# Patient Record
Sex: FEMALE | Race: WHITE | NOT HISPANIC OR LATINO | Employment: OTHER | URBAN - METROPOLITAN AREA
[De-identification: names, ages, dates, MRNs, and addresses within clinical notes are randomized per-mention and may not be internally consistent; named-entity substitution may affect disease eponyms.]

---

## 2017-01-09 RX ORDER — ATORVASTATIN CALCIUM 10 MG/1
10 TABLET, FILM COATED ORAL DAILY
Qty: 30 TABLET | Refills: 2 | Status: SHIPPED | OUTPATIENT
Start: 2017-01-09 | End: 2017-01-10 | Stop reason: SDUPTHER

## 2017-01-10 RX ORDER — ATORVASTATIN CALCIUM 10 MG/1
10 TABLET, FILM COATED ORAL DAILY
Qty: 90 TABLET | Refills: 1 | Status: SHIPPED | OUTPATIENT
Start: 2017-01-10 | End: 2017-01-11 | Stop reason: SDUPTHER

## 2017-01-11 RX ORDER — ATORVASTATIN CALCIUM 10 MG/1
10 TABLET, FILM COATED ORAL DAILY
Qty: 90 TABLET | Refills: 1 | Status: SHIPPED | OUTPATIENT
Start: 2017-01-11 | End: 2017-05-11

## 2017-02-22 ENCOUNTER — OFFICE VISIT (OUTPATIENT)
Dept: CARDIOLOGY | Facility: CLINIC | Age: 72
End: 2017-02-22

## 2017-02-22 VITALS — DIASTOLIC BLOOD PRESSURE: 79 MMHG | HEART RATE: 90 BPM | SYSTOLIC BLOOD PRESSURE: 127 MMHG

## 2017-02-22 DIAGNOSIS — R06.02 SOB (SHORTNESS OF BREATH): ICD-10-CM

## 2017-02-22 DIAGNOSIS — I25.10 CHRONIC CORONARY ARTERY DISEASE: Primary | ICD-10-CM

## 2017-02-22 DIAGNOSIS — I21.4 ACUTE NON-ST ELEVATION MYOCARDIAL INFARCTION (NSTEMI) (HCC): ICD-10-CM

## 2017-02-22 DIAGNOSIS — R09.89 BRUIT: Primary | ICD-10-CM

## 2017-02-22 DIAGNOSIS — E78.5 HYPERLIPIDEMIA, UNSPECIFIED HYPERLIPIDEMIA TYPE: ICD-10-CM

## 2017-02-22 DIAGNOSIS — R09.89 CAROTID BRUIT, UNSPECIFIED LATERALITY: ICD-10-CM

## 2017-02-22 DIAGNOSIS — I25.10 CHRONIC CORONARY ARTERY DISEASE: ICD-10-CM

## 2017-02-22 DIAGNOSIS — Z72.0 TOBACCO ABUSE: ICD-10-CM

## 2017-02-22 PROCEDURE — 93000 ELECTROCARDIOGRAM COMPLETE: CPT | Performed by: INTERNAL MEDICINE

## 2017-02-22 PROCEDURE — 99213 OFFICE O/P EST LOW 20 MIN: CPT | Performed by: INTERNAL MEDICINE

## 2017-02-22 NOTE — PROGRESS NOTES
Subjective:       Mary Jack is a 71 y.o. female who here for follow up    CC yearly follow-up for coronary artery disease     HPI  71-year-old white female with known history of coronary artery disease  Hyperlipidemia as well as a tobacco abuse with a history of myocardial infarction here for the follow-up denies any chest pains or tightness in chest    Complains of shortness of breath on exertion     Problem List Items Addressed This Visit        Cardiovascular and Mediastinum    Acute myocardial infarction    Relevant Orders    Adult Transthoracic Echo Complete    Treadmill Stress Test    Duplex Carotid Ultrasound CAR    Lipid Panel    Comprehensive Metabolic Panel    Chronic coronary artery disease - Primary    Relevant Orders    ECG 12 Lead    Adult Transthoracic Echo Complete    Treadmill Stress Test    Duplex Carotid Ultrasound CAR    Lipid Panel    Comprehensive Metabolic Panel    Hyperlipidemia    Relevant Orders    Adult Transthoracic Echo Complete    Treadmill Stress Test    Duplex Carotid Ultrasound CAR    Lipid Panel    Comprehensive Metabolic Panel       Other    Tobacco abuse    Relevant Orders    Adult Transthoracic Echo Complete    Treadmill Stress Test    Duplex Carotid Ultrasound CAR    Lipid Panel    Comprehensive Metabolic Panel      Other Visit Diagnoses     Carotid bruit, unspecified laterality        Relevant Orders    Adult Transthoracic Echo Complete    Treadmill Stress Test    Duplex Carotid Ultrasound CAR    Lipid Panel    Comprehensive Metabolic Panel        Previous treatments/evaluations include: ASA and beta blocker. Cardiac risk factors: advanced age (older than 55 for men, 65 for women) and hypertension.    The following portions of the patient's history were reviewed and updated as appropriate: allergies, current medications, past family history, past medical history, past social history, past surgical history and problem list.    Past Medical History   Diagnosis Date   •  Anemia    • Arthritis    • CAD (coronary artery disease)    • COPD (chronic obstructive pulmonary disease)    • Emphysema lung    • Empyema    • Fatigue    • GERD (gastroesophageal reflux disease)    • Headache    • High risk medication use    • History of cardiac catheterization      CATH STENT PLACEMENT: CIRCUMFLEX BRANCH   • Hypertension    • Jaundice    • Myocardial infarction    • Myocardial infarction    • Occult blood in stools    • Reflux gastritis     reports that she quit smoking about 8 months ago. She has a 75.00 pack-year smoking history. She does not have any smokeless tobacco history on file. She reports that she does not drink alcohol or use illicit drugs.  Family History   Problem Relation Age of Onset   • Colon cancer Mother    • Breast cancer Mother    • Other Father      cardiac disorder   • Hypertension Father    • Other Sister      cardiac disorder   • Hypertension Sister    • Lung disease Sister    • Thyroid disease Sister    • Other Brother      cardiac disorder   • Thyroid disease Daughter        Review of Systems  Constitutional: No wt loss, fever, fatigue  Gastrointestinal: No nausea, abdominal pain  Behavioral/Psych: No insomnia or anxiety   Cardiovascular shortness of breath no chest pain   Objective:       Physical Exam             Physical Exam  Visit Vitals   • /79   • Pulse 90       General appearance: NAD, conversant   Eyes: anicteric sclerae, moist conjunctivae; no lid-lag; PERRLA   HENT: Atraumatic; oropharynx clear with moist mucous membranes and no mucosal ulcerations;  normal hard and soft palate   Neck: Trachea midline; FROM, supple, no thyromegaly or lymphadenopathy   Lungs: CTA, with normal respiratory effort and no intercostal retractions   CV: S1-S2 regular, no murmurs, no rub, no gallop   Abdomen: Soft, non-tender; no masses or HSM   Extremities: No peripheral edema or extremity lymphadenopathy  Skin: Normal temperature, turgor and texture; no rash, ulcers or  subcutaneous nodules   Psych: Appropriate affect, alert and oriented to person, place and time           Cardiographics  @  ECG 12 Lead  Date/Time: 2/22/2017 3:50 PM  Performed by: ASHU BEVERLY  Authorized by: ASHU BEVERLY   Comparison: not compared with previous ECG   Previous ECG: no previous ECG available  Rhythm: sinus rhythm  Conduction: conduction normal  ST Segments: ST segments normal  Clinical impression: normal ECG            Echocardiogram:        Current Outpatient Prescriptions:   •  aspirin 81 MG tablet, Take 1 tablet by mouth daily., Disp: , Rfl:   •  atorvastatin (LIPITOR) 10 MG tablet, Take 1 tablet by mouth Daily., Disp: 90 tablet, Rfl: 1  •  Diphenhydramine-PE-APAP (ALLERGY & SINUS INTENSE ST PO), Take  by mouth., Disp: , Rfl:   •  Docusate Calcium (STOOL SOFTENER PO), Take  by mouth., Disp: , Rfl:   •  metoprolol tartrate (LOPRESSOR) 25 MG tablet, take 1 tablet by mouth twice a day, Disp: 60 tablet, Rfl: 5  •  nitroglycerin (NITROSTAT) 0.4 MG SL tablet, Place under the tongue. DISSOLVE 1 TABLET UNDER THE TONGUE AS NEEDED FOR CHEST PAIN., Disp: , Rfl:   •  omeprazole (PriLOSEC) 40 MG capsule, Take 40 mg by mouth daily., Disp: , Rfl:   •  Potassium (POTASSIMIN PO), Take  by mouth., Disp: , Rfl:   •  vitamin B-12 (CYANOCOBALAMIN) 2500 MCG sublingual tablet tablet, Place under the tongue daily., Disp: , Rfl:    Assessment:        Patient Active Problem List   Diagnosis   • Acute myocardial infarction   • Chronic obstructive pulmonary disease   • Chronic coronary artery disease   • Fatigue   • Anemia   • Heartburn   • Hyperlipidemia   • Tobacco abuse         CONCLUSION:  1. Previous stent widely patent.   2. Successful angioplasty and stent to the right coronary artery reduced from  80% down to 0% with a 2.5 x 18 Xience drug-eluting stent dilated to 2.7.          Plan:       Clinically no signs and symptoms of angina or chf, no side effects with current meds,.    Continue current  meds and treatment.        ICD-10-CM ICD-9-CM   1. Chronic coronary artery disease I25.10 414.00   2. Hyperlipidemia, unspecified hyperlipidemia type E78.5 272.4   3. Tobacco abuse Z72.0 305.1     FLP/CMP    US OF CAROTIDS    ETT/ ECHO  SEE US 1 MONTH  COUNSELING:    Mary Sands was given to patient for the following topics: diagnostic results, risk factor reductions, impressions, risks and benefits of treatment options and importance of treatment compliance .       SMOKING COUNSELING:    Counseling given: Not Answered      EMR Dragon/Transcription disclaimer:   Much of this encounter note is an electronic transcription/translation of spoken language to printed text. The electronic translation of spoken language may permit erroneous, or at times, nonsensical words or phrases to be inadvertently transcribed; Although I have reviewed the note for such errors, some may still exist.

## 2017-03-06 ENCOUNTER — HOSPITAL ENCOUNTER (OUTPATIENT)
Dept: CARDIOLOGY | Facility: HOSPITAL | Age: 72
Discharge: HOME | End: 2017-03-06
Attending: INTERNAL MEDICINE | Admitting: INTERNAL MEDICINE

## 2017-03-06 ENCOUNTER — APPOINTMENT (OUTPATIENT)
Dept: LAB | Facility: HOSPITAL | Age: 72
End: 2017-03-06

## 2017-03-06 DIAGNOSIS — E78.5 HYPERLIPIDEMIA, UNSPECIFIED HYPERLIPIDEMIA TYPE: ICD-10-CM

## 2017-03-06 DIAGNOSIS — R09.89 BRUIT: ICD-10-CM

## 2017-03-06 DIAGNOSIS — I25.10 CHRONIC CORONARY ARTERY DISEASE: ICD-10-CM

## 2017-03-06 DIAGNOSIS — R06.02 SOB (SHORTNESS OF BREATH): ICD-10-CM

## 2017-03-06 LAB
ALBUMIN SERPL-MCNC: 3.8 G/DL (ref 3.5–5.2)
ALBUMIN/GLOB SERPL: 1.3 G/DL
ALP SERPL-CCNC: 77 U/L (ref 39–117)
ALT SERPL W P-5'-P-CCNC: 13 U/L (ref 1–33)
ANION GAP SERPL CALCULATED.3IONS-SCNC: 12.1 MMOL/L
AST SERPL-CCNC: 14 U/L (ref 1–32)
BH CV XLRA MEAS LEFT CCA RATIO VEL: 80.9 CM/SEC
BH CV XLRA MEAS LEFT DIST CCA EDV: 25.8 CM/SEC
BH CV XLRA MEAS LEFT DIST CCA PSV: 80.9 CM/SEC
BH CV XLRA MEAS LEFT DIST ICA EDV: -38.7 CM/SEC
BH CV XLRA MEAS LEFT DIST ICA PSV: -99.7 CM/SEC
BH CV XLRA MEAS LEFT ICA RATIO VEL: -111 CM/SEC
BH CV XLRA MEAS LEFT ICA/CCA RATIO: -1.4
BH CV XLRA MEAS LEFT MID ICA EDV: -41.6 CM/SEC
BH CV XLRA MEAS LEFT MID ICA PSV: -111 CM/SEC
BH CV XLRA MEAS LEFT PROX CCA EDV: 26.7 CM/SEC
BH CV XLRA MEAS LEFT PROX CCA PSV: 110 CM/SEC
BH CV XLRA MEAS LEFT PROX ECA EDV: -19.3 CM/SEC
BH CV XLRA MEAS LEFT PROX ECA PSV: -77.4 CM/SEC
BH CV XLRA MEAS LEFT PROX ICA EDV: 27.6 CM/SEC
BH CV XLRA MEAS LEFT PROX ICA PSV: 89.1 CM/SEC
BH CV XLRA MEAS LEFT PROX SCLA EDV: 8.4 CM/SEC
BH CV XLRA MEAS LEFT PROX SCLA PSV: 123 CM/SEC
BH CV XLRA MEAS LEFT VERTEBRAL A EDV: 11 CM/SEC
BH CV XLRA MEAS LEFT VERTEBRAL A PSV: 55.8 CM/SEC
BH CV XLRA MEAS RIGHT CCA RATIO VEL: -54.2 CM/SEC
BH CV XLRA MEAS RIGHT DIST CCA EDV: -20.8 CM/SEC
BH CV XLRA MEAS RIGHT DIST CCA PSV: -54.2 CM/SEC
BH CV XLRA MEAS RIGHT DIST ICA EDV: -20.7 CM/SEC
BH CV XLRA MEAS RIGHT DIST ICA PSV: -62.8 CM/SEC
BH CV XLRA MEAS RIGHT ICA RATIO VEL: -269 CM/SEC
BH CV XLRA MEAS RIGHT ICA/CCA RATIO: 5
BH CV XLRA MEAS RIGHT MID ICA EDV: 22.6 CM/SEC
BH CV XLRA MEAS RIGHT MID ICA PSV: 80.5 CM/SEC
BH CV XLRA MEAS RIGHT PROX CCA EDV: 16.4 CM/SEC
BH CV XLRA MEAS RIGHT PROX CCA PSV: 72.7 CM/SEC
BH CV XLRA MEAS RIGHT PROX ECA EDV: 89.8 CM/SEC
BH CV XLRA MEAS RIGHT PROX ECA PSV: 366 CM/SEC
BH CV XLRA MEAS RIGHT PROX ICA EDV: -91.7 CM/SEC
BH CV XLRA MEAS RIGHT PROX ICA PSV: -269 CM/SEC
BH CV XLRA MEAS RIGHT PROX SCLA PSV: 144 CM/SEC
BH CV XLRA MEAS RIGHT VERTEBRAL A EDV: -22 CM/SEC
BH CV XLRA MEAS RIGHT VERTEBRAL A PSV: -63.3 CM/SEC
BILIRUB SERPL-MCNC: 0.2 MG/DL (ref 0.1–1.2)
BUN BLD-MCNC: 12 MG/DL (ref 8–23)
BUN/CREAT SERPL: 13.5 (ref 7–25)
CALCIUM SPEC-SCNC: 9.6 MG/DL (ref 8.6–10.5)
CHLORIDE SERPL-SCNC: 100 MMOL/L (ref 98–107)
CHOLEST SERPL-MCNC: 162 MG/DL (ref 0–200)
CO2 SERPL-SCNC: 27.9 MMOL/L (ref 22–29)
CREAT BLD-MCNC: 0.89 MG/DL (ref 0.57–1)
GFR SERPL CREATININE-BSD FRML MDRD: 63 ML/MIN/1.73
GLOBULIN UR ELPH-MCNC: 2.9 GM/DL
GLUCOSE BLD-MCNC: 81 MG/DL (ref 65–99)
HDLC SERPL-MCNC: 72 MG/DL (ref 40–60)
LDLC SERPL CALC-MCNC: 66 MG/DL (ref 0–100)
LDLC/HDLC SERPL: 0.92 {RATIO}
LEFT ARM BP: NORMAL MMHG
POTASSIUM BLD-SCNC: 3.7 MMOL/L (ref 3.5–5.2)
PROT SERPL-MCNC: 6.7 G/DL (ref 6–8.5)
RIGHT ARM BP: NORMAL MMHG
SODIUM BLD-SCNC: 140 MMOL/L (ref 136–145)
TRIGL SERPL-MCNC: 119 MG/DL (ref 0–150)
VLDLC SERPL-MCNC: 23.8 MG/DL (ref 5–40)

## 2017-03-06 PROCEDURE — 80061 LIPID PANEL: CPT | Performed by: INTERNAL MEDICINE

## 2017-03-06 PROCEDURE — 36415 COLL VENOUS BLD VENIPUNCTURE: CPT | Performed by: INTERNAL MEDICINE

## 2017-03-06 PROCEDURE — 93880 EXTRACRANIAL BILAT STUDY: CPT

## 2017-03-06 PROCEDURE — 80053 COMPREHEN METABOLIC PANEL: CPT | Performed by: INTERNAL MEDICINE

## 2017-03-20 ENCOUNTER — HOSPITAL ENCOUNTER (OUTPATIENT)
Dept: CARDIOLOGY | Facility: HOSPITAL | Age: 72
Discharge: HOME | End: 2017-03-20
Attending: INTERNAL MEDICINE | Admitting: INTERNAL MEDICINE

## 2017-03-20 ENCOUNTER — HOSPITAL ENCOUNTER (OUTPATIENT)
Dept: CARDIOLOGY | Facility: HOSPITAL | Age: 72
Discharge: HOME | End: 2017-03-20
Attending: INTERNAL MEDICINE

## 2017-03-20 ENCOUNTER — OFFICE VISIT (OUTPATIENT)
Dept: CARDIOLOGY | Facility: CLINIC | Age: 72
End: 2017-03-20

## 2017-03-20 VITALS
RESPIRATION RATE: 20 BRPM | SYSTOLIC BLOOD PRESSURE: 126 MMHG | HEART RATE: 87 BPM | OXYGEN SATURATION: 95 % | HEIGHT: 65 IN | BODY MASS INDEX: 26.49 KG/M2 | DIASTOLIC BLOOD PRESSURE: 80 MMHG | WEIGHT: 159 LBS

## 2017-03-20 VITALS
HEIGHT: 65 IN | HEART RATE: 87 BPM | WEIGHT: 159 LBS | BODY MASS INDEX: 26.49 KG/M2 | SYSTOLIC BLOOD PRESSURE: 126 MMHG | DIASTOLIC BLOOD PRESSURE: 80 MMHG

## 2017-03-20 VITALS
WEIGHT: 159 LBS | HEART RATE: 87 BPM | BODY MASS INDEX: 26.49 KG/M2 | SYSTOLIC BLOOD PRESSURE: 126 MMHG | HEIGHT: 65 IN | DIASTOLIC BLOOD PRESSURE: 80 MMHG

## 2017-03-20 DIAGNOSIS — I25.10 CHRONIC CORONARY ARTERY DISEASE: ICD-10-CM

## 2017-03-20 DIAGNOSIS — R09.89 BRUIT: ICD-10-CM

## 2017-03-20 DIAGNOSIS — E78.5 HYPERLIPIDEMIA, UNSPECIFIED HYPERLIPIDEMIA TYPE: ICD-10-CM

## 2017-03-20 DIAGNOSIS — I25.118 CORONARY ARTERY DISEASE OF NATIVE ARTERY OF NATIVE HEART WITH STABLE ANGINA PECTORIS (HCC): ICD-10-CM

## 2017-03-20 DIAGNOSIS — I21.29 ACUTE MYOCARDIAL INFARCTION INVOLVING OTHER CORONARY ARTERY: ICD-10-CM

## 2017-03-20 DIAGNOSIS — R06.02 SOB (SHORTNESS OF BREATH): ICD-10-CM

## 2017-03-20 DIAGNOSIS — R06.02 SOB (SHORTNESS OF BREATH): Primary | ICD-10-CM

## 2017-03-20 DIAGNOSIS — Z72.0 TOBACCO ABUSE: ICD-10-CM

## 2017-03-20 LAB
BH CV ECHO MEAS - ACS: 1.7 CM
BH CV ECHO MEAS - AO MAX PG (FULL): 2.5 MMHG
BH CV ECHO MEAS - AO MAX PG: 6.8 MMHG
BH CV ECHO MEAS - AO MEAN PG (FULL): 1 MMHG
BH CV ECHO MEAS - AO MEAN PG: 4 MMHG
BH CV ECHO MEAS - AO ROOT AREA (BSA CORRECTED): 1.8
BH CV ECHO MEAS - AO ROOT AREA: 8 CM^2
BH CV ECHO MEAS - AO ROOT DIAM: 3.2 CM
BH CV ECHO MEAS - AO V2 MAX: 130 CM/SEC
BH CV ECHO MEAS - AO V2 MEAN: 93.9 CM/SEC
BH CV ECHO MEAS - AO V2 VTI: 25.5 CM
BH CV ECHO MEAS - AVA(I,A): 2.2 CM^2
BH CV ECHO MEAS - AVA(I,D): 2.2 CM^2
BH CV ECHO MEAS - AVA(V,A): 2.2 CM^2
BH CV ECHO MEAS - AVA(V,D): 2.2 CM^2
BH CV ECHO MEAS - BSA(HAYCOCK): 1.8 M^2
BH CV ECHO MEAS - BSA: 1.8 M^2
BH CV ECHO MEAS - BZI_BMI: 26.5 KILOGRAMS/M^2
BH CV ECHO MEAS - BZI_DIASTOLIC_PRESSURE: 80 MMHG
BH CV ECHO MEAS - BZI_HEART_RATE: 87 BPM
BH CV ECHO MEAS - BZI_METRIC_HEIGHT: 165.1 CM
BH CV ECHO MEAS - BZI_METRIC_WEIGHT: 72.1 KG
BH CV ECHO MEAS - BZI_SYSTOLIC_PRESSURE: 126 MMHG
BH CV ECHO MEAS - CONTRAST EF 4CH: 56.4 ML/M^2
BH CV ECHO MEAS - EDV(CUBED): 42.9 ML
BH CV ECHO MEAS - EDV(MOD-SP4): 39 ML
BH CV ECHO MEAS - EDV(TEICH): 50.9 ML
BH CV ECHO MEAS - EF(CUBED): 66.5 %
BH CV ECHO MEAS - EF(MOD-SP4): 56.4 %
BH CV ECHO MEAS - EF(TEICH): 59.1 %
BH CV ECHO MEAS - ESV(CUBED): 14.3 ML
BH CV ECHO MEAS - ESV(MOD-SP4): 17 ML
BH CV ECHO MEAS - ESV(TEICH): 20.8 ML
BH CV ECHO MEAS - FS: 30.6 %
BH CV ECHO MEAS - IVS/LVPW: 0.94
BH CV ECHO MEAS - IVSD: 1.5 CM
BH CV ECHO MEAS - LA DIMENSION: 2 CM
BH CV ECHO MEAS - LA/AO: 0.63
BH CV ECHO MEAS - LAT PEAK E' VEL: 7 CM/SEC
BH CV ECHO MEAS - LV DIASTOLIC VOL/BSA (35-75): 21.7 ML/M^2
BH CV ECHO MEAS - LV MASS(C)D: 204.1 GRAMS
BH CV ECHO MEAS - LV MASS(C)DI: 113.8 GRAMS/M^2
BH CV ECHO MEAS - LV MAX PG: 4.2 MMHG
BH CV ECHO MEAS - LV MEAN PG: 3 MMHG
BH CV ECHO MEAS - LV SYSTOLIC VOL/BSA (12-30): 9.5 ML/M^2
BH CV ECHO MEAS - LV V1 MAX: 103 CM/SEC
BH CV ECHO MEAS - LV V1 MEAN: 77.8 CM/SEC
BH CV ECHO MEAS - LV V1 VTI: 20.2 CM
BH CV ECHO MEAS - LVIDD: 3.5 CM
BH CV ECHO MEAS - LVIDS: 2.4 CM
BH CV ECHO MEAS - LVLD AP4: 6.2 CM
BH CV ECHO MEAS - LVLS AP4: 5.1 CM
BH CV ECHO MEAS - LVOT AREA (M): 2.8 CM^2
BH CV ECHO MEAS - LVOT AREA: 2.8 CM^2
BH CV ECHO MEAS - LVOT DIAM: 1.9 CM
BH CV ECHO MEAS - LVPWD: 1.6 CM
BH CV ECHO MEAS - MED PEAK E' VEL: 5 CM/SEC
BH CV ECHO MEAS - MR MAX PG: 11.6 MMHG
BH CV ECHO MEAS - MR MAX VEL: 170 CM/SEC
BH CV ECHO MEAS - MV A DUR: 0.12 SEC
BH CV ECHO MEAS - MV A MAX VEL: 87.4 CM/SEC
BH CV ECHO MEAS - MV DEC SLOPE: 238 CM/SEC^2
BH CV ECHO MEAS - MV DEC TIME: 0.22 SEC
BH CV ECHO MEAS - MV E MAX VEL: 49.4 CM/SEC
BH CV ECHO MEAS - MV E/A: 0.57
BH CV ECHO MEAS - MV MAX PG: 3 MMHG
BH CV ECHO MEAS - MV MEAN PG: 1 MMHG
BH CV ECHO MEAS - MV P1/2T MAX VEL: 56.8 CM/SEC
BH CV ECHO MEAS - MV P1/2T: 69.9 MSEC
BH CV ECHO MEAS - MV V2 MAX: 86.5 CM/SEC
BH CV ECHO MEAS - MV V2 MEAN: 46 CM/SEC
BH CV ECHO MEAS - MV V2 VTI: 15.3 CM
BH CV ECHO MEAS - MVA P1/2T LCG: 3.9 CM^2
BH CV ECHO MEAS - MVA(P1/2T): 3.1 CM^2
BH CV ECHO MEAS - MVA(VTI): 3.7 CM^2
BH CV ECHO MEAS - PA MAX PG: 3.4 MMHG
BH CV ECHO MEAS - PA MEAN PG: 1 MMHG
BH CV ECHO MEAS - PA V2 MAX: 88.5 CM/SEC
BH CV ECHO MEAS - PA V2 MEAN: 45.4 CM/SEC
BH CV ECHO MEAS - PA V2 VTI: 10.4 CM
BH CV ECHO MEAS - PULM A REVS DUR: 0.11 SEC
BH CV ECHO MEAS - PULM A REVS VEL: 107 CM/SEC
BH CV ECHO MEAS - PULM DIAS VEL: 38.5 CM/SEC
BH CV ECHO MEAS - PULM S/D: 1.8
BH CV ECHO MEAS - PULM SYS VEL: 69.1 CM/SEC
BH CV ECHO MEAS - RAP SYSTOLE: 10 MMHG
BH CV ECHO MEAS - RVSP: 35 MMHG
BH CV ECHO MEAS - SI(AO): 114.3 ML/M^2
BH CV ECHO MEAS - SI(CUBED): 15.9 ML/M^2
BH CV ECHO MEAS - SI(LVOT): 31.9 ML/M^2
BH CV ECHO MEAS - SI(MOD-SP4): 12.3 ML/M^2
BH CV ECHO MEAS - SI(TEICH): 16.8 ML/M^2
BH CV ECHO MEAS - SV(AO): 205.1 ML
BH CV ECHO MEAS - SV(CUBED): 28.5 ML
BH CV ECHO MEAS - SV(LVOT): 57.3 ML
BH CV ECHO MEAS - SV(MOD-SP4): 22 ML
BH CV ECHO MEAS - SV(TEICH): 30.1 ML
BH CV ECHO MEAS - TAPSE (>1.6): 2 CM2
BH CV ECHO MEAS - TR MAX VEL: 250 CM/SEC
BH CV XLRA - RV BASE: 2.5 CM
BH CV XLRA - RV LENGTH: 5.3 CM
BH CV XLRA - RV MID: 2.3 CM
E/E' RATIO: 9
LEFT ATRIUM VOLUME INDEX: 17 ML/M2

## 2017-03-20 PROCEDURE — 93306 TTE W/DOPPLER COMPLETE: CPT

## 2017-03-20 PROCEDURE — 93306 TTE W/DOPPLER COMPLETE: CPT | Performed by: INTERNAL MEDICINE

## 2017-03-20 PROCEDURE — 93016 CV STRESS TEST SUPVJ ONLY: CPT | Performed by: INTERNAL MEDICINE

## 2017-03-20 PROCEDURE — 99213 OFFICE O/P EST LOW 20 MIN: CPT | Performed by: INTERNAL MEDICINE

## 2017-03-20 PROCEDURE — 93018 CV STRESS TEST I&R ONLY: CPT | Performed by: INTERNAL MEDICINE

## 2017-03-20 PROCEDURE — 93017 CV STRESS TEST TRACING ONLY: CPT

## 2017-03-20 PROCEDURE — 93000 ELECTROCARDIOGRAM COMPLETE: CPT | Performed by: INTERNAL MEDICINE

## 2017-03-20 NOTE — PROGRESS NOTES
Subjective:       Mary Jack is a 71 y.o. female who here for follow up    CC  Follow-up for the coronary artery disease as well as a stress test and echocardiogram  HPI  71-year-old white female with known history of coronary artery disease, hyperlipidemia has been complaining of increasing shortness of breath recently had a stress stress test was inconclusive as patient exercised 1 minute    Patient denies any tightness and heaviness or pressure sensations in the chest     Problem List Items Addressed This Visit        Cardiovascular and Mediastinum    Chronic coronary artery disease    Hyperlipidemia       Respiratory    SOB (shortness of breath) - Primary    Relevant Orders    ECG 12 Lead       Other    Tobacco abuse        Previous treatments/evaluations include: ASA. Cardiac risk factors: advanced age (older than 55 for men, 65 for women).    The following portions of the patient's history were reviewed and updated as appropriate: allergies, current medications, past family history, past medical history, past social history, past surgical history and problem list.    Past Medical History   Diagnosis Date   • Anemia    • Arthritis    • CAD (coronary artery disease)    • COPD (chronic obstructive pulmonary disease)    • Emphysema lung    • Empyema    • Fatigue    • GERD (gastroesophageal reflux disease)    • Headache    • High risk medication use    • History of cardiac catheterization      CATH STENT PLACEMENT: CIRCUMFLEX BRANCH   • Hyperlipidemia    • Hypertension    • Jaundice    • Myocardial infarction    • Myocardial infarction    • Occult blood in stools    • Reflux gastritis     reports that she has quit smoking. She has a 59.00 pack-year smoking history. She has never used smokeless tobacco. She reports that she does not drink alcohol or use illicit drugs.  Family History   Problem Relation Age of Onset   • Colon cancer Mother    • Breast cancer Mother    • Other Father      cardiac disorder   •  "Hypertension Father    • Other Sister      cardiac disorder   • Hypertension Sister    • Lung disease Sister    • Thyroid disease Sister    • Other Brother      cardiac disorder   • Thyroid disease Daughter        Review of Systems  Constitutional: No wt loss, fever, fatigue  Gastrointestinal: No nausea, abdominal pain  Behavioral/Psych: No insomnia or anxiety   Cardiovascular chest pains and shortness of breath  Objective:       Physical Exam           Physical Exam  Visit Vitals   • /80   • Pulse 87   • Ht 65\" (165.1 cm)   • Wt 159 lb (72.1 kg)   • BMI 26.46 kg/m2       General appearance: NAD, conversant   Eyes: anicteric sclerae, moist conjunctivae; no lid-lag; PERRLA   HENT: Atraumatic; oropharynx clear with moist mucous membranes and no mucosal ulcerations;  normal hard and soft palate   Neck: Trachea midline; FROM, supple, no thyromegaly or lymphadenopathy   Lungs: CTA, with normal respiratory effort and no intercostal retractions   CV: S1-S2 regular, no murmurs, no rub, no gallop   Abdomen: Soft, non-tender; no masses or HSM   Extremities: No peripheral edema or extremity lymphadenopathy  Skin: Normal temperature, turgor and texture; no rash, ulcers or subcutaneous nodules   Psych: Appropriate affect, alert and oriented to person, place and time           Cardiographics  @  ECG 12 Lead  Date/Time: 3/20/2017 3:56 PM  Performed by: ASHU BEVERLY  Authorized by: ASHU BEVERLY   Comparison: compared with previous ECG   Similar to previous ECG  Rhythm: sinus rhythm  Clinical impression: normal ECG            Echocardiogram:        Current Outpatient Prescriptions:   •  aspirin 81 MG tablet, Take 1 tablet by mouth daily., Disp: , Rfl:   •  atorvastatin (LIPITOR) 10 MG tablet, Take 1 tablet by mouth Daily., Disp: 90 tablet, Rfl: 1  •  Diphenhydramine-PE-APAP (ALLERGY & SINUS INTENSE ST PO), Take  by mouth., Disp: , Rfl:   •  Docusate Calcium (STOOL SOFTENER PO), Take  by mouth., Disp: , Rfl: "   •  metoprolol tartrate (LOPRESSOR) 25 MG tablet, take 1 tablet by mouth twice a day (Patient taking differently: take 1 tablet by mouth once daily at night), Disp: 60 tablet, Rfl: 5  •  nitroglycerin (NITROSTAT) 0.4 MG SL tablet, Place under the tongue. DISSOLVE 1 TABLET UNDER THE TONGUE AS NEEDED FOR CHEST PAIN., Disp: , Rfl:   •  omeprazole (PriLOSEC) 40 MG capsule, Take 40 mg by mouth daily., Disp: , Rfl:   •  Potassium (POTASSIMIN PO), Take  by mouth., Disp: , Rfl:    Assessment:        Patient Active Problem List   Diagnosis   • Acute myocardial infarction   • Chronic obstructive pulmonary disease   • Chronic coronary artery disease   • Fatigue   • Anemia   • Heartburn   • Hyperlipidemia   • Tobacco abuse     Interpretation Summary   · Proximal right internal carotid artery moderate stenosis.  · Mid left internal carotid artery mild stenosis.     Total Cholesterol 0 - 200 mg/dL 162   Triglycerides 0 - 150 mg/dL 119   HDL Cholesterol 40 - 60 mg/dL 72 (H)   LDL Cholesterol  0 - 100 mg/dL 66   VLDL Cholesterol 5 - 40 mg/dL 23.8   LDL/HDL Ratio  0.92   Resulting Agency  [facility]     Glucose 65 - 99 mg/dL 81   BUN 8 - 23 mg/dL 12   Creatinine 0.57 - 1.00 mg/dL 0.89   Sodium 136 - 145 mmol/L 140   Potassium 3.5 - 5.2 mmol/L 3.7   Chloride 98 - 107 mmol/L 100   CO2 22.0 - 29.0 mmol/L 27.9   Calcium 8.6 - 10.5 mg/dL 9.6   Total Protein 6.0 - 8.5 g/dL 6.7   Albumin 3.50 - 5.20 g/dL 3.80   ALT (SGPT) 1 - 33 U/L 13   AST (SGOT) 1 - 32 U/L 14   Alkaline Phosphatase 39 - 117 U/L 77   Total Bilirubin 0.1 - 1.2 mg/dL 0.2   eGFR Non African Amer >60 mL/min/1.73 63   Globulin gm/dL 2.9   A/G Ratio g/dL 1.3               Plan:         shortness of breath most likely due to the COPD but possibility of the coronary artery disease, because of inconclusive stress test patient will have the Lexiscan Cardiolite done    Mary Jack has been explained that tobacco abuse is extremely harmful to the body including to the  cardiovascular, it significantly increases the risk of atherosclerosis, myocardial infarction, strokes and peripheral vascular disease  Strongly advised to stop tobacco abuse  Secondhand smoking also has been explained      ICD-10-CM ICD-9-CM   1. SOB (shortness of breath) R06.02 786.05   2. Chronic coronary artery disease I25.10 414.00   3. Hyperlipidemia, unspecified hyperlipidemia type E78.5 272.4   4. Tobacco abuse Z72.0 305.1     Pt could not do lexiscan cardiolyte and see us 2 wks  COUNSELING:    Mary Sands was given to patient for the following topics: diagnostic results, risk factor reductions, impressions, risks and benefits of treatment options and importance of treatment compliance .       SMOKING COUNSELING:    Counseling given: Not Answered      EMR Dragon/Transcription disclaimer:   Much of this encounter note is an electronic transcription/translation of spoken language to printed text. The electronic translation of spoken language may permit erroneous, or at times, nonsensical words or phrases to be inadvertently transcribed; Although I have reviewed the note for such errors, some may still exist.

## 2017-03-21 PROBLEM — R06.02 SOB (SHORTNESS OF BREATH): Status: ACTIVE | Noted: 2017-03-21

## 2017-03-21 LAB
BH CV STRESS BP STAGE 1: NORMAL
BH CV STRESS DURATION MIN STAGE 1: 1
BH CV STRESS DURATION SEC STAGE 1: 41
BH CV STRESS GRADE STAGE 1: 10
BH CV STRESS HR STAGE 1: 129
BH CV STRESS METS STAGE 1: 4
BH CV STRESS O2 STAGE 1: 93
BH CV STRESS PROTOCOL 1: NORMAL
BH CV STRESS RECOVERY BP: NORMAL MMHG
BH CV STRESS RECOVERY HR: 86 BPM
BH CV STRESS RECOVERY O2: 98 %
BH CV STRESS SPEED STAGE 1: 1.7
BH CV STRESS STAGE 1: 1
MAXIMAL PREDICTED HEART RATE: 151 BPM
PERCENT MAX PREDICTED HR: 85.43 %
STRESS BASELINE BP: NORMAL MMHG
STRESS BASELINE HR: 96 BPM
STRESS O2 SAT REST: 95 %
STRESS PERCENT HR: 101 %
STRESS POST ESTIMATED WORKLOAD: 4 METS
STRESS POST EXERCISE DUR MIN: 1 MIN
STRESS POST EXERCISE DUR SEC: 41 SEC
STRESS POST O2 SAT PEAK: 93 %
STRESS POST PEAK BP: NORMAL MMHG
STRESS POST PEAK HR: 129 BPM
STRESS TARGET HR: 128 BPM

## 2017-03-22 ENCOUNTER — HOSPITAL ENCOUNTER (OUTPATIENT)
Facility: HOSPITAL | Age: 72
Setting detail: OBSERVATION
LOS: 1 days | Discharge: HOME | End: 2017-03-24
Attending: EMERGENCY MEDICINE | Admitting: HOSPITALIST

## 2017-03-22 ENCOUNTER — APPOINTMENT (OUTPATIENT)
Dept: CT IMAGING | Facility: HOSPITAL | Age: 72
End: 2017-03-22

## 2017-03-22 DIAGNOSIS — R10.31 RIGHT LOWER QUADRANT ABDOMINAL PAIN: Primary | ICD-10-CM

## 2017-03-22 DIAGNOSIS — J44.1 COPD EXACERBATION (HCC): ICD-10-CM

## 2017-03-22 DIAGNOSIS — I71.40 ABDOMINAL AORTIC ANEURYSM (AAA) WITHOUT RUPTURE (HCC): ICD-10-CM

## 2017-03-22 DIAGNOSIS — R06.02 SOB (SHORTNESS OF BREATH): ICD-10-CM

## 2017-03-22 DIAGNOSIS — N39.0 ACUTE UTI: ICD-10-CM

## 2017-03-22 DIAGNOSIS — R09.02 HYPOXIA: ICD-10-CM

## 2017-03-22 LAB
ALBUMIN SERPL-MCNC: 4.4 G/DL (ref 3.5–5.2)
ALBUMIN/GLOB SERPL: 1.3 G/DL
ALP SERPL-CCNC: 100 U/L (ref 40–129)
ALT SERPL W P-5'-P-CCNC: 13 U/L (ref 5–33)
ANION GAP SERPL CALCULATED.3IONS-SCNC: 13.2 MMOL/L
AST SERPL-CCNC: 18 U/L (ref 5–32)
BACTERIA UR QL AUTO: ABNORMAL /HPF
BASOPHILS # BLD AUTO: 0.05 10*3/MM3 (ref 0–0.2)
BASOPHILS NFR BLD AUTO: 0.9 % (ref 0–2)
BILIRUB SERPL-MCNC: 0.4 MG/DL (ref 0.2–1.2)
BILIRUB UR QL STRIP: NEGATIVE
BUN BLD-MCNC: 12 MG/DL (ref 8–23)
BUN/CREAT SERPL: 10.5 (ref 7–25)
CALCIUM SPEC-SCNC: 9.6 MG/DL (ref 8.8–10.5)
CHLORIDE SERPL-SCNC: 94 MMOL/L (ref 98–107)
CLARITY UR: CLEAR
CO2 SERPL-SCNC: 24.8 MMOL/L (ref 22–29)
COLOR UR: YELLOW
CREAT BLD-MCNC: 1.14 MG/DL (ref 0.57–1)
DEPRECATED RDW RBC AUTO: 46.8 FL (ref 37–54)
EOSINOPHIL # BLD AUTO: 0.07 10*3/MM3 (ref 0.1–0.3)
EOSINOPHIL NFR BLD AUTO: 1.3 % (ref 0–4)
ERYTHROCYTE [DISTWIDTH] IN BLOOD BY AUTOMATED COUNT: 13.3 % (ref 11.5–14.5)
GFR SERPL CREATININE-BSD FRML MDRD: 47 ML/MIN/1.73
GLOBULIN UR ELPH-MCNC: 3.5 GM/DL
GLUCOSE BLD-MCNC: 111 MG/DL (ref 65–99)
GLUCOSE UR STRIP-MCNC: NEGATIVE MG/DL
HCT VFR BLD AUTO: 39.8 % (ref 37–47)
HGB BLD-MCNC: 13 G/DL (ref 12–16)
HGB UR QL STRIP.AUTO: NEGATIVE
HYALINE CASTS UR QL AUTO: ABNORMAL /LPF
IMM GRANULOCYTES # BLD: 0.01 10*3/MM3 (ref 0–0.03)
IMM GRANULOCYTES NFR BLD: 0.2 % (ref 0–0.5)
KETONES UR QL STRIP: NEGATIVE
LEUKOCYTE ESTERASE UR QL STRIP.AUTO: ABNORMAL
LIPASE SERPL-CCNC: 56 U/L (ref 13–60)
LYMPHOCYTES # BLD AUTO: 1.79 10*3/MM3 (ref 0.6–4.8)
LYMPHOCYTES NFR BLD AUTO: 33.1 % (ref 20–45)
MCH RBC QN AUTO: 31.3 PG (ref 27–31)
MCHC RBC AUTO-ENTMCNC: 32.7 G/DL (ref 31–37)
MCV RBC AUTO: 95.9 FL (ref 81–99)
MONOCYTES # BLD AUTO: 0.64 10*3/MM3 (ref 0–1)
MONOCYTES NFR BLD AUTO: 11.9 % (ref 3–8)
NEUTROPHILS # BLD AUTO: 2.84 10*3/MM3 (ref 1.5–8.3)
NEUTROPHILS NFR BLD AUTO: 52.6 % (ref 45–70)
NITRITE UR QL STRIP: NEGATIVE
NRBC BLD MANUAL-RTO: 0 /100 WBC (ref 0–0)
PH UR STRIP.AUTO: 5.5 [PH] (ref 4.5–8)
PLATELET # BLD AUTO: 202 10*3/MM3 (ref 140–500)
PMV BLD AUTO: 10.7 FL (ref 7.4–10.4)
POTASSIUM BLD-SCNC: 4 MMOL/L (ref 3.5–5.2)
PROT SERPL-MCNC: 7.9 G/DL (ref 6–8.5)
PROT UR QL STRIP: NEGATIVE
RBC # BLD AUTO: 4.15 10*6/MM3 (ref 4.2–5.4)
RBC # UR: ABNORMAL /HPF
REF LAB TEST METHOD: ABNORMAL
SODIUM BLD-SCNC: 132 MMOL/L (ref 136–145)
SP GR UR STRIP: 1.01 (ref 1–1.03)
SQUAMOUS #/AREA URNS HPF: ABNORMAL /HPF
UROBILINOGEN UR QL STRIP: ABNORMAL
WBC NRBC COR # BLD: 5.4 10*3/MM3 (ref 4.8–10.8)
WBC UR QL AUTO: ABNORMAL /HPF

## 2017-03-22 PROCEDURE — 25010000002 ONDANSETRON PER 1 MG: Performed by: EMERGENCY MEDICINE

## 2017-03-22 PROCEDURE — 25010000002 HYDROMORPHONE PER 4 MG: Performed by: EMERGENCY MEDICINE

## 2017-03-22 PROCEDURE — 85025 COMPLETE CBC W/AUTO DIFF WBC: CPT | Performed by: EMERGENCY MEDICINE

## 2017-03-22 PROCEDURE — 99284 EMERGENCY DEPT VISIT MOD MDM: CPT

## 2017-03-22 PROCEDURE — 99284 EMERGENCY DEPT VISIT MOD MDM: CPT | Performed by: EMERGENCY MEDICINE

## 2017-03-22 PROCEDURE — 74176 CT ABD & PELVIS W/O CONTRAST: CPT

## 2017-03-22 PROCEDURE — 80053 COMPREHEN METABOLIC PANEL: CPT | Performed by: EMERGENCY MEDICINE

## 2017-03-22 PROCEDURE — 83690 ASSAY OF LIPASE: CPT | Performed by: EMERGENCY MEDICINE

## 2017-03-22 PROCEDURE — 87086 URINE CULTURE/COLONY COUNT: CPT | Performed by: EMERGENCY MEDICINE

## 2017-03-22 PROCEDURE — 96374 THER/PROPH/DIAG INJ IV PUSH: CPT

## 2017-03-22 PROCEDURE — 81001 URINALYSIS AUTO W/SCOPE: CPT | Performed by: EMERGENCY MEDICINE

## 2017-03-22 PROCEDURE — 96375 TX/PRO/DX INJ NEW DRUG ADDON: CPT

## 2017-03-22 RX ORDER — ONDANSETRON 2 MG/ML
4 INJECTION INTRAMUSCULAR; INTRAVENOUS ONCE
Status: COMPLETED | OUTPATIENT
Start: 2017-03-22 | End: 2017-03-22

## 2017-03-22 RX ADMIN — ONDANSETRON 4 MG: 2 INJECTION, SOLUTION INTRAMUSCULAR; INTRAVENOUS at 21:40

## 2017-03-22 RX ADMIN — HYDROMORPHONE HYDROCHLORIDE 1 MG: 1 INJECTION, SOLUTION INTRAMUSCULAR; INTRAVENOUS; SUBCUTANEOUS at 21:40

## 2017-03-23 ENCOUNTER — APPOINTMENT (OUTPATIENT)
Dept: CT IMAGING | Facility: HOSPITAL | Age: 72
End: 2017-03-23

## 2017-03-23 ENCOUNTER — APPOINTMENT (OUTPATIENT)
Dept: GENERAL RADIOLOGY | Facility: HOSPITAL | Age: 72
End: 2017-03-23

## 2017-03-23 PROBLEM — R10.31 RIGHT LOWER QUADRANT ABDOMINAL PAIN: Status: ACTIVE | Noted: 2017-03-23

## 2017-03-23 PROBLEM — R09.02 HYPOXIA: Status: ACTIVE | Noted: 2017-03-23

## 2017-03-23 LAB
AMYLASE SERPL-CCNC: 90 U/L (ref 28–100)
ARTERIAL PATENCY WRIST A: POSITIVE
ARTERIAL PATENCY WRIST A: POSITIVE
ATMOSPHERIC PRESS: 759 MMHG
ATMOSPHERIC PRESS: 759 MMHG
B PERT DNA SPEC QL NAA+PROBE: NOT DETECTED
BASE EXCESS BLDA CALC-SCNC: -1.1 MMOL/L (ref -2.3–2.3)
BASE EXCESS BLDA CALC-SCNC: 0.8 MMOL/L (ref -2.3–2.3)
BDY SITE: ABNORMAL
BDY SITE: ABNORMAL
C PNEUM DNA NPH QL NAA+NON-PROBE: NOT DETECTED
D DIMER PPP FEU-MCNC: 0.44 MCGFEU/ML (ref 0–0.46)
FLUAV H1 2009 PAND RNA NPH QL NAA+PROBE: NOT DETECTED
FLUAV H1 HA GENE NPH QL NAA+PROBE: NOT DETECTED
FLUAV H3 RNA NPH QL NAA+PROBE: NOT DETECTED
FLUAV SUBTYP SPEC NAA+PROBE: NOT DETECTED
FLUBV RNA ISLT QL NAA+PROBE: NOT DETECTED
GAS FLOW AIRWAY: 3 LPM
HADV DNA SPEC NAA+PROBE: NOT DETECTED
HCO3 BLDA-SCNC: 23.9 MMOL/L (ref 22–26)
HCO3 BLDA-SCNC: 27.1 MMOL/L (ref 22–26)
HCOV 229E RNA SPEC QL NAA+PROBE: NOT DETECTED
HCOV HKU1 RNA SPEC QL NAA+PROBE: NOT DETECTED
HCOV NL63 RNA SPEC QL NAA+PROBE: NOT DETECTED
HCOV OC43 RNA SPEC QL NAA+PROBE: NOT DETECTED
HGB BLDA-MCNC: 12.5 G/DL (ref 12–18)
HGB BLDA-MCNC: 12.9 G/DL (ref 12–18)
HMPV RNA NPH QL NAA+NON-PROBE: NOT DETECTED
HPIV1 RNA SPEC QL NAA+PROBE: NOT DETECTED
HPIV2 RNA SPEC QL NAA+PROBE: NOT DETECTED
HPIV3 RNA NPH QL NAA+PROBE: NOT DETECTED
HPIV4 P GENE NPH QL NAA+PROBE: NOT DETECTED
LIPASE SERPL-CCNC: 34 U/L (ref 13–60)
M PNEUMO IGG SER IA-ACNC: NOT DETECTED
MODALITY: ABNORMAL
MODALITY: ABNORMAL
NT-PROBNP SERPL-MCNC: 139.8 PG/ML (ref 0–900)
PCO2 BLDA: 40.6 MM HG (ref 35–45)
PCO2 BLDA: 50.4 MM HG (ref 35–45)
PH BLDA: 7.35 PH UNITS (ref 7.35–7.45)
PH BLDA: 7.39 PH UNITS (ref 7.35–7.45)
PO2 BLDA: 46.4 MM HG (ref 80–100)
PO2 BLDA: 63.6 MM HG (ref 80–100)
PROCALCITONIN SERPL-MCNC: 0.07 NG/ML (ref 0.1–0.25)
RHINOVIRUS RNA SPEC NAA+PROBE: NOT DETECTED
RSV RNA NPH QL NAA+NON-PROBE: NOT DETECTED
SAO2 % BLDCOA: 81.9 % (ref 91–100)
SAO2 % BLDCOA: 92.2 % (ref 91–100)
TROPONIN T SERPL-MCNC: <0.01 NG/ML (ref 0–0.03)
TROPONIN T SERPL-MCNC: <0.01 NG/ML (ref 0–0.03)

## 2017-03-23 PROCEDURE — 71010 HC CHEST PA OR AP: CPT

## 2017-03-23 PROCEDURE — 94799 UNLISTED PULMONARY SVC/PX: CPT

## 2017-03-23 PROCEDURE — 84145 PROCALCITONIN (PCT): CPT | Performed by: NURSE PRACTITIONER

## 2017-03-23 PROCEDURE — 87798 DETECT AGENT NOS DNA AMP: CPT | Performed by: HOSPITALIST

## 2017-03-23 PROCEDURE — 25010000002 DIPHENHYDRAMINE PER 50 MG: Performed by: NURSE PRACTITIONER

## 2017-03-23 PROCEDURE — 94640 AIRWAY INHALATION TREATMENT: CPT

## 2017-03-23 PROCEDURE — 82803 BLOOD GASES ANY COMBINATION: CPT | Performed by: EMERGENCY MEDICINE

## 2017-03-23 PROCEDURE — 85379 FIBRIN DEGRADATION QUANT: CPT | Performed by: EMERGENCY MEDICINE

## 2017-03-23 PROCEDURE — 25010000002 METHYLPREDNISOLONE PER 125 MG: Performed by: HOSPITALIST

## 2017-03-23 PROCEDURE — 36600 WITHDRAWAL OF ARTERIAL BLOOD: CPT | Performed by: EMERGENCY MEDICINE

## 2017-03-23 PROCEDURE — 99219 PR INITIAL OBSERVATION CARE/DAY 50 MINUTES: CPT | Performed by: NURSE PRACTITIONER

## 2017-03-23 PROCEDURE — 93010 ELECTROCARDIOGRAM REPORT: CPT | Performed by: INTERNAL MEDICINE

## 2017-03-23 PROCEDURE — 96375 TX/PRO/DX INJ NEW DRUG ADDON: CPT

## 2017-03-23 PROCEDURE — 83880 ASSAY OF NATRIURETIC PEPTIDE: CPT | Performed by: EMERGENCY MEDICINE

## 2017-03-23 PROCEDURE — G0378 HOSPITAL OBSERVATION PER HR: HCPCS

## 2017-03-23 PROCEDURE — 82150 ASSAY OF AMYLASE: CPT | Performed by: NURSE PRACTITIONER

## 2017-03-23 PROCEDURE — 87633 RESP VIRUS 12-25 TARGETS: CPT | Performed by: HOSPITALIST

## 2017-03-23 PROCEDURE — 71275 CT ANGIOGRAPHY CHEST: CPT

## 2017-03-23 PROCEDURE — 83690 ASSAY OF LIPASE: CPT | Performed by: NURSE PRACTITIONER

## 2017-03-23 PROCEDURE — 0 IOPAMIDOL PER 1 ML: Performed by: HOSPITALIST

## 2017-03-23 PROCEDURE — 82803 BLOOD GASES ANY COMBINATION: CPT | Performed by: NURSE PRACTITIONER

## 2017-03-23 PROCEDURE — 25010000002 METHYLPREDNISOLONE PER 125 MG: Performed by: EMERGENCY MEDICINE

## 2017-03-23 PROCEDURE — 93005 ELECTROCARDIOGRAM TRACING: CPT | Performed by: EMERGENCY MEDICINE

## 2017-03-23 PROCEDURE — 96361 HYDRATE IV INFUSION ADD-ON: CPT

## 2017-03-23 PROCEDURE — 84484 ASSAY OF TROPONIN QUANT: CPT | Performed by: EMERGENCY MEDICINE

## 2017-03-23 PROCEDURE — 96376 TX/PRO/DX INJ SAME DRUG ADON: CPT

## 2017-03-23 PROCEDURE — 25010000002 METHYLPREDNISOLONE PER 40 MG

## 2017-03-23 PROCEDURE — 36600 WITHDRAWAL OF ARTERIAL BLOOD: CPT | Performed by: NURSE PRACTITIONER

## 2017-03-23 PROCEDURE — 99213 OFFICE O/P EST LOW 20 MIN: CPT | Performed by: INTERNAL MEDICINE

## 2017-03-23 PROCEDURE — 87581 M.PNEUMON DNA AMP PROBE: CPT | Performed by: HOSPITALIST

## 2017-03-23 PROCEDURE — 84484 ASSAY OF TROPONIN QUANT: CPT | Performed by: HOSPITALIST

## 2017-03-23 PROCEDURE — 87486 CHLMYD PNEUM DNA AMP PROBE: CPT | Performed by: HOSPITALIST

## 2017-03-23 RX ORDER — ATORVASTATIN CALCIUM 10 MG/1
10 TABLET, FILM COATED ORAL NIGHTLY
Status: DISCONTINUED | OUTPATIENT
Start: 2017-03-23 | End: 2017-03-24 | Stop reason: HOSPADM

## 2017-03-23 RX ORDER — GUAIFENESIN 600 MG/1
1200 TABLET, EXTENDED RELEASE ORAL 2 TIMES DAILY
Status: DISCONTINUED | OUTPATIENT
Start: 2017-03-23 | End: 2017-03-24 | Stop reason: HOSPADM

## 2017-03-23 RX ORDER — SODIUM CHLORIDE 9 MG/ML
75 INJECTION, SOLUTION INTRAVENOUS CONTINUOUS
Status: DISCONTINUED | OUTPATIENT
Start: 2017-03-23 | End: 2017-03-24 | Stop reason: HOSPADM

## 2017-03-23 RX ORDER — MELATONIN
2000 DAILY
Status: DISCONTINUED | OUTPATIENT
Start: 2017-03-23 | End: 2017-03-24 | Stop reason: HOSPADM

## 2017-03-23 RX ORDER — IPRATROPIUM BROMIDE AND ALBUTEROL SULFATE 2.5; .5 MG/3ML; MG/3ML
3 SOLUTION RESPIRATORY (INHALATION)
Status: DISCONTINUED | OUTPATIENT
Start: 2017-03-23 | End: 2017-03-23

## 2017-03-23 RX ORDER — METHYLPREDNISOLONE SODIUM SUCCINATE 40 MG/ML
INJECTION, POWDER, LYOPHILIZED, FOR SOLUTION INTRAMUSCULAR; INTRAVENOUS
Status: COMPLETED
Start: 2017-03-23 | End: 2017-03-23

## 2017-03-23 RX ORDER — CEFUROXIME AXETIL 250 MG/1
500 TABLET ORAL 2 TIMES DAILY
Qty: 10 TABLET | Refills: 0 | Status: SHIPPED | OUTPATIENT
Start: 2017-03-23 | End: 2017-03-24 | Stop reason: HOSPADM

## 2017-03-23 RX ORDER — BUDESONIDE 0.5 MG/2ML
0.5 INHALANT ORAL
Status: DISCONTINUED | OUTPATIENT
Start: 2017-03-23 | End: 2017-03-23

## 2017-03-23 RX ORDER — ASPIRIN 81 MG/1
81 TABLET, CHEWABLE ORAL DAILY
Status: DISCONTINUED | OUTPATIENT
Start: 2017-03-23 | End: 2017-03-24 | Stop reason: HOSPADM

## 2017-03-23 RX ORDER — ACETAMINOPHEN 325 MG/1
650 TABLET ORAL EVERY 6 HOURS PRN
Status: DISCONTINUED | OUTPATIENT
Start: 2017-03-23 | End: 2017-03-24 | Stop reason: HOSPADM

## 2017-03-23 RX ORDER — TRAMADOL HYDROCHLORIDE 50 MG/1
50 TABLET ORAL EVERY 6 HOURS PRN
Qty: 30 TABLET | Refills: 0 | Status: SHIPPED | OUTPATIENT
Start: 2017-03-23 | End: 2017-03-24 | Stop reason: HOSPADM

## 2017-03-23 RX ORDER — ALBUTEROL SULFATE 2.5 MG/3ML
2.5 SOLUTION RESPIRATORY (INHALATION) ONCE
Status: COMPLETED | OUTPATIENT
Start: 2017-03-23 | End: 2017-03-23

## 2017-03-23 RX ORDER — METHYLPREDNISOLONE SODIUM SUCCINATE 125 MG/2ML
60 INJECTION, POWDER, LYOPHILIZED, FOR SOLUTION INTRAMUSCULAR; INTRAVENOUS EVERY 6 HOURS
Status: DISCONTINUED | OUTPATIENT
Start: 2017-03-23 | End: 2017-03-24 | Stop reason: HOSPADM

## 2017-03-23 RX ORDER — POLYETHYLENE GLYCOL 3350 17 G/17G
17 POWDER, FOR SOLUTION ORAL DAILY
Status: DISCONTINUED | OUTPATIENT
Start: 2017-03-23 | End: 2017-03-24 | Stop reason: HOSPADM

## 2017-03-23 RX ORDER — PANTOPRAZOLE SODIUM 40 MG/1
40 TABLET, DELAYED RELEASE ORAL
Status: DISCONTINUED | OUTPATIENT
Start: 2017-03-23 | End: 2017-03-24 | Stop reason: HOSPADM

## 2017-03-23 RX ORDER — ONDANSETRON 8 MG/1
8 TABLET, ORALLY DISINTEGRATING ORAL EVERY 8 HOURS PRN
Qty: 10 TABLET | Refills: 0 | Status: SHIPPED | OUTPATIENT
Start: 2017-03-23 | End: 2017-03-24 | Stop reason: HOSPADM

## 2017-03-23 RX ORDER — METOPROLOL SUCCINATE 25 MG/1
25 TABLET, EXTENDED RELEASE ORAL ONCE
Status: COMPLETED | OUTPATIENT
Start: 2017-03-23 | End: 2017-03-23

## 2017-03-23 RX ORDER — METHYLPREDNISOLONE SODIUM SUCCINATE 40 MG/ML
INJECTION, POWDER, LYOPHILIZED, FOR SOLUTION INTRAMUSCULAR; INTRAVENOUS
Status: DISCONTINUED
Start: 2017-03-23 | End: 2017-03-23 | Stop reason: WASHOUT

## 2017-03-23 RX ORDER — ALBUTEROL SULFATE 90 UG/1
AEROSOL, METERED RESPIRATORY (INHALATION) EVERY 4 HOURS PRN
COMMUNITY
End: 2017-03-24 | Stop reason: HOSPADM

## 2017-03-23 RX ORDER — METHYLPREDNISOLONE SODIUM SUCCINATE 125 MG/2ML
60 INJECTION, POWDER, LYOPHILIZED, FOR SOLUTION INTRAMUSCULAR; INTRAVENOUS EVERY 6 HOURS
Status: DISCONTINUED | OUTPATIENT
Start: 2017-03-23 | End: 2017-03-23

## 2017-03-23 RX ORDER — DIPHENHYDRAMINE HYDROCHLORIDE 50 MG/ML
25 INJECTION INTRAMUSCULAR; INTRAVENOUS EVERY 6 HOURS PRN
Status: DISCONTINUED | OUTPATIENT
Start: 2017-03-23 | End: 2017-03-24 | Stop reason: HOSPADM

## 2017-03-23 RX ORDER — IPRATROPIUM BROMIDE AND ALBUTEROL SULFATE 2.5; .5 MG/3ML; MG/3ML
3 SOLUTION RESPIRATORY (INHALATION) ONCE
Status: COMPLETED | OUTPATIENT
Start: 2017-03-23 | End: 2017-03-23

## 2017-03-23 RX ORDER — SODIUM CHLORIDE 9 MG/ML
INJECTION, SOLUTION INTRAVENOUS
Status: DISPENSED
Start: 2017-03-23 | End: 2017-03-24

## 2017-03-23 RX ORDER — IPRATROPIUM BROMIDE AND ALBUTEROL SULFATE 2.5; .5 MG/3ML; MG/3ML
3 SOLUTION RESPIRATORY (INHALATION)
Status: DISCONTINUED | OUTPATIENT
Start: 2017-03-23 | End: 2017-03-24 | Stop reason: HOSPADM

## 2017-03-23 RX ORDER — METOPROLOL SUCCINATE 25 MG/1
25 TABLET, EXTENDED RELEASE ORAL NIGHTLY
Status: DISCONTINUED | OUTPATIENT
Start: 2017-03-23 | End: 2017-03-24 | Stop reason: HOSPADM

## 2017-03-23 RX ORDER — DIPHENHYDRAMINE HYDROCHLORIDE 50 MG/ML
25 INJECTION INTRAMUSCULAR; INTRAVENOUS ONCE
Status: COMPLETED | OUTPATIENT
Start: 2017-03-23 | End: 2017-03-23

## 2017-03-23 RX ORDER — METHYLPREDNISOLONE SODIUM SUCCINATE 125 MG/2ML
125 INJECTION, POWDER, LYOPHILIZED, FOR SOLUTION INTRAMUSCULAR; INTRAVENOUS ONCE
Status: COMPLETED | OUTPATIENT
Start: 2017-03-23 | End: 2017-03-23

## 2017-03-23 RX ORDER — METOPROLOL SUCCINATE 25 MG/1
25 TABLET, EXTENDED RELEASE ORAL NIGHTLY
COMMUNITY
End: 2018-07-18

## 2017-03-23 RX ORDER — MELATONIN
2000 EVERY MORNING
COMMUNITY
End: 2020-06-24 | Stop reason: HOSPADM

## 2017-03-23 RX ORDER — PANTOPRAZOLE SODIUM 40 MG/1
40 TABLET, DELAYED RELEASE ORAL
Status: DISCONTINUED | OUTPATIENT
Start: 2017-03-23 | End: 2017-03-23

## 2017-03-23 RX ADMIN — IPRATROPIUM BROMIDE AND ALBUTEROL SULFATE 3 ML: .5; 3 SOLUTION RESPIRATORY (INHALATION) at 12:01

## 2017-03-23 RX ADMIN — ACETAMINOPHEN 650 MG: 325 TABLET, FILM COATED ORAL at 12:36

## 2017-03-23 RX ADMIN — METOPROLOL SUCCINATE 25 MG: 25 TABLET, EXTENDED RELEASE ORAL at 20:19

## 2017-03-23 RX ADMIN — IPRATROPIUM BROMIDE AND ALBUTEROL SULFATE 3 ML: .5; 3 SOLUTION RESPIRATORY (INHALATION) at 23:22

## 2017-03-23 RX ADMIN — SODIUM CHLORIDE 250 ML: 9 INJECTION, SOLUTION INTRAVENOUS at 12:36

## 2017-03-23 RX ADMIN — PANTOPRAZOLE SODIUM 40 MG: 40 TABLET, DELAYED RELEASE ORAL at 07:11

## 2017-03-23 RX ADMIN — IOPAMIDOL 100 ML: 755 INJECTION, SOLUTION INTRAVENOUS at 14:30

## 2017-03-23 RX ADMIN — ASPIRIN 81 MG 81 MG: 81 TABLET ORAL at 08:29

## 2017-03-23 RX ADMIN — IPRATROPIUM BROMIDE AND ALBUTEROL SULFATE 3 ML: .5; 3 SOLUTION RESPIRATORY (INHALATION) at 07:21

## 2017-03-23 RX ADMIN — ALBUTEROL SULFATE 2.5 MG: 2.5 SOLUTION RESPIRATORY (INHALATION) at 01:28

## 2017-03-23 RX ADMIN — METOPROLOL SUCCINATE 25 MG: 25 TABLET, EXTENDED RELEASE ORAL at 14:56

## 2017-03-23 RX ADMIN — PANTOPRAZOLE SODIUM 40 MG: 40 TABLET, DELAYED RELEASE ORAL at 17:35

## 2017-03-23 RX ADMIN — POLYETHYLENE GLYCOL (3350) 17 G: 17 POWDER, FOR SOLUTION ORAL at 14:57

## 2017-03-23 RX ADMIN — GUAIFENESIN 1200 MG: 600 TABLET, EXTENDED RELEASE ORAL at 20:19

## 2017-03-23 RX ADMIN — METHYLPREDNISOLONE SODIUM SUCCINATE 125 MG: 125 INJECTION, POWDER, FOR SOLUTION INTRAMUSCULAR; INTRAVENOUS at 01:20

## 2017-03-23 RX ADMIN — METHYLPREDNISOLONE SODIUM SUCCINATE 60 MG: 125 INJECTION, POWDER, FOR SOLUTION INTRAMUSCULAR; INTRAVENOUS at 07:08

## 2017-03-23 RX ADMIN — DIPHENHYDRAMINE HYDROCHLORIDE 25 MG: 50 INJECTION, SOLUTION INTRAMUSCULAR; INTRAVENOUS at 13:42

## 2017-03-23 RX ADMIN — METHYLPREDNISOLONE SODIUM SUCCINATE 60 MG: 125 INJECTION, POWDER, FOR SOLUTION INTRAMUSCULAR; INTRAVENOUS at 14:55

## 2017-03-23 RX ADMIN — METHYLPREDNISOLONE SODIUM SUCCINATE 60 MG: 125 INJECTION, POWDER, FOR SOLUTION INTRAMUSCULAR; INTRAVENOUS at 20:20

## 2017-03-23 RX ADMIN — METHYLPREDNISOLONE SODIUM SUCCINATE 60 MG: 40 INJECTION, POWDER, FOR SOLUTION INTRAMUSCULAR; INTRAVENOUS at 20:20

## 2017-03-23 RX ADMIN — VITAMIN D 2000 UNITS: 25 TAB ORAL at 08:29

## 2017-03-23 RX ADMIN — ALBUTEROL SULFATE 2.5 MG: 2.5 SOLUTION RESPIRATORY (INHALATION) at 01:54

## 2017-03-23 RX ADMIN — IPRATROPIUM BROMIDE AND ALBUTEROL SULFATE 3 ML: .5; 3 SOLUTION RESPIRATORY (INHALATION) at 01:55

## 2017-03-23 RX ADMIN — IPRATROPIUM BROMIDE AND ALBUTEROL SULFATE 3 ML: .5; 3 SOLUTION RESPIRATORY (INHALATION) at 19:46

## 2017-03-23 RX ADMIN — ATORVASTATIN CALCIUM 10 MG: 10 TABLET, FILM COATED ORAL at 20:19

## 2017-03-23 RX ADMIN — GUAIFENESIN 1200 MG: 600 TABLET, EXTENDED RELEASE ORAL at 14:55

## 2017-03-23 RX ADMIN — IPRATROPIUM BROMIDE AND ALBUTEROL SULFATE 3 ML: .5; 3 SOLUTION RESPIRATORY (INHALATION) at 16:05

## 2017-03-23 RX ADMIN — SODIUM CHLORIDE 75 ML/HR: 9 INJECTION, SOLUTION INTRAVENOUS at 15:00

## 2017-03-23 NOTE — ED NOTES
Patient c/o nausea.  Has completed 1 and 3/4 bottles of contrast.  Patient instructed to stop drinking.  Radiology notified.     Jaz Greco RN  03/22/17 [id number]

## 2017-03-23 NOTE — NURSING NOTE
spoke with patient at bedside. verified face sheet-address and phone# correct. she lives at home in a lower level apartment. no HH, DME or home O2. independent with ADL's. able to drive. uses [facility] [place] and denies having trouble paying for medications. does not have a living will and is not interested in any information. plan is home when medically stable. will continue to follow.

## 2017-03-23 NOTE — ED NOTES
Oxygen discontinued by Dr. De La Cruz at this time to evaluate oxygen saturation on room air in preparation for discharge home.     Jaz Greco RN  03/23/17 0029

## 2017-03-23 NOTE — CONSULTS
[facility]              Initial Inpatient Consult Note    Referring Provider: Dr. Hartman  Reason for Consultation: abdominal pain    Subjective     History of present illness:  A 71-year-old female with underlying COPD and GERD who was doing well up until yesterday around 1 PM when she developed crampy right upper quadrant abdominal pain that was described as moderate to severe in intensity with some radiation to the right lower .  She is not aware of any aggravating or alleviating factors.  This pain started around 1 PM.  She had and this did not affect her pain.  She denies any nausea or vomiting.  She did have one small bowel movement yesterday.  She went to the urgent care facility at 8 PM and was ultimately sent to [facility] emergency room.  Here she had a CT of her abdomen pelvis performed which showed retained contrast in the stomach with some question of thickening of the distal stomach.  Today she states her pain is better but still mildly present mostly in the right upper quadrant and a little in the right lower quadrant.  2 weeks prior to presentation she was receiving treatment for bronchitis.  She was coughing a lot around the time when her abdominal pain started.  I did do her upper endoscopy and colonoscopy about 6 months ago.  She had some gastritis and a diagnosis of Zepeda's esophagus and hiatal hernia.  During the colonoscopy the terminal ileum was entered and this was normal.  She denies any hematemesis or melena.  She is passing flatus but has not had a bowel movement today.  Her hospital course was complicated by hypoxemia which all of a sudden came on last night.  She's straight but she thought it was related to getting Dilaudid.  Nonetheless she is on 2 L of oxygen today satting around 90%.  She is scheduled for CT angiogram later this afternoon.    Past Medical History:  Past Medical History:   Diagnosis Date   • Anemia    • Arthritis    • CAD  (coronary artery disease)    • COPD (chronic obstructive pulmonary disease)    • Emphysema lung    • Empyema    • Fatigue    • GERD (gastroesophageal reflux disease)    • Headache    • High risk medication use    • History of cardiac catheterization     CATH STENT PLACEMENT: CIRCUMFLEX BRANCH   • Hyperlipidemia    • Hypertension    • Jaundice    • Myocardial infarction    • Myocardial infarction    • Occult blood in stools    • Reflux gastritis        Past Surgical History:  Past Surgical History:   Procedure Laterality Date   • CARDIAC VALVE REPLACEMENT      x 2   • CHOLECYSTECTOMY     • COLONOSCOPY     • COLONOSCOPY N/A 6/10/2016    Procedure: COLONOSCOPY with polypectomy;  Surgeon: Virgie Castelan MD;  Location: [facility] OR;  Service:    • CORONARY STENT PLACEMENT     • ENDOSCOPY N/A 6/10/2016    Procedure: ESOPHAGOGASTRODUODENOSCOPY WITH BIOPSY;  Surgeon: Virgie Castelan MD;  Location: [facility] OR;  Service:    • GALLBLADDER SURGERY          Social History:   Social History   Substance Use Topics   • Smoking status: Current Every Day Smoker     Packs/day: 0.50     Years: 59.00   • Smokeless tobacco: Never Used   • Alcohol use No        Family History:  Family History   Problem Relation Age of Onset   • Colon cancer Mother    • Breast cancer Mother    • Other Father      cardiac disorder   • Hypertension Father    • Other Sister      cardiac disorder   • Hypertension Sister    • Lung disease Sister    • Thyroid disease Sister    • Other Brother      cardiac disorder   • Thyroid disease Daughter        Home Meds:  Prescriptions Prior to Admission   Medication Sig Dispense Refill Last Dose   • albuterol (PROVENTIL HFA;VENTOLIN HFA) 108 (90 BASE) MCG/ACT inhaler Inhale Every 4 (Four) Hours As Needed for Wheezing.      • aspirin 81 MG tablet Take 1 tablet by mouth daily.   3/22/2017 at 0800   • atorvastatin (LIPITOR) 10 MG tablet Take 1 tablet by mouth Daily. (Patient taking differently: Take 10 mg by mouth Every Night.)  90 tablet 1 3/21/2017 at 2100   • cholecalciferol (VITAMIN D3) 1000 UNITS tablet Take 2,000 Units by mouth Daily.   3/22/2017 at 0800   • Diphenhydramine-PE-APAP (ALLERGY & SINUS INTENSE ST PO) Take 1 tablet by mouth Daily As Needed.   Past Week at Unknown time   • Docusate Calcium (STOOL SOFTENER PO) Take 1 tablet by mouth 3 (Three) Times a Day As Needed.   Past Week at Unknown time   • metoprolol succinate XL (TOPROL-XL) 25 MG 24 hr tablet Take 25 mg by mouth Every Night.   3/21/2017 at 2100   • nitroglycerin (NITROSTAT) 0.4 MG SL tablet Place under the tongue. DISSOLVE 1 TABLET UNDER THE TONGUE AS NEEDED FOR CHEST PAIN.   Unknown   • omeprazole (PriLOSEC) 40 MG capsule Take 40 mg by mouth daily.   3/22/2017 at 0800   • metoprolol tartrate (LOPRESSOR) 25 MG tablet take 1 tablet by mouth twice a day (Patient taking differently: take 1 tablet by mouth once daily at night) 60 tablet 5 3/20/2017   • Potassium (POTASSIMIN PO) Take  by mouth.   Taking       Current Meds:     aspirin 81 mg Oral Daily   atorvastatin 10 mg Oral Nightly   cholecalciferol 2,000 Units Oral Daily   diphenhydrAMINE 25 mg Intravenous Once   enoxaparin 40 mg Subcutaneous Daily   guaiFENesin 1,200 mg Oral BID   ipratropium-albuterol 3 mL Nebulization Q4H - RT   methylPREDNISolone sodium succinate 60 mg Intravenous Q6H   metoprolol succinate XL 25 mg Oral Nightly   metoprolol succinate XL 25 mg Oral Once   pantoprazole 40 mg Oral Q AM   sodium chloride          Allergies:  Allergies   Allergen Reactions   • Iodinated Diagnostic Agents Itching   • Codeine Palpitations   • Morphine And Related Palpitations       Review of Systems  Pertinent items are noted in HPI     Objective     Vital Signs  Temp:  [96.9 °F (36.1 °C)-97.9 °F (36.6 °C)] 97.4 °F (36.3 °C)  Heart Rate:  [] 104  Resp:  [16-20] 20  BP: (112-171)/(63-88) 124/69    Physical Exam:  General Appearance:    Alert, cooperative, in no acute distress   Head:    Normocephalic, without  obvious abnormality, atraumatic   Eyes:            Lids and lashes normal, conjunctivae and sclerae normal, no   icterus   Throat:   No oral lesions, no thrush, oral mucosa moist   Neck:   No adenopathy, supple, trachea midline, no thyromegaly, no   carotid bruit, no JVD   Lungs:     Clear to auscultation,respirations regular, even and                   unlabored    Heart:    Regular rhythm and normal rate, normal S1 and S2, no            murmur, no gallop, no rub, no click   Chest Wall:    No abnormalities observed   Abdomen:     Normal bowel sounds, no masses, no organomegaly, soft       Mild ruq pain,  Mild central distension, no guarding, no rebound                 tenderness   Rectal:     Deferred   Extremities:   no edema, no cyanosis, no redness   Skin:   No bleeding, bruising or rash   Lymph nodes:   No palpable adenopathy   Psychiatric:  Judgement and insight: normal   Orientation to person place and time: normal   Mood and affect: normal     Results Review:   I reviewed the patient's new clinical results.    WBC No results found for: WBCS   HGB Hemoglobin   Date Value Ref Range Status   03/22/2017 13.0 12.0 - 16.0 g/dL Final      HCT Hematocrit   Date Value Ref Range Status   03/22/2017 39.8 37.0 - 47.0 % Final      Platlets No results found for: LABPLAT   MCV MCV   Date Value Ref Range Status   03/22/2017 95.9 81.0 - 99.0 fL Final          Sodium Sodium   Date Value Ref Range Status   03/22/2017 132 (L) 136 - 145 mmol/L Final      Potassium Potassium   Date Value Ref Range Status   03/22/2017 4.0 3.5 - 5.2 mmol/L Final      Chloride Chloride   Date Value Ref Range Status   03/22/2017 94 (L) 98 - 107 mmol/L Final      CO2 CO2   Date Value Ref Range Status   03/22/2017 24.8 22.0 - 29.0 mmol/L Final      BUN BUN   Date Value Ref Range Status   03/22/2017 12 8 - 23 mg/dL Final      Creatinine Creatinine   Date Value Ref Range Status   03/22/2017 1.14 (H) 0.57 - 1.00 mg/dL Final      Calcium Calcium   Date  Value Ref Range Status   03/22/2017 9.6 8.8 - 10.5 mg/dL Final      Albumin Albumin   Date Value Ref Range Status   03/22/2017 4.40 3.50 - 5.20 g/dL Final      AST  ALT  PT/INR:   AST (SGOT)   Date Value Ref Range Status   03/22/2017 18 5 - 32 U/L Final     ALT (SGPT)   Date Value Ref Range Status   03/22/2017 13 5 - 33 U/L Final     No results found for: PROTIME/No results found for: INR         Imaging Results (last 72 hours)     Procedure Component Value Units Date/Time    CT Abdomen Pelvis Without Contrast [[id number]] Collected:  03/23/17 0659     Updated:  03/23/17 0706    Narrative:       CT abdomen and pelvis without contrast     INDICATION: Right lower quadrant pain last night     PROCEDURE: Noncontrast CT of the abdomen and pelvis.Radiation dose  reduction techniques were utilized, including automated exposure control  and exposure modulation based on body size.     COMPARISON: None     FINDINGS:     ABDOMEN WITHOUT CONTRAST:Included lung bases predominantly clear.     Liver, spleen, kidneys, adrenal glands, pancreas have an unremarkable  unenhanced appearance. Previous cholecystectomy. Bowel loops nondilated.  Appendix is normal.     Dilation of the infrarenal abdominal aorta up to 3.4 cm.  Sigmoid diverticula. No convincing evidence for acute complication.     A majority of the oral contrast material is in the stomach. Others some  questionable thickening in the distal stomach.     PELVIS WITHOUT CONTRAST:No pelvic mass or fluid. No aggressive appearing  bone lesion.       Impression:       Majority of the oral contrast material in the stomach could  reflect delayed gastric emptying. There is questionable thickening of  the distal stomach that could represent the peptic ulcer disease. No  definite active inflammation. This be best evaluated with endoscopy if  clinically appropriate.     Uncomplicated sigmoid diverticula.     Appendix normal.     Aneurysmal dilation infrarenal abdominal aorta.      This  report was finalized on 3/23/2017 7:04 AM by Dr. Gerson Oleary MD.       XR Chest 1 View [[id number]] Collected:  03/23/17 0720     Updated:  03/23/17 0723    Narrative:       INDICATION: COPD chest congestion today     FINDINGS:  Single portable AP view of the chest compared to none.     Heart and mediastinal contours are normal. No dense consolidation. Mild  diffuse interstitial prominence.. No pneumothorax or pleural effusion.       Impression:       Mild diffuse interstitial prominence probably representing underlying  chronic change cannot completely exclude mild interstitial edema though  there is no significant central pulmonary vascular congestion.     This report was finalized on 3/23/2017 7:21 AM by Dr. Gerson Oleary MD.             Assessment/Plan     Active Problems:    Right lower quadrant abdominal pain    Hypoxia      Abdominal pain-CT results are reviewed and there is evidence of retained contrast in the stomach.  Clinically her pain is better.  At this point, do not feel endoscopy is warranted acutely and she is hypoxic and is under evaluation for the etiology of that.  There is no evidence of bleeding at this time.  We'll continue on her PPI therapy and obtain a flat and upright of her abdomen in the morning to assure that her contrast is passing through.  We'll start her on MiraLAX daily.    I discussed the patients findings and my recommendations with patient    Virgie Castelan MD  [facility]  03/23/17  @NOW      Time:

## 2017-03-23 NOTE — ED PROVIDER NOTES
Subjective   History of Present Illness  History of Present Illness    Chief complaint: Abdominal pain    Location: Right sided    Quality/Severity:  Moderate, sharp    Timing/Onset/Duration: Gradual onset since 1 PM    Modifying Factors: Nothing seems to make it better or worse    Associated Symptoms: The patient denies any headache.  No fever chills or cough.  No sore throat earache or nasal congestion.  No chest pain or shortness of breath.  No diarrhea or burning when she urinates.  No nausea or vomiting.    Narrative: This 71-year-old white female presents with right-sided abdominal pain that started 1 PM.  The patient denies any fever chills or cough.  No sore throat earache or nasal congestion.  No chest pain or shortness of breath.  The patient denies any diarrhea or burning when she urinates.  She has had an open cholecystectomy in the past.  She has had no other abdominal surgeries.  The patient still has her appendix and uterus and ovaries.    PCP:  Shaka      Review of Systems   Constitutional: Negative for chills and fever.   HENT: Negative for congestion, ear pain and sore throat.    Eyes: Negative for pain and discharge.   Respiratory: Negative for cough, chest tightness, shortness of breath and wheezing.    Cardiovascular: Negative for chest pain, palpitations and leg swelling.   Gastrointestinal: Positive for abdominal pain. Negative for blood in stool, constipation, diarrhea, nausea and vomiting.   Genitourinary: Negative for decreased urine volume, dysuria, flank pain, frequency, hematuria, menstrual problem, pelvic pain, urgency, vaginal bleeding, vaginal discharge and vaginal pain.   Musculoskeletal: Negative for back pain.   Skin: Negative for color change, pallor, rash and wound.   Neurological: Negative for weakness and headaches.   Hematological: Negative for adenopathy.   Psychiatric/Behavioral: Negative for confusion.        Medication List      START taking these medications           cefuroxime 250 MG tablet   Commonly known as:  CEFTIN   Take 2 tablets by mouth 2 (Two) Times a Day.       ondansetron ODT 8 MG disintegrating tablet   Commonly known as:  ZOFRAN ODT   Take 1 tablet by mouth Every 8 (Eight) Hours As Needed for Nausea or   Vomiting.       traMADol 50 MG tablet   Commonly known as:  ULTRAM   Take 1 tablet by mouth Every 6 (Six) Hours As Needed for Moderate Pain   (4-6).         CHANGE how you take these medications          metoprolol tartrate 25 MG tablet   Commonly known as:  LOPRESSOR   take 1 tablet by mouth twice a day   What changed:  See the new instructions.         CONTINUE taking these medications          ALLERGY & SINUS INTENSE ST PO       aspirin 81 MG tablet       atorvastatin 10 MG tablet   Commonly known as:  LIPITOR   Take 1 tablet by mouth Daily.       nitroglycerin 0.4 MG SL tablet   Commonly known as:  NITROSTAT       omeprazole 40 MG capsule   Commonly known as:  priLOSEC       POTASSIMIN PO       STOOL SOFTENER PO           Past Medical History:   Diagnosis Date   • Anemia    • Arthritis    • CAD (coronary artery disease)    • COPD (chronic obstructive pulmonary disease)    • Emphysema lung    • Empyema    • Fatigue    • GERD (gastroesophageal reflux disease)    • Headache    • High risk medication use    • History of cardiac catheterization     CATH STENT PLACEMENT: CIRCUMFLEX BRANCH   • Hyperlipidemia    • Hypertension    • Jaundice    • Myocardial infarction    • Myocardial infarction    • Occult blood in stools    • Reflux gastritis        Allergies   Allergen Reactions   • Iodinated Diagnostic Agents Itching   • Codeine Palpitations   • Morphine And Related Palpitations       Past Surgical History:   Procedure Laterality Date   • CARDIAC VALVE REPLACEMENT      x 2   • COLONOSCOPY     • COLONOSCOPY N/A 6/10/2016    Procedure: COLONOSCOPY with polypectomy;  Surgeon: Virgie Castelan MD;  Location: [facility];  Service:    • CORONARY STENT PLACEMENT     •  ENDOSCOPY N/A 6/10/2016    Procedure: ESOPHAGOGASTRODUODENOSCOPY WITH BIOPSY;  Surgeon: Virgie Castelan MD;  Location: [facility];  Service:    • GALLBLADDER SURGERY         Family History   Problem Relation Age of Onset   • Colon cancer Mother    • Breast cancer Mother    • Other Father      cardiac disorder   • Hypertension Father    • Other Sister      cardiac disorder   • Hypertension Sister    • Lung disease Sister    • Thyroid disease Sister    • Other Brother      cardiac disorder   • Thyroid disease Daughter        Social History     Social History   • Marital status: Single     Spouse name: N/A   • Number of children: N/A   • Years of education: N/A     Social History Main Topics   • Smoking status: Current Every Day Smoker     Packs/day: 1.00     Years: 59.00   • Smokeless tobacco: Never Used   • Alcohol use No   • Drug use: No   • Sexual activity: Defer     Other Topics Concern   • None     Social History Narrative   • None           Objective   Physical Exam   Constitutional: She is oriented to person, place, and time. She appears well-developed and well-nourished. No distress.   ED Triage Vitals:  Temp: 97.7 °F (36.5 °C) (03/22/17 2046)  Heart Rate: 102 (03/22/17 2046)  Resp: 20 (03/22/17 2046)  BP: 171/69 (03/22/17 2046)  SpO2: 95 % (03/22/17 2046)  Temp src: n/a  Heart Rate Source: n/a  Patient Position: n/a  BP Location: n/a  FiO2 (%): n/a    The patient's vitals were reviewed by me.  Unless otherwise noted they are within normal limits.  The patient is mildly tachycardic with heart rate of 102.  The patient is hypertensive with a blood pressure 171/69.  The patient is in a moderate amount of pain.     HENT:   Head: Normocephalic and atraumatic.   Right Ear: External ear normal.   Left Ear: External ear normal.   Nose: Nose normal.   Mouth/Throat: Oropharynx is clear and moist.   Eyes: Conjunctivae and EOM are normal. Pupils are equal, round, and reactive to light. Right eye exhibits no discharge. Left  eye exhibits no discharge. No scleral icterus.   Neck: Normal range of motion. Neck supple. No JVD present. No tracheal deviation present. No thyromegaly present.   Cardiovascular: Normal rate, regular rhythm, normal heart sounds and intact distal pulses.  Exam reveals no gallop and no friction rub.    No murmur heard.  Pulmonary/Chest: Effort normal and breath sounds normal. No stridor. No respiratory distress. She has no wheezes. She has no rales. She exhibits no tenderness.   Abdominal: Soft. Bowel sounds are normal. She exhibits no distension and no mass. There is tenderness (moderate right lower quadrant tenderness). There is no rebound and no guarding. No hernia.   Musculoskeletal: Normal range of motion. She exhibits no edema or deformity.   Lymphadenopathy:     She has no cervical adenopathy.   Neurological: She is alert and oriented to person, place, and time.   Skin: Skin is dry. No rash noted. She is not diaphoretic. No erythema. No pallor.   Psychiatric: Her behavior is normal.   Nursing note and vitals reviewed.      Procedures         ED Course  ED Course   Comment By Time   The laboratory values were reviewed by me.  The glucose is 111.  The creatinine is 1.14.  The sodium is 132.  The chloride is 94.  The urine microscopic shows 0-2 RBCs, 3-5 day WBCs, trace bacteria.  The urinalysis shows small leukocyte esterase.  The laboratory values are otherwise unremarkable. Ishmael De La Cruz MD 03/23 0010   The ABG shows a pH is 7.34.  A PCO2 of 50.  PO2 of 46.  Bicarbonate 27.  Base excess 0.8.  O2 saturations 81%.  The troponin is normal.  The d-dimer is normal.  The BNP is normal. Ishmael De La Cruz MD 03/23 0119                  [facility]  XR Chest 1 View   ED Interpretation   The chest x-ray is read by Dr. Indra Lemon shows scarring or   atelectasis in the left base, otherwise negative.      CT Abdomen Pelvis Without Contrast   ED Interpretation   The CT of the abdomen and pelvis with oral contrast shows  that   the stomach is distended which he contrast and there is very   little contrast in the proximal small bowel, despite over 90   minutes prior to imaging.  This suggests delayed gastric emptying   or gastric outlet obstruction.  Possible wall thickening of   pylorus, consider peptic ulcer disease.  Diverticulosis.    Appendix is normal.  3.5 cm abdominal aortic aneurysm.    Cholecystectomy.  This was read by Dr. Indra Lemon.        Labs Reviewed   COMPREHENSIVE METABOLIC PANEL - Abnormal; Notable for the following:        Result Value    Glucose 111 (*)     Creatinine 1.14 (*)     Sodium 132 (*)     Chloride 94 (*)     eGFR Non  Amer 47 (*)     All other components within normal limits   URINALYSIS W/ CULTURE IF INDICATED - Abnormal; Notable for the following:     Leuk Esterase, UA Small (1+) (*)     All other components within normal limits   CBC WITH AUTO DIFFERENTIAL - Abnormal; Notable for the following:     RBC 4.15 (*)     MCH 31.3 (*)     MPV 10.7 (*)     Monocyte % 11.9 (*)     Eosinophils, Absolute 0.07 (*)     All other components within normal limits   URINALYSIS, MICROSCOPIC ONLY - Abnormal; Notable for the following:     RBC, UA 0-2 (*)     WBC, UA 3-5 (*)     Bacteria, UA Trace (*)     All other components within normal limits   BLOOD GAS, ARTERIAL - Abnormal; Notable for the following:     pH, Arterial 7.349 (*)     pCO2, Arterial 50.4 (*)     pO2, Arterial 46.4 (*)     HCO3, Arterial 27.1 (*)     O2 Saturation Calculated 81.9 (*)     All other components within normal limits   LIPASE - Normal   TROPONIN (IN-HOUSE) - Normal    Narrative:     Troponin T Reference Ranges:  Less than 0.03 ng/mL:    Negative for AMI  0.03 to 0.09 ng/mL:      Indeterminant for AMI  Greater than 0.09 ng/mL: Positive for AMI   BNP (IN-HOUSE) - Normal    Narrative:     Among patients with dyspnea, NT-proBNP is highly sensitive for the detection of acute congestive heart failure. In addition NT-proBNP of <300  pg/ml effectively rules out acute congestive heart failure with 99% negative predictive value.   D-DIMER, QUANTITATIVE - Normal    Narrative:     Can be elevated in, but is not diagnostic for deep vein thrombosis (DVT) or pulmonary embolis (PE).  It is also elevated in other medical conditions.  Clinical correlation is required.  The negative cut-off value for the D-Dimer is 0.50 mcg FEU/mL for DVT and PE.   URINE CULTURE   CBC AND DIFFERENTIAL    Narrative:     The following orders were created for panel order CBC & Differential.  Procedure                               Abnormality         Status                     ---------                               -----------         ------                     CBC Auto Differential[[id number]]         Abnormal            Final result                 Please view results for these tests on the individual orders.     1:25 AM, 03/23/17:  The patient was reassessed.  She feels better.  Her vital signs were reviewed and are stable.  Abdominal exam: Soft mild right sided abdominal tenderness, no rebound, no guarding, no masses, positive bowel sounds.  She is running sats intermittently of 86%.  He has received pain medication.  She has a history of COPD.  The patient was temporarily placed on supplemental oxygen.  She has no complaints of shortness of breath.  Have turned her oxygen off to her room air sats are going to be.  The patient's diagnosis of abdominal pain was discussed with her.  The plan will be to have the patient follow-up with Dr. Matt 2 days for reassessment.  I will write patient a prescription for something for pain.  She has been instructed to return to emergency department if she has increasing pain vomiting, worsening anyway at all.  She was also informed that she had a small 3.5 mm abdominal aortic aneurysm that will need to be followed by Dr. Matt.  All the patient's questions were answered she will be discharged in good condition.    Lung exam: Rales  noted on the right side, the left side is clear.    1:25 AM, 03/23/17:  The EKG was obtained at 00 47.  EKG was read by me at 00 50.  The EKG shows a normal sinus rhythm with rate of 89.  There is a normal axis with no hypertrophy.  The OR, QRS, and QT intervals are unremarkable.  There is border line T-wave abnormalities in anterior leads.  There is no ectopy.  There is no acute ST elevation.    1:25 AM, 03/23/17:  Chest x-ray was contemporaneously reviewed by me.  There is hyperinflation consistent with COPD.  There is mild pulmonary vascular congestion appears to be slightly worse on the right.  There is bibasilar opacities.    1:25 AM, 03/23/17:  After a DuoNeb the patient was reassessed.  She now has wheezes bilaterally.  Her chest x-ray shows findings consistent with COPD.  We will continue DuoNeb's.  I have ordered Solu-Medrol for the patient.  The plan will be to admit the patient for COPD exacerbation with hypoxia.    1:38 AM, 03/23/17:  I spoke with Dr. Sorenson, on call for the hospitalist, he will bring the patient in for observation.  The patient currently has saturations of 90% on 2 L/m.  Final diagnoses:   Right lower quadrant abdominal pain   Acute UTI   Abdominal aortic aneurysm (AAA) without rupture   COPD exacerbation   Hypoxia         ED Medications:  Medications   ipratropium-albuterol (DUO-NEB) nebulizer solution 3 mL (not administered)   albuterol (PROVENTIL) nebulizer solution 0.083% 2.5 mg/3mL (not administered)   albuterol (PROVENTIL) nebulizer solution 0.083% 2.5 mg/3mL (not administered)   HYDROmorphone (DILAUDID) injection 1 mg (1 mg Intravenous Given 3/22/17 2140)   ondansetron (ZOFRAN) injection 4 mg (4 mg Intravenous Given 3/22/17 2140)   methylPREDNISolone sodium succinate (SOLU-Medrol) injection 125 mg (125 mg Intravenous Given 3/23/17 0120)       New Medications:     Medication List      START taking these medications          cefuroxime 250 MG tablet   Commonly known as:  CEFTIN    Take 2 tablets by mouth 2 (Two) Times a Day.       ondansetron ODT 8 MG disintegrating tablet   Commonly known as:  ZOFRAN ODT   Take 1 tablet by mouth Every 8 (Eight) Hours As Needed for Nausea or   Vomiting.       traMADol 50 MG tablet   Commonly known as:  ULTRAM   Take 1 tablet by mouth Every 6 (Six) Hours As Needed for Moderate Pain   (4-6).         CHANGE how you take these medications          metoprolol tartrate 25 MG tablet   Commonly known as:  LOPRESSOR   take 1 tablet by mouth twice a day   What changed:  See the new instructions.         CONTINUE taking these medications          ALLERGY & SINUS INTENSE ST PO       aspirin 81 MG tablet       atorvastatin 10 MG tablet   Commonly known as:  LIPITOR   Take 1 tablet by mouth Daily.       nitroglycerin 0.4 MG SL tablet   Commonly known as:  NITROSTAT       omeprazole 40 MG capsule   Commonly known as:  priLOSEC       POTASSIMIN PO       STOOL SOFTENER PO           Stopped Medications:     Medication List      START taking these medications          cefuroxime 250 MG tablet   Commonly known as:  CEFTIN   Take 2 tablets by mouth 2 (Two) Times a Day.       ondansetron ODT 8 MG disintegrating tablet   Commonly known as:  ZOFRAN ODT   Take 1 tablet by mouth Every 8 (Eight) Hours As Needed for Nausea or   Vomiting.       traMADol 50 MG tablet   Commonly known as:  ULTRAM   Take 1 tablet by mouth Every 6 (Six) Hours As Needed for Moderate Pain   (4-6).         CHANGE how you take these medications          metoprolol tartrate 25 MG tablet   Commonly known as:  LOPRESSOR   take 1 tablet by mouth twice a day   What changed:  See the new instructions.         CONTINUE taking these medications          ALLERGY & SINUS INTENSE ST PO       aspirin 81 MG tablet       atorvastatin 10 MG tablet   Commonly known as:  LIPITOR   Take 1 tablet by mouth Daily.       nitroglycerin 0.4 MG SL tablet   Commonly known as:  NITROSTAT       omeprazole 40 MG capsule   Commonly known  as:  priLOSEC       POTASSIMIN PO       STOOL SOFTENER PO             Final diagnoses:   Right lower quadrant abdominal pain   Acute UTI   Abdominal aortic aneurysm (AAA) without rupture   COPD exacerbation   Hypoxia            Ishmael De La Cruz MD  03/23/17 0034       Ishmael De La Cruz MD  03/23/17 0052       Ishmael De La Cruz MD  03/23/17 0101       Ishmael De La Cruz MD  03/23/17 0138       Ishmael De La Cruz MD  03/23/17 0139

## 2017-03-23 NOTE — H&P
"[facility] HOSPITALIST     Delfino Matt MD    CHIEF COMPLAINT: abdominal pain    HISTORY OF PRESENT ILLNESS:    The patient is a 71 YOF who presented through the ER secondary to RLQ/RUQ sudden onset, aching in nature, occurring at rest yesterday. She reports no change in pain with eating/movement/breathing, and reports only residual \"soreness\" today. She reports chronic constipation with a very small BM yesterday and stopped taking colace/ran out of colace a couple of weeks ago.  She notes a h/o hiatal hernia and GERD that have been at baseline symptoms    On 3/20/17 she was seen by Dr. Hayes in follow up after a stress test she was was unable to complete due to her extreme SOA. She has an [facility] this [facility] to complete her cardiac evaluation. She has 2 coronary stents, h/o MI, CAD, HLD and known moderate right carotid stenosis. She had a recent echo that preliminary showed a normal EF. She denies any chest pain at this time.    She reports a 3 week history of bronchitis and completion of Z-pack/steroid pack/mucinex full box for this with persistent coughing, nonproductive. She reports that she has COPD but has never been seen by a pulmonologist and only recently was prescribed an albuterol inhaler. She continues to smoke at home and reports no oxygen requirement recurring. She was admitted secondary to pO2 of 46.4% noted on ABG.    She otherwise denies f/c/n/v/d/chest pain/sick exposures/change in bowel or bladder habits (chronic constipation)/no weight change/bloody emesis or bloody stools/change in medications or any other new concerns other than as mentioned above.    Past Medical History:   Diagnosis Date   • Anemia    • Arthritis    • CAD (coronary artery disease)    • COPD (chronic obstructive pulmonary disease)    • Emphysema lung    • Empyema    • Fatigue    • GERD (gastroesophageal reflux disease)    • Headache    • High risk medication use    • History " of cardiac catheterization     CATH STENT PLACEMENT: CIRCUMFLEX BRANCH   • Hyperlipidemia    • Hypertension    • Jaundice    • Myocardial infarction    • Myocardial infarction    • Occult blood in stools    • Reflux gastritis      Past Surgical History:   Procedure Laterality Date   • CARDIAC VALVE REPLACEMENT      x 2   • CHOLECYSTECTOMY     • COLONOSCOPY     • COLONOSCOPY N/A 6/10/2016    Procedure: COLONOSCOPY with polypectomy;  Surgeon: Virgie Castelan MD;  Location: [facility] OR;  Service:    • CORONARY STENT PLACEMENT     • ENDOSCOPY N/A 6/10/2016    Procedure: ESOPHAGOGASTRODUODENOSCOPY WITH BIOPSY;  Surgeon: Virgie Castelan MD;  Location: [facility] OR;  Service:    • GALLBLADDER SURGERY       Family History   Problem Relation Age of Onset   • Colon cancer Mother    • Breast cancer Mother    • Other Father      cardiac disorder   • Hypertension Father    • Other Sister      cardiac disorder   • Hypertension Sister    • Lung disease Sister    • Thyroid disease Sister    • Other Brother      cardiac disorder   • Thyroid disease Daughter      Social History   Substance Use Topics   • Smoking status: Current Every Day Smoker     Packs/day: 0.50     Years: 59.00   • Smokeless tobacco: Never Used   • Alcohol use No     Prescriptions Prior to Admission   Medication Sig Dispense Refill Last Dose   • albuterol (PROVENTIL HFA;VENTOLIN HFA) 108 (90 BASE) MCG/ACT inhaler Inhale Every 4 (Four) Hours As Needed for Wheezing.      • aspirin 81 MG tablet Take 1 tablet by mouth daily.   3/22/2017 at 0800   • atorvastatin (LIPITOR) 10 MG tablet Take 1 tablet by mouth Daily. (Patient taking differently: Take 10 mg by mouth Every Night.) 90 tablet 1 3/21/2017 at 2100   • cholecalciferol (VITAMIN D3) 1000 UNITS tablet Take 2,000 Units by mouth Daily.   3/22/2017 at 0800   • Diphenhydramine-PE-APAP (ALLERGY & SINUS INTENSE ST PO) Take 1 tablet by mouth Daily As Needed.   Past Week at Unknown time   • Docusate Calcium (STOOL SOFTENER PO)  "Take 1 tablet by mouth 3 (Three) Times a Day As Needed.   Past Week at Unknown time   • metoprolol succinate XL (TOPROL-XL) 25 MG 24 hr tablet Take 25 mg by mouth Every Night.   3/21/2017 at 2100   • nitroglycerin (NITROSTAT) 0.4 MG SL tablet Place under the tongue. DISSOLVE 1 TABLET UNDER THE TONGUE AS NEEDED FOR CHEST PAIN.   Unknown   • omeprazole (PriLOSEC) 40 MG capsule Take 40 mg by mouth daily.   3/22/2017 at 0800   • metoprolol tartrate (LOPRESSOR) 25 MG tablet take 1 tablet by mouth twice a day (Patient taking differently: take 1 tablet by mouth once daily at night) 60 tablet 5 3/20/2017   • Potassium (POTASSIMIN PO) Take  by mouth.   Taking     Allergies:  Iodinated diagnostic agents; Codeine; and Morphine and related    REVIEW OF SYSTEMS:  Please see the above history of present illness for pertinent positives and negatives.  The remainder of the patient's systems have been reviewed and are negative.     Vital Signs  Temp:  [96.9 °F (36.1 °C)-97.9 °F (36.6 °C)] 97.9 °F (36.6 °C)  Heart Rate:  [] 112  Resp:  [16-20] 20  BP: (112-171)/(63-88) 112/63  Oxygen Therapy  SpO2: 92 %  Pulse Oximetry Type: Continuous  O2 Device: nasal cannula  Flow (L/min): 2  Oxygen Concentration (%): 2}  Body mass index is 27.04 kg/(m^2).  Flowsheet Rows         First Filed Value    Admission Height  65\" (165.1 cm) Documented at 03/22/2017 2046    Admission Weight  169 lb (76.7 kg) Documented at 03/22/2017 2046             Physical Exam:  Physical Exam   Constitutional: Patient appears well-developed and well-nourished and in no acute distress   HEENT:   Head: Normocephalic and atraumatic.   Eyes:  Pupils are equal, round, and reactive to light. EOM are intact. Sclera are anicteric and non-injected.  Mouth and Throat: Patient has moist mucous membranes. Oropharynx is clear of any erythema or exudate.     Neck: Neck supple. No JVD present. No thyromegaly present. No lymphadenopathy present.  Cardiovascular: Tachycardic rate, " regular rhythm, S1 normal and S2 normal.  Exam reveals no gallop and no friction rub.  No murmur heard.  Pulmonary/Chest: Lungs diminished all fields, occasional exp wheeze LLL   Abdominal: Obese, Soft. Bowel sounds are normal. No distension and no mass. There is no hepatosplenomegaly. There is no tenderness.   Musculoskeletal: Normal Muscle tone  Extremities: No edema. Pulses are palpable in all 4 extremities.  Neurological: Patient is alert and oriented to person, place, and time. Cranial nerves II-XII are grossly intact with no focal deficits.  Skin: Skin is warm. No rash noted. Nails show no clubbing.  No cyanosis or erythema.     Results Review:    I reviewed the patient's new clinical results.  Lab Results (most recent)     Procedure Component Value Units Date/Time    CBC & Differential [[id number]] Collected:  03/22/17 2104    Specimen:  Blood Updated:  03/22/17 2119    Narrative:       The following orders were created for panel order CBC & Differential.  Procedure                               Abnormality         Status                     ---------                               -----------         ------                     CBC Auto Differential[[id number]]         Abnormal            Final result                 Please view results for these tests on the individual orders.    CBC Auto Differential [[id number]]  (Abnormal) Collected:  03/22/17 2104    Specimen:  Blood Updated:  03/22/17 2119     WBC 5.40 10*3/mm3      RBC 4.15 (L) 10*6/mm3      Hemoglobin 13.0 g/dL      Hematocrit 39.8 %      MCV 95.9 fL      MCH 31.3 (H) pg      MCHC 32.7 g/dL      RDW 13.3 %      RDW-SD 46.8 fl      MPV 10.7 (H) fL      Platelets 202 10*3/mm3      Neutrophil % 52.6 %      Lymphocyte % 33.1 %      Monocyte % 11.9 (H) %      Eosinophil % 1.3 %      Basophil % 0.9 %      Immature Grans % 0.2 %      Neutrophils, Absolute 2.84 10*3/mm3      Lymphocytes, Absolute 1.79 10*3/mm3      Monocytes, Absolute 0.64 10*3/mm3      Eosinophils,  Absolute 0.07 (L) 10*3/mm3      Basophils, Absolute 0.05 10*3/mm3      Immature Grans, Absolute 0.01 10*3/mm3      nRBC 0.0 /100 WBC     Comprehensive Metabolic Panel [[id number]]  (Abnormal) Collected:  03/22/17 2104    Specimen:  Blood Updated:  03/22/17 2133     Glucose 111 (H) mg/dL      BUN 12 mg/dL      Creatinine 1.14 (H) mg/dL      Sodium 132 (L) mmol/L      Potassium 4.0 mmol/L      Chloride 94 (L) mmol/L      CO2 24.8 mmol/L      Calcium 9.6 mg/dL      Total Protein 7.9 g/dL      Albumin 4.40 g/dL      ALT (SGPT) 13 U/L      AST (SGOT) 18 U/L      Alkaline Phosphatase 100 U/L      Total Bilirubin 0.4 mg/dL      eGFR Non African Amer 47 (L) mL/min/1.73      Globulin 3.5 gm/dL      A/G Ratio 1.3 g/dL      BUN/Creatinine Ratio 10.5     Anion Gap 13.2 mmol/L     Urinalysis With / Culture If Indicated [[id number]]  (Abnormal) Collected:  03/22/17 2104    Specimen:  Urine from Urine, Clean Catch Updated:  03/22/17 2138     Color, UA Yellow     Appearance, UA Clear     pH, UA 5.5     Specific Gravity, UA 1.015     Glucose, UA Negative     Ketones, UA Negative     Bilirubin, UA Negative     Blood, UA Negative     Protein, UA Negative     Leuk Esterase, UA Small (1+) (A)     Nitrite, UA Negative     Urobilinogen, UA 0.2 E.U./dL    Urinalysis, Microscopic Only [[id number]]  (Abnormal) Collected:  03/22/17 2104    Specimen:  Urine from Urine, Clean Catch Updated:  03/22/17 2138     RBC, UA 0-2 (A) /HPF      WBC, UA 3-5 (A) /HPF      Bacteria, UA Trace (A) /HPF      Squamous Epithelial Cells, UA 0-2 /HPF      Hyaline Casts, UA None Seen /LPF      Methodology Manual Light Microscopy    Lipase [[id number]]  (Normal) Collected:  03/22/17 2133    Specimen:  Blood Updated:  03/22/17 2140     Lipase 56 U/L     D-dimer, Quantitative [[id number]]  (Normal) Collected:  03/23/17 0045    Specimen:  Blood Updated:  03/23/17 0056     D-Dimer, Quantitative 0.44 MCGFEU/mL     Narrative:       Can be elevated in, but is not diagnostic for deep  vein thrombosis (DVT) or pulmonary embolis (PE).  It is also elevated in other medical conditions.  Clinical correlation is required.  The negative cut-off value for the D-Dimer is 0.50 mcg FEU/mL for DVT and PE.    BNP [[id number]]  (Normal) Collected:  03/23/17 0045    Specimen:  Blood Updated:  03/23/17 0111     proBNP 139.8 pg/mL     Narrative:       Among patients with dyspnea, NT-proBNP is highly sensitive for the detection of acute congestive heart failure. In addition NT-proBNP of <300 pg/ml effectively rules out acute congestive heart failure with 99% negative predictive value.    Blood Gas, Arterial [[id number]]  (Abnormal) Collected:  03/23/17 0102    Specimen:  Arterial Blood Updated:  03/23/17 0111     Site Arterial: left radial     Elia's Test Positive     pH, Arterial 7.349 (L) pH units      pCO2, Arterial 50.4 (H) mm Hg      pO2, Arterial 46.4 (C) mm Hg      HCO3, Arterial 27.1 (H) mmol/L      Base Excess, Arterial 0.8 mmol/L      O2 Saturation Calculated 81.9 (L) %      Hemoglobin, Blood Gas 12.9 g/dL      Barometric Pressure for Blood Gas 759 mmHg      Modality RA    Troponin [[id number]]  (Normal) Collected:  03/23/17 0045    Specimen:  Blood Updated:  03/23/17 0114     Troponin T <0.010 ng/mL     Narrative:       Troponin T Reference Ranges:  Less than 0.03 ng/mL:    Negative for AMI  0.03 to 0.09 ng/mL:      Indeterminant for AMI  Greater than 0.09 ng/mL: Positive for AMI    Troponin [[id number]]  (Normal) Collected:  03/23/17 0404    Specimen:  Blood Updated:  03/23/17 0440     Troponin T <0.010 ng/mL     Narrative:       Troponin T Reference Ranges:  Less than 0.03 ng/mL:    Negative for AMI  0.03 to 0.09 ng/mL:      Indeterminant for AMI  Greater than 0.09 ng/mL: Positive for AMI    Respiratory Panel, PCR [[id number]]  (Normal) Collected:  03/23/17 0301    Specimen:  Swab from Nasopharynx Updated:  03/23/17 0891     ADENOVIRUS, PCR Not Detected     Coronavirus 229E Not Detected     Coronavirus HKU1  Not Detected     Coronavirus NL63 Not Detected     Coronavirus OC43 Not Detected     Human Metapneumovirus Not Detected     Human Rhinovirus/Enterovirus Not Detected     Influenza B PCR Not Detected     Parainfluenza Virus 1 Not Detected     Parainfluenza Virus 2 Not Detected     Parainfluenza Virus 3 Not Detected     Parainfluenza Virus 4 Not Detected     Bordetella pertussis pcr Not Detected     Influenza 2009 H1N1 by PCR Not Detected     Chlamydophila pneumoniae PCR Not Detected     Mycoplasma pneumo by PCR Not Detected     Influenza A PCR Not Detected     Influenza A H3 Not Detected     Influenza A H1 Not Detected     RSV, PCR Not Detected    Urine Culture [[id number]]  (Normal) Collected:  03/22/17 2104    Specimen:  Urine from Urine, Clean Catch Updated:  03/23/17 0918     Urine Culture Culture in progress          Imaging Results (most recent)     Procedure Component Value Units Date/Time    CT Abdomen Pelvis Without Contrast [[id number]] Collected:  03/23/17 0659     Updated:  03/23/17 0706    Narrative:       CT abdomen and pelvis without contrast     INDICATION: Right lower quadrant pain last night     PROCEDURE: Noncontrast CT of the abdomen and pelvis.Radiation dose  reduction techniques were utilized, including automated exposure control  and exposure modulation based on body size.     COMPARISON: None     FINDINGS:     ABDOMEN WITHOUT CONTRAST:Included lung bases predominantly clear.     Liver, spleen, kidneys, adrenal glands, pancreas have an unremarkable  unenhanced appearance. Previous cholecystectomy. Bowel loops nondilated.  Appendix is normal.     Dilation of the infrarenal abdominal aorta up to 3.4 cm.  Sigmoid diverticula. No convincing evidence for acute complication.     A majority of the oral contrast material is in the stomach. Others some  questionable thickening in the distal stomach.     PELVIS WITHOUT CONTRAST:No pelvic mass or fluid. No aggressive appearing  bone lesion.       Impression:        Majority of the oral contrast material in the stomach could  reflect delayed gastric emptying. There is questionable thickening of  the distal stomach that could represent the peptic ulcer disease. No  definite active inflammation. This be best evaluated with endoscopy if  clinically appropriate.     Uncomplicated sigmoid diverticula.     Appendix normal.     Aneurysmal dilation infrarenal abdominal aorta.      This report was finalized on 3/23/2017 7:04 AM by Dr. Gerson Oleary MD.       XR Chest 1 View [[id number]] Collected:  03/23/17 0720     Updated:  03/23/17 0723    Narrative:       INDICATION: COPD chest congestion today     FINDINGS:  Single portable AP view of the chest compared to none.     Heart and mediastinal contours are normal. No dense consolidation. Mild  diffuse interstitial prominence.. No pneumothorax or pleural effusion.       Impression:       Mild diffuse interstitial prominence probably representing underlying  chronic change cannot completely exclude mild interstitial edema though  there is no significant central pulmonary vascular congestion.     This report was finalized on 3/23/2017 7:21 AM by Dr. Gerson Oleary MD.           Reviewed report    ECG/EMG Results (most recent)     Procedure Component Value Units Date/Time    ECG 12 Lead [[id number]] Collected:  03/23/17 0047     Updated:  03/23/17 0504        Assessment/Plan      1. Abdominal pain: Consult Dr. Castelan  CT abdomen shows retained contrast in stomach with delayed gastric emptying noted.   Followed outpatient by Dr. Castelan with EGD done 6/2016 showing colon polyps/diverticulosis/internal hemorrhoids/Barretts esophagus/TA/hyperplastic polypsGERD/HH/irregular Z-line  Continue daily PPI  Amylase/lipase/LFTs normal/WBCC normal  Await further input from GI    2. Acute hypoxic hypercarbic respiratory failure with   3. AECOPD: Consult pulmonary  PO2 on ABG 46.4%, patient currently on 4 liters nasal cannula with sats approx 91%  Very poor  air movement  Viral panel negative  Sputum culture pending  Has never seen a pulmonologist  Repeat ABG to verify  CT chest pending  Procal pending  Change duonebs to q 4 hours, continue IV steroids, add mucinex/acapella/IS  Await diagnostic results    3. Hypertension: BP at goal on metoprolol succ XL 25 mg hs    4. Hyperlipidemia: lipid panel normal, continue atorvastatin 10 mg hs/asa 81 mg daily    5. Tachycardia with H/O cardiac stents x 2:  Missed metoprolol dosing yesterday, give home dose metoprolol now  trops negative  Recent echo with normal EF  Followed outpatient by Dr. Hayes  Monitor on telemetry    6. 3.4cm AAA, infrarenal: f/u Delfino Matt MD for further referral    I discussed the patients findings and my recommendations with patient.     Nhung Clifford, RIO  03/23/17  11:05 AM

## 2017-03-23 NOTE — ED NOTES
RT at bedside to draw room air ABG then give DuoNeb for low oxygen saturation in the 80's.     Jaz Greco RN  03/23/17 0102

## 2017-03-23 NOTE — PLAN OF CARE
Problem: Patient Care Overview (Adult)  Goal: Discharge Needs Assessment  Outcome: Ongoing (interventions implemented as appropriate)    03/23/17 0584   Discharge Needs Assessment   Concerns To Be Addressed denies needs/concerns at this time   Readmission Within The Last 30 Days no previous admission in last 30 days   Equipment Needed After Discharge (will continue to assess, currently wearing O2. )   Discharge Planning Comments spoke with patient at bedside. verified face sheet-address and phone# correct. she lives at home in a lower level apartment. no HH, DME or home O2. independent with ADL's. able to drive. uses [facility] [place] and denies having trouble paying for medications. does not have a living will and is not interested in any information. plan is home when medically stable. will continue to follow.    Current Health   Anticipated Changes Related to Illness none   Living Environment   Transportation Available family or friend will provide   Self-Care   Equipment Currently Used at Home none

## 2017-03-23 NOTE — ED NOTES
"Oxygen Saturation dropped to 86% on Room Air after DuoNeb Tx.  Oxygen 2L/NC placed on patient.  Patient states \"when you put the oxygen on me earlier, my dizziness went away.\"  When I asked patient if she was short of breath when she came to the hospital, patient states, \"only when I do something. I thought the pain was making me short of breath.\"  No distress noted at this time.     Jaz Greco RN  03/23/17 0131    "

## 2017-03-23 NOTE — NURSING NOTE
"Patient refused Lovenox this AM, stated she wont take it and wants \"out of here\" today! Patient is not in very god humor and wanting to be D/C from hospital.   "

## 2017-03-23 NOTE — DISCHARGE INSTRUCTIONS
Follow-up with Dr. Matt in 2 days.  Return if he had increasing pain, fever, vomiting blood, vomiting, diarrhea, bloody diarrhea, worse in any way at all.

## 2017-03-23 NOTE — PLAN OF CARE
Problem: COPD, Chronic Bronchitis/Emphysema (Adult)  Intervention: Optimize Oxygenation/Ventilation/Perfusion    03/23/17 1747   Promote Aggressive Pulmonary Hygiene/Secretion Management   Cough And Deep Breathing done with encouragement         03/23/17 1747   Promote Aggressive Pulmonary Hygiene/Secretion Management   Cough And Deep Breathing done with encouragement       Intervention: Reduce/Relieve Breathlessness    03/23/17 1747   Positioning   Head Of Bed (HOB) Position HOB at 30 degrees   Respiratory Interventions   Breathing Techniques/Airway Clearance pursed-lip breathing encouraged

## 2017-03-24 ENCOUNTER — APPOINTMENT (OUTPATIENT)
Dept: GENERAL RADIOLOGY | Facility: HOSPITAL | Age: 72
End: 2017-03-24

## 2017-03-24 VITALS
HEART RATE: 76 BPM | SYSTOLIC BLOOD PRESSURE: 138 MMHG | BODY MASS INDEX: 27.07 KG/M2 | OXYGEN SATURATION: 96 % | DIASTOLIC BLOOD PRESSURE: 68 MMHG | TEMPERATURE: 97.1 F | RESPIRATION RATE: 20 BRPM | HEIGHT: 65 IN | WEIGHT: 162.5 LBS

## 2017-03-24 LAB — BACTERIA SPEC AEROBE CULT: NO GROWTH

## 2017-03-24 PROCEDURE — 96376 TX/PRO/DX INJ SAME DRUG ADON: CPT

## 2017-03-24 PROCEDURE — 96361 HYDRATE IV INFUSION ADD-ON: CPT

## 2017-03-24 PROCEDURE — G0378 HOSPITAL OBSERVATION PER HR: HCPCS

## 2017-03-24 PROCEDURE — 96372 THER/PROPH/DIAG INJ SC/IM: CPT

## 2017-03-24 PROCEDURE — 94620 HC PULMONARY STRESS TEST SIMPLE: CPT

## 2017-03-24 PROCEDURE — 99217 PR OBSERVATION CARE DISCHARGE MANAGEMENT: CPT | Performed by: NURSE PRACTITIONER

## 2017-03-24 PROCEDURE — 94799 UNLISTED PULMONARY SVC/PX: CPT

## 2017-03-24 PROCEDURE — 74020 HC XR ABDOMEN FLAT & UPRIGHT: CPT

## 2017-03-24 PROCEDURE — 99406 BEHAV CHNG SMOKING 3-10 MIN: CPT | Performed by: NURSE PRACTITIONER

## 2017-03-24 PROCEDURE — 71020 HC CHEST PA AND LATERAL: CPT

## 2017-03-24 PROCEDURE — 25010000002 METHYLPREDNISOLONE PER 125 MG: Performed by: HOSPITALIST

## 2017-03-24 PROCEDURE — 25010000002 METHYLPREDNISOLONE PER 40 MG

## 2017-03-24 PROCEDURE — 25010000002 ENOXAPARIN PER 10 MG: Performed by: HOSPITALIST

## 2017-03-24 RX ORDER — BUDESONIDE AND FORMOTEROL FUMARATE DIHYDRATE 160; 4.5 UG/1; UG/1
2 AEROSOL RESPIRATORY (INHALATION)
Qty: 1 INHALER | Refills: 1 | Status: SHIPPED | OUTPATIENT
Start: 2017-03-24 | End: 2017-04-23

## 2017-03-24 RX ORDER — IPRATROPIUM BROMIDE AND ALBUTEROL SULFATE 2.5; .5 MG/3ML; MG/3ML
3 SOLUTION RESPIRATORY (INHALATION)
Qty: 360 ML | Refills: 2 | Status: SHIPPED | OUTPATIENT
Start: 2017-03-24 | End: 2017-03-31 | Stop reason: SDUPTHER

## 2017-03-24 RX ORDER — POLYETHYLENE GLYCOL 3350 17 G/17G
17 POWDER, FOR SOLUTION ORAL DAILY
Qty: 850 G | Refills: 1 | Status: SHIPPED | OUTPATIENT
Start: 2017-03-24 | End: 2017-05-11

## 2017-03-24 RX ORDER — METHYLPREDNISOLONE SODIUM SUCCINATE 40 MG/ML
INJECTION, POWDER, LYOPHILIZED, FOR SOLUTION INTRAMUSCULAR; INTRAVENOUS
Status: COMPLETED
Start: 2017-03-24 | End: 2017-03-24

## 2017-03-24 RX ORDER — PREDNISONE 10 MG/1
TABLET ORAL
Qty: 20 TABLET | Refills: 0 | Status: SHIPPED | OUTPATIENT
Start: 2017-03-24 | End: 2017-05-11

## 2017-03-24 RX ORDER — GUAIFENESIN 600 MG/1
1200 TABLET, EXTENDED RELEASE ORAL 2 TIMES DAILY
Qty: 40 TABLET | Refills: 0 | Status: SHIPPED | OUTPATIENT
Start: 2017-03-24 | End: 2017-05-11

## 2017-03-24 RX ADMIN — METHYLPREDNISOLONE SODIUM SUCCINATE 60 MG: 125 INJECTION, POWDER, FOR SOLUTION INTRAMUSCULAR; INTRAVENOUS at 03:37

## 2017-03-24 RX ADMIN — IPRATROPIUM BROMIDE AND ALBUTEROL SULFATE 3 ML: .5; 3 SOLUTION RESPIRATORY (INHALATION) at 08:22

## 2017-03-24 RX ADMIN — METHYLPREDNISOLONE SODIUM SUCCINATE 60 MG: 40 INJECTION, POWDER, FOR SOLUTION INTRAMUSCULAR; INTRAVENOUS at 03:37

## 2017-03-24 RX ADMIN — VITAMIN D 2000 UNITS: 25 TAB ORAL at 09:14

## 2017-03-24 RX ADMIN — ASPIRIN 81 MG 81 MG: 81 TABLET ORAL at 09:14

## 2017-03-24 RX ADMIN — ACETAMINOPHEN 650 MG: 325 TABLET, FILM COATED ORAL at 07:58

## 2017-03-24 RX ADMIN — PANTOPRAZOLE SODIUM 40 MG: 40 TABLET, DELAYED RELEASE ORAL at 06:33

## 2017-03-24 RX ADMIN — POLYETHYLENE GLYCOL (3350) 17 G: 17 POWDER, FOR SOLUTION ORAL at 10:16

## 2017-03-24 RX ADMIN — ENOXAPARIN SODIUM 40 MG: 40 INJECTION SUBCUTANEOUS at 09:15

## 2017-03-24 RX ADMIN — SODIUM CHLORIDE 75 ML/HR: 9 INJECTION, SOLUTION INTRAVENOUS at 06:33

## 2017-03-24 RX ADMIN — IPRATROPIUM BROMIDE AND ALBUTEROL SULFATE 3 ML: .5; 3 SOLUTION RESPIRATORY (INHALATION) at 03:26

## 2017-03-24 RX ADMIN — METHYLPREDNISOLONE SODIUM SUCCINATE 60 MG: 125 INJECTION, POWDER, FOR SOLUTION INTRAMUSCULAR; INTRAVENOUS at 09:19

## 2017-03-24 RX ADMIN — IPRATROPIUM BROMIDE AND ALBUTEROL SULFATE 3 ML: .5; 3 SOLUTION RESPIRATORY (INHALATION) at 12:05

## 2017-03-24 RX ADMIN — GUAIFENESIN 1200 MG: 600 TABLET, EXTENDED RELEASE ORAL at 09:15

## 2017-03-24 NOTE — DISCHARGE SUMMARY
Mary Jack  1945  [id number]    Hospitalists Discharge Summary    Date of Admission: 3/22/2017  Date of Discharge:  3/24/2017    Primary Discharge Diagnoses:   1. Acute hypoxic, hypercarbic respiratory failure  3. Abdominal pain  Secondary Discharge Diagnoses:   4. Tachycardia with h/o cardiac stents x 2, h/o MI  5. Hypertension  6. Hyperlipidemia  7. 3.4 cm abdominal aortic infrarenal aneurysm  8. Tobacco abuse  PCP  Patient Care Team:  Delfino Matt MD as PCP - General    Consults:   Consults     Date and Time Order Name Status Description    3/23/2017 1046 Inpatient Consult to Gastroenterology          Operations and Procedures Performed:     Ct Abdomen Pelvis Without Contrast    Result Date: 3/23/2017  Narrative: CT abdomen and pelvis without contrast  INDICATION: Right lower quadrant pain last night  PROCEDURE: Noncontrast CT of the abdomen and pelvis.Radiation dose reduction techniques were utilized, including automated exposure control and exposure modulation based on body size.  COMPARISON: None  FINDINGS:  ABDOMEN WITHOUT CONTRAST:Included lung bases predominantly clear.  Liver, spleen, kidneys, adrenal glands, pancreas have an unremarkable unenhanced appearance. Previous cholecystectomy. Bowel loops nondilated. Appendix is normal.  Dilation of the infrarenal abdominal aorta up to 3.4 cm. Sigmoid diverticula. No convincing evidence for acute complication.  A majority of the oral contrast material is in the stomach. Others some questionable thickening in the distal stomach.  PELVIS WITHOUT CONTRAST:No pelvic mass or fluid. No aggressive appearing bone lesion.      Impression: Majority of the oral contrast material in the stomach could reflect delayed gastric emptying. There is questionable thickening of the distal stomach that could represent the peptic ulcer disease. No definite active inflammation. This be best evaluated with endoscopy if clinically appropriate.  Uncomplicated sigmoid  diverticula.  Appendix normal.  Aneurysmal dilation infrarenal abdominal aorta.  This report was finalized on 3/23/2017 7:04 AM by Dr. Gerson Oleary MD.      Xr Abdomen Flat & Upright    Result Date: 3/24/2017  Narrative: FLAT AND UPRIGHT VIEWS OF THE ABDOMEN 03/24/2017  HISTORY: Right lower quadrant abdominal pain, improving.  COMPARISON: CT abdomen and pelvis 03/22/2017.  FINDINGS: Enteric contrast is demonstrated within the ascending, transverse and descending colon with a mild-to-moderate stool burden in the rectosigmoid region. No abnormal small bowel dilation is identified. No free air or pneumatosis is evident. Cholecystectomy clips in the right upper quadrant. Imaged lung bases appear clear. Lumbar levoscoliosis with degenerative endplate changes.      Impression: No acute findings in the abdomen. Oral contrast from the 03/22/2017 CT abdomen has advanced into the colon.  This report was finalized on 3/24/2017 9:06 AM by Dr. Paige Khan MD.      Ct Angiogram Chest With & Without Contrast    Result Date: 3/23/2017  Narrative: CTA CHEST PE PROTOCOL  INDICATION: Shortness of air since last night  PROCEDURE: Contrast enhanced CTA of the chest with attention on opacification of the pulmonary arteries. Coronal 3-D mid and sagittal reformatted images are reconstructed and submitted.  COMPARISON: None  FINDINGS:  No evidence for pulmonary embolus or acute aortic injury. Main pulmonary artery measures up to 3.3 cm. No adenopathy. No acute findings in the included upper abdomen.  Severe emphysema. Lungs are otherwise clear. No aggressive appearing bone lesion.      Impression: No acute findings. No evidence for pulmonary embolus.  Severe emphysema.  Prominence of the pulmonary arteries suggesting of underlying pulmonary arterial hypertension.  This report was finalized on 3/23/2017 2:27 PM by Dr. Gerson Oleary MD.      Xr Chest 1 View    Result Date: 3/23/2017  Narrative: INDICATION: COPD chest congestion today   FINDINGS: Single portable AP view of the chest compared to none.  Heart and mediastinal contours are normal. No dense consolidation. Mild diffuse interstitial prominence.. No pneumothorax or pleural effusion.      Impression: Mild diffuse interstitial prominence probably representing underlying chronic change cannot completely exclude mild interstitial edema though there is no significant central pulmonary vascular congestion.  This report was finalized on 3/23/2017 7:21 AM by Dr. Gerson Oleary MD.      Xr Chest Pa & Lateral    Result Date: 3/24/2017  Narrative: EXAM: PA and lateral chest.  DATE: 03/24/2017  HISTORY:: Cough for 3 weeks. Smoking history. Bronchitis. Emphysema.  COMPARISON: CT angiography is chest 08/23/2017. AP portable chest 03/23/2017.  FINDINGS: Severe emphysematous changes redemonstrated. Benign calcified granuloma in the right lower lung. No acute airspace disease. Heart size within normal limits. No pleural effusion. No pneumothorax. Lower thoracic curvature toward the right. Osteopenia.      Impression: 1. Emphysema.. No acute chest findings.  This report was finalized on 3/24/2017 9:18 AM by Dr. Paige Khan MD.      Allergies:  is allergic to iodinated diagnostic agents; codeine; and morphine and related.    Perez  No medications noted per report of 3/23/17    Discharge Medications:   Mary Jack   Home Medication Instructions MARY ANN:[id number]    Printed on:03/24/17 1014   Medication Information                      aspirin 81 MG tablet  Take 1 tablet by mouth daily.             atorvastatin (LIPITOR) 10 MG tablet  Take 1 tablet by mouth Daily.             budesonide-formoterol (SYMBICORT) 160-4.5 MCG/ACT inhaler  Inhale 2 puffs 2 (Two) Times a Day for 30 days.             cholecalciferol (VITAMIN D3) 1000 UNITS tablet  Take 2,000 Units by mouth Daily.             Diphenhydramine-PE-APAP (ALLERGY & SINUS INTENSE ST PO)  Take 1 tablet by mouth Daily As Needed.             Docusate  "Calcium (STOOL SOFTENER PO)  Take 1 tablet by mouth 3 (Three) Times a Day As Needed.             guaiFENesin (MUCINEX) 600 MG 12 hr tablet  Take 2 tablets by mouth 2 (Two) Times a Day.             ipratropium-albuterol (DUO-NEB) 0.5-2.5 mg/mL nebulizer  Take 3 mL by nebulization Every 4 (Four) Hours.             metoprolol succinate XL (TOPROL-XL) 25 MG 24 hr tablet  Take 25 mg by mouth Every Night.             nitroglycerin (NITROSTAT) 0.4 MG SL tablet  Place under the tongue. DISSOLVE 1 TABLET UNDER THE TONGUE AS NEEDED FOR CHEST PAIN.             omeprazole (PriLOSEC) 40 MG capsule  Take 40 mg by mouth daily.             ondansetron ODT (ZOFRAN ODT) 8 MG disintegrating tablet  Take 1 tablet by mouth Every 8 (Eight) Hours As Needed for Nausea or Vomiting.             polyethylene glycol (MIRALAX) packet  Take 17 g by mouth Daily.             Potassium (POTASSIMIN PO)  Take  by mouth.             predniSONE (DELTASONE) 10 MG tablet  40 mg x 2 days, 30 mg x 2 days, 20 mg x 2 days, 10 mg x 2 days               History of Present Illness: Taken from original HPI on admit:   \"The patient is a 71 YOF who presented through the ER secondary to RLQ/RUQ sudden onset, aching in nature, occurring at rest yesterday. She reports no change in pain with eating/movement/breathing, and reports only residual \"soreness\" today. She reports chronic constipation with a very small BM yesterday and stopped taking colace/ran out of colace a couple of weeks ago.  She notes a h/o hiatal hernia and GERD that have been at baseline symptoms     On 3/20/17 she was seen by Dr. Hayes in follow up after a stress test she was was unable to complete due to her extreme SOA. She has an upcoming Lexiscan Cardiolite this week to complete her cardiac evaluation. She has 2 coronary stents, h/o MI, CAD, HLD and known moderate right carotid stenosis. She had a recent echo that preliminary showed a normal EF. She denies any chest pain at this " "time.     She reports a 3 week history of bronchitis and completion of Z-pack/steroid pack/mucinex full box for this with persistent coughing, nonproductive. She reports that she has COPD but has never been seen by a pulmonologist and only recently was prescribed an albuterol inhaler. She continues to smoke at home and reports no oxygen requirement recurring. She was admitted secondary to pO2 of 46.4% noted on ABG.     She otherwise denies f/c/n/v/d/chest pain/sick exposures/change in bowel or bladder habits (chronic constipation)/no weight change/bloody emesis or bloody stools/change in medications or any other new concerns other than as mentioned above.\"    Hospital Course  1. Acute hypoxic, hypercarbic respiratory failure:  2. Acute exacerbation COPD (AECOPD): Pulmonary followed  ABG much improved yesterday on 3 liters oxygen/nasal cannula (46.4 to 63.6),   CT chest negative for pulmonary embolism  Procalcitonin low/respiratory viral panel negative  Sputum culture pending  Echo with normal EF, RVSP 35-40, grade I diastolic dysfunction  CXR today shows emphysema, no acute findings  Home on duonebs/symbicort/tapering prednisone pending eval by pulmonary outpatient    3. Abdominal pain: Dr. Castelan following  Resolved  Labs normal  CT abdomen showed retained contrast in stomach  Amylase/lipase normal  Flat and upright today shows nothing acute, contrast progressed  F/U Dr. Castelan 4 weeks    4. Tachycardia with h/o cardiac stents x 2, h/o MI:   Tachycardia resolved when home metoprolol home dose given  Has full workup in progress outpatient by Dr. Hayes, f/u as scheduled    5. Hypertension: at goal on home metoprolol succinate XL 25 mg hs    6. Hyperlipidemia: no acute issues on atorvastatin 10 mg daily/Aspirin 81 mg daily, lipid panel normal    7. 3.4 cm abdominal aortic infrarenal aneurysm: stable, f/u Delfino Matt MD  For further referral needs.     8. Tobacco abuse: Advised the patient of the risks " in continuing to use tobacco and provided this patient with smoking cessation educational materials and discussed how to quit smoking, and patient has expressed the willingness to quit.  Counseled x 4 minutes, patient has tried nicotine patch-allergy, wellbutrin without success, gum works so prescribed this for her. F/U Delfino Matt MD    Last Lab Results:   Lab Results (most recent)     Procedure Component Value Units Date/Time    CBC & Differential [[id number]] Collected:  03/22/17 2104    Specimen:  Blood Updated:  03/22/17 2119    Narrative:       The following orders were created for panel order CBC & Differential.  Procedure                               Abnormality         Status                     ---------                               -----------         ------                     CBC Auto Differential[[id number]]         Abnormal            Final result                 Please view results for these tests on the individual orders.    CBC Auto Differential [[id number]]  (Abnormal) Collected:  03/22/17 2104    Specimen:  Blood Updated:  03/22/17 2119     WBC 5.40 10*3/mm3      RBC 4.15 (L) 10*6/mm3      Hemoglobin 13.0 g/dL      Hematocrit 39.8 %      MCV 95.9 fL      MCH 31.3 (H) pg      MCHC 32.7 g/dL      RDW 13.3 %      RDW-SD 46.8 fl      MPV 10.7 (H) fL      Platelets 202 10*3/mm3      Neutrophil % 52.6 %      Lymphocyte % 33.1 %      Monocyte % 11.9 (H) %      Eosinophil % 1.3 %      Basophil % 0.9 %      Immature Grans % 0.2 %      Neutrophils, Absolute 2.84 10*3/mm3      Lymphocytes, Absolute 1.79 10*3/mm3      Monocytes, Absolute 0.64 10*3/mm3      Eosinophils, Absolute 0.07 (L) 10*3/mm3      Basophils, Absolute 0.05 10*3/mm3      Immature Grans, Absolute 0.01 10*3/mm3      nRBC 0.0 /100 WBC     Comprehensive Metabolic Panel [[id number]]  (Abnormal) Collected:  03/22/17 2104    Specimen:  Blood Updated:  03/22/17 2133     Glucose 111 (H) mg/dL      BUN 12 mg/dL      Creatinine 1.14 (H) mg/dL       Sodium 132 (L) mmol/L      Potassium 4.0 mmol/L      Chloride 94 (L) mmol/L      CO2 24.8 mmol/L      Calcium 9.6 mg/dL      Total Protein 7.9 g/dL      Albumin 4.40 g/dL      ALT (SGPT) 13 U/L      AST (SGOT) 18 U/L      Alkaline Phosphatase 100 U/L      Total Bilirubin 0.4 mg/dL      eGFR Non African Amer 47 (L) mL/min/1.73      Globulin 3.5 gm/dL      A/G Ratio 1.3 g/dL      BUN/Creatinine Ratio 10.5     Anion Gap 13.2 mmol/L     Urinalysis With / Culture If Indicated [[id number]]  (Abnormal) Collected:  03/22/17 2104    Specimen:  Urine from Urine, Clean Catch Updated:  03/22/17 2138     Color, UA Yellow     Appearance, UA Clear     pH, UA 5.5     Specific Gravity, UA 1.015     Glucose, UA Negative     Ketones, UA Negative     Bilirubin, UA Negative     Blood, UA Negative     Protein, UA Negative     Leuk Esterase, UA Small (1+) (A)     Nitrite, UA Negative     Urobilinogen, UA 0.2 E.U./dL    Urinalysis, Microscopic Only [[id number]]  (Abnormal) Collected:  03/22/17 2104    Specimen:  Urine from Urine, Clean Catch Updated:  03/22/17 2138     RBC, UA 0-2 (A) /HPF      WBC, UA 3-5 (A) /HPF      Bacteria, UA Trace (A) /HPF      Squamous Epithelial Cells, UA 0-2 /HPF      Hyaline Casts, UA None Seen /LPF      Methodology Manual Light Microscopy    Lipase [[id number]]  (Normal) Collected:  03/22/17 2133    Specimen:  Blood Updated:  03/22/17 2140     Lipase 56 U/L     D-dimer, Quantitative [[id number]]  (Normal) Collected:  03/23/17 0045    Specimen:  Blood Updated:  03/23/17 0056     D-Dimer, Quantitative 0.44 MCGFEU/mL     Narrative:       Can be elevated in, but is not diagnostic for deep vein thrombosis (DVT) or pulmonary embolis (PE).  It is also elevated in other medical conditions.  Clinical correlation is required.  The negative cut-off value for the D-Dimer is 0.50 mcg FEU/mL for DVT and PE.    BNP [[id number]]  (Normal) Collected:  03/23/17 0045    Specimen:  Blood Updated:  03/23/17 0111     proBNP 139.8 pg/mL      Narrative:       Among patients with dyspnea, NT-proBNP is highly sensitive for the detection of acute congestive heart failure. In addition NT-proBNP of <300 pg/ml effectively rules out acute congestive heart failure with 99% negative predictive value.    Blood Gas, Arterial [[id number]]  (Abnormal) Collected:  03/23/17 0102    Specimen:  Arterial Blood Updated:  03/23/17 0111     Site Arterial: left radial     Elia's Test Positive     pH, Arterial 7.349 (L) pH units      pCO2, Arterial 50.4 (H) mm Hg      pO2, Arterial 46.4 (C) mm Hg      HCO3, Arterial 27.1 (H) mmol/L      Base Excess, Arterial 0.8 mmol/L      O2 Saturation Calculated 81.9 (L) %      Hemoglobin, Blood Gas 12.9 g/dL      Barometric Pressure for Blood Gas 759 mmHg      Modality RA    Troponin [[id number]]  (Normal) Collected:  03/23/17 0045    Specimen:  Blood Updated:  03/23/17 0114     Troponin T <0.010 ng/mL     Narrative:       Troponin T Reference Ranges:  Less than 0.03 ng/mL:    Negative for AMI  0.03 to 0.09 ng/mL:      Indeterminant for AMI  Greater than 0.09 ng/mL: Positive for AMI    Troponin [[id number]]  (Normal) Collected:  03/23/17 0404    Specimen:  Blood Updated:  03/23/17 0440     Troponin T <0.010 ng/mL     Narrative:       Troponin T Reference Ranges:  Less than 0.03 ng/mL:    Negative for AMI  0.03 to 0.09 ng/mL:      Indeterminant for AMI  Greater than 0.09 ng/mL: Positive for AMI    Respiratory Panel, PCR [[id number]]  (Normal) Collected:  03/23/17 0301    Specimen:  Swab from Nasopharynx Updated:  03/23/17 0835     ADENOVIRUS, PCR Not Detected     Coronavirus 229E Not Detected     Coronavirus HKU1 Not Detected     Coronavirus NL63 Not Detected     Coronavirus OC43 Not Detected     Human Metapneumovirus Not Detected     Human Rhinovirus/Enterovirus Not Detected     Influenza B PCR Not Detected     Parainfluenza Virus 1 Not Detected     Parainfluenza Virus 2 Not Detected     Parainfluenza Virus 3 Not Detected     Parainfluenza Virus  "4 Not Detected     Bordetella pertussis pcr Not Detected     Influenza 2009 H1N1 by PCR Not Detected     Chlamydophila pneumoniae PCR Not Detected     Mycoplasma pneumo by PCR Not Detected     Influenza A PCR Not Detected     Influenza A H3 Not Detected     Influenza A H1 Not Detected     RSV, PCR Not Detected    Amylase [[id number]]  (Normal) Collected:  03/23/17 0404    Specimen:  Blood Updated:  03/23/17 1031     Amylase 90 U/L     Lipase [[id number]]  (Normal) Collected:  03/23/17 0404    Specimen:  Blood Updated:  03/23/17 1032     Lipase 34 U/L     Blood Gas, Arterial [[id number]]  (Abnormal) Collected:  03/23/17 1158    Specimen:  Arterial Blood Updated:  03/23/17 1204     Site Arterial: left brachial     Elia's Test Positive     pH, Arterial 7.387 pH units      pCO2, Arterial 40.6 mm Hg      pO2, Arterial 63.6 (L) mm Hg      HCO3, Arterial 23.9 mmol/L      Base Excess, Arterial -1.1 mmol/L      O2 Saturation Calculated 92.2 %      Hemoglobin, Blood Gas 12.5 g/dL      Barometric Pressure for Blood Gas 759 mmHg      Modality Cannula - Nasal     Flow Rate 3 lpm     Procalcitonin [[id number]]  (Abnormal) Collected:  03/23/17 0404    Specimen:  Blood Updated:  03/23/17 1426     Procalcitonin 0.07 (L) ng/mL     Narrative:       As a Marker for Sepsis (Non-Neonates):   1. <0.5 ng/mL represents a low risk of severe sepsis and/or septic shock.  1. >2 ng/mL represents a high risk of severe sepsis and/or septic shock.    As a Marker for Lower Respiratory Tract Infections that require antibiotic therapy:  PCT on Admission     Antibiotic Therapy             6-12 Hrs later  > 0.5                Strongly Recommended            >0.25 - <0.5         Recommended  0.1 - 0.25           Discouraged                   Remeasure/reassess PCT  <0.1                 Strongly Discouraged          Remeasure/reassess PCT      As 28 day mortality risk marker: \"Change in Procalcitonin Result\" (> 80 % or <=80 %) if Day 0 (or Day 1) and Day 4 " values are available. Refer to http://www.[facility]-pct-calculator.com/   Change in PCT <=80 %   A decrease of PCT levels below or equal to 80 % defines a positive change in PCT test result representing a higher risk for 28-day all-cause mortality of patients diagnosed with severe sepsis or septic shock.  Change in PCT > 80 %   A decrease of PCT levels of more than 80 % defines a negative change in PCT result representing a lower risk for 28-day all-cause mortality of patients diagnosed with severe sepsis or septic shock.                Urine Culture [[id number]]  (Normal) Collected:  03/22/17 2104    Specimen:  Urine from Urine, Clean Catch Updated:  03/24/17 0659     Urine Culture No growth        Imaging Results (most recent)     Procedure Component Value Units Date/Time    CT Abdomen Pelvis Without Contrast [[id number]] Collected:  03/23/17 0659     Updated:  03/23/17 0706    Narrative:       CT abdomen and pelvis without contrast     INDICATION: Right lower quadrant pain last night     PROCEDURE: Noncontrast CT of the abdomen and pelvis.Radiation dose  reduction techniques were utilized, including automated exposure control  and exposure modulation based on body size.     COMPARISON: None     FINDINGS:     ABDOMEN WITHOUT CONTRAST:Included lung bases predominantly clear.     Liver, spleen, kidneys, adrenal glands, pancreas have an unremarkable  unenhanced appearance. Previous cholecystectomy. Bowel loops nondilated.  Appendix is normal.     Dilation of the infrarenal abdominal aorta up to 3.4 cm.  Sigmoid diverticula. No convincing evidence for acute complication.     A majority of the oral contrast material is in the stomach. Others some  questionable thickening in the distal stomach.     PELVIS WITHOUT CONTRAST:No pelvic mass or fluid. No aggressive appearing  bone lesion.       Impression:       Majority of the oral contrast material in the stomach could  reflect delayed gastric emptying. There is questionable  thickening of  the distal stomach that could represent the peptic ulcer disease. No  definite active inflammation. This be best evaluated with endoscopy if  clinically appropriate.     Uncomplicated sigmoid diverticula.     Appendix normal.     Aneurysmal dilation infrarenal abdominal aorta.      This report was finalized on 3/23/2017 7:04 AM by Dr. Gerson Oleary MD.       XR Chest 1 View [[id number]] Collected:  03/23/17 0720     Updated:  03/23/17 0723    Narrative:       INDICATION: COPD chest congestion today     FINDINGS:  Single portable AP view of the chest compared to none.     Heart and mediastinal contours are normal. No dense consolidation. Mild  diffuse interstitial prominence.. No pneumothorax or pleural effusion.       Impression:       Mild diffuse interstitial prominence probably representing underlying  chronic change cannot completely exclude mild interstitial edema though  there is no significant central pulmonary vascular congestion.     This report was finalized on 3/23/2017 7:21 AM by Dr. Gerson Oleary MD.       CT Angiogram Chest With & Without Contrast [[id number]] Collected:  03/23/17 1424     Updated:  03/23/17 1429    Narrative:       CTA CHEST PE PROTOCOL     INDICATION: Shortness of air since last night      PROCEDURE: Contrast enhanced CTA of the chest with attention on  opacification of the pulmonary arteries. Coronal 3-D mid and sagittal  reformatted images are reconstructed and submitted.      COMPARISON: None      FINDINGS:  No evidence for pulmonary embolus or acute aortic injury.  Main pulmonary artery measures up to 3.3 cm. No adenopathy. No acute  findings in the included upper abdomen.     Severe emphysema. Lungs are otherwise clear. No aggressive appearing  bone lesion.        Impression:       No acute findings. No evidence for pulmonary embolus.     Severe emphysema.     Prominence of the pulmonary arteries suggesting of underlying pulmonary  arterial hypertension.     This report  was finalized on 3/23/2017 2:27 PM by Dr. Gerson Oleary MD.           PROCEDURES: NONE    Condition on Discharge:  Stable    Physical Exam at Discharge  Vital Signs  Temp:  [97.1 °F (36.2 °C)-98.1 °F (36.7 °C)] 97.1 °F (36.2 °C)  Heart Rate:  [] 96  Resp:  [18-22] 18  BP: ()/(57-75) 138/68    Physical Exam:  Physical Exam   Constitutional: Patient appears well-developed and well-nourished and in no acute distress   HEENT:   Head: Normocephalic and atraumatic.   Eyes:  Pupils are equal, round, and reactive to light. EOM are intact. Sclera are anicteric and non-injected.  Mouth and Throat: Patient has moist mucous membranes. Oropharynx is clear of any erythema or exudate.     Neck: Neck supple. No JVD present. No thyromegaly present. No lymphadenopathy present.  Cardiovascular: Regular rate, regular rhythm, S1 normal and S2 normal.  Exam reveals no gallop and no friction rub.  No murmur heard.  Pulmonary/Chest: Lungs diminished throughout.   Abdominal: Soft. Bowel sounds are normal. No distension and no mass. There is no hepatosplenomegaly. There is no tenderness.   Musculoskeletal: Normal Muscle tone  Extremities: No edema. Pulses are palpable in all 4 extremities.  Neurological: Patient is alert and oriented to person, place, and time. Cranial nerves II-XII are grossly intact with no focal deficits.  Skin: Skin is warm. No rash noted. Nails show no clubbing.  No cyanosis or erythema.    Discharge Disposition  Home    Visiting Nurse:    Yes     Home PT/OT:  Yes     Home Safety Evaluation:  Yes     DME  arranged    Discharge Diet:         Dietary Orders            Start     Ordered    03/23/17 0248  Diet Regular; Cardiac  Diet Effective Now     Question Answer Comment   Diet Texture / Consistency Regular    Common Modifiers Cardiac        03/23/17 0248        Activity at Discharge:  As tolerated    Pre-discharge education  Medications, follow up    Follow-up Appointments  Future Appointments  Date Time  Provider Department [place]   3/31/2017 10:00 AM Delfino Matt MD [facility] None   4/14/2017 7:45 AM [facility]  [facility]   4/14/2017 9:30 AM [facility]  [facility]   4/14/2017 10:30 AM [facility]  [facility]   4/14/2017 11:15 AM [facility]  [facility]   4/27/2017 10:45 AM Virgie Castelan MD [facility] None   5/4/2017 2:45 PM Herber Hayes MD [facility] None     Additional Instructions for the Follow-ups that You Need to Schedule     Discharge Follow-Up With Specified Provider    As directed    To:  Dr. CHHAYA Llanes Pulmonary   Follow Up:  2 Weeks       Discharge Follow-up with PCP    As directed    Follow Up Details:  1 week       Discharge Follow-up with Specialty    As directed    Specialty:  Dr. Castelan   Follow Up:  1 Month             Test Results Pending at Discharge: NONE     Nhung Clifford, RIO  03/24/17  11:51 AM    Time: Discharge over 30 min (if over 30 minutes give explanation as to why it took greater than 30 minutes)  Secondary to:   Walking oximetry  Review of labs/diagnostics   Medication reconciliation

## 2017-03-24 NOTE — PROGRESS NOTES
Exercise Oximetry    Patient Name:Mary Jack   MRN: [id number]   Date: 03/24/17             ROOM AIR BASELINE   SpO2%    Heart Rate    Blood Pressure      EXERCISE ON ROOM AIR SpO2% EXERCISE ON O2 @  2 LPM SpO2%   1 MINUTE  1 MINUTE   96   2 MINUTES  2 MINUTES   93   3 MINUTES  3 MINUTES   92   4 MINUTES  4 MINUTES   92   5 MINUTES  5 MINUTES   93   6 MINUTES  6 MINUTES   91              Distance Walked   Distance Walked    520 ft   Dyspnea (Flynn Scale)   Dyspnea (Flynn Scale)   Pre 0 post 0   Fatigue (Flynn Scale)   Fatigue (Flynn Scale)     Pre 0 post 3   SpO2% Post Exercise   SpO2% Post Exercise     93   HR Post Exercise   HR Post Exercise             100   Time to Recovery   Time to Recovery   2 min,     Comments: Pt walked using 2 lpm Cannula.

## 2017-03-24 NOTE — PLAN OF CARE
Problem: Patient Care Overview (Adult)  Goal: Plan of Care Review  Outcome: Ongoing (interventions implemented as appropriate)    03/24/17 0327   Coping/Psychosocial Response Interventions   Plan Of Care Reviewed With patient   Patient Care Overview   Progress improving       Goal: Adult Individualization and Mutuality  Outcome: Ongoing (interventions implemented as appropriate)    Problem: COPD, Chronic Bronchitis/Emphysema (Adult)  Intervention: Optimize Oxygenation/Ventilation/Perfusion    03/24/17 0327   Promote Aggressive Pulmonary Hygiene/Secretion Management   Cough And Deep Breathing done independently per patient   Respiratory Interventions   Airway/Ventilation Management airway patency maintained

## 2017-03-24 NOTE — NURSING NOTE
spoke with patient at bedside regarding discharge planning. patient states her sister is coming to visit today and could provide transportation if discharged today. her daughter lives in the [place] area. patient agreeable to look at list provided of home health agecies. she would like to use [facility]. spoke with Kacy @ [facility] r/t referral. also discussed home O2 companies and she stated she had used oxygen approx 13 years ago. she would like to use [employer]. awaiting pulmonology consult. will continue to follow.       spoke with Erin @ [employer] r/t need for home O2 and nebulizer machine. she will be out to deliver approx before 12 noon. faxed information to [phone]. will continue to follow.     Spoke with Fior @ [employer] and duoneb=$29.80 and symbicort is $30.00, will communicate pricing to patient. patient states she has talked to the home health and she is not home bound so therefore she cancelled services. will contiue to follow.

## 2017-03-24 NOTE — PLAN OF CARE
Problem: Patient Care Overview (Adult)  Goal: Plan of Care Review  Outcome: Outcome(s) achieved Date Met:  03/24/17 03/24/17 1134   Coping/Psychosocial Response Interventions   Plan Of Care Reviewed With patient   Outcome Evaluation   Outcome Summary/Follow up Plan home today with oxygen       Goal: Adult Individualization and Mutuality  Outcome: Outcome(s) achieved Date Met:  03/24/17  Goal: Discharge Needs Assessment  Outcome: Outcome(s) achieved Date Met:  03/24/17    Problem: Pain, Acute (Adult)  Goal: Identify Related Risk Factors and Signs and Symptoms  Outcome: Outcome(s) achieved Date Met:  03/24/17  Goal: Acceptable Pain Control/Comfort Level  Outcome: Outcome(s) achieved Date Met:  03/24/17    Problem: COPD, Chronic Bronchitis/Emphysema (Adult)  Goal: Signs and Symptoms of Listed Potential Problems Will be Absent or Manageable (COPD, Chronic Bronchitis/Emphysema)  Outcome: Outcome(s) achieved Date Met:  03/24/17

## 2017-03-24 NOTE — PLAN OF CARE
Problem: Patient Care Overview (Adult)  Goal: Plan of Care Review  Outcome: Ongoing (interventions implemented as appropriate)    03/24/17 0141   Coping/Psychosocial Response Interventions   Plan Of Care Reviewed With patient   Patient Care Overview   Progress no change         Problem: Pain, Acute (Adult)  Goal: Acceptable Pain Control/Comfort Level  Outcome: Ongoing (interventions implemented as appropriate)    Problem: COPD, Chronic Bronchitis/Emphysema (Adult)  Goal: Signs and Symptoms of Listed Potential Problems Will be Absent or Manageable (COPD, Chronic Bronchitis/Emphysema)  Outcome: Ongoing (interventions implemented as appropriate)

## 2017-03-24 NOTE — PLAN OF CARE
Problem: Patient Care Overview (Adult)  Goal: Discharge Needs Assessment  Outcome: Ongoing (interventions implemented as appropriate)    03/24/17 0970   Discharge Needs Assessment   Discharge Planning Comments referral to [facility].

## 2017-03-31 ENCOUNTER — OFFICE VISIT (OUTPATIENT)
Dept: FAMILY MEDICINE CLINIC | Facility: CLINIC | Age: 72
End: 2017-03-31

## 2017-03-31 VITALS
DIASTOLIC BLOOD PRESSURE: 60 MMHG | SYSTOLIC BLOOD PRESSURE: 110 MMHG | HEIGHT: 64 IN | BODY MASS INDEX: 27.76 KG/M2 | TEMPERATURE: 97.7 F | HEART RATE: 91 BPM | WEIGHT: 162.6 LBS | OXYGEN SATURATION: 98 %

## 2017-03-31 DIAGNOSIS — J43.1 PANLOBULAR EMPHYSEMA (HCC): Primary | ICD-10-CM

## 2017-03-31 DIAGNOSIS — I25.10 CHRONIC CORONARY ARTERY DISEASE: ICD-10-CM

## 2017-03-31 DIAGNOSIS — Z72.0 TOBACCO ABUSE: ICD-10-CM

## 2017-03-31 DIAGNOSIS — Z09 HOSPITAL DISCHARGE FOLLOW-UP: ICD-10-CM

## 2017-03-31 DIAGNOSIS — I71.40 ABDOMINAL AORTIC ANEURYSM (AAA) WITHOUT RUPTURE (HCC): ICD-10-CM

## 2017-03-31 DIAGNOSIS — E78.2 MIXED HYPERLIPIDEMIA: ICD-10-CM

## 2017-03-31 DIAGNOSIS — Z99.81 SUPPLEMENTAL OXYGEN DEPENDENT: ICD-10-CM

## 2017-03-31 DIAGNOSIS — I27.21 PULMONARY ARTERIAL HYPERTENSION (HCC): ICD-10-CM

## 2017-03-31 PROBLEM — R06.02 SOB (SHORTNESS OF BREATH): Status: RESOLVED | Noted: 2017-03-21 | Resolved: 2017-03-31

## 2017-03-31 PROCEDURE — 99407 BEHAV CHNG SMOKING > 10 MIN: CPT | Performed by: FAMILY MEDICINE

## 2017-03-31 PROCEDURE — 99214 OFFICE O/P EST MOD 30 MIN: CPT | Performed by: FAMILY MEDICINE

## 2017-03-31 RX ORDER — POLYETHYLENE GLYCOL 3350 17 G
4 POWDER IN PACKET (EA) ORAL AS NEEDED
Qty: 27 LOZENGE | Refills: 5 | Status: SHIPPED | OUTPATIENT
Start: 2017-03-31 | End: 2017-05-11

## 2017-03-31 RX ORDER — POLYETHYLENE GLYCOL 3350 17 G
4 POWDER IN PACKET (EA) ORAL AS NEEDED
Qty: 27 LOZENGE | Refills: 5 | Status: SHIPPED | OUTPATIENT
Start: 2017-03-31 | End: 2017-03-31 | Stop reason: SDUPTHER

## 2017-03-31 RX ORDER — IPRATROPIUM BROMIDE AND ALBUTEROL SULFATE 2.5; .5 MG/3ML; MG/3ML
3 SOLUTION RESPIRATORY (INHALATION)
Qty: 360 ML | Refills: 2
Start: 2017-03-31 | End: 2017-05-11

## 2017-03-31 NOTE — PROGRESS NOTES
Subjective   Mary Jack is a 71 y.o. female who is here for   Chief Complaint   Patient presents with   • COPD   • Follow-up   • Emphysema   .     History of Present Illness   COPD: Patient complains of dyspnea, cough, wheezing, fatigue, increased sputum and colored sputum. Symptoms began a few weeks ago. Symptoms chronic dyspnea, dyspnea after 1 blocks, dyspnea after 1 flights of stairs and inability to climb stairs does worsen with exertion. Sputum is green in moderate amounts. Fever has been hot and cold spells. Patient uses 2 pillows at night. Patient can walk 40 feet before resting. Patient currently is on oxygen at 2 L/min per nasal cannula.. Respiratory history: frequent episodes of bronchitis, COPD, emphysema and pneumonia     Was hospitalized last week with AECOPD  Now on oxygen 2 L  Will see Jaycee Alba as a new pt next week.    Still smoking.  We reviewed all her hospital records.  Has a new 3.8 cm AAA, i will refer to Vascular.    The following portions of the patient's history were reviewed and updated as appropriate: allergies, current medications, past family history, past medical history, past social history, past surgical history and problem list.    Review of Systems    Objective   Physical Exam   Constitutional: She appears well-developed and well-nourished. No distress.   HENT:   Mouth/Throat: Oropharynx is clear and moist.   Eyes: Conjunctivae are normal.   Cardiovascular: Normal rate.    Pulmonary/Chest: She has wheezes. She has rales.   Abdominal: There is no tenderness.   Neurological: She is alert.   Psychiatric: She has a normal mood and affect.   Nursing note and vitals reviewed.      Assessment/Plan   Mary was seen today for copd, follow-up and emphysema.    Diagnoses and all orders for this visit:    Panlobular emphysema    Chronic coronary artery disease    Mixed hyperlipidemia    Tobacco abuse  -     Discontinue: nicotine polacrilex (NICORETTE) 4 MG lozenge; Dissolve 1  "lozenge in the mouth As Needed for Smoking Cessation.  -     nicotine polacrilex (NICORETTE) 4 MG lozenge; Dissolve 1 lozenge in the mouth As Needed for Smoking Cessation.    Hospital discharge follow-up    Abdominal aortic aneurysm (AAA) without rupture  -     Ambulatory Referral to Vascular Surgery    Pulmonary arterial hypertension    Supplemental oxygen dependent    Other orders  -     ipratropium-albuterol (DUO-NEB) 0.5-2.5 mg/mL nebulizer; Take 3 mL by nebulization Every 4 (Four) Hours.      Patient Instructions   \"I advised the patient of the risks in continuing to use tobacco,\" and \"I provided this patient with smoking cessation educational materials and discussed how to quit smoking,\" and \"patient has expressed the willingness to quit\".      Medications Discontinued During This Encounter   Medication Reason   • nitroglycerin (NITROSTAT) 0.4 MG SL tablet Duplicate order   • nicotine polacrilex (NICORETTE) 4 MG gum    • Potassium (POTASSIMIN PO)    • ipratropium-albuterol (DUO-NEB) 0.5-2.5 mg/mL nebulizer Duplicate order   • Docusate Calcium (STOOL SOFTENER PO) Duplicate order   • Diphenhydramine-PE-APAP (ALLERGY & SINUS INTENSE ST PO) Duplicate order   • nicotine polacrilex (NICORETTE) 4 MG lozenge Reorder   • nicotine polacrilex (NICORETTE) 4 MG gum         Return in about 3 months (around 6/30/2017).    Dr. Delfino Matt  [place], [place]  "

## 2017-03-31 NOTE — PATIENT INSTRUCTIONS
"\"I advised the patient of the risks in continuing to use tobacco,\" and \"I provided this patient with smoking cessation educational materials and discussed how to quit smoking,\" and \"patient has expressed the willingness to quit\".  "

## 2017-04-14 ENCOUNTER — HOSPITAL ENCOUNTER (OUTPATIENT)
Dept: CARDIOLOGY | Facility: HOSPITAL | Age: 72
Discharge: HOME | End: 2017-04-14
Attending: INTERNAL MEDICINE

## 2017-04-14 VITALS
DIASTOLIC BLOOD PRESSURE: 64 MMHG | SYSTOLIC BLOOD PRESSURE: 98 MMHG | OXYGEN SATURATION: 95 % | BODY MASS INDEX: 28.53 KG/M2 | RESPIRATION RATE: 22 BRPM | HEART RATE: 68 BPM | HEIGHT: 63 IN | WEIGHT: 161 LBS

## 2017-04-14 DIAGNOSIS — R06.02 SOB (SHORTNESS OF BREATH): ICD-10-CM

## 2017-04-14 DIAGNOSIS — I21.29 ACUTE MYOCARDIAL INFARCTION INVOLVING OTHER CORONARY ARTERY: ICD-10-CM

## 2017-04-14 DIAGNOSIS — I25.118 CORONARY ARTERY DISEASE OF NATIVE ARTERY OF NATIVE HEART WITH STABLE ANGINA PECTORIS (HCC): ICD-10-CM

## 2017-04-14 PROCEDURE — 0 TECHNETIUM SESTAMIBI: Performed by: INTERNAL MEDICINE

## 2017-04-14 PROCEDURE — 93016 CV STRESS TEST SUPVJ ONLY: CPT | Performed by: INTERNAL MEDICINE

## 2017-04-14 PROCEDURE — 25010000002 REGADENOSON 0.4 MG/5ML SOLUTION: Performed by: INTERNAL MEDICINE

## 2017-04-14 PROCEDURE — A9500 TC99M SESTAMIBI: HCPCS | Performed by: INTERNAL MEDICINE

## 2017-04-14 PROCEDURE — 78452 HT MUSCLE IMAGE SPECT MULT: CPT | Performed by: INTERNAL MEDICINE

## 2017-04-14 PROCEDURE — 93017 CV STRESS TEST TRACING ONLY: CPT

## 2017-04-14 PROCEDURE — 78452 HT MUSCLE IMAGE SPECT MULT: CPT

## 2017-04-14 PROCEDURE — 93018 CV STRESS TEST I&R ONLY: CPT | Performed by: INTERNAL MEDICINE

## 2017-04-14 RX ADMIN — Medication 1 DOSE: at 10:50

## 2017-04-14 RX ADMIN — REGADENOSON 0.4 MG: 0.08 INJECTION, SOLUTION INTRAVENOUS at 10:50

## 2017-04-14 RX ADMIN — Medication 1 DOSE: at 08:01

## 2017-04-19 LAB
BH CV STRESS BP STAGE 1: NORMAL
BH CV STRESS COMMENTS STAGE 1: NORMAL
BH CV STRESS DOSE REGADENOSON STAGE 1: 0.4
BH CV STRESS DURATION MIN STAGE 1: 0
BH CV STRESS DURATION SEC STAGE 1: 15
BH CV STRESS HR STAGE 1: 96
BH CV STRESS O2 STAGE 1: 98
BH CV STRESS PROTOCOL 1: NORMAL
BH CV STRESS RECOVERY BP: NORMAL MMHG
BH CV STRESS RECOVERY HR: 82 BPM
BH CV STRESS RECOVERY O2: 98 %
BH CV STRESS STAGE 1: 1
LV EF NUC BP: 72 %
MAXIMAL PREDICTED HEART RATE: 151 BPM
PERCENT MAX PREDICTED HR: 63.58 %
STRESS BASELINE BP: NORMAL MMHG
STRESS BASELINE HR: 68 BPM
STRESS O2 SAT REST: 98 %
STRESS PERCENT HR: 75 %
STRESS POST ESTIMATED WORKLOAD: 1 METS
STRESS POST EXERCISE DUR MIN: 0 MIN
STRESS POST EXERCISE DUR SEC: 0 SEC
STRESS POST O2 SAT PEAK: 98 %
STRESS POST PEAK BP: NORMAL MMHG
STRESS POST PEAK HR: 96 BPM
STRESS TARGET HR: 128 BPM

## 2017-04-27 ENCOUNTER — OFFICE VISIT (OUTPATIENT)
Dept: GASTROENTEROLOGY | Facility: CLINIC | Age: 72
End: 2017-04-27

## 2017-04-27 VITALS
TEMPERATURE: 97.8 F | SYSTOLIC BLOOD PRESSURE: 112 MMHG | OXYGEN SATURATION: 93 % | HEART RATE: 78 BPM | HEIGHT: 63 IN | WEIGHT: 164.8 LBS | DIASTOLIC BLOOD PRESSURE: 68 MMHG | BODY MASS INDEX: 29.2 KG/M2

## 2017-04-27 DIAGNOSIS — K22.70 BARRETT'S ESOPHAGUS WITHOUT DYSPLASIA: ICD-10-CM

## 2017-04-27 DIAGNOSIS — K59.00 CONSTIPATION, UNSPECIFIED CONSTIPATION TYPE: Primary | ICD-10-CM

## 2017-04-27 PROCEDURE — 99213 OFFICE O/P EST LOW 20 MIN: CPT | Performed by: INTERNAL MEDICINE

## 2017-04-27 NOTE — PROGRESS NOTES
PATIENT INFORMATION  Mary Jack       - 1945    CHIEF COMPLAINT  Chief Complaint   Patient presents with   • Constipation     1 MO FOLLOW UP    • Heartburn       HISTORY OF PRESENT ILLNESS  HPI  She is here today in follow up from recent hospitalization.  She had some abdominal pain pain and a CT was done which showed some question of thickening of the distal stomach. I did her EGD about 8 months prior and looked normal. The esophageal biopsy showed evidence of Barretts esophagus.   She has not had any further pain. She is on prilosec daily and miralax. Weight has been stable. No nausea or vomiting. She is on chronic o2 2 liters.  She has follow up with pulmonary.   Her reflux is controlled on Prilosec daily.  She denies any nausea vomiting dysphagia or odynophagia.    REVIEW OF SYSTEMS  Review of Systems   HENT: Positive for ear pain and hearing loss.    Eyes: Positive for photophobia.   Respiratory: Positive for shortness of breath.    Gastrointestinal: Positive for constipation.   Musculoskeletal: Positive for back pain, neck pain and neck stiffness.   Neurological: Positive for light-headedness and numbness.         ACTIVE PROBLEMS  Patient Active Problem List    Diagnosis   • Hospital discharge follow-up [Z09]   • Abdominal aortic aneurysm (AAA) without rupture [I71.4]   • Pulmonary arterial hypertension [I27.2]     Overview Note:     Dilated pulm arteries on CTA of chest, 2017     • Supplemental oxygen dependent [Z99.81]   • Right lower quadrant abdominal pain [R10.31]   • Hypoxia [R09.02]   • Tobacco abuse [Z72.0]   • Acute myocardial infarction [I21.3]   • Panlobular emphysema [J43.1]   • Chronic coronary artery disease [I25.10]     Overview Note:     Description: RCA 80% (nonSTLMI), LAD 40%, Circum branch     • Anemia [D64.9]   • Heartburn [R12]   • Mixed hyperlipidemia [E78.2]         PAST MEDICAL HISTORY  Past Medical History:   Diagnosis Date   • Anemia    • Aneurysm of abdominal aorta     • Arthritis    • CAD (coronary artery disease)    • COPD (chronic obstructive pulmonary disease)    • Emphysema lung    • Empyema    • Fatigue    • GERD (gastroesophageal reflux disease)    • Headache    • High risk medication use    • History of cardiac catheterization     CATH STENT PLACEMENT: CIRCUMFLEX BRANCH   • Hyperlipidemia    • Hypertension    • Jaundice    • Myocardial infarction    • Myocardial infarction    • Occult blood in stools    • Peripheral artery disease    • Reflux gastritis          SURGICAL HISTORY  Past Surgical History:   Procedure Laterality Date   • CARDIAC VALVE REPLACEMENT      x 2   • CHOLECYSTECTOMY     • COLONOSCOPY     • COLONOSCOPY N/A 6/10/2016    Procedure: COLONOSCOPY with polypectomy;  Surgeon: Virgie Castelan MD;  Location: [facility] OR;  Service:    • CORONARY STENT PLACEMENT     • ENDOSCOPY N/A 6/10/2016    Procedure: ESOPHAGOGASTRODUODENOSCOPY WITH BIOPSY;  Surgeon: Virgie Castelan MD;  Location: [facility] OR;  Service:    • GALLBLADDER SURGERY           FAMILY HISTORY  Family History   Problem Relation Age of Onset   • Colon cancer Mother    • Breast cancer Mother    • Other Father      cardiac disorder   • Hypertension Father    • Other Sister      cardiac disorder   • Hypertension Sister    • Lung disease Sister    • Thyroid disease Sister    • Other Brother      cardiac disorder   • Thyroid disease Daughter          SOCIAL HISTORY  Social History     Occupational History   • Not on file.     Social History Main Topics   • Smoking status: Current Every Day Smoker     Packs/day: 0.50     Years: 59.00   • Smokeless tobacco: Never Used      Comment: trying to quit   • Alcohol use No   • Drug use: No   • Sexual activity: Defer         CURRENT MEDICATIONS    Current Outpatient Prescriptions:   •  ANORO ELLIPTA 62.5-25 MCG/INH aerosol powder , USE 1 PUFF BY MOUTH DAILY., Disp: , Rfl: 0  •  aspirin 81 MG tablet, Take 1 tablet by mouth daily., Disp: , Rfl:   •  atorvastatin (LIPITOR)  "10 MG tablet, Take 1 tablet by mouth Daily. (Patient taking differently: Take 10 mg by mouth Every Night.), Disp: 90 tablet, Rfl: 1  •  cholecalciferol (VITAMIN D3) 1000 UNITS tablet, Take 2,000 Units by mouth Daily., Disp: , Rfl:   •  guaiFENesin (MUCINEX) 600 MG 12 hr tablet, Take 2 tablets by mouth 2 (Two) Times a Day., Disp: 40 tablet, Rfl: 0  •  ipratropium-albuterol (DUO-NEB) 0.5-2.5 mg/mL nebulizer, Take 3 mL by nebulization Every 4 (Four) Hours., Disp: 360 mL, Rfl: 2  •  metoprolol succinate XL (TOPROL-XL) 25 MG 24 hr tablet, Take 25 mg by mouth Every Night., Disp: , Rfl:   •  nicotine polacrilex (NICORETTE) 4 MG lozenge, Dissolve 1 lozenge in the mouth As Needed for Smoking Cessation., Disp: 27 lozenge, Rfl: 5  •  omeprazole (PriLOSEC) 40 MG capsule, Take 40 mg by mouth daily., Disp: , Rfl:   •  polyethylene glycol (MIRALAX) packet, Take 17 g by mouth Daily., Disp: 850 g, Rfl: 1  •  predniSONE (DELTASONE) 10 MG tablet, 40 mg x 2 days, 30 mg x 2 days, 20 mg x 2 days, 10 mg x 2 days, Disp: 20 tablet, Rfl: 0    ALLERGIES  Iodinated diagnostic agents; Codeine; and Morphine and related    VITALS  Vitals:    04/27/17 1056   BP: 112/68   Pulse: 78   Temp: 97.8 °F (36.6 °C)   TempSrc: Oral   SpO2: 93%   Weight: 164 lb 12.8 oz (74.8 kg)   Height: 63.25\" (160.7 cm)       LAST RESULTS   Hospital Outpatient Visit on 04/14/2017   Component Date Value Ref Range Status   •  CV STRESS PROTOCOL 1 04/14/2017 Pharmacologic   Final   • Stage 1 04/14/2017 1   Final   • HR Stage 1 04/14/2017 96   Final   • BP Stage 1 04/14/2017 98/64   Final   • O2 Stage 1 04/14/2017 98   Final   • Duration Min Stage 1 04/14/2017 0   Final   • Duration Sec Stage 1 04/14/2017 15   Final   • Stress Dose Regadenoson Stage 1 04/14/2017 0.4   Final   • Stress Comments Stage 1 04/14/2017 15 sec bolus injection   Final   • Baseline HR 04/14/2017 68  bpm Final   • Baseline BP 04/14/2017 98/64  mmHg Final   • O2 sat rest 04/14/2017 98  % Final   • Peak " HR 04/14/2017 96  bpm Final   • Percent Max Pred HR 04/14/2017 63.58  % Final   • Percent Target HR 04/14/2017 75  % Final   • Peak BP 04/14/2017 98/64  mmHg Final   • O2 sat peak 04/14/2017 98  % Final   • Recovery HR 04/14/2017 82  bpm Final   • Recovery BP 04/14/2017 102/64  mmHg Final   • Recovery O2 04/14/2017 98  % Final   • Target HR (85%) 04/14/2017 128  bpm Final   • Max. Pred. HR (100%) 04/14/2017 151  bpm Final   • Exercise duration (min) 04/14/2017 0  min Final   • Exercise duration (sec) 04/14/2017 0  sec Final   • Estimated workload 04/14/2017 1.0  METS Final   • Nuc Stress EF 04/14/2017 72  % Final     No results found.    PHYSICAL EXAM  Physical Exam   Constitutional: She is oriented to person, place, and time. She appears well-developed and well-nourished. No distress.   HENT:   Head: Normocephalic and atraumatic.   Mouth/Throat: Oropharynx is clear and moist.   Eyes: EOM are normal. Pupils are equal, round, and reactive to light.   Neck: Normal range of motion. No tracheal deviation present.   Cardiovascular: Normal rate, regular rhythm, normal heart sounds and intact distal pulses.  Exam reveals no gallop and no friction rub.    No murmur heard.  Pulmonary/Chest: Effort normal and breath sounds normal. No stridor. No respiratory distress. She has no wheezes. She has no rales. She exhibits no tenderness.   Abdominal: Soft. Bowel sounds are normal. She exhibits no distension. There is no tenderness. There is no rebound and no guarding.   Musculoskeletal: She exhibits no edema.   Lymphadenopathy:     She has no cervical adenopathy.   Neurological: She is alert and oriented to person, place, and time.   Skin: Skin is warm. She is not diaphoretic.   Psychiatric: She has a normal mood and affect. Her behavior is normal. Judgment and thought content normal.   Nursing note and vitals reviewed.      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    I had a lengthy discussion with her in regards to Repeating an upper endoscopy.  I would defer this at this time due to her current decompensation of her long.  She has follow-up with pulmonary in August.  She is maintained on Prilosec once daily.  She is not having any further issues with abdominal pain nausea or vomiting.  Her constipation is controlled on MiraLAX daily and she will continue on this.  Her pictures from her previous endoscopy is reviewed in the antral area at that time looked normal.  She is agreeable with this plan.  We will reassess in 3 months and consider endoscopy at that time for lung status has stabilized.

## 2017-05-04 ENCOUNTER — OFFICE VISIT (OUTPATIENT)
Dept: CARDIOLOGY | Facility: CLINIC | Age: 72
End: 2017-05-04

## 2017-05-04 VITALS
DIASTOLIC BLOOD PRESSURE: 72 MMHG | HEIGHT: 63 IN | SYSTOLIC BLOOD PRESSURE: 123 MMHG | BODY MASS INDEX: 29.06 KG/M2 | HEART RATE: 85 BPM | WEIGHT: 164 LBS

## 2017-05-04 DIAGNOSIS — Z01.810 PRE-OPERATIVE CARDIOVASCULAR EXAMINATION: ICD-10-CM

## 2017-05-04 DIAGNOSIS — I20.0 UNSTABLE ANGINA (HCC): Primary | ICD-10-CM

## 2017-05-04 DIAGNOSIS — R07.89 OTHER CHEST PAIN: ICD-10-CM

## 2017-05-04 PROCEDURE — 99214 OFFICE O/P EST MOD 30 MIN: CPT | Performed by: INTERNAL MEDICINE

## 2017-05-04 RX ORDER — NITROGLYCERIN 0.4 MG/1
TABLET SUBLINGUAL
Qty: 100 TABLET | Refills: 11 | Status: SHIPPED | OUTPATIENT
Start: 2017-05-04 | End: 2017-05-11

## 2017-05-04 RX ORDER — B-COMPLEX WITH VITAMIN C
1 TABLET ORAL DAILY
COMMUNITY
End: 2018-07-18

## 2017-05-04 RX ORDER — LANOLIN ALCOHOL/MO/W.PET/CERES
1000 CREAM (GRAM) TOPICAL EVERY MORNING
COMMUNITY

## 2017-05-09 ENCOUNTER — LAB (OUTPATIENT)
Dept: LAB | Facility: HOSPITAL | Age: 72
End: 2017-05-09

## 2017-05-09 DIAGNOSIS — R07.89 OTHER CHEST PAIN: ICD-10-CM

## 2017-05-09 DIAGNOSIS — I20.0 UNSTABLE ANGINA (HCC): ICD-10-CM

## 2017-05-09 DIAGNOSIS — Z01.810 PRE-OPERATIVE CARDIOVASCULAR EXAMINATION: ICD-10-CM

## 2017-05-09 LAB
ANION GAP SERPL CALCULATED.3IONS-SCNC: 12.8 MMOL/L
APTT PPP: 29.1 SECONDS (ref 24.3–38.1)
BASOPHILS # BLD AUTO: 0.03 10*3/MM3 (ref 0–0.2)
BASOPHILS NFR BLD AUTO: 0.6 % (ref 0–2)
BUN BLD-MCNC: 10 MG/DL (ref 8–23)
BUN/CREAT SERPL: 11.8 (ref 7–25)
CALCIUM SPEC-SCNC: 9.6 MG/DL (ref 8.8–10.5)
CHLORIDE SERPL-SCNC: 95 MMOL/L (ref 98–107)
CO2 SERPL-SCNC: 26.2 MMOL/L (ref 22–29)
CREAT BLD-MCNC: 0.85 MG/DL (ref 0.57–1)
DEPRECATED RDW RBC AUTO: 46 FL (ref 37–54)
EOSINOPHIL # BLD AUTO: 0.07 10*3/MM3 (ref 0.1–0.3)
EOSINOPHIL NFR BLD AUTO: 1.4 % (ref 0–4)
ERYTHROCYTE [DISTWIDTH] IN BLOOD BY AUTOMATED COUNT: 13.2 % (ref 11.5–14.5)
GFR SERPL CREATININE-BSD FRML MDRD: 66 ML/MIN/1.73
GLUCOSE BLD-MCNC: 93 MG/DL (ref 65–99)
HCT VFR BLD AUTO: 34.4 % (ref 37–47)
HGB BLD-MCNC: 11.1 G/DL (ref 12–16)
IMM GRANULOCYTES # BLD: 0.02 10*3/MM3 (ref 0–0.03)
IMM GRANULOCYTES NFR BLD: 0.4 % (ref 0–0.5)
INR PPP: 1.02 (ref 0.9–1.1)
LYMPHOCYTES # BLD AUTO: 1.61 10*3/MM3 (ref 0.6–4.8)
LYMPHOCYTES NFR BLD AUTO: 32 % (ref 20–45)
MCH RBC QN AUTO: 30.7 PG (ref 27–31)
MCHC RBC AUTO-ENTMCNC: 32.3 G/DL (ref 31–37)
MCV RBC AUTO: 95 FL (ref 81–99)
MONOCYTES # BLD AUTO: 0.71 10*3/MM3 (ref 0–1)
MONOCYTES NFR BLD AUTO: 14.1 % (ref 3–8)
NEUTROPHILS # BLD AUTO: 2.59 10*3/MM3 (ref 1.5–8.3)
NEUTROPHILS NFR BLD AUTO: 51.5 % (ref 45–70)
NRBC BLD MANUAL-RTO: 0 /100 WBC (ref 0–0)
PLATELET # BLD AUTO: 181 10*3/MM3 (ref 140–500)
PMV BLD AUTO: 10.2 FL (ref 7.4–10.4)
POTASSIUM BLD-SCNC: 5 MMOL/L (ref 3.5–5.2)
PROTHROMBIN TIME: 13.4 SECONDS (ref 12.1–15)
RBC # BLD AUTO: 3.62 10*6/MM3 (ref 4.2–5.4)
SODIUM BLD-SCNC: 134 MMOL/L (ref 136–145)
WBC NRBC COR # BLD: 5.03 10*3/MM3 (ref 4.8–10.8)

## 2017-05-09 PROCEDURE — 85025 COMPLETE CBC W/AUTO DIFF WBC: CPT

## 2017-05-09 PROCEDURE — 36415 COLL VENOUS BLD VENIPUNCTURE: CPT | Performed by: INTERNAL MEDICINE

## 2017-05-09 PROCEDURE — 80048 BASIC METABOLIC PNL TOTAL CA: CPT | Performed by: INTERNAL MEDICINE

## 2017-05-09 PROCEDURE — 85730 THROMBOPLASTIN TIME PARTIAL: CPT

## 2017-05-09 PROCEDURE — 85610 PROTHROMBIN TIME: CPT

## 2017-05-11 RX ORDER — GUAIFENESIN 600 MG/1
1200 TABLET, EXTENDED RELEASE ORAL 2 TIMES DAILY
Status: ON HOLD | COMMUNITY
End: 2017-05-12

## 2017-05-11 RX ORDER — ATORVASTATIN CALCIUM 10 MG/1
10 TABLET, FILM COATED ORAL DAILY
COMMUNITY
End: 2018-04-09 | Stop reason: SDUPTHER

## 2017-05-11 RX ORDER — IPRATROPIUM BROMIDE AND ALBUTEROL SULFATE 2.5; .5 MG/3ML; MG/3ML
3 SOLUTION RESPIRATORY (INHALATION) EVERY 4 HOURS PRN
Status: ON HOLD | COMMUNITY
End: 2017-05-12

## 2017-05-11 RX ORDER — POLYETHYLENE GLYCOL 3350 17 G
2 POWDER IN PACKET (EA) ORAL AS NEEDED
COMMUNITY
End: 2017-12-15

## 2017-05-11 RX ORDER — POLYETHYLENE GLYCOL 3350 17 G/17G
17 POWDER, FOR SOLUTION ORAL DAILY
COMMUNITY
End: 2017-06-14 | Stop reason: SDUPTHER

## 2017-05-11 RX ORDER — NITROGLYCERIN 0.4 MG/1
0.4 TABLET SUBLINGUAL
COMMUNITY
End: 2022-02-08 | Stop reason: SDUPTHER

## 2017-05-12 ENCOUNTER — HOSPITAL ENCOUNTER (OUTPATIENT)
Facility: HOSPITAL | Age: 72
Setting detail: HOSPITAL OUTPATIENT SURGERY
Discharge: HOME | End: 2017-05-12
Attending: INTERNAL MEDICINE | Admitting: INTERNAL MEDICINE

## 2017-05-12 VITALS
WEIGHT: 162 LBS | BODY MASS INDEX: 28.7 KG/M2 | HEART RATE: 69 BPM | OXYGEN SATURATION: 95 % | SYSTOLIC BLOOD PRESSURE: 136 MMHG | RESPIRATION RATE: 16 BRPM | TEMPERATURE: 97.9 F | DIASTOLIC BLOOD PRESSURE: 77 MMHG | HEIGHT: 63 IN

## 2017-05-12 DIAGNOSIS — I20.0 UNSTABLE ANGINA (HCC): ICD-10-CM

## 2017-05-12 PROCEDURE — 93458 L HRT ARTERY/VENTRICLE ANGIO: CPT | Performed by: INTERNAL MEDICINE

## 2017-05-12 PROCEDURE — 25010000002 MIDAZOLAM PER 1 MG: Performed by: INTERNAL MEDICINE

## 2017-05-12 PROCEDURE — 0 IOPAMIDOL PER 1 ML: Performed by: INTERNAL MEDICINE

## 2017-05-12 PROCEDURE — C1769 GUIDE WIRE: HCPCS | Performed by: INTERNAL MEDICINE

## 2017-05-12 PROCEDURE — 25010000002 HEPARIN (PORCINE) PER 1000 UNITS: Performed by: INTERNAL MEDICINE

## 2017-05-12 PROCEDURE — C1894 INTRO/SHEATH, NON-LASER: HCPCS | Performed by: INTERNAL MEDICINE

## 2017-05-12 PROCEDURE — 25010000002 FENTANYL CITRATE (PF) 100 MCG/2ML SOLUTION: Performed by: INTERNAL MEDICINE

## 2017-05-12 RX ORDER — LIDOCAINE HYDROCHLORIDE 20 MG/ML
INJECTION, SOLUTION INFILTRATION; PERINEURAL AS NEEDED
Status: DISCONTINUED | OUTPATIENT
Start: 2017-05-12 | End: 2017-05-12 | Stop reason: HOSPADM

## 2017-05-12 RX ORDER — SODIUM CHLORIDE 0.9 % (FLUSH) 0.9 %
1-10 SYRINGE (ML) INJECTION AS NEEDED
Status: DISCONTINUED | OUTPATIENT
Start: 2017-05-12 | End: 2017-05-12 | Stop reason: HOSPADM

## 2017-05-12 RX ORDER — LIDOCAINE HYDROCHLORIDE 10 MG/ML
0.1 INJECTION, SOLUTION EPIDURAL; INFILTRATION; INTRACAUDAL; PERINEURAL ONCE AS NEEDED
Status: DISCONTINUED | OUTPATIENT
Start: 2017-05-12 | End: 2017-05-12 | Stop reason: HOSPADM

## 2017-05-12 RX ORDER — SODIUM CHLORIDE 9 MG/ML
75 INJECTION, SOLUTION INTRAVENOUS CONTINUOUS
Status: DISCONTINUED | OUTPATIENT
Start: 2017-05-12 | End: 2017-05-12 | Stop reason: HOSPADM

## 2017-05-12 RX ORDER — FENTANYL CITRATE 50 UG/ML
INJECTION, SOLUTION INTRAMUSCULAR; INTRAVENOUS AS NEEDED
Status: DISCONTINUED | OUTPATIENT
Start: 2017-05-12 | End: 2017-05-12 | Stop reason: HOSPADM

## 2017-05-12 RX ORDER — MIDAZOLAM HYDROCHLORIDE 1 MG/ML
INJECTION INTRAMUSCULAR; INTRAVENOUS AS NEEDED
Status: DISCONTINUED | OUTPATIENT
Start: 2017-05-12 | End: 2017-05-12 | Stop reason: HOSPADM

## 2017-05-12 RX ORDER — SODIUM CHLORIDE 9 MG/ML
100 INJECTION, SOLUTION INTRAVENOUS CONTINUOUS
Status: DISCONTINUED | OUTPATIENT
Start: 2017-05-12 | End: 2017-05-12 | Stop reason: HOSPADM

## 2017-05-12 RX ADMIN — SODIUM CHLORIDE 75 ML/HR: 9 INJECTION, SOLUTION INTRAVENOUS at 07:07

## 2017-05-31 ENCOUNTER — OFFICE VISIT (OUTPATIENT)
Dept: CARDIOLOGY | Facility: CLINIC | Age: 72
End: 2017-05-31

## 2017-05-31 VITALS
HEART RATE: 79 BPM | WEIGHT: 163 LBS | HEIGHT: 63 IN | DIASTOLIC BLOOD PRESSURE: 69 MMHG | BODY MASS INDEX: 28.88 KG/M2 | SYSTOLIC BLOOD PRESSURE: 108 MMHG

## 2017-05-31 DIAGNOSIS — I25.10 CHRONIC CORONARY ARTERY DISEASE: Primary | ICD-10-CM

## 2017-05-31 DIAGNOSIS — I27.21 PULMONARY ARTERIAL HYPERTENSION (HCC): ICD-10-CM

## 2017-05-31 DIAGNOSIS — E78.5 HYPERLIPIDEMIA, UNSPECIFIED HYPERLIPIDEMIA TYPE: ICD-10-CM

## 2017-05-31 DIAGNOSIS — Z72.0 TOBACCO ABUSE: ICD-10-CM

## 2017-05-31 PROCEDURE — 99213 OFFICE O/P EST LOW 20 MIN: CPT | Performed by: INTERNAL MEDICINE

## 2017-06-14 RX ORDER — POLYETHYLENE GLYCOL 3350 17 G/17G
17 POWDER, FOR SOLUTION ORAL DAILY
Qty: 850 G | Refills: 3 | Status: SHIPPED | OUTPATIENT
Start: 2017-06-14 | End: 2018-10-31 | Stop reason: SDUPTHER

## 2017-06-14 RX ORDER — OMEPRAZOLE 40 MG/1
40 CAPSULE, DELAYED RELEASE ORAL DAILY
Qty: 90 CAPSULE | Refills: 3 | Status: SHIPPED | OUTPATIENT
Start: 2017-06-14 | End: 2018-06-28 | Stop reason: SDUPTHER

## 2017-08-03 ENCOUNTER — OFFICE VISIT (OUTPATIENT)
Dept: GASTROENTEROLOGY | Facility: CLINIC | Age: 72
End: 2017-08-03

## 2017-08-03 ENCOUNTER — TELEPHONE (OUTPATIENT)
Dept: GASTROENTEROLOGY | Facility: CLINIC | Age: 72
End: 2017-08-03

## 2017-08-03 VITALS
BODY MASS INDEX: 29.2 KG/M2 | SYSTOLIC BLOOD PRESSURE: 124 MMHG | HEIGHT: 63 IN | DIASTOLIC BLOOD PRESSURE: 70 MMHG | WEIGHT: 164.8 LBS

## 2017-08-03 DIAGNOSIS — R10.84 GENERALIZED ABDOMINAL PAIN: Primary | ICD-10-CM

## 2017-08-03 DIAGNOSIS — K21.9 GASTROESOPHAGEAL REFLUX DISEASE, ESOPHAGITIS PRESENCE NOT SPECIFIED: ICD-10-CM

## 2017-08-03 DIAGNOSIS — K22.70 BARRETT'S ESOPHAGUS WITHOUT DYSPLASIA: ICD-10-CM

## 2017-08-03 PROCEDURE — 99214 OFFICE O/P EST MOD 30 MIN: CPT | Performed by: INTERNAL MEDICINE

## 2017-08-03 NOTE — TELEPHONE ENCOUNTER
Requesting pulmonary clearance for EGD scheduled on 8/25/17 with Dr. Castelan. Patient has an appointment with them on 8/15/17.  They will review her then and fax okay.

## 2017-08-03 NOTE — PROGRESS NOTES
PATIENT INFORMATION  Mary Jack       - 1945    CHIEF COMPLAINT  Chief Complaint   Patient presents with   • Abdominal Pain     3 MO FOLLOW UP       HISTORY OF PRESENT ILLNESS  Abdominal Pain       3 weeks ago she developed lower abdominal pain which radiated up to the right side. There was some nausea and one episode of vomiting. Niece who is a np called in Rx for levaquin for presumed diverticulitis. This did not help. Pain resolved. Weight has been stable. No melena or hematochezia.    EGD and CLS last year. Barretts esophagus noted. Due for EGD. CT done earlier this year with some questionable thickening of the stomach.      REVIEW OF SYSTEMS  Review of Systems   Constitutional: Positive for fatigue.   HENT: Positive for sinus pressure.    Gastrointestinal: Positive for abdominal pain.   Musculoskeletal: Positive for joint swelling.   Neurological: Positive for numbness.   All other systems reviewed and are negative.        ACTIVE PROBLEMS  Patient Active Problem List    Diagnosis   • Hospital discharge follow-up [Z09]   • Abdominal aortic aneurysm (AAA) without rupture [I71.4]   • Pulmonary arterial hypertension [I27.2]     Overview Note:     Dilated pulm arteries on CTA of chest, 2017     • Supplemental oxygen dependent [Z99.81]   • Right lower quadrant abdominal pain [R10.31]   • Hypoxia [R09.02]   • Tobacco abuse [Z72.0]   • Acute myocardial infarction [I21.3]   • Panlobular emphysema [J43.1]   • Chronic coronary artery disease [I25.10]     Overview Note:     Description: RCA 80% (nonSTLMI), LAD 40%, Circum branch     • Anemia [D64.9]   • Heartburn [R12]   • Mixed hyperlipidemia [E78.2]         PAST MEDICAL HISTORY  Past Medical History:   Diagnosis Date   • Anemia    • Aneurysm of abdominal aorta    • Arthritis    • CAD (coronary artery disease)    • COPD (chronic obstructive pulmonary disease)    • Emphysema lung    • Empyema    • Fatigue    • GERD (gastroesophageal reflux disease)    •  Headache    • High risk medication use    • History of cardiac catheterization     CATH STENT PLACEMENT: CIRCUMFLEX BRANCH   • Hyperlipidemia    • Hypertension    • Jaundice    • Myocardial infarction    • Myocardial infarction    • Occult blood in stools    • On home O2    • Peripheral artery disease    • Reflux gastritis          SURGICAL HISTORY  Past Surgical History:   Procedure Laterality Date   • CARDIAC CATHETERIZATION     • CARDIAC CATHETERIZATION N/A 5/12/2017    Procedure: Left Heart Cath;  Surgeon: Herber Hayes MD;  Location: [facility] CATH INVASIVE LOCATION;  Service:    • CHOLECYSTECTOMY     • COLONOSCOPY     • COLONOSCOPY N/A 6/10/2016    Procedure: COLONOSCOPY with polypectomy;  Surgeon: Virgie Castelan MD;  Location: [facility] OR;  Service:    • CORONARY ANGIOPLASTY     • CORONARY STENT PLACEMENT      X2   • ENDOSCOPY N/A 6/10/2016    Procedure: ESOPHAGOGASTRODUODENOSCOPY WITH BIOPSY;  Surgeon: Virgie Castelan MD;  Location: [facility] OR;  Service:          FAMILY HISTORY  Family History   Problem Relation Age of Onset   • Colon cancer Mother    • Breast cancer Mother    • Other Father      cardiac disorder   • Hypertension Father    • Other Sister      cardiac disorder   • Hypertension Sister    • Lung disease Sister    • Thyroid disease Sister    • Other Brother      cardiac disorder   • Thyroid disease Daughter          SOCIAL HISTORY  Social History     Occupational History   • Not on file.     Social History Main Topics   • Smoking status: Former Smoker     Packs/day: 0.50     Years: 59.00     Quit date: 4/27/2017   • Smokeless tobacco: Never Used      Comment: trying to quit   • Alcohol use No   • Drug use: No   • Sexual activity: Defer         CURRENT MEDICATIONS    Current Outpatient Prescriptions:   •  ANORO ELLIPTA 62.5-25 MCG/INH aerosol powder , USE 1 PUFF BY MOUTH DAILY., Disp: , Rfl: 0  •  aspirin 81 MG tablet, Take 1 tablet by mouth daily., Disp: , Rfl:   •  atorvastatin (LIPITOR) 10 MG  "tablet, Take 10 mg by mouth Daily., Disp: , Rfl:   •  B Complex Vitamins (VITAMIN B COMPLEX) tablet, Take 1 tablet by mouth Daily., Disp: , Rfl:   •  cholecalciferol (VITAMIN D3) 1000 UNITS tablet, Take 2,000 Units by mouth Daily., Disp: , Rfl:   •  metoprolol succinate XL (TOPROL-XL) 25 MG 24 hr tablet, Take 25 mg by mouth Every Night., Disp: , Rfl:   •  nicotine polacrilex (COMMIT) 2 MG lozenge, Dissolve 2 mg in the mouth As Needed for Smoking Cessation., Disp: , Rfl:   •  nitroglycerin (NITROSTAT) 0.4 MG SL tablet, Place 0.4 mg under the tongue Every 5 (Five) Minutes As Needed for Chest Pain. Take no more than 3 doses in 15 minutes., Disp: , Rfl:   •  omeprazole (priLOSEC) 40 MG capsule, Take 1 capsule by mouth Daily., Disp: 90 capsule, Rfl: 3  •  polyethylene glycol (MIRALAX) packet, Take 17 g by mouth Daily., Disp: 850 g, Rfl: 3  •  vitamin B-12 (CYANOCOBALAMIN) 1000 MCG tablet, Take 1,000 mcg by mouth Daily., Disp: , Rfl:     ALLERGIES  Iodinated diagnostic agents; Codeine; and Morphine and related    VITALS  Vitals:    08/03/17 1047   BP: 124/70   Weight: 164 lb 12.8 oz (74.8 kg)   Height: 63\" (160 cm)       LAST RESULTS   Lab on 05/09/2017   Component Date Value Ref Range Status   • PTT 05/09/2017 29.1  24.3 - 38.1 seconds Final   • Protime 05/09/2017 13.4  12.1 - 15.0 Seconds Final   • INR 05/09/2017 1.02  0.90 - 1.10 Final   • WBC 05/09/2017 5.03  4.80 - 10.80 10*3/mm3 Final   • RBC 05/09/2017 3.62* 4.20 - 5.40 10*6/mm3 Final   • Hemoglobin 05/09/2017 11.1* 12.0 - 16.0 g/dL Final   • Hematocrit 05/09/2017 34.4* 37.0 - 47.0 % Final   • MCV 05/09/2017 95.0  81.0 - 99.0 fL Final   • MCH 05/09/2017 30.7  27.0 - 31.0 pg Final   • MCHC 05/09/2017 32.3  31.0 - 37.0 g/dL Final   • RDW 05/09/2017 13.2  11.5 - 14.5 % Final   • RDW-SD 05/09/2017 46.0  37.0 - 54.0 fl Final   • MPV 05/09/2017 10.2  7.4 - 10.4 fL Final   • Platelets 05/09/2017 181  140 - 500 10*3/mm3 Final   • Neutrophil % 05/09/2017 51.5  45.0 - 70.0 % " Final   • Lymphocyte % 05/09/2017 32.0  20.0 - 45.0 % Final   • Monocyte % 05/09/2017 14.1* 3.0 - 8.0 % Final   • Eosinophil % 05/09/2017 1.4  0.0 - 4.0 % Final   • Basophil % 05/09/2017 0.6  0.0 - 2.0 % Final   • Immature Grans % 05/09/2017 0.4  0.0 - 0.5 % Final   • Neutrophils, Absolute 05/09/2017 2.59  1.50 - 8.30 10*3/mm3 Final   • Lymphocytes, Absolute 05/09/2017 1.61  0.60 - 4.80 10*3/mm3 Final   • Monocytes, Absolute 05/09/2017 0.71  0.00 - 1.00 10*3/mm3 Final   • Eosinophils, Absolute 05/09/2017 0.07* 0.10 - 0.30 10*3/mm3 Final   • Basophils, Absolute 05/09/2017 0.03  0.00 - 0.20 10*3/mm3 Final   • Immature Grans, Absolute 05/09/2017 0.02  0.00 - 0.03 10*3/mm3 Final   • nRBC 05/09/2017 0.0  0.0 - 0.0 /100 WBC Final     No results found.    PHYSICAL EXAM  Physical Exam   Constitutional: She is oriented to person, place, and time. She appears well-developed and well-nourished. No distress.   HENT:   Head: Normocephalic and atraumatic.   Mouth/Throat: Oropharynx is clear and moist.   Eyes: EOM are normal. Pupils are equal, round, and reactive to light.   Neck: Normal range of motion. No tracheal deviation present.   Cardiovascular: Normal rate, regular rhythm, normal heart sounds and intact distal pulses.  Exam reveals no gallop and no friction rub.    No murmur heard.  Pulmonary/Chest: Effort normal and breath sounds normal. No stridor. No respiratory distress. She has no wheezes. She has no rales. She exhibits no tenderness.   Abdominal: Soft. Bowel sounds are normal. She exhibits no distension. There is tenderness. There is no rebound and no guarding.   Epigastric pain   Musculoskeletal: She exhibits no edema.   Lymphadenopathy:     She has no cervical adenopathy.   Neurological: She is alert and oriented to person, place, and time.   Skin: Skin is warm. She is not diaphoretic.   Psychiatric: She has a normal mood and affect. Her behavior is normal. Judgment and thought content normal.   Nursing note and  vitals reviewed.      ASSESSMENT  Diagnoses and all orders for this visit:    Generalized abdominal pain    Gastroesophageal reflux disease, esophagitis presence not specified    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  -     Case Request; Standing  -     Case Request    Other orders  -     Implement Anesthesia orders day of procedure.; Standing  -     Obtain informed consent; Standing          PLAN  No Follow-up on file.  Sees dr. Lim and has appt in 2 weeks.    Antireflux measures and dietary modifications reviewed. Low acid diet reviewed. Keep head of bed elevated. Stop eating/drinking at least 3 hours prior to bedtime. Eliminate caffeine and carbonated beverages.  Weight loss encouraged if BMI over 25.    Risks, benefits and alternatives discussed including but not limited to the complications of bleeding, perforation and sedation related problems.

## 2017-08-24 ENCOUNTER — ANESTHESIA EVENT (OUTPATIENT)
Dept: PERIOP | Facility: HOSPITAL | Age: 72
End: 2017-08-24

## 2017-08-25 ENCOUNTER — ANESTHESIA (OUTPATIENT)
Dept: PERIOP | Facility: HOSPITAL | Age: 72
End: 2017-08-25

## 2017-08-25 ENCOUNTER — HOSPITAL ENCOUNTER (OUTPATIENT)
Facility: HOSPITAL | Age: 72
Setting detail: HOSPITAL OUTPATIENT SURGERY
Discharge: HOME | End: 2017-08-25
Attending: INTERNAL MEDICINE | Admitting: INTERNAL MEDICINE

## 2017-08-25 VITALS
SYSTOLIC BLOOD PRESSURE: 119 MMHG | HEART RATE: 78 BPM | WEIGHT: 162.25 LBS | DIASTOLIC BLOOD PRESSURE: 74 MMHG | HEIGHT: 64 IN | BODY MASS INDEX: 27.7 KG/M2 | TEMPERATURE: 98 F | OXYGEN SATURATION: 99 % | RESPIRATION RATE: 15 BRPM

## 2017-08-25 DIAGNOSIS — K22.70 BARRETT'S ESOPHAGUS WITHOUT DYSPLASIA: ICD-10-CM

## 2017-08-25 PROCEDURE — 43239 EGD BIOPSY SINGLE/MULTIPLE: CPT | Performed by: INTERNAL MEDICINE

## 2017-08-25 PROCEDURE — 25010000002 PROPOFOL 10 MG/ML EMULSION: Performed by: NURSE ANESTHETIST, CERTIFIED REGISTERED

## 2017-08-25 RX ORDER — LIDOCAINE HYDROCHLORIDE 20 MG/ML
INJECTION, SOLUTION INFILTRATION; PERINEURAL AS NEEDED
Status: DISCONTINUED | OUTPATIENT
Start: 2017-08-25 | End: 2017-08-25 | Stop reason: SURG

## 2017-08-25 RX ORDER — PROPOFOL 10 MG/ML
VIAL (ML) INTRAVENOUS AS NEEDED
Status: DISCONTINUED | OUTPATIENT
Start: 2017-08-25 | End: 2017-08-25 | Stop reason: SURG

## 2017-08-25 RX ORDER — MAGNESIUM HYDROXIDE 1200 MG/15ML
LIQUID ORAL AS NEEDED
Status: DISCONTINUED | OUTPATIENT
Start: 2017-08-25 | End: 2017-08-25 | Stop reason: HOSPADM

## 2017-08-25 RX ORDER — SODIUM CHLORIDE 0.9 % (FLUSH) 0.9 %
3 SYRINGE (ML) INJECTION AS NEEDED
Status: DISCONTINUED | OUTPATIENT
Start: 2017-08-25 | End: 2017-08-25 | Stop reason: HOSPADM

## 2017-08-25 RX ORDER — GLYCOPYRROLATE 0.2 MG/ML
INJECTION INTRAMUSCULAR; INTRAVENOUS AS NEEDED
Status: DISCONTINUED | OUTPATIENT
Start: 2017-08-25 | End: 2017-08-25 | Stop reason: SURG

## 2017-08-25 RX ORDER — LIDOCAINE HYDROCHLORIDE 10 MG/ML
0.5 INJECTION, SOLUTION INFILTRATION; PERINEURAL ONCE AS NEEDED
Status: COMPLETED | OUTPATIENT
Start: 2017-08-25 | End: 2017-08-25

## 2017-08-25 RX ORDER — LIDOCAINE HYDROCHLORIDE 10 MG/ML
INJECTION, SOLUTION EPIDURAL; INFILTRATION; INTRACAUDAL; PERINEURAL
Status: COMPLETED
Start: 2017-08-25 | End: 2017-08-25

## 2017-08-25 RX ORDER — SODIUM CHLORIDE, SODIUM LACTATE, POTASSIUM CHLORIDE, CALCIUM CHLORIDE 600; 310; 30; 20 MG/100ML; MG/100ML; MG/100ML; MG/100ML
1000 INJECTION, SOLUTION INTRAVENOUS CONTINUOUS PRN
Status: DISCONTINUED | OUTPATIENT
Start: 2017-08-25 | End: 2017-08-25 | Stop reason: HOSPADM

## 2017-08-25 RX ORDER — PROPOFOL 10 MG/ML
VIAL (ML) INTRAVENOUS CONTINUOUS PRN
Status: DISCONTINUED | OUTPATIENT
Start: 2017-08-25 | End: 2017-08-25 | Stop reason: SURG

## 2017-08-25 RX ADMIN — PROPOFOL 50 MG: 10 INJECTION, EMULSION INTRAVENOUS at 14:27

## 2017-08-25 RX ADMIN — PROPOFOL 150 MCG/KG/MIN: 10 INJECTION, EMULSION INTRAVENOUS at 14:27

## 2017-08-25 RX ADMIN — LIDOCAINE HYDROCHLORIDE 100 MG: 20 INJECTION, SOLUTION INFILTRATION; PERINEURAL at 14:29

## 2017-08-25 RX ADMIN — PROPOFOL 50 MG: 10 INJECTION, EMULSION INTRAVENOUS at 14:30

## 2017-08-25 RX ADMIN — LIDOCAINE HYDROCHLORIDE 0.5 ML: 10 INJECTION, SOLUTION INFILTRATION; PERINEURAL at 13:42

## 2017-08-25 RX ADMIN — LIDOCAINE HYDROCHLORIDE 100 MG: 20 INJECTION, SOLUTION INFILTRATION; PERINEURAL at 14:27

## 2017-08-25 RX ADMIN — SODIUM CHLORIDE, POTASSIUM CHLORIDE, SODIUM LACTATE AND CALCIUM CHLORIDE 1000 ML: 600; 310; 30; 20 INJECTION, SOLUTION INTRAVENOUS at 13:41

## 2017-08-25 RX ADMIN — LIDOCAINE HYDROCHLORIDE 0.5 ML: 10 INJECTION, SOLUTION EPIDURAL; INFILTRATION; INTRACAUDAL; PERINEURAL at 13:42

## 2017-08-25 RX ADMIN — GLYCOPYRROLATE 0.1 MG: 0.2 INJECTION INTRAMUSCULAR; INTRAVENOUS at 14:25

## 2017-08-25 NOTE — PLAN OF CARE
Problem: Patient Care Overview (Adult)  Goal: Plan of Care Review  Outcome: Outcome(s) achieved Date Met:  08/25/17 08/25/17 1342 08/25/17 1445   Coping/Psychosocial Response Interventions   Plan Of Care Reviewed With patient --    Patient Care Overview   Progress --  improving   Outcome Evaluation   Outcome Summary/Follow up Plan --  vss, waiting to go home

## 2017-08-25 NOTE — PLAN OF CARE
Problem: GI Endoscopy (Adult)  Goal: Signs and Symptoms of Listed Potential Problems Will be Absent or Manageable (GI Endoscopy)  Outcome: Outcome(s) achieved Date Met:  08/25/17 08/25/17 1425   GI Endoscopy   Problems Assessed (GI Endoscopy) all   Problems Present (GI Endoscopy) none

## 2017-08-25 NOTE — H&P
PATIENT INFORMATION  Mary Jack         - 1945     CHIEF COMPLAINT       Chief Complaint   Patient presents with   • Abdominal Pain       3 MO FOLLOW UP         HISTORY OF PRESENT ILLNESS  Abdominal Pain      3 weeks ago she developed lower abdominal pain which radiated up to the right side. There was some nausea and one episode of vomiting. Niece who is a np called in Rx for levaquin for presumed diverticulitis. This did not help. Pain resolved. Weight has been stable. No melena or hematochezia.     EGD and CLS last year. Barretts esophagus noted. Due for EGD. CT done earlier this year with some questionable thickening of the stomach.        REVIEW OF SYSTEMS  Review of Systems   Constitutional: Positive for fatigue.   HENT: Positive for sinus pressure.    Gastrointestinal: Positive for abdominal pain.   Musculoskeletal: Positive for joint swelling.   Neurological: Positive for numbness.   All other systems reviewed and are negative.           ACTIVE PROBLEMS       Patient Active Problem List     Diagnosis   • Hospital discharge follow-up [Z09]   • Abdominal aortic aneurysm (AAA) without rupture [I71.4]   • Pulmonary arterial hypertension [I27.2]       Overview Note:       Dilated pulm arteries on CTA of chest, 2017   • Supplemental oxygen dependent [Z99.81]   • Right lower quadrant abdominal pain [R10.31]   • Hypoxia [R09.02]   • Tobacco abuse [Z72.0]   • Acute myocardial infarction [I21.3]   • Panlobular emphysema [J43.1]   • Chronic coronary artery disease [I25.10]       Overview Note:       Description: RCA 80% (nonSTLMI), LAD 40%, Circum branch   • Anemia [D64.9]   • Heartburn [R12]   • Mixed hyperlipidemia [E78.2]            PAST MEDICAL HISTORY   Medical History         Past Medical History:   Diagnosis Date   • Anemia     • Aneurysm of abdominal aorta     • Arthritis     • CAD (coronary artery disease)     • COPD (chronic obstructive pulmonary disease)     • Emphysema lung     • Empyema     •  Fatigue     • GERD (gastroesophageal reflux disease)     • Headache     • High risk medication use     • History of cardiac catheterization       CATH STENT PLACEMENT: CIRCUMFLEX BRANCH   • Hyperlipidemia     • Hypertension     • Jaundice     • Myocardial infarction     • Myocardial infarction     • Occult blood in stools     • On home O2     • Peripheral artery disease     • Reflux gastritis                 SURGICAL HISTORY   Surgical History    Past Surgical History:   Procedure Laterality Date   • CARDIAC CATHETERIZATION       • CARDIAC CATHETERIZATION N/A 5/12/2017     Procedure: Left Heart Cath;  Surgeon: Herber Hayes MD;  Location: [facility] CATH INVASIVE LOCATION;  Service:    • CHOLECYSTECTOMY       • COLONOSCOPY       • COLONOSCOPY N/A 6/10/2016     Procedure: COLONOSCOPY with polypectomy;  Surgeon: Virgie Castelan MD;  Location: [facility] OR;  Service:    • CORONARY ANGIOPLASTY       • CORONARY STENT PLACEMENT         X2   • ENDOSCOPY N/A 6/10/2016     Procedure: ESOPHAGOGASTRODUODENOSCOPY WITH BIOPSY;  Surgeon: Virgie Castelan MD;  Location: [facility] OR;  Service:                FAMILY HISTORY         Family History   Problem Relation Age of Onset   • Colon cancer Mother     • Breast cancer Mother     • Other Father         cardiac disorder   • Hypertension Father     • Other Sister         cardiac disorder   • Hypertension Sister     • Lung disease Sister     • Thyroid disease Sister     • Other Brother         cardiac disorder   • Thyroid disease Daughter              SOCIAL HISTORY  Social History          Occupational History   • Not on file.             Social History Main Topics   • Smoking status: Former Smoker       Packs/day: 0.50       Years: 59.00       Quit date: 4/27/2017   • Smokeless tobacco: Never Used         Comment: trying to quit   • Alcohol use No   • Drug use: No   • Sexual activity: Defer            CURRENT MEDICATIONS     Current Outpatient Prescriptions:   •  ANORO ELLIPTA 62.5-25  "MCG/INH aerosol powder , USE 1 PUFF BY MOUTH DAILY., Disp: , Rfl: 0  •  aspirin 81 MG tablet, Take 1 tablet by mouth daily., Disp: , Rfl:   •  atorvastatin (LIPITOR) 10 MG tablet, Take 10 mg by mouth Daily., Disp: , Rfl:   •  B Complex Vitamins (VITAMIN B COMPLEX) tablet, Take 1 tablet by mouth Daily., Disp: , Rfl:   •  cholecalciferol (VITAMIN D3) 1000 UNITS tablet, Take 2,000 Units by mouth Daily., Disp: , Rfl:   •  metoprolol succinate XL (TOPROL-XL) 25 MG 24 hr tablet, Take 25 mg by mouth Every Night., Disp: , Rfl:   •  nicotine polacrilex (COMMIT) 2 MG lozenge, Dissolve 2 mg in the mouth As Needed for Smoking Cessation., Disp: , Rfl:   •  nitroglycerin (NITROSTAT) 0.4 MG SL tablet, Place 0.4 mg under the tongue Every 5 (Five) Minutes As Needed for Chest Pain. Take no more than 3 doses in 15 minutes., Disp: , Rfl:   •  omeprazole (priLOSEC) 40 MG capsule, Take 1 capsule by mouth Daily., Disp: 90 capsule, Rfl: 3  •  polyethylene glycol (MIRALAX) packet, Take 17 g by mouth Daily., Disp: 850 g, Rfl: 3  •  vitamin B-12 (CYANOCOBALAMIN) 1000 MCG tablet, Take 1,000 mcg by mouth Daily., Disp: , Rfl:      ALLERGIES  Iodinated diagnostic agents; Codeine; and Morphine and related     VITALS   Vitals        Vitals:     08/03/17 1047   BP: 124/70   Weight: 164 lb 12.8 oz (74.8 kg)   Height: 63\" (160 cm)            LAST RESULTS                             Lab on 05/09/2017   Component Date Value Ref Range Status   • PTT 05/09/2017 29.1  24.3 - 38.1 seconds Final   • Protime 05/09/2017 13.4  12.1 - 15.0 Seconds Final   • INR 05/09/2017 1.02  0.90 - 1.10 Final   • WBC 05/09/2017 5.03  4.80 - 10.80 10*3/mm3 Final   • RBC 05/09/2017 3.62* 4.20 - 5.40 10*6/mm3 Final   • Hemoglobin 05/09/2017 11.1* 12.0 - 16.0 g/dL Final   • Hematocrit 05/09/2017 34.4* 37.0 - 47.0 % Final   • MCV 05/09/2017 95.0  81.0 - 99.0 fL Final   • MCH 05/09/2017 30.7  27.0 - 31.0 pg Final   • MCHC 05/09/2017 32.3  31.0 - 37.0 g/dL Final   • RDW 05/09/2017 " 13.2  11.5 - 14.5 % Final   • RDW-SD 05/09/2017 46.0  37.0 - 54.0 fl Final   • MPV 05/09/2017 10.2  7.4 - 10.4 fL Final   • Platelets 05/09/2017 181  140 - 500 10*3/mm3 Final   • Neutrophil % 05/09/2017 51.5  45.0 - 70.0 % Final   • Lymphocyte % 05/09/2017 32.0  20.0 - 45.0 % Final   • Monocyte % 05/09/2017 14.1* 3.0 - 8.0 % Final   • Eosinophil % 05/09/2017 1.4  0.0 - 4.0 % Final   • Basophil % 05/09/2017 0.6  0.0 - 2.0 % Final   • Immature Grans % 05/09/2017 0.4  0.0 - 0.5 % Final   • Neutrophils, Absolute 05/09/2017 2.59  1.50 - 8.30 10*3/mm3 Final   • Lymphocytes, Absolute 05/09/2017 1.61  0.60 - 4.80 10*3/mm3 Final   • Monocytes, Absolute 05/09/2017 0.71  0.00 - 1.00 10*3/mm3 Final   • Eosinophils, Absolute 05/09/2017 0.07* 0.10 - 0.30 10*3/mm3 Final   • Basophils, Absolute 05/09/2017 0.03  0.00 - 0.20 10*3/mm3 Final   • Immature Grans, Absolute 05/09/2017 0.02  0.00 - 0.03 10*3/mm3 Final   • nRBC 05/09/2017 0.0  0.0 - 0.0 /100 WBC Final      No results found.     PHYSICAL EXAM  Physical Exam   Constitutional: She is oriented to person, place, and time. She appears well-developed and well-nourished. No distress.   HENT:   Head: Normocephalic and atraumatic.   Mouth/Throat: Oropharynx is clear and moist.   Eyes: EOM are normal. Pupils are equal, round, and reactive to light.   Neck: Normal range of motion. No tracheal deviation present.   Cardiovascular: Normal rate, regular rhythm, normal heart sounds and intact distal pulses.  Exam reveals no gallop and no friction rub.    No murmur heard.  Pulmonary/Chest: Effort normal and breath sounds normal. No stridor. No respiratory distress. She has no wheezes. She has no rales. She exhibits no tenderness.   Abdominal: Soft. Bowel sounds are normal. She exhibits no distension. There is tenderness. There is no rebound and no guarding.   Epigastric pain   Musculoskeletal: She exhibits no edema.   Lymphadenopathy:     She has no cervical adenopathy.   Neurological: She is  alert and oriented to person, place, and time.   Skin: Skin is warm. She is not diaphoretic.   Psychiatric: She has a normal mood and affect. Her behavior is normal. Judgment and thought content normal.   Nursing note and vitals reviewed.        ASSESSMENT  Diagnoses and all orders for this visit:     Generalized abdominal pain     Gastroesophageal reflux disease, esophagitis presence not specified     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  -     Case Request; Standing  -     Case Request     Other orders  -     Implement Anesthesia orders day of procedure.; Standing  -     Obtain informed consent; Standing              PLAN  No Follow-up on file.  Sees dr. Lim and has appt in 2 weeks.     Antireflux measures and dietary modifications reviewed. Low acid diet reviewed. Keep head of bed elevated. Stop eating/drinking at least 3 hours prior to bedtime. Eliminate caffeine and carbonated beverages.  Weight loss encouraged if BMI over 25.     Risks, benefits and alternatives discussed including but not limited to the complications of bleeding, perforation and sedation related problems.

## 2017-08-25 NOTE — ANESTHESIA PREPROCEDURE EVALUATION
Anesthesia Evaluation     Patient summary reviewed and Nursing notes reviewed   NPO Solid Status: > 8 hours  NPO Liquid Status: > 8 hours     Airway   Mallampati: I  TM distance: >3 FB  Neck ROM: full  no difficulty expected  Dental    (+) upper dentures    Pulmonary     breath sounds clear to auscultation  (+) a smoker (1/2ppd ) Current, COPD (2L home 02) severe, shortness of breath,   Sleep apnea: ?    ROS comment: EXAM: PA and lateral chest.      DATE: 03/24/2017      HISTORY:: Cough for 3 weeks. Smoking history. Bronchitis. Emphysema.      COMPARISON: CT angiography is chest 08/23/2017. AP portable chest  03/23/2017.      FINDINGS: Severe emphysematous changes redemonstrated. Benign calcified  granuloma in the right lower lung. No acute airspace disease. Heart size  within normal limits. No pleural effusion. No pneumothorax. Lower  thoracic curvature toward the right. Osteopenia.      IMPRESSION:  1. Emphysema.. No acute chest findings.  Cardiovascular   Exercise tolerance: poor (<4 METS)    ECG reviewed  Rhythm: regular  Rate: normal    (+) hypertension well controlled, past MI (2 yrs ago) , CAD, cardiac stents (2 stents) more than 12 months ago GA, PVD (AAA pt states md is watching it 3cm ), hyperlipidemia    ROS comment: RR Interval= 674 ms  [place] Interval= 144 ms  QRSD Interval= 96 ms  QT Interval= 360 ms  QTc Interval= 439 ms  Heart Rate= 89 ms  P Axis= 83 deg  QRS Axis= 84 deg  T Wave Axis= 62 deg  I: 40 Axis= 55 deg  T: 40 Axis= 99 deg  ST Axis= 65 deg  SINUS RHYTHM  BORDERLINE RIGHT AXIS DEVIATION  BORDERLINE T ABNORMALITIES, ANT-LAT LEADS  NO SIGNIFICANT CHANGE FROM PREVIOUS ECG  Electronically Signed by:  Lul Hampton 23-Mar-2017 11:28:58  Date and Time of Study: 2017-03-23 00:47:43    · Successful left heart catheter  · Normal left main  · Early atherosclerotic plaque of LAD including the diagonal and  branches  · Nondominant circumflex with a stent widely patent  · Dominant RCA with  patent stent with 30% IntraStent stenosis  · Normal LV function with mild inferior wall hypokinetic        • Right ICA Prox: Plaque present.   • Right ICA Mid: Tortuous vessel.   • Right ECA: Plaque present.   • Right Vertebral: Antegrade flow noted.       • Left ICA Prox: Plaque present.   • Left ICA Mid: Tortuous vessel.   • Left ECA: Plaque present  May 12, 2017    Neuro/Psych  (+) numbness (lisha feet and left index finger),    GI/Hepatic/Renal/Endo    (+)  hiatal hernia, GERD well controlled, hepatitis (17yo) A,     Musculoskeletal     (+) back pain, chronic pain,   Abdominal    Substance History - negative use     OB/GYN          Other   (+) arthritis                                     Anesthesia Plan    ASA 3     MAC     intravenous induction   Anesthetic plan and risks discussed with patient.  Use of blood products discussed with patient  Consented to blood products.

## 2017-08-25 NOTE — OP NOTE
EGD Procedure Note        Indication: Abnormal findings on CT      Consent: Procedure of EGD was explained to the patient in detail including but not limited to the complications of bleeding perforation and possible reactions to sedation.  She understood all this and was willing to proceed.    Anesthesia: Sedation was provided by anesthesia.    Procedure:    After excellent sedation a flexible endoscope was passed into the oropharynx into the distal esophagus.  The Z line was regular.  There is a small sliding hiatal hernia noted.  The scope was easily traversed into the stomach although into the antrum.  The entire examined stomach appeared normal there is mild amount of gastritis noted in the stomach and this was biopsied.  This is mostly in the antral area.  No other abnormalities were noted here.  The scope was passed into the duodenal bulb although into the second portion with ease.  Scope was withdrawn back out into the stomach and the area in question and the CT is carefully examined and no abnormalities are noted.  The scope was slowly withdrawn out of the patient with no immediate, patient's and she tolerated procedure well.      Impression/Plan:  Gastritis  Sliding hiatal hernia  We'll await biopsy results.  She'll see me back in the office in about 2-3 weeks in follow-up and discuss any further management.  Otherwise she'll continue on her current PPI therapy and antireflux measures as discussed before in the office.

## 2017-08-25 NOTE — PLAN OF CARE
Problem: GI Endoscopy (Adult)  Goal: Signs and Symptoms of Listed Potential Problems Will be Absent or Manageable (GI Endoscopy)  Outcome: Ongoing (interventions implemented as appropriate)    08/25/17 1425   GI Endoscopy   Problems Assessed (GI Endoscopy) all   Problems Present (GI Endoscopy) none

## 2017-08-25 NOTE — PLAN OF CARE
Problem: Patient Care Overview (Adult)  Goal: Adult Individualization and Mutuality  Outcome: Outcome(s) achieved Date Met:  08/25/17 08/25/17 1342   Individualization   Patient Specific Preferences goes by savita

## 2017-08-25 NOTE — ANESTHESIA POSTPROCEDURE EVALUATION
Patient: Mary Jack    Procedure Summary     Date Anesthesia Start Anesthesia Stop Room / Location    08/25/17 1424 1440 [facility] / [facility]       Procedure Diagnosis Surgeon Provider    ESOPHAGOGASTRODUODENOSCOPY w/ biopsies (N/A Esophagus) 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  (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 [K22.70]) MD Pam Hoffman CRNA          Anesthesia Type: MAC  Last vitals  BP   119/74 (08/25/17 1450)    Temp   98 °F (36.7 °C) (08/25/17 1450)    Pulse   78 (08/25/17 1450)   Resp   15 (08/25/17 1450)    SpO2   97 % (08/25/17 1450)      Post Anesthesia Care and Evaluation    Patient location during evaluation: bedside  Patient participation: complete - patient participated  Level of consciousness: awake and alert  Pain management: adequate  Airway patency: patent  Anesthetic complications: No anesthetic complications    Cardiovascular status: acceptable  Respiratory status: acceptable  Hydration status: acceptable

## 2017-08-29 LAB
LAB AP CASE REPORT: NORMAL
Lab: NORMAL
PATH REPORT.FINAL DX SPEC: NORMAL

## 2017-09-06 PROBLEM — K44.9 HIATAL HERNIA: Status: ACTIVE | Noted: 2017-09-06

## 2017-11-29 ENCOUNTER — OFFICE VISIT (OUTPATIENT)
Dept: CARDIOLOGY | Age: 72
End: 2017-11-29

## 2017-11-29 VITALS
BODY MASS INDEX: 28.51 KG/M2 | WEIGHT: 167 LBS | SYSTOLIC BLOOD PRESSURE: 121 MMHG | HEART RATE: 85 BPM | HEIGHT: 64 IN | DIASTOLIC BLOOD PRESSURE: 72 MMHG

## 2017-11-29 DIAGNOSIS — I25.10 CHRONIC CORONARY ARTERY DISEASE: Primary | ICD-10-CM

## 2017-11-29 DIAGNOSIS — E78.2 MIXED HYPERLIPIDEMIA: ICD-10-CM

## 2017-11-29 DIAGNOSIS — I27.21 PULMONARY ARTERIAL HYPERTENSION (HCC): ICD-10-CM

## 2017-11-29 PROCEDURE — 99213 OFFICE O/P EST LOW 20 MIN: CPT | Performed by: INTERNAL MEDICINE

## 2017-12-15 ENCOUNTER — OFFICE VISIT (OUTPATIENT)
Dept: FAMILY MEDICINE CLINIC | Facility: CLINIC | Age: 72
End: 2017-12-15

## 2017-12-15 VITALS
HEART RATE: 81 BPM | OXYGEN SATURATION: 90 % | WEIGHT: 169.1 LBS | TEMPERATURE: 97.8 F | SYSTOLIC BLOOD PRESSURE: 124 MMHG | HEIGHT: 64 IN | DIASTOLIC BLOOD PRESSURE: 68 MMHG | BODY MASS INDEX: 28.87 KG/M2

## 2017-12-15 DIAGNOSIS — I27.21 PULMONARY ARTERIAL HYPERTENSION (HCC): ICD-10-CM

## 2017-12-15 DIAGNOSIS — Z72.0 TOBACCO ABUSE: ICD-10-CM

## 2017-12-15 DIAGNOSIS — Z51.81 MEDICATION MONITORING ENCOUNTER: ICD-10-CM

## 2017-12-15 DIAGNOSIS — Z99.81 SUPPLEMENTAL OXYGEN DEPENDENT: ICD-10-CM

## 2017-12-15 DIAGNOSIS — I25.10 CHRONIC CORONARY ARTERY DISEASE: ICD-10-CM

## 2017-12-15 DIAGNOSIS — J43.1 PANLOBULAR EMPHYSEMA (HCC): ICD-10-CM

## 2017-12-15 DIAGNOSIS — E78.2 MIXED HYPERLIPIDEMIA: Primary | ICD-10-CM

## 2017-12-15 DIAGNOSIS — D50.0 IRON DEFICIENCY ANEMIA DUE TO CHRONIC BLOOD LOSS: ICD-10-CM

## 2017-12-15 PROBLEM — R10.31 RIGHT LOWER QUADRANT ABDOMINAL PAIN: Status: RESOLVED | Noted: 2017-03-23 | Resolved: 2017-12-15

## 2017-12-15 LAB
ALT SERPL-CCNC: 10 U/L (ref 5–33)
CHOLEST SERPL-MCNC: 160 MG/DL (ref 0–200)
HCT VFR BLD AUTO: 33.8 % (ref 37–47)
HDLC SERPL-MCNC: 62 MG/DL (ref 40–60)
HGB BLD-MCNC: 11.2 G/DL (ref 12–16)
LDLC SERPL CALC-MCNC: 74 MG/DL (ref 0–100)
TRIGL SERPL-MCNC: 118 MG/DL (ref 0–150)
TSH SERPL DL<=0.005 MIU/L-ACNC: 2.84 MIU/ML (ref 0.27–4.2)
VLDLC SERPL CALC-MCNC: 23.6 MG/DL (ref 7–27)

## 2017-12-15 PROCEDURE — 99213 OFFICE O/P EST LOW 20 MIN: CPT | Performed by: FAMILY MEDICINE

## 2017-12-15 NOTE — PROGRESS NOTES
Subjective   Mary Jack is a 72 y.o. female who is here for   Chief Complaint   Patient presents with   • Follow-up   • Hyperlipidemia   .     History of Present Illness   Would like chol rechecked  Saw Cardio and GI  Had a Barretts check EGD. With dr dave    COPD: Patient complains of dyspnea, cough, wheezing and fatigue. Symptoms began 10 years ago. Symptoms chronic dyspnea, dyspnea after 1 blocks and dyspnea after 1 flights of stairs does worsen with exertion. Sputum is yellow in moderate amounts. Fever has been hot and cold spells. Patient uses 2 pillows at night. Patient can walk 25 feet before resting. Patient currently is on oxygen at 2 L/min per nasal cannula.. Respiratory history: COPD      The following portions of the patient's history were reviewed and updated as appropriate: allergies, current medications, past family history, past medical history, past social history, past surgical history and problem list.    Review of Systems    Objective   Physical Exam   Constitutional: She appears well-developed and well-nourished.   Cardiovascular: Normal rate.    Pulmonary/Chest: No respiratory distress. She has wheezes.   Neurological: She is alert.   Psychiatric: She has a normal mood and affect.   Nursing note and vitals reviewed.      Assessment/Plan   Mary was seen today for follow-up and hyperlipidemia.    Diagnoses and all orders for this visit:    Mixed hyperlipidemia  -     Lipid Panel  -     TSH    Panlobular emphysema    Pulmonary arterial hypertension    Tobacco abuse    Supplemental oxygen dependent    Chronic coronary artery disease    Iron deficiency anemia due to chronic blood loss  -     Hemoglobin & Hematocrit, Blood    Medication monitoring encounter  -     ALT      There are no Patient Instructions on file for this visit.    Medications Discontinued During This Encounter   Medication Reason   • magnesium oxide (MAGOX) 400 (241.3 Mg) MG tablet tablet Therapy completed   • nicotine  polacrilex (COMMIT) 2 MG lozenge Therapy completed   • vitamin A 31969 UNIT capsule Therapy completed        Return in about 6 months (around 6/15/2018) for Medicare Wellness visit.    Dr. Delfino Matt  [facility]  [place], [place]

## 2018-04-10 RX ORDER — ATORVASTATIN CALCIUM 10 MG/1
TABLET, FILM COATED ORAL
Qty: 90 TABLET | Refills: 0 | Status: SHIPPED | OUTPATIENT
Start: 2018-04-10 | End: 2018-07-10 | Stop reason: SDUPTHER

## 2018-05-07 ENCOUNTER — OFFICE VISIT (OUTPATIENT)
Dept: RETAIL CLINIC | Facility: CLINIC | Age: 73
End: 2018-05-07

## 2018-05-07 VITALS
OXYGEN SATURATION: 94 % | SYSTOLIC BLOOD PRESSURE: 132 MMHG | TEMPERATURE: 96.6 F | DIASTOLIC BLOOD PRESSURE: 66 MMHG | HEART RATE: 82 BPM | RESPIRATION RATE: 20 BRPM

## 2018-05-07 DIAGNOSIS — M25.512 ACUTE PAIN OF LEFT SHOULDER: Primary | ICD-10-CM

## 2018-05-07 PROBLEM — Z99.81 SUPPLEMENTAL OXYGEN DEPENDENT: Status: RESOLVED | Noted: 2017-03-31 | Resolved: 2018-05-07

## 2018-05-07 PROBLEM — Z09 HOSPITAL DISCHARGE FOLLOW-UP: Status: RESOLVED | Noted: 2017-03-31 | Resolved: 2018-05-07

## 2018-05-07 PROBLEM — Z51.81 MEDICATION MONITORING ENCOUNTER: Status: RESOLVED | Noted: 2017-12-15 | Resolved: 2018-05-07

## 2018-05-07 PROCEDURE — 99213 OFFICE O/P EST LOW 20 MIN: CPT | Performed by: NURSE PRACTITIONER

## 2018-05-07 NOTE — PATIENT INSTRUCTIONS
Rotator Cuff Injury  Rotator cuff injury is any type of injury to the set of muscles and tendons that make up the stabilizing unit of your shoulder. This unit holds the ball of your upper arm bone (humerus) in the socket of your shoulder blade (scapula).  What are the causes?  Injuries to your rotator cuff most commonly come from sports or activities that cause your arm to be moved repeatedly over your head. Examples of this include throwing, weight lifting, swimming, or racquet sports. Long lasting (chronic) irritation of your rotator cuff can cause soreness and swelling (inflammation), bursitis, and eventual damage to your tendons, such as a tear (rupture).  What are the signs or symptoms?  Acute rotator cuff tear:  · Sudden tearing sensation followed by severe pain shooting from your upper shoulder down your arm toward your elbow.  · Decreased range of motion of your shoulder because of pain and muscle spasm.  · Severe pain.  · Inability to raise your arm out to the side because of pain and loss of muscle power (large tears).  Chronic rotator cuff tear:  · Pain that usually is worse at night and may interfere with sleep.  · Gradual weakness and decreased shoulder motion as the pain worsens.  · Decreased range of motion.  Rotator cuff tendinitis:  · Deep ache in your shoulder and the outside upper arm over your shoulder.  · Pain that comes on gradually and becomes worse when lifting your arm to the side or turning it inward.  How is this diagnosed?  Rotator cuff injury is diagnosed through a medical history, physical exam, and imaging exam. The medical history helps determine the type of rotator cuff injury. Your health care provider will look at your injured shoulder, feel the injured area, and ask you to move your shoulder in different positions. X-ray exams typically are done to rule out other causes of shoulder pain, such as fractures. MRI is the exam of choice for the most severe shoulder injuries because the  images show muscles and tendons.  How is this treated?  Chronic tear:  · Medicine for pain, such as acetaminophen or ibuprofen.  · Physical therapy and range-of-motion exercises may be helpful in maintaining shoulder function and strength.  · Steroid injections into your shoulder joint.  · Surgical repair of the rotator cuff if the injury does not heal with noninvasive treatment.  Acute tear:  · Anti-inflammatory medicines such as ibuprofen and naproxen to help reduce pain and swelling.  · A sling to help support your arm and rest your rotator cuff muscles. Long-term use of a sling is not advised. It may cause significant stiffening of the shoulder joint.  · Surgery may be considered within a few weeks, especially in younger, active people, to return the shoulder to full function.  · Indications for surgical treatment include the following:  ¨ Age younger than 60 years.  ¨ Rotator cuff tears that are complete.  ¨ Physical therapy, rest, and anti-inflammatory medicines have been used for 6-8 weeks, with no improvement.  ¨ Employment or sporting activity that requires constant shoulder use.  Tendinitis:  · Anti-inflammatory medicines such as ibuprofen and naproxen to help reduce pain and swelling.  · A sling to help support your arm and rest your rotator cuff muscles. Long-term use of a sling is not advised. It may cause significant stiffening of the shoulder joint.  · Severe tendinitis may require:  ¨ Steroid injections into your shoulder joint.  ¨ Physical therapy.  ¨ Surgery.  Follow these instructions at home:  · Apply ice to your injury:  ¨ Put ice in a plastic bag.  ¨ Place a towel between your skin and the bag.  ¨ Leave the ice on for 20 minutes, 2-3 times a day.  · If you have a shoulder immobilizer (sling and straps), wear it until told otherwise by your health care provider.  · You may want to sleep on several pillows or in a recliner at night to lessen swelling and pain.  · Only take over-the-counter or  prescription medicines for pain, discomfort, or fever as directed by your health care provider.  · Do simple hand squeezing exercises with a soft rubber ball to decrease hand swelling.  Contact a health care provider if:  · Your shoulder pain increases, or new pain or numbness develops in your arm, hand, or fingers.  · Your hand or fingers are colder than your other hand.  Get help right away if:  · Your arm, hand, or fingers are numb or tingling.  · Your arm, hand, or fingers are increasingly swollen and painful, or they turn white or blue.  This information is not intended to replace advice given to you by your health care provider. Make sure you discuss any questions you have with your health care provider.  Document Released: 12/15/2001 Document Revised: 05/25/2017 Document Reviewed: 07/30/2014  [employer] Interactive Patient Education © 2017 [employer]    Referred to PCP for further evaluation and treatment.

## 2018-05-07 NOTE — PROGRESS NOTES
Mary Jack is a 72 y.o. female.     Shoulder Injury    The incident occurred at home. The left shoulder is affected. The incident occurred more than 1 week ago (approx. one month ago). The injury mechanism was a fall (fell against wall on left shoulder). The quality of the pain is described as aching and shooting (aches all the time especially when trying to sleep and has a shooting pain down her arm when she tries to raise her arm). The pain is mild. Pertinent negatives include no chest pain, muscle weakness, numbness or tingling. The symptoms are aggravated by movement and overhead lifting. She has tried rest for the symptoms. The treatment provided no relief.        The following portions of the patient's history were reviewed and updated as appropriate: allergies, current medications, past family history, past medical history, past social history, past surgical history and problem list.    Review of Systems   Constitutional: Negative for activity change, fatigue and fever.   Cardiovascular: Negative.  Negative for chest pain.   Musculoskeletal:        Left shoulder pain x one month   Neurological: Negative for tingling, weakness and numbness.           Physical Exam   Constitutional: She appears well-developed and well-nourished. No distress.   HENT:   Head: Normocephalic and atraumatic.   Eyes: EOM are normal. Pupils are equal, round, and reactive to light.   Neck: Normal range of motion. Neck supple.   Cardiovascular: Normal rate, regular rhythm and normal heart sounds.    No murmur heard.  Pulmonary/Chest: Effort normal and breath sounds normal. No respiratory distress.   Musculoskeletal:        Left shoulder: She exhibits decreased range of motion, tenderness and pain. She exhibits no bony tenderness, no swelling, no crepitus, no deformity, no laceration and normal strength.   Unable to raise left arm above 45 degrees           Diagnoses and all orders for this visit:    Acute pain of left  shoulder    Follow up with PCP for further evaluation and treatment and possible referral to ortho.    Rotator Cuff Injury  Rotator cuff injury is any type of injury to the set of muscles and tendons that make up the stabilizing unit of your shoulder. This unit holds the ball of your upper arm bone (humerus) in the socket of your shoulder blade (scapula).  What are the causes?  Injuries to your rotator cuff most commonly come from sports or activities that cause your arm to be moved repeatedly over your head. Examples of this include throwing, weight lifting, swimming, or racquet sports. Long lasting (chronic) irritation of your rotator cuff can cause soreness and swelling (inflammation), bursitis, and eventual damage to your tendons, such as a tear (rupture).  What are the signs or symptoms?  Acute rotator cuff tear:  · Sudden tearing sensation followed by severe pain shooting from your upper shoulder down your arm toward your elbow.  · Decreased range of motion of your shoulder because of pain and muscle spasm.  · Severe pain.  · Inability to raise your arm out to the side because of pain and loss of muscle power (large tears).  Chronic rotator cuff tear:  · Pain that usually is worse at night and may interfere with sleep.  · Gradual weakness and decreased shoulder motion as the pain worsens.  · Decreased range of motion.  Rotator cuff tendinitis:  · Deep ache in your shoulder and the outside upper arm over your shoulder.  · Pain that comes on gradually and becomes worse when lifting your arm to the side or turning it inward.  How is this diagnosed?  Rotator cuff injury is diagnosed through a medical history, physical exam, and imaging exam. The medical history helps determine the type of rotator cuff injury. Your health care provider will look at your injured shoulder, feel the injured area, and ask you to move your shoulder in different positions. X-ray exams typically are done to rule out other causes of  shoulder pain, such as fractures. MRI is the exam of choice for the most severe shoulder injuries because the images show muscles and tendons.  How is this treated?  Chronic tear:  · Medicine for pain, such as acetaminophen or ibuprofen.  · Physical therapy and range-of-motion exercises may be helpful in maintaining shoulder function and strength.  · Steroid injections into your shoulder joint.  · Surgical repair of the rotator cuff if the injury does not heal with noninvasive treatment.  Acute tear:  · Anti-inflammatory medicines such as ibuprofen and naproxen to help reduce pain and swelling.  · A sling to help support your arm and rest your rotator cuff muscles. Long-term use of a sling is not advised. It may cause significant stiffening of the shoulder joint.  · Surgery may be considered within a few weeks, especially in younger, active people, to return the shoulder to full function.  · Indications for surgical treatment include the following:  ¨ Age younger than 60 years.  ¨ Rotator cuff tears that are complete.  ¨ Physical therapy, rest, and anti-inflammatory medicines have been used for 6-8 weeks, with no improvement.  ¨ Employment or sporting activity that requires constant shoulder use.  Tendinitis:  · Anti-inflammatory medicines such as ibuprofen and naproxen to help reduce pain and swelling.  · A sling to help support your arm and rest your rotator cuff muscles. Long-term use of a sling is not advised. It may cause significant stiffening of the shoulder joint.  · Severe tendinitis may require:  ¨ Steroid injections into your shoulder joint.  ¨ Physical therapy.  ¨ Surgery.  Follow these instructions at home:  · Apply ice to your injury:  ¨ Put ice in a plastic bag.  ¨ Place a towel between your skin and the bag.  ¨ Leave the ice on for 20 minutes, 2-3 times a day.  · If you have a shoulder immobilizer (sling and straps), wear it until told otherwise by your health care provider.  · You may want to sleep  on several pillows or in a recliner at night to lessen swelling and pain.  · Only take over-the-counter or prescription medicines for pain, discomfort, or fever as directed by your health care provider.  · Do simple hand squeezing exercises with a soft rubber ball to decrease hand swelling.  Contact a health care provider if:  · Your shoulder pain increases, or new pain or numbness develops in your arm, hand, or fingers.  · Your hand or fingers are colder than your other hand.  Get help right away if:  · Your arm, hand, or fingers are numb or tingling.  · Your arm, hand, or fingers are increasingly swollen and painful, or they turn white or blue.  This information is not intended to replace advice given to you by your health care provider. Make sure you discuss any questions you have with your health care provider.  Document Released: 12/15/2001 Document Revised: 05/25/2017 Document Reviewed: 07/30/2014  [employer] Interactive Patient Education © 2017 [employer]    Take Ibuprofen 400 mg every 6 hours for 10 days or until seen by PCP or ortho.

## 2018-05-21 ENCOUNTER — OFFICE VISIT (OUTPATIENT)
Dept: ORTHOPEDIC SURGERY | Facility: CLINIC | Age: 73
End: 2018-05-21

## 2018-05-21 VITALS
DIASTOLIC BLOOD PRESSURE: 77 MMHG | HEIGHT: 64 IN | SYSTOLIC BLOOD PRESSURE: 128 MMHG | BODY MASS INDEX: 28.85 KG/M2 | HEART RATE: 75 BPM | WEIGHT: 169 LBS

## 2018-05-21 DIAGNOSIS — M75.50 SUBACROMIAL BURSITIS: ICD-10-CM

## 2018-05-21 DIAGNOSIS — R52 PAIN: Primary | ICD-10-CM

## 2018-05-21 PROCEDURE — 20610 DRAIN/INJ JOINT/BURSA W/O US: CPT | Performed by: ORTHOPAEDIC SURGERY

## 2018-05-21 PROCEDURE — 99203 OFFICE O/P NEW LOW 30 MIN: CPT | Performed by: ORTHOPAEDIC SURGERY

## 2018-05-21 PROCEDURE — 73030 X-RAY EXAM OF SHOULDER: CPT | Performed by: ORTHOPAEDIC SURGERY

## 2018-05-21 RX ORDER — MELOXICAM 7.5 MG/1
7.5 TABLET ORAL DAILY
Qty: 30 TABLET | Refills: 5 | Status: SHIPPED | OUTPATIENT
Start: 2018-05-21 | End: 2018-10-06 | Stop reason: SDUPTHER

## 2018-05-21 RX ORDER — LIDOCAINE HYDROCHLORIDE 10 MG/ML
4 INJECTION, SOLUTION EPIDURAL; INFILTRATION; INTRACAUDAL; PERINEURAL
Status: COMPLETED | OUTPATIENT
Start: 2018-05-21 | End: 2018-05-21

## 2018-05-21 RX ORDER — LIDOCAINE HYDROCHLORIDE 10 MG/ML
4 INJECTION, SOLUTION INFILTRATION; PERINEURAL
Status: COMPLETED | OUTPATIENT
Start: 2018-05-21 | End: 2018-05-21

## 2018-05-21 RX ADMIN — LIDOCAINE HYDROCHLORIDE 4 ML: 10 INJECTION, SOLUTION INFILTRATION; PERINEURAL at 14:34

## 2018-05-21 RX ADMIN — LIDOCAINE HYDROCHLORIDE 4 ML: 10 INJECTION, SOLUTION EPIDURAL; INFILTRATION; INTRACAUDAL; PERINEURAL at 14:34

## 2018-05-21 NOTE — PROGRESS NOTES
Subjective: Left shoulder pain     Patient ID: Mary Jack is a 72 y.o. female.    Chief Complaint:    History of Present Illness 72-year-old female right-hand-dominant presents evaluation of the left shoulder symptomatic for approximately 1 month.  No specific history of trauma but is noted progressive and increasing pain and discomfort describes the pain is 7 out of 10.  He's tried occasional over-the-counter anti-inflammatory history of improvement.       Social History     Occupational History   • retired [employer]     Social History Main Topics   • Smoking status: Current Some Day Smoker     Packs/day: 0.50     Years: 50.00     Types: Cigarettes   • Smokeless tobacco: Never Used      Comment: trying to quit - rotates between cigs and logz   • Alcohol use No   • Drug use: No   • Sexual activity: Not Currently     Partners: Male     Birth control/ protection: Post-menopausal      Review of Systems   Constitutional: Negative for chills, diaphoresis, fever and unexpected weight change.   HENT: Negative for hearing loss, nosebleeds, sore throat and tinnitus.    Eyes: Negative for pain and visual disturbance.   Respiratory: Negative for cough, shortness of breath and wheezing.    Cardiovascular: Negative for chest pain and palpitations.   Gastrointestinal: Negative for abdominal pain, diarrhea, nausea and vomiting.   Endocrine: Negative for cold intolerance, heat intolerance and polydipsia.   Genitourinary: Negative for difficulty urinating, dysuria and hematuria.   Musculoskeletal: Positive for arthralgias. Negative for joint swelling and myalgias.   Skin: Negative for rash and wound.   Allergic/Immunologic: Negative for environmental allergies.   Neurological: Positive for numbness. Negative for dizziness and syncope.   Hematological: Does not bruise/bleed easily.   Psychiatric/Behavioral: Negative for dysphoric mood and sleep disturbance. The patient is not nervous/anxious.    All other  systems reviewed and are negative.          Objective:     Ortho Exam  AP lateral outlet view of the left shoulder to evaluate her chief complain completely within normal limits showing no acute or chronic changes.  She is alert and oriented ×3.  He does use nasal O2 for COPD.  Normocephalic skull are clear.  Left upper extremity has no motor deficit as far as radial, ulnar median nerve function is no sensory loss.  She has full range of motion of the elbow flexion extension pronation supination flexion does cause some anterior shoulder pain.  She can extend the shoulder approximately 80-85° and abduct to about 70° with moderate pain.  External rotation is limited to 35° and internal rotation 20°.  Abduction and extension against resistance is painful.  Negative speed test.  Negative Columbia's test.  Moderately positive Montiel sign.  Against L tolerate anti-inflammatories without any GI side effects.    Assessment:       1. Pain    2. Subacromial bursitis          Plan: Over 20 minutes was spent with the patient reviewing the x-rays physical findings and outlining treatment options for her shoulder pain.  After reviewing the different options with him to begin meloxicam 7.5 daily.  Also will inject the shoulder as opposed to proceed with physical therapy.  The left shoulder is therefore injected 4 cc lidocaine 1 cc Kenalog through the posterior portal tolerating well.  Postinjection instructions given to the patient.  Return in a month if still symptomatic.  Large Joint Arthrocentesis  Date/Time: 5/21/2018 2:34 PM  Consent given by: patient  Site marked: site marked  Timeout: Immediately prior to procedure a time out was called to verify the correct patient, procedure, equipment, support staff and site/side marked as required   Supporting Documentation  Indications: pain   Procedure Details  Location: shoulder - L subacromial bursa  Preparation: Patient was prepped and draped in the usual sterile fashion  Needle  size: 22 G  Approach: posterior  Medications administered: 4 mL lidocaine PF 1% 1 %; 4 mL lidocaine 1 %  Patient tolerance: patient tolerated the procedure well with no immediate complications

## 2018-05-22 ENCOUNTER — PATIENT MESSAGE (OUTPATIENT)
Dept: ORTHOPEDIC SURGERY | Facility: CLINIC | Age: 73
End: 2018-05-22

## 2018-06-20 ENCOUNTER — OFFICE VISIT (OUTPATIENT)
Dept: ORTHOPEDIC SURGERY | Facility: CLINIC | Age: 73
End: 2018-06-20

## 2018-06-20 DIAGNOSIS — M75.02 ADHESIVE CAPSULITIS OF LEFT SHOULDER: ICD-10-CM

## 2018-06-20 DIAGNOSIS — R52 PAIN: Primary | ICD-10-CM

## 2018-06-20 DIAGNOSIS — M72.2 PLANTAR FASCIITIS, RIGHT: ICD-10-CM

## 2018-06-20 DIAGNOSIS — M75.50 SUBACROMIAL BURSITIS: ICD-10-CM

## 2018-06-20 PROCEDURE — 99214 OFFICE O/P EST MOD 30 MIN: CPT | Performed by: ORTHOPAEDIC SURGERY

## 2018-06-20 NOTE — PROGRESS NOTES
Note when she was counseled diabetic she was genera were htSubjective: Left shoulder pain, right foot pain     Patient ID: Mary Jack is a 72 y.o. female.    Chief Complaint:     History of Present Illness patient returns with increasing pain and discomfort in the left shoulder complaint of right foot pain.  With regard to the shoulder the cortisone injection offered very little relief and she recently developing significant pain in the foot with ambulation.       Social History     Occupational History   • retired [employer]     Social History Main Topics   • Smoking status: Current Some Day Smoker     Packs/day: 0.50     Years: 50.00     Types: Cigarettes   • Smokeless tobacco: Never Used      Comment: trying to quit - rotates between cigs and logz   • Alcohol use No   • Drug use: No   • Sexual activity: Not Currently     Partners: Male     Birth control/ protection: Post-menopausal      Review of Systems   Musculoskeletal: Positive for arthralgias.   All other systems reviewed and are negative.        Past Medical History:   Diagnosis Date   • AAA (abdominal aortic aneurysm)    • Anemia    • Aneurysm of abdominal aorta    • Arthritis    • Zepeda esophagus    • CAD (coronary artery disease)    • Chest pain    • Constipation    • COPD (chronic obstructive pulmonary disease)    • Emphysema lung    • Empyema    • Fatigue    • GERD (gastroesophageal reflux disease)    • Headache    • Hiatal hernia    • High risk medication use    • History of cardiac catheterization     CATH STENT PLACEMENT: CIRCUMFLEX BRANCH   • Hyperlipidemia    • Hypertension    • Jaundice    • Lumbosacral disc disease    • Myocardial infarction    • Myocardial infarction    • Occult blood in stools    • On home O2    • Peripheral artery disease    • Reflux gastritis    • Scoliosis      Past Surgical History:   Procedure Laterality Date   • CARDIAC CATHETERIZATION     • CARDIAC CATHETERIZATION N/A 5/12/2017    Procedure: Left  Heart Cath;  Surgeon: Herber Hayes MD;  Location:  [facility];  Service:    • CHOLECYSTECTOMY     • COLONOSCOPY     • COLONOSCOPY N/A 6/10/2016    Procedure: COLONOSCOPY with polypectomy;  Surgeon: Virgie Castelan MD;  Location: [facility] OR;  Service:    • CORONARY ANGIOPLASTY     • CORONARY STENT PLACEMENT      X2   • ENDOSCOPY N/A 6/10/2016    Procedure: ESOPHAGOGASTRODUODENOSCOPY WITH BIOPSY;  Surgeon: Virgie Castelan MD;  Location: [facility] OR;  Service:    • ENDOSCOPY     • ENDOSCOPY N/A 8/25/2017    Procedure: ESOPHAGOGASTRODUODENOSCOPY w/ biopsies;  Surgeon: Virgie Castelan MD;  Location: [facility] OR;  Service:      Family History   Problem Relation Age of Onset   • Colon cancer Mother    • Breast cancer Mother    • Other Father         cardiac disorder   • Hypertension Father    • Other Sister         cardiac disorder   • Hypertension Sister    • Lung disease Sister    • Thyroid disease Sister    • Other Brother         cardiac disorder   • Thyroid disease Daughter          Objective:  There were no vitals filed for this visit.  There were no vitals filed for this visit.  There is no height or weight on file to calculate BMI.       Ortho Exam  he is alert and oriented ×3.  The left upper extremity shows no motor deficit she does complain of some mild tingling at times in her fingers but there is no specific pattern to the numbness.  Radial ulnar median nerve function is intact.  She has full range of motion of the elbow.  The left shoulder though has only approximately 70° of extension and 50° of abduction and passively cannot be moved past that point either.  Past that point we get glenohumeral rotation.  External rotation is limited to about 25° of internal rotation less than 10°.  Markedly positive Montiel sign.  Skin is cool to touch.  Good capillary refill to the extremity.  With regard to the right foot is marked tenderness plantar aspect of the heel.  Pain with compression of the  heel.  Achilles tendon is intact.  No motor sensory deficit.  No pain over either medial or lateral malleoli.  Skin is cool to touch no ecchymosis.  Tolerating meloxicam without any GI side effects but no significant improvement of her symptoms if anything the left shoulder is worse.    Assessment:       1. Pain    2. Subacromial bursitis    3. Adhesive capsulitis of right shoulder    4. Plantar fasciitis, right          Plan: Over 15 minutes was spent with the patient reviewing her physical findings and outlined treatment line going forward.  The prominent the shoulder as I described with her now is one of adhesive capsulitis requires physical therapy at another cortisone injection is not indicated.  With regard to the heel and the plantar fasciitis again I recommend physical therapy.  She'll continue the meloxicam completed course of physical therapy she turns in 1 month will dictate Treatment plan regarding the shoulder and the heel.            Work Status:    LILIAN query complete.    Orders:  Orders Placed This Encounter   Procedures   • Ambulatory Referral to Physical Therapy Evaluate and treat       Medications:  No orders of the defined types were placed in this encounter.      Followup:  Return in about 4 weeks (around 7/18/2018).          Dragon transcription disclaimer     Much of this encounter note is an electronic transcription/translation of spoken language to printed text. The electronic translation of spoken language may permit erroneous, or at times, nonsensical words or phrases to be inadvertently transcribed. Although I have reviewed the note for such errors, some may still exist.

## 2018-06-26 ENCOUNTER — HOSPITAL ENCOUNTER (OUTPATIENT)
Dept: PHYSICAL THERAPY | Facility: HOSPITAL | Age: 73
Setting detail: THERAPIES SERIES
Discharge: HOME | End: 2018-06-26

## 2018-06-26 DIAGNOSIS — M75.02 ADHESIVE CAPSULITIS OF LEFT SHOULDER: Primary | ICD-10-CM

## 2018-06-26 DIAGNOSIS — M72.2 PLANTAR FASCIITIS OF RIGHT FOOT: ICD-10-CM

## 2018-06-26 PROCEDURE — 97162 PT EVAL MOD COMPLEX 30 MIN: CPT

## 2018-06-26 PROCEDURE — G8985 CARRY GOAL STATUS: HCPCS

## 2018-06-26 PROCEDURE — 97110 THERAPEUTIC EXERCISES: CPT

## 2018-06-26 PROCEDURE — G8984 CARRY CURRENT STATUS: HCPCS
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
## 2018-06-29 ENCOUNTER — HOSPITAL ENCOUNTER (OUTPATIENT)
Dept: PHYSICAL THERAPY | Facility: HOSPITAL | Age: 73
Setting detail: THERAPIES SERIES
Discharge: HOME | End: 2018-06-29

## 2018-06-29 DIAGNOSIS — M72.2 PLANTAR FASCIITIS OF RIGHT FOOT: ICD-10-CM

## 2018-06-29 DIAGNOSIS — M75.02 ADHESIVE CAPSULITIS OF LEFT SHOULDER: Primary | ICD-10-CM

## 2018-06-29 PROCEDURE — 97110 THERAPEUTIC EXERCISES: CPT

## 2018-06-29 PROCEDURE — 97035 APP MDLTY 1+ULTRASOUND EA 15: CPT

## 2018-06-29 RX ORDER — OMEPRAZOLE 40 MG/1
CAPSULE, DELAYED RELEASE ORAL
Qty: 90 CAPSULE | Refills: 3 | Status: SHIPPED | OUTPATIENT
Start: 2018-06-29 | End: 2018-08-14 | Stop reason: SDUPTHER

## 2018-06-29 NOTE — THERAPY TREATMENT NOTE
Outpatient Physical Therapy Ortho Treatment Note   Rylee Baugh     Patient Name: Mary Jack  : 1945  MRN: [id number]  Today's Date: 2018      Visit Date: 2018    Visit Dx:    ICD-10-CM ICD-9-CM   1. Adhesive capsulitis of left shoulder M75.02 726.0   2. Plantar fasciitis of right foot M72.2 728.71       Patient Active Problem List   Diagnosis   • Acute myocardial infarction   • Panlobular emphysema   • Chronic coronary artery disease   • Iron deficiency anemia due to chronic blood loss   • Heartburn   • Mixed hyperlipidemia   • Tobacco abuse   • Hypoxia   • Abdominal aortic aneurysm (AAA) without rupture   • Pulmonary arterial hypertension   • 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   • Hiatal hernia        Past Medical History:   Diagnosis Date   • AAA (abdominal aortic aneurysm)    • Anemia    • Aneurysm of abdominal aorta    • Arthritis    • Zepeda esophagus    • CAD (coronary artery disease)    • Chest pain    • Constipation    • COPD (chronic obstructive pulmonary disease)    • Emphysema lung    • Empyema    • Fatigue    • GERD (gastroesophageal reflux disease)    • Headache    • Hiatal hernia    • High risk medication use    • History of cardiac catheterization     CATH STENT PLACEMENT: CIRCUMFLEX BRANCH   • Hyperlipidemia    • Hypertension    • Jaundice    • Lumbosacral disc disease    • Myocardial infarction    • Myocardial infarction    • Occult blood in stools    • On home O2    • Peripheral artery disease    • Reflux gastritis    • Scoliosis         Past Surgical History:   Procedure Laterality Date   • CARDIAC CATHETERIZATION     • CARDIAC CATHETERIZATION N/A 2017    Procedure: Left Heart Cath;  Surgeon: Herber Hayes MD;  Location: [facility] INVASIVE LOCATION;  Service:    • CHOLECYSTECTOMY     • COLONOSCOPY     • COLONOSCOPY N/A 6/10/2016    Procedure: COLONOSCOPY with polypectomy;  Surgeon: Virgie Castelan MD;  Location:  [facility];  Service:    • CORONARY  ANGIOPLASTY     • CORONARY STENT PLACEMENT      X2   • ENDOSCOPY N/A 6/10/2016    Procedure: ESOPHAGOGASTRODUODENOSCOPY WITH BIOPSY;  Surgeon: Virgie Castelan MD;  Location:  [facility];  Service:    • ENDOSCOPY     • ENDOSCOPY N/A 8/25/2017    Procedure: ESOPHAGOGASTRODUODENOSCOPY w/ biopsies;  Surgeon: Virgie Castelan MD;  Location:  [facility];  Service:                              PT Assessment/Plan     Row Name 06/29/18 1407          PT Assessment    Assessment Comments Pt appeared to have less antalgic gait R foot today. Trial of kinesiotape plantar fascia. Pt also did some AArom with pulley for L shoulder-c/o difficulty to do it due to pain but after sx subsided and related it felt better. Pt had tolerance to session today  -CC        PT Plan    PT Plan Comments Continue per plan to concentrate on R foot sx and progress RX to L shoulder  -CC       User Key  (r) = Recorded By, (t) = Taken By, (c) = Cosigned By    Initials Name Provider Type    ANNIE Manning, PT Physical Therapist                Modalities     Row Name 06/29/18 1100             Subjective Comments    Subjective Comments Pt states she has been doing stretches for her R foot and going ok except difficult to get off floor  -CC         Moist Heat    Location R shoulder  -CC      Rx Minutes 15 mins  -CC      MH Prior to Rx Yes  -CC         Ultrasound 00666    Location R plantar fascia and heel  -CC      Frequency 3.0 MHz  -CC      Intensity - Wts/cm 1.3  -CC      07376 - PT Ultrasound Minutes 10  -CC         Other Treatment Provided    Taping / Bracing Trial kinesiotape to inhibit R plantar fascia and achilles tendon insertion  -CC        User Key  (r) = Recorded By, (t) = Taken By, (c) = Cosigned By    Initials Name Provider Type    ANNIE Manning, PT Physical Therapist                Exercises     Row Name 06/29/18 1115 06/29/18 1100          Subjective Comments    Subjective Comments  -- Pt states she has been doing stretches  for her R foot and going ok except difficult to get off floor  -CC        Exercise 1    Exercise Name 1 R DF stretch with sheet- knee extended and flexed  -CC  --     Cueing 1 Verbal;Tactile;Demo  -CC  --     Reps 1 4 ea ex  -CC  --     Time 1 15 sec  -CC  --        Exercise 2    Exercise Name 2 L flexion of great toe  -CC  --     Cueing 2 Verbal  -CC  --     Reps 2 2  -CC  --     Time 2 30 sec  -CC  --        Exercise 6    Exercise Name 6 L shoulder pulley FF/scaption  -CC  --     Cueing 6 Verbal;Tactile  -CC  --     Reps 6 10 ea  -CC  --       User Key  (r) = Recorded By, (t) = Taken By, (c) = Cosigned By    Initials Name Provider Type     Raquel Manning, PT Physical Therapist                               PT OP Goals     Row Name 06/29/18 1115          PT Short Term Goals    STG Date to Achieve 07/26/18  -CC     STG 1 Pt compliant with HEP for R foot and L shoulder as instructed  -     STG 2 Pt reports decreasing sx R foot with max pain less/equal 3-4/10  -CC     STG 3 Pt shows improved gait R foot with heel/toe mechanics by 25-50 %  -     STG 4 Pt shows improved PROM L shoulder FF/scaption 140-150 degrees  -     STG 5 Pt shows improved PROM L shoulder in ER/IR 50-60 degrees  -        Long Term Goals    LTG Date to Achieve 08/27/18  -CC     LTG 1 Observe improved wt shifting R foot with normalized heel-toe mechanics  -     LTG 2 Pt shows Arom L shoulder FF-scaption 150-160 degrees  -     LTG 3 Pt shows muscle strength L shoulder 4-4+/5 RC  -CC       User Key  (r) = Recorded By, (t) = Taken By, (c) = Cosigned By    Initials Name Provider Type     Raquel Manning, PT Physical Therapist          Therapy Education  Education Details: Add L big toe stretch to HEP- also discussed with pt it is not necessary to get on floor to do  R plantar stretches with sheet-can do sitting in chair with foot on ottoman or can do in bed  Given: HEP  Program: New  How Provided: Verbal  Provided to:  Patient  Level of Understanding: Demonstrated, Verbalized              Time Calculation:   Start Time: 1115  Stop Time: 1200  Time Calculation (min): 45 min  Therapy Suggested Charges     Code   Minutes Charges    92805 (CPT®) Hc Pt Neuromusc Re Education Ea 15 Min      37837 (CPT®) Hc Pt Ther Proc Ea 15 Min      23496 (CPT®) Hc Gait Training Ea 15 Min      38734 (CPT®) Hc Pt Therapeutic Act Ea 15 Min      29242 (CPT®) Hc Pt Manual Therapy Ea 15 Min      72052 (CPT®) Hc Pt Ther Massage- Per 15 Min      64587 (CPT®) Hc Pt Iontophoresis Ea 15 Min      96540 (CPT®) Hc Pt Elec Stim Ea-Per 15 Min      54037 (CPT®) Hc Pt Ultrasound Ea 15 Min 10 1    07707 (CPT®) Hc Pt Self Care/Mgmt/Train Ea 15 Min      Total  10 1        Therapy Charges for Today     Code Description Service Date Service Provider Modifiers Qty    67866679108 HC PT ULTRASOUND EA 15 MIN 6/29/2018 Raquel Manning, PT GP 1    04863097175 HC PT THER PROC EA 15 MIN 6/29/2018 Raquel Manning, PT GP 2                    Raquel Manning, PT  6/29/2018

## 2018-07-02 ENCOUNTER — HOSPITAL ENCOUNTER (OUTPATIENT)
Dept: PHYSICAL THERAPY | Facility: HOSPITAL | Age: 73
Setting detail: THERAPIES SERIES
Discharge: HOME | End: 2018-07-02

## 2018-07-02 DIAGNOSIS — M72.2 PLANTAR FASCIITIS OF RIGHT FOOT: ICD-10-CM

## 2018-07-02 DIAGNOSIS — M75.02 ADHESIVE CAPSULITIS OF LEFT SHOULDER: Primary | ICD-10-CM

## 2018-07-02 PROCEDURE — 97140 MANUAL THERAPY 1/> REGIONS: CPT

## 2018-07-02 PROCEDURE — 97035 APP MDLTY 1+ULTRASOUND EA 15: CPT

## 2018-07-02 PROCEDURE — 97110 THERAPEUTIC EXERCISES: CPT

## 2018-07-02 NOTE — THERAPY TREATMENT NOTE
Outpatient Physical Therapy Ortho Treatment Note   Rylee Baugh     Patient Name: Mary Jack  : 1945  MRN: [id number]  Today's Date: 2018      Visit Date: 2018    Visit Dx:    ICD-10-CM ICD-9-CM   1. Adhesive capsulitis of left shoulder M75.02 726.0   2. Plantar fasciitis of right foot M72.2 728.71       Patient Active Problem List   Diagnosis   • Acute myocardial infarction   • Panlobular emphysema (CMS/HCC)   • Chronic coronary artery disease   • Iron deficiency anemia due to chronic blood loss   • Heartburn   • Mixed hyperlipidemia   • Tobacco abuse   • Hypoxia   • Abdominal aortic aneurysm (AAA) without rupture   • Pulmonary arterial hypertension   • 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   • Hiatal hernia        Past Medical History:   Diagnosis Date   • AAA (abdominal aortic aneurysm) (CMS/HCC)    • Anemia    • Aneurysm of abdominal aorta (CMS/HCC)    • Arthritis    • Zepeda esophagus    • CAD (coronary artery disease)    • Chest pain    • Constipation    • COPD (chronic obstructive pulmonary disease) (CMS/HCC)    • Emphysema lung (CMS/HCC)    • Empyema (CMS/HCC)    • Fatigue    • GERD (gastroesophageal reflux disease)    • Headache    • Hiatal hernia    • High risk medication use    • History of cardiac catheterization     CATH STENT PLACEMENT: CIRCUMFLEX BRANCH   • Hyperlipidemia    • Hypertension    • Jaundice    • Lumbosacral disc disease    • Myocardial infarction    • Myocardial infarction    • Occult blood in stools    • On home O2    • Peripheral artery disease (CMS/HCC)    • Reflux gastritis    • Scoliosis         Past Surgical History:   Procedure Laterality Date   • CARDIAC CATHETERIZATION     • CARDIAC CATHETERIZATION N/A 2017    Procedure: Left Heart Cath;  Surgeon: Herber Hayes MD;  Location: [facility] INVASIVE LOCATION;  Service:    • CHOLECYSTECTOMY     • COLONOSCOPY     • COLONOSCOPY N/A 6/10/2016    Procedure: COLONOSCOPY with polypectomy;  Surgeon:  Virgie Castelan MD;  Location:  [facility];  Service:    • CORONARY ANGIOPLASTY     • CORONARY STENT PLACEMENT      X2   • ENDOSCOPY N/A 6/10/2016    Procedure: ESOPHAGOGASTRODUODENOSCOPY WITH BIOPSY;  Surgeon: Virgie Castelan MD;  Location:  [facility];  Service:    • ENDOSCOPY     • ENDOSCOPY N/A 8/25/2017    Procedure: ESOPHAGOGASTRODUODENOSCOPY w/ biopsies;  Surgeon: Virgie Castelan MD;  Location:  [facility];  Service:                              PT Assessment/Plan     Row Name 07/02/18 0598          PT Assessment    Assessment Comments Pt making gains with decreasing sx in R plantar fascia and heel -observed improving WB in ambulation. Pt tolerates pulley the best L shoulder for ROM attempts- recommend pt purchase or make one for home use. Pt had difficulty tolerating Prom L shoulder but after pulley  AROM  degrees and scaption 115 degrees which  is improved from eval                                                                                                                          -CC        PT Plan    PT Plan Comments Continue per plan of primary Rx to R foot and progression to L shoulder to increase Arom   -CC       User Key  (r) = Recorded By, (t) = Taken By, (c) = Cosigned By    Initials Name Provider Type    CC Raquel Manning, PT Physical Therapist                Modalities     Row Name 07/02/18 1530             Subjective Comments    Subjective Comments Pt relates she had periods during the day yesterday when she had no pain in R heel.-feeling more soreness today. Not much change in L shoulder sx  -CC         Moist Heat    Location R shoulder  -CC      Rx Minutes 15 mins  -CC      MH Prior to Rx Yes  -CC         Ultrasound 70305    Location R plantar fascia and heel  -CC      Frequency 3.0 MHz  -CC      Intensity - Wts/cm 1.3  -CC      97002 - PT Ultrasound Minutes 10  -CC         Other Treatment Provided    Taping / Bracing  Re-tape kinesiotape to inhibit R plantar fascia and achilles  tendon insertion  -CC        User Key  (r) = Recorded By, (t) = Taken By, (c) = Cosigned By    Initials Name Provider Type    ANNIE Manning PT Physical Therapist                Exercises     Row Name 07/02/18 1530             Subjective Comments    Subjective Comments Pt relates she had periods during the day yesterday when she had no pain in R heel.-feeling more soreness today. Not much change in L shoulder sx  -CC         Exercise 1    Exercise Name 1 R DF stretch with sheet- knee extended and flexed  -CC      Cueing 1 Verbal;Tactile;Demo  -CC      Reps 1 4 ea ex  -CC      Time 1 15 sec  -CC         Exercise 2    Exercise Name 2 L flexion of great toe  -CC      Cueing 2 Verbal  -CC      Reps 2 2  -CC      Time 2 30 sec  -CC         Exercise 3    Exercise Name 3 Prostretch DF stretch in sitting  -CC      Cueing 3 Verbal;Tactile  -CC      Reps 3 5   -CC      Time 3 10 sec  -CC         Exercise 4    Exercise Name 4 A/A FF with cane   -CC      Cueing 4 Verbal;Tactile  -CC      Time 4 10-pt supine  -CC         Exercise 5    Exercise Name 5 A/A ER/IR with cane  -CC      Cueing 5 Verbal;Tactile  -CC      Time 5 10 -pt supine  -CC         Exercise 6    Exercise Name 6 L shoulder pulley FF/scaption  -CC      Cueing 6 Verbal;Tactile  -CC      Reps 6 10 ea  -CC         Exercise 7    Exercise Name 7 Scapular retraction with ER  -CC      Cueing 7 Verbal;Tactile  -CC      Reps 7 5- pt supine  -CC      Time 7 5 sec  -CC        User Key  (r) = Recorded By, (t) = Taken By, (c) = Cosigned By    Initials Name Provider Type    ANNIE Manning PT Physical Therapist                        Manual Rx (last 36 hours)      Manual Treatments     Row Name 07/02/18 1530             Manual Rx 1    Manual Rx 1 Location L shoulder-pt supine elevated HOB  -CC      Manual Rx 1 Type MET  -CC      Manual Rx 1 Grade Prom L shoulder -all planes  -CC      Manual Rx 1 Duration x 5-10  -CC         Manual Rx 2    Manual Rx 2  Location L shoulder  -CC      Manual Rx 2 Type GH distraction- A-P glides  -CC      Manual Rx 2 Grade Gr II  -CC      Manual Rx 2 Duration x 10  -CC        User Key  (r) = Recorded By, (t) = Taken By, (c) = Cosigned By    Initials Name Provider Type    ANNIE Manning PT Physical Therapist                PT OP Goals     Row Name 07/02/18 1530          PT Short Term Goals    STG Date to Achieve 07/26/18  -CC     STG 1 Pt compliant with HEP for R foot and L shoulder as instructed  -CC     STG 2 Pt reports decreasing sx R foot with max pain less/equal 3-4/10  -CC     STG 3 Pt shows improved gait R foot with heel/toe mechanics by 25-50 %  -CC     STG 4 Pt shows improved PROM L shoulder FF/scaption 140-150 degrees  -CC     STG 5 Pt shows improved PROM L shoulder in ER/IR 50-60 degrees  -CC        Long Term Goals    LTG Date to Achieve 08/27/18  -CC     LTG 1 Observe improved wt shifting R foot with normalized heel-toe mechanics  -CC     LTG 2 Pt shows Arom L shoulder FF-scaption 150-160 degrees  -CC     LTG 3 Pt shows muscle strength L shoulder 4-4+/5 RC  -CC       User Key  (r) = Recorded By, (t) = Taken By, (c) = Cosigned By    Initials Name Provider Type    ANNIE Manning PT Physical Therapist          Therapy Education  Education Details: Issued handout for A/A stretches for Rom L shoulder for HEP. Also discussed with pt benefit to put up home pulley   Given: HEP  Program: New  How Provided: Verbal, Demonstration, Written  Provided to: Patient  Level of Understanding: Verbalized, Demonstrated              Time Calculation:   Start Time: 1535  Stop Time: 1635  Time Calculation (min): 60 min  Therapy Suggested Charges     Code   Minutes Charges    51728 (CPT®) Hc Pt Neuromusc Re Education Ea 15 Min      87454 (CPT®) Hc Pt Ther Proc Ea 15 Min      04802 (CPT®) Hc Gait Training Ea 15 Min      77632 (CPT®) Hc Pt Therapeutic Act Ea 15 Min      03376 (CPT®) Hc Pt Manual Therapy Ea 15 Min      64286  (CPT®) Hc Pt Ther Massage- Per 15 Min      42373 (CPT®) Hc Pt Iontophoresis Ea 15 Min      88874 (CPT®) Hc Pt Elec Stim Ea-Per 15 Min      92916 (CPT®) Hc Pt Ultrasound Ea 15 Min 10 1    22140 (CPT®) Hc Pt Self Care/Mgmt/Train Ea 15 Min      Total  10 1        Therapy Charges for Today     Code Description Service Date Service Provider Modifiers Qty    35439798891 HC PT MANUAL THERAPY EA 15 MIN 7/2/2018 Raquel Manning, PT GP 1    90231941296 HC PT ULTRASOUND EA 15 MIN 7/2/2018 Raquel Manning, PT GP 1    21995848176 HC PT THER PROC EA 15 MIN 7/2/2018 Raquel Manning, PT GP 1    07253937446 HC PT HOT OR COLD PACK TREAT MCARE 7/2/2018 Raquel Manning, PT GP 1                    Raquel Manning, PT  7/2/2018

## 2018-07-06 ENCOUNTER — HOSPITAL ENCOUNTER (OUTPATIENT)
Dept: PHYSICAL THERAPY | Facility: HOSPITAL | Age: 73
Setting detail: THERAPIES SERIES
Discharge: HOME | End: 2018-07-06

## 2018-07-06 DIAGNOSIS — M75.02 ADHESIVE CAPSULITIS OF LEFT SHOULDER: Primary | ICD-10-CM

## 2018-07-06 DIAGNOSIS — M72.2 PLANTAR FASCIITIS OF RIGHT FOOT: ICD-10-CM

## 2018-07-06 PROCEDURE — 97035 APP MDLTY 1+ULTRASOUND EA 15: CPT

## 2018-07-06 PROCEDURE — 97110 THERAPEUTIC EXERCISES: CPT

## 2018-07-06 PROCEDURE — 97140 MANUAL THERAPY 1/> REGIONS: CPT

## 2018-07-06 NOTE — THERAPY TREATMENT NOTE
Outpatient Physical Therapy Ortho Treatment Note   Rylee Baugh     Patient Name: Mary Jack  : 1945  MRN: [id number]  Today's Date: 2018      Visit Date: 2018    Visit Dx:    ICD-10-CM ICD-9-CM   1. Adhesive capsulitis of left shoulder M75.02 726.0   2. Plantar fasciitis of right foot M72.2 728.71       Patient Active Problem List   Diagnosis   • Acute myocardial infarction   • Panlobular emphysema (CMS/HCC)   • Chronic coronary artery disease   • Iron deficiency anemia due to chronic blood loss   • Heartburn   • Mixed hyperlipidemia   • Tobacco abuse   • Hypoxia   • Abdominal aortic aneurysm (AAA) without rupture (CMS/HCC)   • Pulmonary arterial hypertension   • 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   • Hiatal hernia        Past Medical History:   Diagnosis Date   • AAA (abdominal aortic aneurysm) (CMS/HCC)    • Anemia    • Aneurysm of abdominal aorta (CMS/HCC)    • Arthritis    • Zepeda esophagus    • CAD (coronary artery disease)    • Chest pain    • Constipation    • COPD (chronic obstructive pulmonary disease) (CMS/HCC)    • Emphysema lung (CMS/HCC)    • Empyema (CMS/HCC)    • Fatigue    • GERD (gastroesophageal reflux disease)    • Headache    • Hiatal hernia    • High risk medication use    • History of cardiac catheterization     CATH STENT PLACEMENT: CIRCUMFLEX BRANCH   • Hyperlipidemia    • Hypertension    • Jaundice    • Lumbosacral disc disease    • Myocardial infarction    • Myocardial infarction    • Occult blood in stools    • On home O2    • Peripheral artery disease (CMS/HCC)    • Reflux gastritis    • Scoliosis         Past Surgical History:   Procedure Laterality Date   • CARDIAC CATHETERIZATION     • CARDIAC CATHETERIZATION N/A 2017    Procedure: Left Heart Cath;  Surgeon: Herber Hayes MD;  Location: [facility] INVASIVE LOCATION;  Service:    • CHOLECYSTECTOMY     • COLONOSCOPY     • COLONOSCOPY N/A 6/10/2016    Procedure: COLONOSCOPY with polypectomy;   Surgeon: Virgie Castelan MD;  Location:  [facility];  Service:    • CORONARY ANGIOPLASTY     • CORONARY STENT PLACEMENT      X2   • ENDOSCOPY N/A 6/10/2016    Procedure: ESOPHAGOGASTRODUODENOSCOPY WITH BIOPSY;  Surgeon: Virgie Castelan MD;  Location:  [facility];  Service:    • ENDOSCOPY     • ENDOSCOPY N/A 8/25/2017    Procedure: ESOPHAGOGASTRODUODENOSCOPY w/ biopsies;  Surgeon: Virgie Castelan MD;  Location:  [facility];  Service:                              PT Assessment/Plan     Row Name 07/06/18 1339          PT Assessment    Assessment Comments Pt making progress with decrease R foot pain- observed pt with close to full WB R foot in ambulation and better tolerance. Pt also tolerated P/A/A rom better today as well. Pt stated L shoulder still hurt with ROM but not as bad. Pt making gains toward goals  -CC        PT Plan    PT Plan Comments Continue per plan- pt- making good progress with R foot pain so will focus more of RX session on L shoulder  -CC       User Key  (r) = Recorded By, (t) = Taken By, (c) = Cosigned By    Initials Name Provider Type    CC Raquel Manning, PT Physical Therapist                Modalities     Row Name 07/06/18 1100             Moist Heat    Location R shoulder  -CC      Rx Minutes 15 mins  -CC      MH Prior to Rx Yes  -CC         Ultrasound 35567    Location R plantar fascia and heel  -CC      Frequency 3.0 MHz  -CC      Intensity - Wts/cm 1.3  -CC      12162 - PT Ultrasound Minutes 8  -CC         Other Treatment Provided    Taping / Bracing  Did not Re-tape kinesiotape to inhibit R plantar fascia and achilles tendon insertion- Observed some mild skin irrtiation at lateral aspect of taping to foot with tape removal at Rx session  -CC        User Key  (r) = Recorded By, (t) = Taken By, (c) = Cosigned By    Initials Name Provider Type    ANNIE Manning, PT Physical Therapist                Exercises     Row Name 07/06/18 1100             Exercise 1    Exercise Name 1 R DF  stretch with sheet- knee extended and flexed  -CC      Cueing 1 Verbal;Tactile;Demo  -CC      Reps 1 4 ea ex  -CC      Time 1 15 sec  -CC         Exercise 2    Exercise Name 2 L flexion of great toe  -CC      Cueing 2 Verbal  -CC      Reps 2 2  -CC      Time 2 30 sec  -CC         Exercise 3    Exercise Name 3 Prostretch DF stretch in sitting  -CC      Cueing 3 Verbal;Tactile  -CC      Reps 3 10  -CC      Time 3 10 sec  -CC         Exercise 4    Exercise Name 4 A/A FF with cane   -CC      Cueing 4 Verbal;Tactile  -CC      Time 4 10-pt supine  -CC         Exercise 5    Exercise Name 5 A/A ER/IR with cane  -CC      Cueing 5 Verbal;Tactile  -CC      Time 5 10 -pt supine  -CC         Exercise 6    Exercise Name 6 L shoulder pulley FF/scaption  -CC      Cueing 6 Verbal;Tactile  -CC      Reps 6 10 ea  -CC         Exercise 7    Exercise Name 7 Scapular retraction with ER  -CC      Cueing 7 Verbal;Tactile  -CC      Reps 7 5- pt supine  -CC      Time 7 5 sec  -CC         Exercise 8    Exercise Name 8 Towel IR stretch  -CC      Cueing 8 Demo  -CC      Reps 8 10  -CC        User Key  (r) = Recorded By, (t) = Taken By, (c) = Cosigned By    Initials Name Provider Type    ANNIE Manning, PT Physical Therapist                        Manual Rx (last 36 hours)      Manual Treatments     Row Name 07/06/18 1100             Manual Rx 1    Manual Rx 1 Location L shoulder-pt supine elevated HOB  -CC      Manual Rx 1 Type MET  -CC      Manual Rx 1 Grade Prom L shoulder -all planes  -CC      Manual Rx 1 Duration x10  -CC         Manual Rx 2    Manual Rx 2 Location L shoulder  -CC      Manual Rx 2 Type GH distraction- A-P glides  -CC      Manual Rx 2 Grade Gr II  -CC      Manual Rx 2 Duration x 10  -CC        User Key  (r) = Recorded By, (t) = Taken By, (c) = Cosigned By    Initials Name Provider Type    ANNIE Manning, PT Physical Therapist                PT OP Goals     Row Name 07/06/18 1100          PT Short Term  Goals    STG Date to Achieve 07/26/18  -CC     STG 1 Pt compliant with HEP for R foot and L shoulder as instructed  -CC     STG 2 Pt reports decreasing sx R foot with max pain less/equal 3-4/10  -CC     STG 3 Pt shows improved gait R foot with heel/toe mechanics by 25-50 %  -CC     STG 4 Pt shows improved PROM L shoulder FF/scaption 140-150 degrees  -CC     STG 5 Pt shows improved PROM L shoulder in ER/IR 50-60 degrees  -CC        Long Term Goals    LTG Date to Achieve 08/27/18  -CC     LTG 1 Observe improved wt shifting R foot with normalized heel-toe mechanics  -CC     LTG 2 Pt shows Arom L shoulder FF-scaption 150-160 degrees  -CC     LTG 3 Pt shows muscle strength L shoulder 4-4+/5 RC  -CC       User Key  (r) = Recorded By, (t) = Taken By, (c) = Cosigned By    Initials Name Provider Type    CC Raquel Manning, PT Physical Therapist          Therapy Education  Education Details: Add towel IR stretch to HEP. Pt also to use home pulley 3x day low reps -x 10 each  Given: HEP  Program: New  How Provided: Verbal  Provided to: Patient  Level of Understanding: Demonstrated, Verbalized              Time Calculation:   Start Time: 1100  Stop Time: 1210  Time Calculation (min): 70 min  Therapy Suggested Charges     Code   Minutes Charges    71396 (CPT®) Hc Pt Neuromusc Re Education Ea 15 Min      37912 (CPT®) Hc Pt Ther Proc Ea 15 Min      96071 (CPT®) Hc Gait Training Ea 15 Min      17607 (CPT®) Hc Pt Therapeutic Act Ea 15 Min      50864 (CPT®) Hc Pt Manual Therapy Ea 15 Min      88294 (CPT®) Hc Pt Ther Massage- Per 15 Min      83047 (CPT®) Hc Pt Iontophoresis Ea 15 Min      73350 (CPT®) Hc Pt Elec Stim Ea-Per 15 Min      33538 (CPT®) Hc Pt Ultrasound Ea 15 Min 8 1    37231 (CPT®) Hc Pt Self Care/Mgmt/Train Ea 15 Min      Total  8 1        Therapy Charges for Today     Code Description Service Date Service Provider Modifiers Qty    06806050732 HC PT MANUAL THERAPY EA 15 MIN 7/6/2018 Raquel Manning, PT GP  1    78462435732 HC PT ULTRASOUND EA 15 MIN 7/6/2018 Raquel Manning, PT GP 1    78886847207 HC PT THER PROC EA 15 MIN 7/6/2018 Raquel Manning, PT GP 2    94643403539 HC PT HOT OR COLD PACK TREAT [facility] 7/6/2018 Raquel Manning, PT GP 1                    Raquel Manning, PT  7/6/2018

## 2018-07-10 ENCOUNTER — HOSPITAL ENCOUNTER (OUTPATIENT)
Dept: PHYSICAL THERAPY | Facility: HOSPITAL | Age: 73
Setting detail: THERAPIES SERIES
Discharge: HOME | End: 2018-07-10

## 2018-07-10 DIAGNOSIS — D50.0 IRON DEFICIENCY ANEMIA DUE TO CHRONIC BLOOD LOSS: Primary | ICD-10-CM

## 2018-07-10 DIAGNOSIS — R53.83 FATIGUE, UNSPECIFIED TYPE: ICD-10-CM

## 2018-07-10 DIAGNOSIS — M75.02 ADHESIVE CAPSULITIS OF LEFT SHOULDER: Primary | ICD-10-CM

## 2018-07-10 DIAGNOSIS — E78.2 MIXED HYPERLIPIDEMIA: ICD-10-CM

## 2018-07-10 DIAGNOSIS — M72.2 PLANTAR FASCIITIS OF RIGHT FOOT: ICD-10-CM

## 2018-07-10 DIAGNOSIS — Z51.81 MEDICATION MONITORING ENCOUNTER: ICD-10-CM

## 2018-07-10 PROCEDURE — 97035 APP MDLTY 1+ULTRASOUND EA 15: CPT

## 2018-07-10 PROCEDURE — 97140 MANUAL THERAPY 1/> REGIONS: CPT

## 2018-07-10 PROCEDURE — 97110 THERAPEUTIC EXERCISES: CPT

## 2018-07-10 RX ORDER — ATORVASTATIN CALCIUM 10 MG/1
10 TABLET, FILM COATED ORAL DAILY
Qty: 90 TABLET | Refills: 1 | Status: SHIPPED | OUTPATIENT
Start: 2018-07-10 | End: 2019-01-04 | Stop reason: SDUPTHER

## 2018-07-10 NOTE — THERAPY TREATMENT NOTE
Outpatient Physical Therapy Ortho Treatment Note   Rylee Baugh     Patient Name: Mary Jack  : 1945  MRN: [id number]  Today's Date: 7/10/2018      Visit Date: 07/10/2018    Visit Dx:    ICD-10-CM ICD-9-CM   1. Adhesive capsulitis of left shoulder M75.02 726.0   2. Plantar fasciitis of right foot M72.2 728.71       Patient Active Problem List   Diagnosis   • Acute myocardial infarction   • Panlobular emphysema (CMS/HCC)   • Chronic coronary artery disease   • Iron deficiency anemia due to chronic blood loss   • Heartburn   • Mixed hyperlipidemia   • Tobacco abuse   • Hypoxia   • Abdominal aortic aneurysm (AAA) without rupture (CMS/HCC)   • Pulmonary arterial hypertension   • 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   • Hiatal hernia        Past Medical History:   Diagnosis Date   • AAA (abdominal aortic aneurysm) (CMS/HCC)    • Anemia    • Aneurysm of abdominal aorta (CMS/HCC)    • Arthritis    • Zpeeda esophagus    • CAD (coronary artery disease)    • Chest pain    • Constipation    • COPD (chronic obstructive pulmonary disease) (CMS/HCC)    • Emphysema lung (CMS/HCC)    • Empyema (CMS/HCC)    • Fatigue    • GERD (gastroesophageal reflux disease)    • Headache    • Hiatal hernia    • High risk medication use    • History of cardiac catheterization     CATH STENT PLACEMENT: CIRCUMFLEX BRANCH   • Hyperlipidemia    • Hypertension    • Jaundice    • Lumbosacral disc disease    • Myocardial infarction    • Myocardial infarction    • Occult blood in stools    • On home O2    • Peripheral artery disease (CMS/HCC)    • Reflux gastritis    • Scoliosis         Past Surgical History:   Procedure Laterality Date   • CARDIAC CATHETERIZATION     • CARDIAC CATHETERIZATION N/A 2017    Procedure: Left Heart Cath;  Surgeon: Herber Hayes MD;  Location: [facility] INVASIVE LOCATION;  Service:    • CHOLECYSTECTOMY     • COLONOSCOPY     • COLONOSCOPY N/A 6/10/2016    Procedure: COLONOSCOPY with polypectomy;   Surgeon: Virgie Castelan MD;  Location:  [facility];  Service:    • CORONARY ANGIOPLASTY     • CORONARY STENT PLACEMENT      X2   • ENDOSCOPY N/A 6/10/2016    Procedure: ESOPHAGOGASTRODUODENOSCOPY WITH BIOPSY;  Surgeon: Virgie Castelan MD;  Location:  [facility];  Service:    • ENDOSCOPY     • ENDOSCOPY N/A 8/25/2017    Procedure: ESOPHAGOGASTRODUODENOSCOPY w/ biopsies;  Surgeon: Virgie Castelan MD;  Location:  [facility];  Service:                              PT Assessment/Plan     Row Name 07/10/18 1317          PT Assessment    Assessment Comments Pt making good progress with sx R foot- sx now localized in lateral heel -no longer tender or painful in plantar fascia. Discussed with pt to continue wearing good support shoes so do not have relapse of sx(pt wearing shoe without arch support today). Pt in agreement. Making slow but steady gains in L shoulder ROM.  Pt tolerating increased use in ADL  -CC        PT Plan    PT Plan Comments Continue per plan  -CC       User Key  (r) = Recorded By, (t) = Taken By, (c) = Cosigned By    Initials Name Provider Type    ANNIE Manning, PT Physical Therapist                Modalities     Row Name 07/10/18 1100             Moist Heat    Location R shoulder  -CC      Rx Minutes 15 mins  -CC      MH Prior to Rx Yes  -CC         Ultrasound 80778    Location R plantar fascia and heel  -CC      Frequency 3.0 MHz  -CC      Intensity - Wts/cm 1.3  -CC         Other Treatment Provided    Taping / Bracing  Did not Re-tape kinesiotape to inhibit R plantar fascia and achilles tendon insertion- Observed some mild skin irriation at lateral aspect of taping to foot s  -CC        User Key  (r) = Recorded By, (t) = Taken By, (c) = Cosigned By    Initials Name Provider Type    ANNIE Manning, PT Physical Therapist                Exercises     Row Name 07/10/18 1300 07/10/18 1100          Subjective Comments    Subjective Comments Pt relates R foot feeling alot better -only has sx  at lateral heel. L shoulder is moving better up but still very painful out to side.  -CC  --        Subjective Pain    Subjective Pain Comment Pt can dress easier pulling shirt over head and fixing hair with L shoulder  -CC  --        Exercise 1    Exercise Name 1  -- R DF stretch with sheet- knee extended and flexed  -CC     Cueing 1  -- Verbal;Tactile;Demo  -CC     Reps 1  -- 4 ea ex  -CC     Time 1  -- 15 sec  -CC        Exercise 2    Exercise Name 2  -- L flexion of great toe  -CC     Cueing 2  -- Verbal  -CC     Reps 2  -- 2  -CC     Time 2  -- 30 sec  -CC        Exercise 3    Exercise Name 3  -- Prostretch DF stretch in sitting  -CC     Cueing 3  -- Verbal;Tactile  -CC     Reps 3  -- 10  -CC     Time 3  -- 10 sec  -CC        Exercise 4    Exercise Name 4  -- A/A FF with cane   -CC     Cueing 4  -- Verbal;Tactile  -CC     Time 4  -- 10-pt supine  -CC        Exercise 5    Exercise Name 5  -- A/A ER/IR with cane  -CC     Cueing 5  -- Verbal;Tactile  -CC     Time 5  -- 10 -pt supine  -CC        Exercise 6    Exercise Name 6  -- L shoulder pulley FF/scaption  -CC     Cueing 6  -- Verbal;Tactile  -CC     Reps 6  -- 10 ea  -CC        Exercise 7    Exercise Name 7  -- Scapular retraction with ER  -CC     Cueing 7  -- Verbal;Tactile  -CC     Reps 7  -- 5- pt supine  -CC     Time 7  -- 5 sec  -CC        Exercise 8    Exercise Name 8  -- Towel IR stretch  -CC     Cueing 8  -- Demo  -CC     Reps 8  -- 10  -CC        Exercise 9    Exercise Name 9  -- FF wall climb  -CC     Cueing 9  -- Verbal;Demo  -CC     Reps 9  -- 10  -CC        Exercise 10    Exercise Name 10  -- Standing R gastroc stretch  -CC     Cueing 10  -- Verbal;Demo  -CC     Reps 10  -- 2  -CC     Time 10  -- 15 sec  -CC       User Key  (r) = Recorded By, (t) = Taken By, (c) = Cosigned By    Initials Name Provider Type    CC Raquel Manning, PT Physical Therapist                        Manual Rx (last 36 hours)      Manual Treatments     Row Name  07/10/18 1100             Manual Rx 1    Manual Rx 1 Location L shoulder-pt supine elevated HOB  -CC      Manual Rx 1 Type MET  -CC      Manual Rx 1 Grade Prom L shoulder -all planes  -CC      Manual Rx 1 Duration x10  -CC         Manual Rx 2    Manual Rx 2 Location L shoulder  -CC      Manual Rx 2 Type GH distraction- A-P glides  -CC      Manual Rx 2 Grade Gr II  -CC      Manual Rx 2 Duration x 10  -CC        User Key  (r) = Recorded By, (t) = Taken By, (c) = Cosigned By    Initials Name Provider Type    ANNIE Manning, PT Physical Therapist                PT OP Goals     Row Name 07/10/18 1100          PT Short Term Goals    STG Date to Achieve 07/26/18  -CC     STG 1 Pt compliant with HEP for R foot and L shoulder as instructed  -CC     STG 2 Pt reports decreasing sx R foot with max pain less/equal 3-4/10  -CC     STG 3 Pt shows improved gait R foot with heel/toe mechanics by 25-50 %  -CC     STG 4 Pt shows improved PROM L shoulder FF/scaption 140-150 degrees  -CC     STG 5 Pt shows improved PROM L shoulder in ER/IR 50-60 degrees  -CC        Long Term Goals    LTG Date to Achieve 08/27/18  -CC     LTG 1 Observe improved wt shifting R foot with normalized heel-toe mechanics  -CC     LTG 2 Pt shows Arom L shoulder FF-scaption 150-160 degrees  -CC     LTG 3 Pt shows muscle strength L shoulder 4-4+/5 RC  -CC       User Key  (r) = Recorded By, (t) = Taken By, (c) = Cosigned By    Initials Name Provider Type    ANNIE Manning, PT Physical Therapist          Therapy Education  Education Details: Add FF finger climb on wall and standing gastroc stretch to HEP For L shoulder and R heel sx  Given: HEP  Program: New  How Provided: Verbal, Written, Demonstration  Provided to: Patient  Level of Understanding: Demonstrated, Verbalized              Time Calculation:   Start Time: 1100  Stop Time: 1200  Time Calculation (min): 60 min  Therapy Suggested Charges     Code   Minutes Charges    None            Therapy Charges for Today     Code Description Service Date Service Provider Modifiers Qty    47705670713 HC PT MANUAL THERAPY EA 15 MIN 7/10/2018 Raquel Manning, PT GP 1    59691816605 HC PT ULTRASOUND EA 15 MIN 7/10/2018 Raquel Manning, PT GP 1    67802152030 HC PT THER PROC EA 15 MIN 7/10/2018 Raquel Manning, PT GP 1    14172377170 HC PT HOT OR COLD PACK TREAT [facility] 7/10/2018 Raquel Manning, PT GP 1                    Raquel Manning, PT  7/10/2018

## 2018-07-11 ENCOUNTER — HOSPITAL ENCOUNTER (OUTPATIENT)
Dept: PHYSICAL THERAPY | Facility: HOSPITAL | Age: 73
Setting detail: THERAPIES SERIES
Discharge: HOME | End: 2018-07-11

## 2018-07-11 DIAGNOSIS — M72.2 PLANTAR FASCIITIS OF RIGHT FOOT: ICD-10-CM

## 2018-07-11 DIAGNOSIS — M75.02 ADHESIVE CAPSULITIS OF LEFT SHOULDER: Primary | ICD-10-CM

## 2018-07-11 LAB
ALT SERPL-CCNC: 12 U/L (ref 5–33)
APPEARANCE UR: CLEAR
BILIRUB UR QL STRIP: NEGATIVE
BUN SERPL-MCNC: 13 MG/DL (ref 8–23)
BUN/CREAT SERPL: 15.5 (ref 7–25)
CALCIUM SERPL-MCNC: 10.6 MG/DL (ref 8.8–10.5)
CHLORIDE SERPL-SCNC: 95 MMOL/L (ref 98–107)
CHOLEST SERPL-MCNC: 151 MG/DL (ref 0–200)
CO2 SERPL-SCNC: 31.3 MMOL/L (ref 22–29)
COLOR UR: NORMAL
CREAT SERPL-MCNC: 0.84 MG/DL (ref 0.57–1)
ERYTHROCYTE [DISTWIDTH] IN BLOOD BY AUTOMATED COUNT: 13.4 % (ref 11.5–14.5)
GLUCOSE SERPL-MCNC: 97 MG/DL (ref 65–99)
GLUCOSE UR QL: NEGATIVE
HCT VFR BLD AUTO: 36.3 % (ref 37–47)
HDLC SERPL-MCNC: 70 MG/DL (ref 40–60)
HGB BLD-MCNC: 11.7 G/DL (ref 12–16)
HGB UR QL STRIP: NEGATIVE
KETONES UR QL STRIP: NEGATIVE
LDLC SERPL CALC-MCNC: 59 MG/DL (ref 0–100)
LDLC/HDLC SERPL: 0.85 {RATIO}
LEUKOCYTE ESTERASE UR QL STRIP: NEGATIVE
MCH RBC QN AUTO: 32.1 PG (ref 27–31)
MCHC RBC AUTO-ENTMCNC: 32.2 G/DL (ref 31–37)
MCV RBC AUTO: 99.5 FL (ref 81–99)
NITRITE UR QL STRIP: NEGATIVE
PH UR STRIP: 7.5 [PH] (ref 4.5–8)
PLATELET # BLD AUTO: 224 10*3/MM3 (ref 140–500)
POTASSIUM SERPL-SCNC: 5.2 MMOL/L (ref 3.5–5.2)
PROT UR QL STRIP: NEGATIVE
RBC # BLD AUTO: 3.65 10*6/MM3 (ref 4.2–5.4)
SODIUM SERPL-SCNC: 136 MMOL/L (ref 136–145)
SP GR UR: 1.01 (ref 1–1.03)
TRIGL SERPL-MCNC: 109 MG/DL (ref 0–150)
TSH SERPL DL<=0.005 MIU/L-ACNC: 2.88 MIU/ML (ref 0.27–4.2)
UROBILINOGEN UR STRIP-MCNC: NORMAL MG/DL
VLDLC SERPL CALC-MCNC: 21.8 MG/DL (ref 7–27)
WBC # BLD AUTO: 5.85 10*3/MM3 (ref 4.8–10.8)

## 2018-07-11 PROCEDURE — 97035 APP MDLTY 1+ULTRASOUND EA 15: CPT

## 2018-07-11 PROCEDURE — 97140 MANUAL THERAPY 1/> REGIONS: CPT

## 2018-07-11 PROCEDURE — 97110 THERAPEUTIC EXERCISES: CPT

## 2018-07-11 NOTE — THERAPY TREATMENT NOTE
Outpatient Physical Therapy Ortho Treatment Note   Rylee Baugh     Patient Name: Mary Jack  : 1945  MRN: [id number]  Today's Date: 2018      Visit Date: 2018    Visit Dx:    ICD-10-CM ICD-9-CM   1. Adhesive capsulitis of left shoulder M75.02 726.0   2. Plantar fasciitis of right foot M72.2 728.71       Patient Active Problem List   Diagnosis   • Acute myocardial infarction   • Panlobular emphysema (CMS/HCC)   • Chronic coronary artery disease   • Iron deficiency anemia due to chronic blood loss   • Heartburn   • Mixed hyperlipidemia   • Tobacco abuse   • Hypoxia   • Abdominal aortic aneurysm (AAA) without rupture (CMS/HCC)   • Pulmonary arterial hypertension   • 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   • Hiatal hernia        Past Medical History:   Diagnosis Date   • AAA (abdominal aortic aneurysm) (CMS/HCC)    • Anemia    • Aneurysm of abdominal aorta (CMS/HCC)    • Arthritis    • Zepeda esophagus    • CAD (coronary artery disease)    • Chest pain    • Constipation    • COPD (chronic obstructive pulmonary disease) (CMS/HCC)    • Emphysema lung (CMS/HCC)    • Empyema (CMS/HCC)    • Fatigue    • GERD (gastroesophageal reflux disease)    • Headache    • Hiatal hernia    • High risk medication use    • History of cardiac catheterization     CATH STENT PLACEMENT: CIRCUMFLEX BRANCH   • Hyperlipidemia    • Hypertension    • Jaundice    • Lumbosacral disc disease    • Myocardial infarction    • Myocardial infarction    • Occult blood in stools    • On home O2    • Peripheral artery disease (CMS/HCC)    • Reflux gastritis    • Scoliosis         Past Surgical History:   Procedure Laterality Date   • CARDIAC CATHETERIZATION     • CARDIAC CATHETERIZATION N/A 2017    Procedure: Left Heart Cath;  Surgeon: Herber Hayes MD;  Location: [facility] INVASIVE LOCATION;  Service:    • CHOLECYSTECTOMY     • COLONOSCOPY     • COLONOSCOPY N/A 6/10/2016    Procedure: COLONOSCOPY with polypectomy;   Surgeon: Virgie Castelan MD;  Location:  [facility];  Service:    • CORONARY ANGIOPLASTY     • CORONARY STENT PLACEMENT      X2   • ENDOSCOPY N/A 6/10/2016    Procedure: ESOPHAGOGASTRODUODENOSCOPY WITH BIOPSY;  Surgeon: Virgie Castelan MD;  Location:  [facility];  Service:    • ENDOSCOPY     • ENDOSCOPY N/A 8/25/2017    Procedure: ESOPHAGOGASTRODUODENOSCOPY w/ biopsies;  Surgeon: Virgie Castelan MD;  Location:  [facility];  Service:                              PT Assessment/Plan     Row Name 07/11/18 7567          PT Assessment    Assessment Comments Pt tolerated Rx session but looked like she was fatigued through out. Making progress toward goals  -CC        PT Plan    PT Plan Comments Continue per POC for R heel sx and L adhesive capsulitis L shoulder  -CC       User Key  (r) = Recorded By, (t) = Taken By, (c) = Cosigned By    Initials Name Provider Type    CC Raquel Manning, PT Physical Therapist                Modalities     Row Name 07/11/18 1400             Moist Heat    Location R shoulder  -CC      Rx Minutes 15 mins  -CC      MH Prior to Rx Yes  -CC         Ultrasound 38028    Location R plantar fascia and heel  -CC      Frequency 3.0 MHz  -CC      Intensity - Wts/cm 1.3  -CC         Other Treatment Provided    Taping / Bracing  Did not Re-tape kinesiotape to inhibit R plantar fascia and achilles tendon insertion- Observed some mild skin irriation at lateral aspect of taping to foot s  -CC        User Key  (r) = Recorded By, (t) = Taken By, (c) = Cosigned By    Initials Name Provider Type    CC Raquel Manning, PT Physical Therapist                Exercises     Row Name 07/11/18 1400             Subjective Comments    Subjective Comments Pt states she is worn out today since did not sleep last night and had to get up to get lab work done  -CC         Exercise 1    Exercise Name 1 R DF stretch with sheet- knee extended and flexed  -CC      Cueing 1 Verbal;Tactile;Demo  -CC      Reps 1 4 ea ex  -CC       Time 1 15 sec  -CC         Exercise 2    Exercise Name 2 L flexion of great toe  -CC      Cueing 2 Verbal  -CC      Reps 2 2  -CC      Time 2 30 sec  -CC         Exercise 3    Exercise Name 3 Prostretch DF stretch in sitting  -CC      Cueing 3 Verbal;Tactile  -CC      Reps 3 10  -CC      Time 3 10 sec  -CC         Exercise 4    Exercise Name 4 A/A FF with cane   -CC      Cueing 4 Verbal;Tactile  -CC      Time 4 10-pt supine  -CC         Exercise 5    Exercise Name 5 A/A ER/IR with cane  -CC      Cueing 5 Verbal;Tactile  -CC      Time 5 10 -pt supine  -CC         Exercise 6    Exercise Name 6 L shoulder pulley FF/scaption  -CC      Cueing 6 Verbal;Tactile  -CC      Reps 6 10 ea  -CC         Exercise 7    Exercise Name 7 Scapular retraction with ER  -CC      Cueing 7 Verbal;Tactile  -CC      Reps 7 5- pt supine  -CC      Time 7 5 sec  -CC         Exercise 8    Exercise Name 8 Towel IR stretch  -CC      Cueing 8 Demo  -CC      Reps 8 10  -CC         Exercise 9    Exercise Name 9 FF wall climb  -CC      Cueing 9 Verbal;Demo  -CC      Reps 9 10  -CC         Exercise 10    Exercise Name 10 Standing R gastroc stretch  -CC      Cueing 10 Verbal;Demo  -CC      Reps 10 2  -CC      Time 10 15 sec  -CC        User Key  (r) = Recorded By, (t) = Taken By, (c) = Cosigned By    Initials Name Provider Type    CC Raquel Manning PT Physical Therapist                        Manual Rx (last 36 hours)      Manual Treatments     Row Name 07/11/18 1400 07/10/18 1100          Manual Rx 1    Manual Rx 1 Location L shoulder-pt supine elevated HOB  -CC L shoulder-pt supine elevated HOB  -CC     Manual Rx 1 Type MET  -CC MET  -CC     Manual Rx 1 Grade Prom L shoulder -all planes  -CC Prom L shoulder -all planes  -CC     Manual Rx 1 Duration x10  -CC x10  -CC        Manual Rx 2    Manual Rx 2 Location L shoulder  -CC L shoulder  -CC     Manual Rx 2 Type GH distraction- A-P glides  -CC GH distraction- A-P glides  -CC     Manual Rx 2  Grade Gr II  -CC Gr II  -CC     Manual Rx 2 Duration x 10  -CC x 10  -CC       User Key  (r) = Recorded By, (t) = Taken By, (c) = Cosigned By    Initials Name Provider Type    CC Raquel Manning PT Physical Therapist                PT OP Goals     Row Name 07/11/18 1400          PT Short Term Goals    STG Date to Achieve 07/26/18  -CC     STG 1 Pt compliant with HEP for R foot and L shoulder as instructed  -CC     STG 2 Pt reports decreasing sx R foot with max pain less/equal 3-4/10  -CC     STG 3 Pt shows improved gait R foot with heel/toe mechanics by 25-50 %  -CC     STG 4 Pt shows improved PROM L shoulder FF/scaption 140-150 degrees  -CC     STG 5 Pt shows improved PROM L shoulder in ER/IR 50-60 degrees  -CC        Long Term Goals    LTG Date to Achieve 08/27/18  -CC     LTG 1 Observe improved wt shifting R foot with normalized heel-toe mechanics  -CC     LTG 2 Pt shows Arom L shoulder FF-scaption 150-160 degrees  -CC     LTG 3 Pt shows muscle strength L shoulder 4-4+/5 RC  -CC       User Key  (r) = Recorded By, (t) = Taken By, (c) = Cosigned By    Initials Name Provider Type    CC Raquel Manning PT Physical Therapist          Therapy Education  Education Details: Instructed pt to do HEP 2x/day vs 3 with exception of pulley which pt reports helps L shoulder feel more relaxed  Given: HEP  Program: Reinforced              Time Calculation:   Start Time: 1400  Stop Time: 1455  Time Calculation (min): 55 min  Therapy Suggested Charges     Code   Minutes Charges    None           Therapy Charges for Today     Code Description Service Date Service Provider Modifiers Qty    01829117349 HC PT MANUAL THERAPY EA 15 MIN 7/11/2018 Raquel Manning, PT GP 1    67353575531 HC PT ULTRASOUND EA 15 MIN 7/11/2018 Raquel Manning, PT GP 1    64106216460 HC PT THER PROC EA 15 MIN 7/11/2018 Raquel Manning, PT GP 1    96058288977 HC PT HOT OR COLD PACK TREAT [facility] 7/11/2018 Raquel BALLARD  Nigel, PT GP 1                    Raquel Manning, PT  7/11/2018

## 2018-07-17 ENCOUNTER — HOSPITAL ENCOUNTER (OUTPATIENT)
Dept: PHYSICAL THERAPY | Facility: HOSPITAL | Age: 73
Setting detail: THERAPIES SERIES
Discharge: HOME | End: 2018-07-17

## 2018-07-17 DIAGNOSIS — M75.02 ADHESIVE CAPSULITIS OF LEFT SHOULDER: Primary | ICD-10-CM

## 2018-07-17 DIAGNOSIS — M72.2 PLANTAR FASCIITIS OF RIGHT FOOT: ICD-10-CM

## 2018-07-17 PROCEDURE — 97140 MANUAL THERAPY 1/> REGIONS: CPT

## 2018-07-17 PROCEDURE — 97110 THERAPEUTIC EXERCISES: CPT

## 2018-07-17 PROCEDURE — 97035 APP MDLTY 1+ULTRASOUND EA 15: CPT

## 2018-07-17 NOTE — THERAPY TREATMENT NOTE
Outpatient Physical Therapy Ortho Treatment Note   Rylee Baugh     Patient Name: Mary Jack  : 1945  MRN: [id number]  Today's Date: 2018      Visit Date: 2018    Visit Dx:    ICD-10-CM ICD-9-CM   1. Adhesive capsulitis of left shoulder M75.02 726.0   2. Plantar fasciitis of right foot M72.2 728.71       Patient Active Problem List   Diagnosis   • Acute myocardial infarction   • Panlobular emphysema (CMS/HCC)   • Chronic coronary artery disease   • Iron deficiency anemia due to chronic blood loss   • Heartburn   • Mixed hyperlipidemia   • Tobacco abuse   • Hypoxia   • Abdominal aortic aneurysm (AAA) without rupture (CMS/HCC)   • Pulmonary arterial hypertension   • 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   • Hiatal hernia        Past Medical History:   Diagnosis Date   • AAA (abdominal aortic aneurysm) (CMS/HCC)    • Anemia    • Aneurysm of abdominal aorta (CMS/HCC)    • Arthritis    • Zepeda esophagus    • CAD (coronary artery disease)    • Chest pain    • Constipation    • COPD (chronic obstructive pulmonary disease) (CMS/HCC)    • Emphysema lung (CMS/HCC)    • Empyema (CMS/HCC)    • Fatigue    • GERD (gastroesophageal reflux disease)    • Headache    • Hiatal hernia    • High risk medication use    • History of cardiac catheterization     CATH STENT PLACEMENT: CIRCUMFLEX BRANCH   • Hyperlipidemia    • Hypertension    • Jaundice    • Lumbosacral disc disease    • Myocardial infarction    • Myocardial infarction    • Occult blood in stools    • On home O2    • Peripheral artery disease (CMS/HCC)    • Reflux gastritis    • Scoliosis         Past Surgical History:   Procedure Laterality Date   • CARDIAC CATHETERIZATION     • CARDIAC CATHETERIZATION N/A 2017    Procedure: Left Heart Cath;  Surgeon: Herber Hayes MD;  Location: [facility] INVASIVE LOCATION;  Service:    • CHOLECYSTECTOMY     • COLONOSCOPY     • COLONOSCOPY N/A 6/10/2016    Procedure: COLONOSCOPY with polypectomy;   Surgeon: Virgie Castelan MD;  Location:  [facility];  Service:    • CORONARY ANGIOPLASTY     • CORONARY STENT PLACEMENT      X2   • ENDOSCOPY N/A 6/10/2016    Procedure: ESOPHAGOGASTRODUODENOSCOPY WITH BIOPSY;  Surgeon: Virgie Castelan MD;  Location:  [facility];  Service:    • ENDOSCOPY     • ENDOSCOPY N/A 8/25/2017    Procedure: ESOPHAGOGASTRODUODENOSCOPY w/ biopsies;  Surgeon: Virgie Castelan MD;  Location:  [facility];  Service:                              PT Assessment/Plan     Row Name 07/17/18 5949          PT Assessment    Assessment Comments Pt has made progress with localizing sx in R lateral heel vs entire plantar surface. P tc/o flared sx this session due to prolonged WB with shopping at [employer] 1-2 days ago.  Pt making gains with increase A/Prom L shoulder  but still painful in use in ADL. Prom FF/Scaption 150 degrees- Arom - 130. Pt's ER/IR measurements not taken -has greatest limitations due to pain with ROM -CC        PT Plan    Predicted Duration of Therapy Intervention (Therapy Eval) Continue 2x week focusing on L shoulder since pt has almost reached goals for R plantar fasciitis. Pt to f/u with MD 7-18-18  -CC       User Key  (r) = Recorded By, (t) = Taken By, (c) = Cosigned By    Initials Name Provider Type    ANNIE Manning, PT Physical Therapist                Modalities     Row Name 07/17/18 1100             Moist Heat    Location R shoulder  -CC      Rx Minutes 15 mins  -CC      MH Prior to Rx Yes  -CC         Ultrasound 68065    Location R plantar fascia and heel  -CC      Frequency 3.0 MHz  -CC      Intensity - Wts/cm 1.3  -CC      25311 - PT Ultrasound Minutes 8  -CC         Other Treatment Provided    Taping / Bracing --  -CC        User Key  (r) = Recorded By, (t) = Taken By, (c) = Cosigned By    Initials Name Provider Type    ANNIE Manning, PT Physical Therapist                Exercises     Row Name 07/17/18 1100             Exercise 1    Exercise Name 1 R DF  stretch with sheet- knee extended and flexed  -CC      Cueing 1 Verbal;Tactile;Demo  -CC      Reps 1 4 ea ex  -CC      Time 1 15 sec  -CC         Exercise 2    Exercise Name 2 L flexion of great toe  -CC      Cueing 2 Verbal  -CC      Reps 2 2  -CC      Time 2 30 sec  -CC         Exercise 3    Exercise Name 3 Prostretch DF stretch in sitting  -CC      Cueing 3 Verbal;Tactile  -CC      Reps 3 10  -CC      Time 3 10 sec  -CC         Exercise 4    Exercise Name 4 A/A FF with cane   -CC      Cueing 4 Verbal;Tactile  -CC      Time 4 10-pt supine  -CC         Exercise 5    Exercise Name 5 A/A ER/IR with cane  -CC      Cueing 5 Verbal;Tactile  -CC      Time 5 10 -pt supine  -CC         Exercise 6    Exercise Name 6 L shoulder pulley FF/scaption  -CC      Cueing 6 Verbal;Tactile  -CC      Reps 6 10 ea  -CC         Exercise 7    Exercise Name 7 Scapular retraction with ER  -CC      Cueing 7 Verbal;Tactile  -CC      Reps 7 5- pt supine  -CC      Time 7 5 sec  -CC         Exercise 8    Exercise Name 8 Towel IR stretch  -CC      Cueing 8 Demo  -CC      Reps 8 10  -CC         Exercise 9    Exercise Name 9 FF wall climb  -CC      Cueing 9 Verbal;Demo  -CC      Reps 9 10  -CC         Exercise 10    Exercise Name 10 Standing R gastroc stretch  -CC      Cueing 10 Verbal;Demo  -CC      Reps 10 3  -CC      Time 10 15 sec  -CC        User Key  (r) = Recorded By, (t) = Taken By, (c) = Cosigned By    Initials Name Provider Type    CC Raquel Manning PT Physical Therapist                        Manual Rx (last 36 hours)      Manual Treatments     Row Name 07/17/18 1100             Manual Rx 1    Manual Rx 1 Location L shoulder-pt supine elevated HOB  -CC      Manual Rx 1 Type MET  -CC      Manual Rx 1 Grade Prom L shoulder -all planes  -CC      Manual Rx 1 Duration x10  -CC         Manual Rx 2    Manual Rx 2 Location L shoulder  -CC      Manual Rx 2 Type GH distraction- A-P glides  -CC      Manual Rx 2 Grade Gr II  -CC       Manual Rx 2 Duration x 10  -CC        User Key  (r) = Recorded By, (t) = Taken By, (c) = Cosigned By    Initials Name Provider Type    ANNIE Manning PT Physical Therapist                PT OP Goals     Row Name 07/17/18 1100          PT Short Term Goals    STG Date to Achieve 07/26/18  -CC     STG 1 Pt compliant with HEP for R foot and L shoulder as instructed  -CC     STG 2 Pt reports decreasing sx R foot with max pain less/equal 3-4/10  -CC     STG 3 Pt shows improved gait R foot with heel/toe mechanics by 25-50 %  -CC     STG 4 Pt shows improved PROM L shoulder FF/scaption 140-150 degrees  -CC     STG 5 Pt shows improved PROM L shoulder in ER/IR 50-60 degrees  -CC        Long Term Goals    LTG Date to Achieve 08/27/18  -CC     LTG 1 Observe improved wt shifting R foot with normalized heel-toe mechanics  -CC     LTG 2 Pt shows Arom L shoulder FF-scaption 150-160 degrees  -CC     LTG 3 Pt shows muscle strength L shoulder 4-4+/5 RC  -CC       User Key  (r) = Recorded By, (t) = Taken By, (c) = Cosigned By    Initials Name Provider Type    ANNIE Manning, PT Physical Therapist          Therapy Education  Education Details: Pt overdoing pulley for HEP  instructed pt just to do 2x day and reduce to 10-15x vs 3x/day.  Given: HEP  Program: Reinforced  How Provided: Verbal  Provided to: Patient  Level of Understanding: Verbalized              Time Calculation:   Start Time: 1100  Stop Time: 1200  Time Calculation (min): 60 min  Therapy Suggested Charges     Code   Minutes Charges    28477 (CPT®) Hc Pt Neuromusc Re Education Ea 15 Min      07992 (CPT®) Hc Pt Ther Proc Ea 15 Min      97680 (CPT®) Hc Gait Training Ea 15 Min      35907 (CPT®) Hc Pt Therapeutic Act Ea 15 Min      99138 (CPT®) Hc Pt Manual Therapy Ea 15 Min      62498 (CPT®) Hc Pt Ther Massage- Per 15 Min      90597 (CPT®) Hc Pt Iontophoresis Ea 15 Min      46125 (CPT®) Hc Pt Elec Stim Ea-Per 15 Min      54641 (CPT®) Hc Pt Ultrasound Ea  15 Min 8 1    93097 (CPT®) Hc Pt Self Care/Mgmt/Train Ea 15 Min      Total  8 1        Therapy Charges for Today     Code Description Service Date Service Provider Modifiers Qty    80227721551 HC PT ULTRASOUND EA 15 MIN 7/17/2018 Raquel Manning, PT GP 1    45355013744 HC PT MANUAL THERAPY EA 15 MIN 7/17/2018 Raquel Manning, PT GP 1    58123577291 HC PT THER PROC EA 15 MIN 7/17/2018 Raquel Manning, PT GP 1    74923725775 HC PT HOT OR COLD PACK TREAT MCARE 7/17/2018 Raquel Manning, PT GP 1                    Raquel Mannign, PT  7/17/2018

## 2018-07-18 ENCOUNTER — OFFICE VISIT (OUTPATIENT)
Dept: FAMILY MEDICINE CLINIC | Facility: CLINIC | Age: 73
End: 2018-07-18

## 2018-07-18 VITALS
DIASTOLIC BLOOD PRESSURE: 68 MMHG | SYSTOLIC BLOOD PRESSURE: 112 MMHG | OXYGEN SATURATION: 87 % | HEART RATE: 76 BPM | HEIGHT: 64 IN | BODY MASS INDEX: 29.19 KG/M2 | WEIGHT: 171 LBS

## 2018-07-18 DIAGNOSIS — F17.219 CIGARETTE NICOTINE DEPENDENCE WITH NICOTINE-INDUCED DISORDER: ICD-10-CM

## 2018-07-18 DIAGNOSIS — I25.9 ISCHEMIC HEART DISEASE DUE TO CORONARY ARTERY OBSTRUCTION (HCC): ICD-10-CM

## 2018-07-18 DIAGNOSIS — Z00.00 MEDICARE ANNUAL WELLNESS VISIT, SUBSEQUENT: ICD-10-CM

## 2018-07-18 DIAGNOSIS — I24.0 ISCHEMIC HEART DISEASE DUE TO CORONARY ARTERY OBSTRUCTION (HCC): ICD-10-CM

## 2018-07-18 DIAGNOSIS — I77.9 BILATERAL CAROTID ARTERY DISEASE (HCC): ICD-10-CM

## 2018-07-18 DIAGNOSIS — E78.2 MIXED HYPERLIPIDEMIA: Primary | ICD-10-CM

## 2018-07-18 DIAGNOSIS — Z12.39 SCREENING FOR BREAST CANCER: ICD-10-CM

## 2018-07-18 DIAGNOSIS — Z78.0 MENOPAUSE: ICD-10-CM

## 2018-07-18 DIAGNOSIS — Z72.0 TOBACCO ABUSE: ICD-10-CM

## 2018-07-18 DIAGNOSIS — J43.1 PANLOBULAR EMPHYSEMA (HCC): ICD-10-CM

## 2018-07-18 PROBLEM — R09.02 HYPOXIA: Status: RESOLVED | Noted: 2017-03-23 | Resolved: 2018-07-18

## 2018-07-18 PROCEDURE — 99406 BEHAV CHNG SMOKING 3-10 MIN: CPT | Performed by: FAMILY MEDICINE

## 2018-07-18 PROCEDURE — G0439 PPPS, SUBSEQ VISIT: HCPCS | Performed by: FAMILY MEDICINE

## 2018-07-18 PROCEDURE — 96160 PT-FOCUSED HLTH RISK ASSMT: CPT | Performed by: FAMILY MEDICINE

## 2018-07-18 NOTE — PROGRESS NOTES
QUICK REFERENCE INFORMATION:  The ABCs of the Annual Wellness Visit    Subsequent Medicare Wellness Visit    HEALTH RISK ASSESSMENT    1945    Recent Hospitalizations:  No hospitalization(s) within the last year..        Current Medical Providers:  Patient Care Team:  Delfino Matt MD as PCP - General  Oral Llanes MD as Consulting Physician (Pulmonary Disease)  Herber Hayes MD as Consulting Physician (Cardiology)  Virgie Castelan MD as Consulting Physician (Gastroenterology)  Maxi Lundy MD as Surgeon (Orthopedic Surgery)        Smoking Status:  History   Smoking Status   • Current Every Day Smoker   • Packs/day: 1.00   • Years: 50.00   • Types: Cigarettes   • Start date: 8/3/2016   • Last attempt to quit: 6/3/2016   Smokeless Tobacco   • Never Used     Comment: Quit for about 6 weeks then started back       Alcohol Consumption:  History   Alcohol Use No       Depression Screen:   PHQ-2/PHQ-9 Depression Screening 7/18/2018   Little interest or pleasure in doing things 0   Feeling down, depressed, or hopeless 0   Total Score 0       Health Habits and Functional and Cognitive Screening:  Functional & Cognitive Status 7/18/2018   Do you have difficulty preparing food and eating? No   Do you have difficulty bathing yourself, getting dressed or grooming yourself? No   Do you have difficulty using the toilet? No   Do you have difficulty moving around from place to place? No   Do you have trouble with steps or getting out of a bed or a chair? No   In the past year have you fallen or experienced a near fall? Yes   Current Diet Unhealthy Diet   Dental Exam Not up to date   Eye Exam Not up to date   Exercise (times per week) 0 times per week   Current Exercise Activities Include None   Do you need help using the phone?  No   Are you deaf or do you have serious difficulty hearing?  No   Do you need help with transportation? No   Do you need help shopping? No   Do you need help preparing meals?  No   Do  you need help with housework?  No   Do you need help with laundry? No   Do you need help taking your medications? No   Do you need help managing money? No   Do you ever drive or ride in a car without wearing a seat belt? No   Have you felt unusual stress, anger or loneliness in the last month? No   Who do you live with? Alone   If you need help, do you have trouble finding someone available to you? No   Have you been bothered in the last four weeks by sexual problems? No   Do you have difficulty concentrating, remembering or making decisions? Yes           Does the patient have evidence of cognitive impairment? No    Aspirin use counseling: Taking ASA appropriately as indicated      Recent Lab Results:  CMP:  Lab Results   Component Value Date    GLU 97 07/11/2018    BUN 13 07/11/2018    CREATININE 0.84 07/11/2018    EGFRIFNONA 67 07/11/2018    EGFRIFAFRI 81 07/11/2018    BCR 15.5 07/11/2018     07/11/2018    K 5.2 07/11/2018    CO2 31.3 (H) 07/11/2018    CALCIUM 10.6 (H) 07/11/2018    ALBUMIN 4.40 03/22/2017    BILITOT 0.4 03/22/2017    ALKPHOS 100 03/22/2017    AST 18 03/22/2017    ALT 12 07/11/2018     Lipid Panel:  Lab Results   Component Value Date    CHOL 162 03/06/2017    TRIG 109 07/11/2018    HDL 70 (H) 07/11/2018    VLDL 21.8 07/11/2018    LDLHDL 0.85 07/11/2018     HbA1c:       Visual Acuity:  No exam data present    Age-appropriate Screening Schedule:  Refer to the list below for future screening recommendations based on patient's age, sex and/or medical conditions. Orders for these recommended tests are listed in the plan section. The patient has been provided with a written plan.    Health Maintenance   Topic Date Due   • TDAP/TD VACCINES (1 - Tdap) 09/06/1966   • ZOSTER VACCINE (2 of 3) 02/26/2014   • INFLUENZA VACCINE  08/01/2018   • LIPID PANEL  07/11/2019   • MAMMOGRAM  07/25/2020   • COLONOSCOPY  06/10/2026   • PNEUMOCOCCAL VACCINES (65+ LOW/MEDIUM RISK)  Completed        Subjective    History of Present Illness    Mary Jack is a 72 y.o. female who presents for an Subsequent Wellness Visit.    The following portions of the patient's history were reviewed and updated as appropriate: allergies, current medications, past family history, past medical history, past social history, past surgical history and problem list.    Outpatient Medications Prior to Visit   Medication Sig Dispense Refill   • ANORO ELLIPTA 62.5-25 MCG/INH aerosol powder  USE 1 PUFF BY MOUTH DAILY.  0   • aspirin 81 MG tablet Take 1 tablet by mouth daily.     • atorvastatin (LIPITOR) 10 MG tablet Take 1 tablet by mouth Daily. 90 tablet 1   • cholecalciferol (VITAMIN D3) 1000 UNITS tablet Take 2,000 Units by mouth Daily.     • meloxicam (MOBIC) 7.5 MG tablet Take 1 tablet by mouth Daily. 30 tablet 5   • nitroglycerin (NITROSTAT) 0.4 MG SL tablet Place 0.4 mg under the tongue Every 5 (Five) Minutes As Needed for Chest Pain. Take no more than 3 doses in 15 minutes.     • omeprazole (priLOSEC) 40 MG capsule take 1 capsule by mouth once daily 90 capsule 3   • polyethylene glycol (MIRALAX) packet Take 17 g by mouth Daily. 850 g 3   • vitamin A 89628 UNIT capsule Take 10,000 Units by mouth Daily.     • vitamin B-12 (CYANOCOBALAMIN) 1000 MCG tablet Take 1,000 mcg by mouth Daily.     • metoprolol tartrate (LOPRESSOR) 25 MG tablet take 1 tablet by mouth twice a day 60 tablet 0   • B Complex Vitamins (VITAMIN B COMPLEX) tablet Take 1 tablet by mouth Daily.     • metoprolol succinate XL (TOPROL-XL) 25 MG 24 hr tablet Take 25 mg by mouth Every Night.       No facility-administered medications prior to visit.        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)   • Panlobular emphysema (CMS/HCC)   • Chronic coronary artery disease   • Iron deficiency anemia due to chronic blood loss   • Mixed hyperlipidemia   • Tobacco abuse   • Abdominal aortic aneurysm (AAA) without  "rupture (CMS/[facility])   • Pulmonary arterial hypertension   • 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   • Hiatal hernia   • Medicare annual wellness visit, subsequent   • Screening for breast cancer   • Menopause   • Bilateral carotid artery disease (CMS/[facility])   • Osteopenia of multiple sites   • Nicotine dependence       Advance Care Planning:  has an advance directive - a copy HAS NOT been provided    Identification of Risk Factors:  Risk factors include: cardiovascular risk, tobacco use, increased fall risk, financial stress and polypharmacy.    Review of Systems    Compared to one year ago, the patient feels her physical health is worse.  Compared to one year ago, the patient feels her mental health is worse.    Objective     Physical Exam   Constitutional: She appears well-developed and well-nourished.   Neck: Carotid bruit is present.   Cardiovascular: Normal rate.    Pulmonary/Chest: No respiratory distress. She has rales.   Neurological: She is alert.   Nursing note and vitals reviewed.      Vitals:    07/18/18 1426   BP: 112/68   BP Location: Left arm   Patient Position: Sitting   Pulse: 76   SpO2: (!) 87%   Weight: 77.6 kg (171 lb)   Height: 162.6 cm (64\")       Patient's Body mass index is 29.35 kg/m². BMI is within normal parameters. No follow-up required.      Assessment/Plan   Patient Self-Management and Personalized Health Advice  The patient has been provided with information about: exercise, prevention of cardiac or vascular disease, the relationship between weight and GERD and tobacco cessation and preventive services including:   · Bone densitometry screening, Counseling for cardiovascular disease risk reduction, Diabetes screening, see lab orders, Screening mammography, referral placed, Smoking cessation counseling completed.    Visit Diagnoses:    ICD-10-CM ICD-9-CM   1. Mixed hyperlipidemia E78.2 272.2   2. Panlobular emphysema (CMS/[facility]) J43.1 492.8   3. Medicare annual wellness visit, subsequent " Z00.00 V70.0   4. Screening for breast cancer Z12.31 V76.10   5. Menopause Z78.0 627.2   6. 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 Bilateral carotid artery disease (CMS/HCC) I77.9 447.9   8. Tobacco abuse Z72.0 305.1   9. Cigarette nicotine dependence with nicotine-induced disorder F17.219 292.9       Orders Placed This Encounter   Procedures   • Dexa Bone Density, Axial (Every 2 Years)     Standing Status:   Future     Number of Occurrences:   1     Standing Expiration Date:   7/18/2019     Order Specific Question:   Reason for Exam:     Answer:   screening   • Mammo Screening Digital Tomosynthesis Bilateral With CAD     Standing Status:   Future     Number of Occurrences:   1     Standing Expiration Date:   7/24/2019     Order Specific Question:   Reason for Exam:     Answer:   sceening       Outpatient Encounter Prescriptions as of 7/18/2018   Medication Sig Dispense Refill   • ANORO ELLIPTA 62.5-25 MCG/INH aerosol powder  USE 1 PUFF BY MOUTH DAILY.  0   • aspirin 81 MG tablet Take 1 tablet by mouth daily.     • atorvastatin (LIPITOR) 10 MG tablet Take 1 tablet by mouth Daily. 90 tablet 1   • cholecalciferol (VITAMIN D3) 1000 UNITS tablet Take 2,000 Units by mouth Daily.     • meloxicam (MOBIC) 7.5 MG tablet Take 1 tablet by mouth Daily. 30 tablet 5   • metoprolol tartrate (LOPRESSOR) 25 MG tablet Take 1 tablet by mouth 2 (Two) Times a Day. 60 tablet 11   • nitroglycerin (NITROSTAT) 0.4 MG SL tablet Place 0.4 mg under the tongue Every 5 (Five) Minutes As Needed for Chest Pain. Take no more than 3 doses in 15 minutes.     • omeprazole (priLOSEC) 40 MG capsule take 1 capsule by mouth once daily 90 capsule 3   • polyethylene glycol (MIRALAX) packet Take 17 g by mouth Daily. 850 g 3   • vitamin A 32002 UNIT capsule Take 10,000 Units by mouth Daily.     • vitamin B-12 (CYANOCOBALAMIN) 1000 MCG tablet Take 1,000 mcg by mouth Daily.     • [DISCONTINUED] metoprolol tartrate  "(LOPRESSOR) 25 MG tablet take 1 tablet by mouth twice a day 60 tablet 0   • [DISCONTINUED] B Complex Vitamins (VITAMIN B COMPLEX) tablet Take 1 tablet by mouth Daily.     • [DISCONTINUED] metoprolol succinate XL (TOPROL-XL) 25 MG 24 hr tablet Take 25 mg by mouth Every Night.       No facility-administered encounter medications on file as of 7/18/2018.        Reviewed use of high risk medication in the elderly: yes  Reviewed for potential of harmful drug interactions in the elderly: yes   Reviewed labs  In with PT now for left frozen shoulder.  ANORO samples given  Still smoking  B carotid bruits.    \"I advised the patient of the risks in continuing to use tobacco,\" and \"I provided this patient with smoking cessation educational materials and discussed how to quit smoking,\" and \"patient has NOT expressed the willingness to quit\". 3 min talk    Follow Up:  Return in about 6 months (around 1/18/2019) for blood pressure.     An After Visit Summary and PPPS with all of these plans were given to the patient.         "

## 2018-07-19 ENCOUNTER — HOSPITAL ENCOUNTER (OUTPATIENT)
Dept: PHYSICAL THERAPY | Facility: HOSPITAL | Age: 73
Setting detail: THERAPIES SERIES
Discharge: HOME | End: 2018-07-19

## 2018-07-19 DIAGNOSIS — M75.02 ADHESIVE CAPSULITIS OF LEFT SHOULDER: Primary | ICD-10-CM

## 2018-07-19 DIAGNOSIS — M72.2 PLANTAR FASCIITIS OF RIGHT FOOT: ICD-10-CM

## 2018-07-19 PROCEDURE — 97110 THERAPEUTIC EXERCISES: CPT

## 2018-07-19 PROCEDURE — 97035 APP MDLTY 1+ULTRASOUND EA 15: CPT

## 2018-07-19 PROCEDURE — 97140 MANUAL THERAPY 1/> REGIONS: CPT

## 2018-07-19 NOTE — THERAPY TREATMENT NOTE
Outpatient Physical Therapy Ortho Treatment Note  [facility]     Patient Name: Mary Jack  : 1945  MRN: [id number]  Today's Date: 2018      Visit Date: 2018    Visit Dx:    ICD-10-CM ICD-9-CM   1. Adhesive capsulitis of left shoulder M75.02 726.0   2. 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)   • Panlobular emphysema (CMS/HCC)   • Chronic coronary artery disease   • Iron deficiency anemia due to chronic blood loss   • Mixed hyperlipidemia   • Tobacco abuse   • Abdominal aortic aneurysm (AAA) without rupture (CMS/HCC)   • Pulmonary arterial hypertension   • 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   • Hiatal hernia   • Medicare annual wellness visit, subsequent   • Screening for breast cancer   • Menopause   • Bilateral carotid artery disease (CMS/HCC)        Past Medical History:   Diagnosis Date   • AAA (abdominal aortic aneurysm) (CMS/HCC)    • Allergic not sure    CODINE,MORFINE AND DYE SOLUTION   • Anemia    • Aneurysm of abdominal aorta (CMS/HCC)    • Arthritis    • Zepeda esophagus    • CAD (coronary artery disease)    • Chest pain    • Constipation    • COPD (chronic obstructive pulmonary disease) (CMS/HCC)    • Emphysema lung (CMS/HCC)    • Empyema (CMS/HCC)    • Fatigue    • GERD (gastroesophageal reflux disease)    • Headache    • Hiatal hernia    • High risk medication use    • History of cardiac catheterization     CATH STENT PLACEMENT: CIRCUMFLEX BRANCH   • Hyperlipidemia    • Hypertension    • Infectious viral hepatitis Jaundice in 1963   • Jaundice    • Lumbosacral disc disease    • Myocardial infarction    • Myocardial infarction    • Occult blood in stools    • On home O2    • Peripheral artery disease (CMS/HCC)    • Reflux gastritis    • Scoliosis    • Visual impairment     BLURRY VISION IN RT. EYE        Past Surgical History:   Procedure Laterality  Date   • CARDIAC CATHETERIZATION     • CARDIAC CATHETERIZATION N/A 5/12/2017    Procedure: Left Heart Cath;  Surgeon: Herber Hayes MD;  Location:  [facility];  Service:    • CHOLECYSTECTOMY     • COLONOSCOPY     • COLONOSCOPY N/A 6/10/2016    Procedure: COLONOSCOPY with polypectomy;  Surgeon: Virgie Castelan MD;  Location:  [facility] OR;  Service:    • CORONARY ANGIOPLASTY     • CORONARY STENT PLACEMENT      X2   • ENDOSCOPY N/A 6/10/2016    Procedure: ESOPHAGOGASTRODUODENOSCOPY WITH BIOPSY;  Surgeon: Virgie Castelan MD;  Location:  [facility] OR;  Service:    • ENDOSCOPY     • ENDOSCOPY N/A 8/25/2017    Procedure: ESOPHAGOGASTRODUODENOSCOPY w/ biopsies;  Surgeon: Virgie Castelan MD;  Location:  [facility] OR;  Service:                              PT Assessment/Plan     Row Name 07/19/18 1314          PT Assessment    Assessment Comments Pt doing better with less c/o sx R lateral heel and observed improved heel strike and wt bearing RLE. Pt also making gains with increase functional ROM L shoulder Arom after full stretching in Rx session  degrees- scaption 140 degrees- ER 45 degrees -IR 80 degrees . Pt ABD and ER still most restricted and painful. Pt has met goals and completed US series for plantar fasciitis. Need to continue PT for  L shoulder.                                                                                                          -CC       User Key  (r) = Recorded By, (t) = Taken By, (c) = Cosigned By    Initials Name Provider Type    ANNIE Manning, PT Physical Therapist                Modalities     Row Name 07/19/18 1100             Moist Heat    Location R shoulder  -CC      Rx Minutes 15 mins  -CC      MH Prior to Rx Yes  -CC         Ultrasound 15283    Location R plantar fascia and heel  -CC      Frequency 3.0 MHz  -CC      Intensity - Wts/cm 1.3  -CC        User Key  (r) = Recorded By, (t) = Taken By, (c) = Cosigned By    Initials Name Provider Type    CC  Raquel Manning, PT Physical Therapist                Exercises     Row Name 07/19/18 1100             Subjective Comments    Subjective Comments Pt reports having less sx in R heel today. Relates can fix her hair now using LUE  -CC         Subjective Pain    Subjective Pain Comment Doing pulley 2x day with less reps and having less soreness in L upper arm  -CC         Exercise 1    Exercise Name 1 R DF stretch with sheet- knee extended and flexed  -CC      Cueing 1 Verbal;Tactile;Demo  -CC      Reps 1 4 ea ex  -CC      Time 1 15 sec  -CC         Exercise 2    Exercise Name 2 L flexion of great toe  -CC      Cueing 2 Verbal  -CC      Reps 2 2  -CC      Time 2 30 sec  -CC         Exercise 3    Exercise Name 3 Prostretch DF stretch in sitting  -CC      Cueing 3 Verbal;Tactile  -CC      Reps 3 10  -CC      Time 3 10 sec  -CC         Exercise 4    Exercise Name 4 A/A FF with cane   -CC      Cueing 4 Verbal;Tactile  -CC      Time 4 10-pt seated  -CC         Exercise 5    Exercise Name 5 A/A ER/IR with cane  -CC      Cueing 5 Verbal;Tactile  -CC      Time 5 10 -pt seated  -CC         Exercise 6    Exercise Name 6 L shoulder pulley FF/scaption  -CC      Cueing 6 Verbal;Tactile  -CC      Reps 6 20 ea  -CC         Exercise 7    Exercise Name 7 Scapular retraction with ER  -CC      Cueing 7 Verbal;Tactile  -CC      Reps 7 5- pt supine  -CC      Time 7 5 sec  -CC         Exercise 8    Exercise Name 8 Towel IR stretch  -CC      Cueing 8 Demo  -CC      Reps 8 10  -CC         Exercise 9    Exercise Name 9 FF wall climb  -CC      Cueing 9 Verbal;Demo  -CC      Reps 9 10  -CC         Exercise 10    Exercise Name 10 Standing R gastroc stretch  -CC      Cueing 10 Verbal;Demo  -CC      Reps 10 3  -CC      Time 10 15 sec  -CC         Exercise 11    Exercise Name 11 Seated A/A scaption with cane  -CC        User Key  (r) = Recorded By, (t) = Taken By, (c) = Cosigned By    Initials Name Provider Type    CC Raquel BALLARD  Nigel, PT Physical Therapist                        Manual Rx (last 36 hours)      Manual Treatments     Row Name 07/19/18 1100             Manual Rx 1    Manual Rx 1 Location L shoulder-pt supine elevated HOB  -CC      Manual Rx 1 Type MET  -CC      Manual Rx 1 Grade Prom L shoulder -all planes  -CC      Manual Rx 1 Duration x10  -CC         Manual Rx 2    Manual Rx 2 Location L shoulder  -CC      Manual Rx 2 Type GH distraction- A-P glides  -CC      Manual Rx 2 Grade Gr II  -CC      Manual Rx 2 Duration x 10  -CC        User Key  (r) = Recorded By, (t) = Taken By, (c) = Cosigned By    Initials Name Provider Type    ANNIE Manning, PT Physical Therapist                PT OP Goals     Row Name 07/19/18 1100          PT Short Term Goals    STG Date to Achieve 07/26/18  -CC     STG 1 Pt compliant with HEP for R foot and L shoulder as instructed  -CC     STG 2 Pt reports decreasing sx R foot with max pain less/equal 3-4/10  -CC     STG 3 Pt shows improved gait R foot with heel/toe mechanics by 25-50 %  -CC     STG 4 Pt shows improved PROM L shoulder FF/scaption 140-150 degrees  -CC     STG 5 Pt shows improved PROM L shoulder in ER/IR 50-60 degrees  -CC        Long Term Goals    LTG Date to Achieve 08/27/18  -CC     LTG 1 Observe improved wt shifting R foot with normalized heel-toe mechanics  -CC     LTG 2 Pt shows Arom L shoulder FF-scaption 150-160 degrees  -CC     LTG 3 Pt shows muscle strength L shoulder 4-4+/5 RC  -CC       User Key  (r) = Recorded By, (t) = Taken By, (c) = Cosigned By    Initials Name Provider Type    ANNIE Manning, PT Physical Therapist          Therapy Education  Given: HEP  Program: Reinforced  How Provided: Verbal  Provided to: Patient  Level of Understanding: Verbalized              Time Calculation:   Start Time: 1100  Stop Time: 1200  Time Calculation (min): 60 min  Therapy Suggested Charges     Code   Minutes Charges    None           Therapy Charges for  Today     Code Description Service Date Service Provider Modifiers Qty    71738843766 HC PT HOT OR COLD PACK TREAT MCARE 7/19/2018 Raquel Manning, PT GP 1    76790354429 HC PT ULTRASOUND EA 15 MIN 7/19/2018 Raquel Manning, PT GP 1    29407166446 HC PT MANUAL THERAPY EA 15 MIN 7/19/2018 Raquel Manning, PT GP 1    15893606804 HC PT THER PROC EA 15 MIN 7/19/2018 Raquel Manning, PT GP 1                    Raquel Manning, PT  7/19/2018

## 2018-07-23 ENCOUNTER — OFFICE VISIT (OUTPATIENT)
Dept: ORTHOPEDIC SURGERY | Facility: CLINIC | Age: 73
End: 2018-07-23

## 2018-07-23 DIAGNOSIS — M75.02 ADHESIVE CAPSULITIS OF LEFT SHOULDER: ICD-10-CM

## 2018-07-23 DIAGNOSIS — M75.50 SUBACROMIAL BURSITIS: ICD-10-CM

## 2018-07-23 DIAGNOSIS — R52 PAIN: Primary | ICD-10-CM

## 2018-07-23 PROCEDURE — 99213 OFFICE O/P EST LOW 20 MIN: CPT | Performed by: ORTHOPAEDIC SURGERY

## 2018-07-23 NOTE — PROGRESS NOTES
Subjective: LEFT shoulder pain     Patient ID: Mary Jack is a 72 y.o. female.    Chief Complaint:    History of Present Illness 72-year-old female seen in follow-up to adhesive capsulitis left shoulder.  Making significant progress with physical therapy with reduction of her pain and discomfort also       Social History     Occupational History   • retired [employer]     Social History Main Topics   • Smoking status: Current Every Day Smoker     Packs/day: 1.00     Years: 50.00     Types: Cigarettes     Start date: 8/3/2016     Last attempt to quit: 6/3/2016   • Smokeless tobacco: Never Used      Comment: Quit for about 6 weeks then started back   • Alcohol use No   • Drug use: No   • Sexual activity: Not Currently     Partners: Male     Birth control/ protection: Post-menopausal      Review of Systems   Constitutional: Negative for chills, diaphoresis, fever and unexpected weight change.   HENT: Negative for hearing loss, nosebleeds, sore throat and tinnitus.    Eyes: Negative for pain and visual disturbance.   Respiratory: Negative for cough, shortness of breath and wheezing.    Cardiovascular: Negative for chest pain and palpitations.   Gastrointestinal: Negative for abdominal pain, diarrhea, nausea and vomiting.   Endocrine: Negative for cold intolerance, heat intolerance and polydipsia.   Genitourinary: Negative for difficulty urinating, dysuria and hematuria.   Musculoskeletal: Positive for arthralgias. Negative for joint swelling and myalgias.   Skin: Negative for rash and wound.   Allergic/Immunologic: Negative for environmental allergies.   Neurological: Negative for dizziness, syncope and numbness.   Hematological: Does not bruise/bleed easily.   Psychiatric/Behavioral: Negative for dysphoric mood and sleep disturbance. The patient is not nervous/anxious.          Past Medical History:   Diagnosis Date   • AAA (abdominal aortic aneurysm) (CMS/[facility])    • Allergic not sure     CODINE,MORFINE AND DYE SOLUTION   • Anemia    • Aneurysm of abdominal aorta (CMS/HCC)    • Arthritis    • Zepeda esophagus    • CAD (coronary artery disease)    • Chest pain    • Constipation    • COPD (chronic obstructive pulmonary disease) (CMS/HCC)    • Emphysema lung (CMS/HCC)    • Empyema (CMS/HCC)    • Fatigue    • GERD (gastroesophageal reflux disease)    • Headache    • Hiatal hernia    • High risk medication use    • History of cardiac catheterization     CATH STENT PLACEMENT: CIRCUMFLEX BRANCH   • Hyperlipidemia    • Hypertension    • Infectious viral hepatitis Jaundice in 1963   • Jaundice    • Lumbosacral disc disease    • Myocardial infarction    • Myocardial infarction    • Occult blood in stools    • On home O2    • Peripheral artery disease (CMS/HCC)    • Reflux gastritis    • Scoliosis    • Visual impairment     BLURRY VISION IN RT. EYE     Past Surgical History:   Procedure Laterality Date   • CARDIAC CATHETERIZATION     • CARDIAC CATHETERIZATION N/A 5/12/2017    Procedure: Left Heart Cath;  Surgeon: Herber Hayes MD;  Location: [facility] CATH INVASIVE LOCATION;  Service:    • CHOLECYSTECTOMY     • COLONOSCOPY     • COLONOSCOPY N/A 6/10/2016    Procedure: COLONOSCOPY with polypectomy;  Surgeon: Virgie Castelan MD;  Location: [facility] OR;  Service:    • CORONARY ANGIOPLASTY     • CORONARY STENT PLACEMENT      X2   • ENDOSCOPY N/A 6/10/2016    Procedure: ESOPHAGOGASTRODUODENOSCOPY WITH BIOPSY;  Surgeon: Virgie Castelan MD;  Location: [facility] OR;  Service:    • ENDOSCOPY     • ENDOSCOPY N/A 8/25/2017    Procedure: ESOPHAGOGASTRODUODENOSCOPY w/ biopsies;  Surgeon: Virgie Castelan MD;  Location: [facility] OR;  Service:      Family History   Problem Relation Age of Onset   • Colon cancer Mother    • Breast cancer Mother    • Other Father         cardiac disorder   • Hypertension Father    • Heart disease Father    • Other Sister         cardiac disorder   • Hypertension Sister    • Lung disease Sister    •  Thyroid disease Sister    • Other Brother         cardiac disorder   • Thyroid disease Daughter    • Heart disease Sister    • Hypertension Sister    • Pulmonary fibrosis Sister    • Heart disease Brother    • Other Brother    • Thyroid disease Sister    • Other Sister    • Thyroid disease Daughter          Objective:  There were no vitals filed for this visit.  There were no vitals filed for this visit.  There is no height or weight on file to calculate BMI.       Ortho Exam  is alert and oriented ×3.  She can extend the shoulder proximally 150-160°.  Abduction and approximately 140°.  External rotation 40° of internal rotation was to 20°.  No motor sensory deficit good distal pulses.  Full range of motion of the elbow.  Abduction and extension against resistance is intact but weak +3 out of 5.  She has been tolerating meloxicam without any GI side effects.  She is doing much better much functional use of that arm as far as the dressing care for herself she has not regained full motion.    Assessment:       1. Pain    2. Subacromial bursitis    3. Adhesive capsulitis of left shoulder          Plan:      over 10 minutes was spent with the patient reviewing her physical findings and outlined treatment plan going forward.  My recommendation is to continue physical therapy she is making progress.  Return to see me in 6 weeks      Work Status:    LILIAN query complete.    Orders:  Orders Placed This Encounter   Procedures   • Ambulatory Referral to Physical Therapy Evaluate and treat       Medications:  No orders of the defined types were placed in this encounter.      Followup:  Return in about 6 weeks (around 9/3/2018).          Dragon transcription disclaimer     Much of this encounter note is an electronic transcription/translation of spoken language to printed text. The electronic translation of spoken language may permit erroneous, or at times, nonsensical words or phrases to be inadvertently transcribed. Although  I have reviewed the note for such errors, some may still exist.

## 2018-07-24 ENCOUNTER — HOSPITAL ENCOUNTER (OUTPATIENT)
Dept: PHYSICAL THERAPY | Facility: HOSPITAL | Age: 73
Setting detail: THERAPIES SERIES
Discharge: HOME | End: 2018-07-24

## 2018-07-24 DIAGNOSIS — M75.02 ADHESIVE CAPSULITIS OF LEFT SHOULDER: Primary | ICD-10-CM

## 2018-07-24 PROCEDURE — 97140 MANUAL THERAPY 1/> REGIONS: CPT

## 2018-07-24 PROCEDURE — 97110 THERAPEUTIC EXERCISES: CPT

## 2018-07-24 PROCEDURE — 97035 APP MDLTY 1+ULTRASOUND EA 15: CPT

## 2018-07-24 NOTE — THERAPY TREATMENT NOTE
Outpatient Physical Therapy Ortho Treatment Note  [facility]     Patient Name: Mary Jack  : 1945  MRN: [id number]  Today's Date: 2018      Visit Date: 2018    Visit Dx:    ICD-10-CM ICD-9-CM   1. 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)   • Panlobular emphysema (CMS/HCC)   • Chronic coronary artery disease   • Iron deficiency anemia due to chronic blood loss   • Mixed hyperlipidemia   • Tobacco abuse   • Abdominal aortic aneurysm (AAA) without rupture (CMS/HCC)   • Pulmonary arterial hypertension   • 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   • Hiatal hernia   • Medicare annual wellness visit, subsequent   • Screening for breast cancer   • Menopause   • Bilateral carotid artery disease (CMS/HCC)        Past Medical History:   Diagnosis Date   • AAA (abdominal aortic aneurysm) (CMS/HCC)    • Allergic not sure    CODINE,MORFINE AND DYE SOLUTION   • Anemia    • Aneurysm of abdominal aorta (CMS/HCC)    • Arthritis    • Zepeda esophagus    • CAD (coronary artery disease)    • Chest pain    • Constipation    • COPD (chronic obstructive pulmonary disease) (CMS/HCC)    • Emphysema lung (CMS/HCC)    • Empyema (CMS/HCC)    • Fatigue    • GERD (gastroesophageal reflux disease)    • Headache    • Hiatal hernia    • High risk medication use    • History of cardiac catheterization     CATH STENT PLACEMENT: CIRCUMFLEX BRANCH   • Hyperlipidemia    • Hypertension    • Infectious viral hepatitis Jaundice in    • Jaundice    • Lumbosacral disc disease    • Myocardial infarction    • Myocardial infarction    • Occult blood in stools    • On home O2    • Peripheral artery disease (CMS/HCC)    • Reflux gastritis    • Scoliosis    • Visual impairment     BLURRY VISION IN RT. EYE        Past Surgical History:   Procedure Laterality Date   • CARDIAC CATHETERIZATION     • CARDIAC  CATHETERIZATION N/A 5/12/2017    Procedure: Left Heart Cath;  Surgeon: Herber Hayes MD;  Location:  [facility];  Service:    • CHOLECYSTECTOMY     • COLONOSCOPY     • COLONOSCOPY N/A 6/10/2016    Procedure: COLONOSCOPY with polypectomy;  Surgeon: Virgie Castelan MD;  Location:  [facility] OR;  Service:    • CORONARY ANGIOPLASTY     • CORONARY STENT PLACEMENT      X2   • ENDOSCOPY N/A 6/10/2016    Procedure: ESOPHAGOGASTRODUODENOSCOPY WITH BIOPSY;  Surgeon: Virgie Castelan MD;  Location:  [facility] OR;  Service:    • ENDOSCOPY     • ENDOSCOPY N/A 8/25/2017    Procedure: ESOPHAGOGASTRODUODENOSCOPY w/ biopsies;  Surgeon: Virgie Castelan MD;  Location:  [facility] OR;  Service:                              PT Assessment/Plan     Row Name 07/24/18 0049          PT Assessment    Assessment Comments Pt has completed PT sessions for R foot pain but needs to continue HEP to manage sx and avoid reoccuring flare up. Pt making gains with L shoulder also. Added US to posterior L shoulder. Pt had MD f/u  yesterday and to continue PT  -CC        PT Plan    PT Plan Comments Continue PT for L shoulder to increase Arom and  progress with light strengthening as pt can tolerate  -CC       User Key  (r) = Recorded By, (t) = Taken By, (c) = Cosigned By    Initials Name Provider Type    ANNIE Manning, PT Physical Therapist                Modalities     Row Name 07/24/18 1100             Moist Heat    Location R shoulder  -CC      Rx Minutes 15 mins  -CC      MH Prior to Rx Yes  -CC         Ultrasound 71094    Location L posterior shoulder  -CC      Frequency 1.0 MHz  -CC      Intensity - Wts/cm 1.5  -CC      88932 - PT Ultrasound Minutes 8  -CC        User Key  (r) = Recorded By, (t) = Taken By, (c) = Cosigned By    Initials Name Provider Type    ANNIE Manning, PT Physical Therapist                Exercises     Row Name 07/24/18 1100             Exercise 1    Exercise Name 1 R DF stretch with sheet-  knee extended and flexed  -CC      Cueing 1 Verbal;Tactile;Demo  -CC      Reps 1 4 ea ex  -CC      Time 1 HEP  -CC         Exercise 2    Exercise Name 2 L flexion of great toe  -CC      Cueing 2 Verbal  -CC      Reps 2 2   HEP  -CC      Time 2 30 sec  -CC         Exercise 4    Exercise Name 4 A/A FF with cane   -CC      Cueing 4 Verbal;Tactile  -CC      Time 4 10-pt seated  -CC         Exercise 5    Exercise Name 5 A/A ER/IR with cane  -CC      Cueing 5 Verbal;Tactile  -CC      Time 5 10 -pt seated  -CC         Exercise 6    Exercise Name 6 L shoulder pulley FF/scaption  -CC      Cueing 6 Verbal;Tactile  -CC      Reps 6 20 ea  -CC         Exercise 7    Exercise Name 7 Scapular retraction with ER  -CC      Cueing 7 Verbal;Tactile  -CC      Reps 7 5- pt supine  -CC      Time 7 5 sec  -CC         Exercise 8    Exercise Name 8 Towel IR stretch  -CC      Cueing 8 Demo  -CC      Reps 8 10  -CC         Exercise 9    Exercise Name 9 FF wall climb  -CC      Cueing 9 Verbal;Demo  -CC      Reps 9 10  -CC         Exercise 10    Exercise Name 10 Standing R gastroc stretch  -CC      Cueing 10 Verbal;Demo  -CC      Reps 10 3  -CC      Time 10 15 sec  -CC         Exercise 11    Exercise Name 11 Seated A/A scaption with cane  -CC      Cueing 11 Verbal;Demo  -CC      Reps 11 10  -CC        User Key  (r) = Recorded By, (t) = Taken By, (c) = Cosigned By    Initials Name Provider Type    CC Raquel Manning, FLO Physical Therapist                        Manual Rx (last 36 hours)      Manual Treatments     Row Name 07/24/18 1100             Manual Rx 1    Manual Rx 1 Location L shoulder-pt supine elevated HOB  -CC      Manual Rx 1 Type MET  -CC      Manual Rx 1 Grade Prom L shoulder -all planes  -CC      Manual Rx 1 Duration x10  -CC         Manual Rx 2    Manual Rx 2 Location L shoulder  -CC      Manual Rx 2 Type GH distraction- A-P glides  -CC      Manual Rx 2 Grade Gr II  -CC      Manual Rx 2 Duration x 10  -CC        User Key   (r) = Recorded By, (t) = Taken By, (c) = Cosigned By    Initials Name Provider Type    CC Raquel Manning PT Physical Therapist                PT OP Goals     Row Name 07/24/18 1100          PT Short Term Goals    STG Date to Achieve 07/26/18  -CC     STG 1 Pt compliant with HEP for R foot and L shoulder as instructed  -CC     STG 2 Pt reports decreasing sx R foot with max pain less/equal 3-4/10  -CC     STG 3 Pt shows improved gait R foot with heel/toe mechanics by 25-50 %  -CC     STG 4 Pt shows improved PROM L shoulder FF/scaption 140-150 degrees  -CC     STG 5 Pt shows improved PROM L shoulder in ER/IR 50-60 degrees  -CC        Long Term Goals    LTG Date to Achieve 08/27/18  -CC     LTG 1 Observe improved wt shifting R foot with normalized heel-toe mechanics  -CC     LTG 2 Pt shows Arom L shoulder FF-scaption 150-160 degrees  -CC     LTG 3 Pt shows muscle strength L shoulder 4-4+/5 RC  -CC       User Key  (r) = Recorded By, (t) = Taken By, (c) = Cosigned By    Initials Name Provider Type     Raquel Manning PT Physical Therapist          Therapy Education  Education Details: Pt to continue with HEP for R plantar fasciitis DX since has completed PT session for R foot. Continue with HEP for L shoulder as well.  Given: HEP  Program: Reinforced  How Provided: Verbal  Provided to: Patient  Level of Understanding: Verbalized              Time Calculation:   Start Time: 1110  Stop Time: 1200  Time Calculation (min): 50 min  Therapy Suggested Charges     Code   Minutes Charges    02974 (CPT®) Hc Pt Neuromusc Re Education Ea 15 Min      40846 (CPT®) Hc Pt Ther Proc Ea 15 Min      66590 (CPT®) Hc Gait Training Ea 15 Min      14164 (CPT®) Hc Pt Therapeutic Act Ea 15 Min      12454 (CPT®) Hc Pt Manual Therapy Ea 15 Min      83695 (CPT®) Hc Pt Ther Massage- Per 15 Min      48615 (CPT®) Hc Pt Iontophoresis Ea 15 Min      93159 (CPT®) Hc Pt Elec Stim Ea-Per 15 Min      21933 (CPT®) Hc Pt Ultrasound Ea 15  Min 8 1    55075 (CPT®) Hc Pt Self Care/Mgmt/Train Ea 15 Min      Total  8 1        Therapy Charges for Today     Code Description Service Date Service Provider Modifiers Qty    08200310812 HC PT MANUAL THERAPY EA 15 MIN 7/24/2018 Raquel Manning, PT GP 1    18355433942 HC PT ULTRASOUND EA 15 MIN 7/24/2018 Raquel Manning, PT GP 1    06817316762 HC PT THER PROC EA 15 MIN 7/24/2018 Raquel Manning, PT GP 1    50103816869 HC PT HOT OR COLD PACK TREAT MCARE 7/24/2018 Raquel Manning, PT GP 1                    Raquel Manning, PT  7/24/2018

## 2018-07-25 ENCOUNTER — HOSPITAL ENCOUNTER (OUTPATIENT)
Dept: MAMMOGRAPHY | Facility: HOSPITAL | Age: 73
Discharge: HOME | End: 2018-07-25
Attending: FAMILY MEDICINE | Admitting: FAMILY MEDICINE

## 2018-07-25 ENCOUNTER — APPOINTMENT (OUTPATIENT)
Dept: BONE DENSITY | Facility: HOSPITAL | Age: 73
End: 2018-07-25
Attending: FAMILY MEDICINE

## 2018-07-25 DIAGNOSIS — Z78.0 MENOPAUSE: ICD-10-CM

## 2018-07-25 DIAGNOSIS — Z12.39 SCREENING FOR BREAST CANCER: ICD-10-CM

## 2018-07-25 PROCEDURE — 77063 BREAST TOMOSYNTHESIS BI: CPT

## 2018-07-25 PROCEDURE — 77080 DXA BONE DENSITY AXIAL: CPT

## 2018-07-25 PROCEDURE — 77067 SCR MAMMO BI INCL CAD: CPT

## 2018-07-26 ENCOUNTER — HOSPITAL ENCOUNTER (OUTPATIENT)
Dept: PHYSICAL THERAPY | Facility: HOSPITAL | Age: 73
Setting detail: THERAPIES SERIES
Discharge: HOME | End: 2018-07-26

## 2018-07-26 DIAGNOSIS — M75.02 ADHESIVE CAPSULITIS OF LEFT SHOULDER: Primary | ICD-10-CM

## 2018-07-26 PROCEDURE — 97140 MANUAL THERAPY 1/> REGIONS: CPT

## 2018-07-26 PROCEDURE — G8979 MOBILITY GOAL STATUS: HCPCS

## 2018-07-26 PROCEDURE — G8978 MOBILITY CURRENT STATUS: HCPCS

## 2018-07-26 PROCEDURE — 97035 APP MDLTY 1+ULTRASOUND EA 15: CPT

## 2018-07-26 PROCEDURE — 97110 THERAPEUTIC EXERCISES: CPT

## 2018-07-26 NOTE — THERAPY TREATMENT NOTE
Outpatient Physical Therapy Ortho Treatment Note  [facility]     Patient Name: Mary Jack  : 1945  MRN: [id number]  Today's Date: 2018      Visit Date: 2018    Visit Dx:    ICD-10-CM ICD-9-CM   1. 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)   • Panlobular emphysema (CMS/HCC)   • Chronic coronary artery disease   • Iron deficiency anemia due to chronic blood loss   • Mixed hyperlipidemia   • Tobacco abuse   • Abdominal aortic aneurysm (AAA) without rupture (CMS/HCC)   • Pulmonary arterial hypertension   • 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   • Hiatal hernia   • Medicare annual wellness visit, subsequent   • Screening for breast cancer   • Menopause   • Bilateral carotid artery disease (CMS/HCC)        Past Medical History:   Diagnosis Date   • AAA (abdominal aortic aneurysm) (CMS/HCC)    • Allergic not sure    CODINE,MORFINE AND DYE SOLUTION   • Anemia    • Aneurysm of abdominal aorta (CMS/HCC)    • Arthritis    • Zepeda esophagus    • CAD (coronary artery disease)    • Chest pain    • Constipation    • COPD (chronic obstructive pulmonary disease) (CMS/HCC)    • Emphysema lung (CMS/HCC)    • Empyema (CMS/HCC)    • Fatigue    • GERD (gastroesophageal reflux disease)    • Headache    • Hiatal hernia    • High risk medication use    • History of cardiac catheterization     CATH STENT PLACEMENT: CIRCUMFLEX BRANCH   • Hyperlipidemia    • Hypertension    • Infectious viral hepatitis Jaundice in    • Jaundice    • Lumbosacral disc disease    • Myocardial infarction    • Myocardial infarction    • Occult blood in stools    • On home O2    • Peripheral artery disease (CMS/HCC)    • Reflux gastritis    • Scoliosis    • Visual impairment     BLURRY VISION IN RT. EYE        Past Surgical History:   Procedure Laterality Date   • CARDIAC CATHETERIZATION     • CARDIAC  CATHETERIZATION N/A 5/12/2017    Procedure: Left Heart Cath;  Surgeon: Herber Hayes MD;  Location:  [facility];  Service:    • CHOLECYSTECTOMY     • COLONOSCOPY     • COLONOSCOPY N/A 6/10/2016    Procedure: COLONOSCOPY with polypectomy;  Surgeon: Virgie Castelan MD;  Location:  [facility];  Service:    • CORONARY ANGIOPLASTY     • CORONARY STENT PLACEMENT      X2   • ENDOSCOPY N/A 6/10/2016    Procedure: ESOPHAGOGASTRODUODENOSCOPY WITH BIOPSY;  Surgeon: Virgie Castelan MD;  Location:  [facility];  Service:    • ENDOSCOPY     • ENDOSCOPY N/A 8/25/2017    Procedure: ESOPHAGOGASTRODUODENOSCOPY w/ biopsies;  Surgeon: Virgie Castelan MD;  Location:  [facility];  Service:                              PT Assessment/Plan     Row Name 07/26/18 1546 07/26/18 1511       PT Assessment    Assessment Comments  --Re-assessment -Pt has completed STG for R foot and heel pain and has completed PT sessions and ready for discharge.Pt continues to make progress with STG for L shoulder adhesive capsulitis and ongoing. Pt:s LTG now appropriate for next 4 weeks. Pt showing improved Arom with decrease pain for functional use in ADL but still has limitations and pain-greatest in ER/IR.  Pt ready to begin strengthening program.          PT Plan    PT Plan Comments Pt to continue with PT for L shoulder to improve functional ROM and strength-plan 2x/week for 4 weeks. Pt discharged from therapy for R foot pain and reinforced to keep up with HEP on regular basis to maintain and progress sx.  -CC  --      User Key  (r) = Recorded By, (t) = Taken By, (c) = Cosigned By    Initials Name Provider Type    CC Raquel Manning, PT Physical Therapist                Modalities     Row Name 07/26/18 1100             Moist Heat    Location R shoulder  -CC      Rx Minutes 15 mins  -CC      MH Prior to Rx Yes  -CC         Ultrasound 52313    Location L posterior shoulder  -CC      Frequency 1.0 MHz  -CC      Intensity - Wts/cm 1.5  -CC         User Key  (r) = Recorded By, (t) = Taken By, (c) = Cosigned By    Initials Name Provider Type    CC Raquel Manning, PT Physical Therapist                Exercises     Row Name 07/26/18 1100             Exercise 1    Exercise Name 1 R DF stretch with sheet- knee extended and flexed  -CC      Cueing 1 Verbal;Tactile;Demo  -CC      Reps 1 4 ea ex  -CC      Time 1 HEP  -CC         Exercise 2    Exercise Name 2 L flexion of great toe  -CC      Cueing 2 Verbal  -CC      Reps 2 2   HEP  -CC      Time 2 30 sec  -CC         Exercise 4    Exercise Name 4 A/A FF with cane   -CC      Cueing 4 Verbal;Tactile  -CC      Time 4 10-pt seated  -CC         Exercise 5    Exercise Name 5 A/A ER/IR with cane  -CC      Cueing 5 Verbal;Tactile  -CC      Time 5 10 -pt seated  -CC         Exercise 6    Exercise Name 6 L shoulder pulley FF/scaption  -CC      Cueing 6 Verbal;Tactile  -CC      Reps 6 20 ea  -CC         Exercise 7    Exercise Name 7 Scapular retraction with ER  -CC      Cueing 7 Verbal;Tactile  -CC      Reps 7 5- pt supine  -CC      Time 7 5 sec  -CC         Exercise 8    Exercise Name 8 Towel IR stretch  -CC      Cueing 8 Demo  -CC      Reps 8 10  -CC         Exercise 9    Exercise Name 9 FF wall climb  -CC      Cueing 9 Verbal;Demo  -CC      Reps 9 10  -CC         Exercise 10    Exercise Name 10 Standing R gastroc stretch  -CC      Cueing 10 Verbal;Demo  -CC      Reps 10 3  -CC      Time 10 15 sec  -CC         Exercise 11    Exercise Name 11 Seated A/A scaption with cane  -CC      Cueing 11 Verbal;Demo  -CC      Reps 11 10  HEP  -CC         Exercise 12    Exercise Name 12 B rows vs TB  -CC      Cueing 12 Verbal;Tactile  -CC      Time 12 x10 use of green TB  -CC      Additional Comments Pt performed sitting  -CC         Exercise 13    Exercise Name 13 ER/IR vs TB  -CC      Cueing 13 Verbal;Demo  -CC      Time 13 5 ea- red TB  -CC        User Key  (r) = Recorded By, (t) = Taken By, (c) = Cosigned By    Initials  Name Provider Type    ANNIE Manning, PT Physical Therapist                        Manual Rx (last 36 hours)      Manual Treatments     Row Name 07/26/18 1100             Manual Rx 1    Manual Rx 1 Location L shoulder-pt supine elevated HOB  -CC      Manual Rx 1 Type MET  -CC      Manual Rx 1 Grade Prom L shoulder -all planes  -CC      Manual Rx 1 Duration x10  -CC         Manual Rx 2    Manual Rx 2 Location L shoulder  -CC      Manual Rx 2 Type GH distraction- A-P glides  -CC      Manual Rx 2 Grade Gr II  -CC      Manual Rx 2 Duration x 10  -CC        User Key  (r) = Recorded By, (t) = Taken By, (c) = Cosigned By    Initials Name Provider Type    ANNIE Manning, PT Physical Therapist                PT OP Goals     Row Name 07/26/18 1100          PT Short Term Goals    STG Date to Achieve 07/26/18  -CC     STG 1 Pt compliant with HEP for R foot and L shoulder as instructed  -CC     STG 1 Progress Met  -CC     STG 2 Pt reports decreasing sx R foot with max pain less/equal 3-4/10  -CC     STG 2 Progress Met  -CC     STG 2 Progress Comments Pt reports having intermittent sx in R heel now- sx 1-2/10 when occurs  -CC     STG 3 Pt shows improved gait R foot with heel/toe mechanics by 25-50 %  -CC     STG 3 Progress Met  -CC     STG 4 Pt shows improved PROM L shoulder FF/scaption 140-150 degrees  -CC     STG 4 Progress Ongoing;Progressing  -CC     STG 5 Pt shows improved PROM L shoulder in ER/IR 50-60 degrees  -CC     STG 5 Progress Ongoing;Progressing  -CC        Long Term Goals    LTG Date to Achieve 08/27/18  -CC     LTG 1 Observe improved wt shifting R foot with normalized heel-toe mechanics  -CC     LTG 2 Pt shows Arom L shoulder FF-scaption 150-160 degrees  -CC     LTG 3 Pt shows muscle strength L shoulder 4-4+/5 RC  -CC       User Key  (r) = Recorded By, (t) = Taken By, (c) = Cosigned By    Initials Name Provider Type    ANNIE Manning, PT Physical Therapist          Therapy  Education  Education Details: Add B rows vs TB to HEP - issued green TB  Given: HEP  Program: New  How Provided: Verbal, Written  Provided to: Patient  Level of Understanding: Verbalized, Demonstrated              Time Calculation:   Start Time: 1100  Stop Time: 1200  Time Calculation (min): 60 min  Therapy Suggested Charges     Code   Minutes Charges    None           Therapy Charges for Today     Code Description Service Date Service Provider Modifiers Qty    77306494493 HC PT MANUAL THERAPY EA 15 MIN 7/26/2018 Raquel Manning, PT GP 1    22712127835 HC PT ULTRASOUND EA 15 MIN 7/26/2018 Raquel Manning, PT GP 1    57947148263 HC PT THER PROC EA 15 MIN 7/26/2018 Raquel Manning, PT GP 1    85916628730 HC PT HOT OR COLD PACK TREAT MCARE 7/26/2018 Raquel Manning, PT GP 1    25736285540 HC PT MOBILITY CURRENT 7/26/2018 Raquel Manning, PT GP, CK 1    25134822978 HC PT MOBILITY PROJECTED 7/26/2018 Raquel Manning, PT GP, CJ 1          PT G-Codes  Functional Limitation: Carrying, moving and handling objects  Mobility: Walking and Moving Around Current Status (): At least 40 percent but less than 60 percent impaired, limited or restricted  Mobility: Walking and Moving Around Goal Status (): At least 20 percent but less than 40 percent impaired, limited or restricted         Raquel Manning, PT  7/26/2018

## 2018-07-27 PROBLEM — M85.89 OSTEOPENIA OF MULTIPLE SITES: Status: ACTIVE | Noted: 2018-07-27

## 2018-07-30 PROBLEM — F17.200 NICOTINE DEPENDENCE: Status: ACTIVE | Noted: 2018-07-30

## 2018-07-31 ENCOUNTER — HOSPITAL ENCOUNTER (OUTPATIENT)
Dept: PHYSICAL THERAPY | Facility: HOSPITAL | Age: 73
Setting detail: THERAPIES SERIES
Discharge: HOME | End: 2018-07-31

## 2018-07-31 DIAGNOSIS — M75.02 ADHESIVE CAPSULITIS OF LEFT SHOULDER: Primary | ICD-10-CM

## 2018-07-31 PROCEDURE — 97035 APP MDLTY 1+ULTRASOUND EA 15: CPT

## 2018-07-31 PROCEDURE — 97140 MANUAL THERAPY 1/> REGIONS: CPT

## 2018-07-31 PROCEDURE — 97110 THERAPEUTIC EXERCISES: CPT

## 2018-08-02 ENCOUNTER — HOSPITAL ENCOUNTER (OUTPATIENT)
Dept: PHYSICAL THERAPY | Facility: HOSPITAL | Age: 73
Setting detail: THERAPIES SERIES
Discharge: HOME | End: 2018-08-02

## 2018-08-02 DIAGNOSIS — M75.02 ADHESIVE CAPSULITIS OF LEFT SHOULDER: Primary | ICD-10-CM

## 2018-08-02 PROCEDURE — 97110 THERAPEUTIC EXERCISES: CPT

## 2018-08-02 PROCEDURE — 97035 APP MDLTY 1+ULTRASOUND EA 15: CPT

## 2018-08-02 PROCEDURE — 97140 MANUAL THERAPY 1/> REGIONS: CPT

## 2018-08-02 NOTE — THERAPY TREATMENT NOTE
Outpatient Physical Therapy Ortho Treatment Note  [facility]     Patient Name: Mary Jack  : 1945  MRN: [id number]  Today's Date: 2018      Visit Date: 2018    Visit Dx:    ICD-10-CM ICD-9-CM   1. 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)   • Panlobular emphysema (CMS/HCC)   • Chronic coronary artery disease   • Iron deficiency anemia due to chronic blood loss   • Mixed hyperlipidemia   • Tobacco abuse   • Abdominal aortic aneurysm (AAA) without rupture (CMS/HCC)   • Pulmonary arterial hypertension   • 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   • Hiatal hernia   • Medicare annual wellness visit, subsequent   • Screening for breast cancer   • Menopause   • Bilateral carotid artery disease (CMS/HCC)   • Osteopenia of multiple sites   • Nicotine dependence        Past Medical History:   Diagnosis Date   • AAA (abdominal aortic aneurysm) (CMS/HCC)    • Allergic not sure    CODINE,MORFINE AND DYE SOLUTION   • Anemia    • Aneurysm of abdominal aorta (CMS/HCC)    • Arthritis    • Zepeda esophagus    • CAD (coronary artery disease)    • Chest pain    • Constipation    • COPD (chronic obstructive pulmonary disease) (CMS/HCC)    • Emphysema lung (CMS/HCC)    • Empyema (CMS/HCC)    • Fatigue    • GERD (gastroesophageal reflux disease)    • Headache    • Hiatal hernia    • High risk medication use    • History of cardiac catheterization     CATH STENT PLACEMENT: CIRCUMFLEX BRANCH   • Hyperlipidemia    • Hypertension    • Infectious viral hepatitis Jaundice in    • Jaundice    • Lumbosacral disc disease    • Myocardial infarction    • Myocardial infarction    • Occult blood in stools    • On home O2    • Peripheral artery disease (CMS/HCC)    • Reflux gastritis    • Scoliosis    • Visual impairment     BLURRY VISION IN RT. EYE        Past Surgical History:   Procedure  Laterality Date   • CARDIAC CATHETERIZATION     • CARDIAC CATHETERIZATION N/A 5/12/2017    Procedure: Left Heart Cath;  Surgeon: Herber Hayes MD;  Location:  [facility];  Service:    • CHOLECYSTECTOMY     • COLONOSCOPY     • COLONOSCOPY N/A 6/10/2016    Procedure: COLONOSCOPY with polypectomy;  Surgeon: Virgie Castelan MD;  Location:  [facility];  Service:    • CORONARY ANGIOPLASTY     • CORONARY STENT PLACEMENT      X2   • ENDOSCOPY N/A 6/10/2016    Procedure: ESOPHAGOGASTRODUODENOSCOPY WITH BIOPSY;  Surgeon: Virgie Castelan MD;  Location:  [facility];  Service:    • ENDOSCOPY     • ENDOSCOPY N/A 8/25/2017    Procedure: ESOPHAGOGASTRODUODENOSCOPY w/ biopsies;  Surgeon: Virgie Castelan MD;  Location:  [facility];  Service:              PT Ortho     Row Name 08/02/18 1100       Left Upper Ext    Lt Shoulder External Rotation AROM 70 degrees  -CC    Lt Shoulder External Rotation PROM 80 degrees  -CC      User Key  (r) = Recorded By, (t) = Taken By, (c) = Cosigned By    Initials Name Provider Type    Raquel Hirsch, PT Physical Therapist                            PT Assessment/Plan     Row Name 08/02/18 1153          PT Assessment    Assessment Comments Pt progressing toward goals. Arom ER improved to 70 degrees but still has discomfort to acheive it  -CC        PT Plan    PT Plan Comments Continue per plan to improve Arom ER/IR and strengthening L shoulder RC  -CC       User Key  (r) = Recorded By, (t) = Taken By, (c) = Cosigned By    Initials Name Provider Type    Raquel Hirsch, PT Physical Therapist                Modalities     Row Name 08/02/18 1100             Moist Heat    Location R shoulder  -CC      MH S/P Rx --   pt declined prior to Prom today  -CC         Ultrasound 36709    Location L posterior shoulder  -CC      Frequency 1.0 MHz  -CC      Intensity - Wts/cm 1.5  -CC        User Key  (r) = Recorded By, (t) = Taken By, (c) = Cosigned By    Initials Name  Provider Type    CC Raquel Manning, PT Physical Therapist                Exercises     Row Name 08/02/18 1100             Subjective Comments    Subjective Comments Pt states she was able to sleep on L shoulder for brief time last night for 1st time  -CC         Exercise 1    Exercise Name 1 R DF stretch with sheet- knee extended and flexed  -CC      Cueing 1 Verbal;Tactile;Demo  -CC      Reps 1 4 ea ex  -CC      Time 1 HEP  -CC         Exercise 2    Exercise Name 2 L flexion of great toe  -CC      Cueing 2 Verbal  -CC      Reps 2 2   HEP  -CC      Time 2 30 sec  -CC         Exercise 4    Exercise Name 4 A/A FF with cane   -CC      Cueing 4 Verbal;Tactile  -CC      Time 4 10-pt seated  -CC         Exercise 5    Exercise Name 5 A/A ER/IR with cane  -CC      Cueing 5 Verbal;Tactile  -CC      Time 5 10 -pt seated  -CC         Exercise 6    Exercise Name 6 L shoulder pulley FF/scaption  -CC      Cueing 6 Verbal;Tactile  -CC      Reps 6 20 ea  -CC         Exercise 7    Exercise Name 7 Scapular retraction with ER  -CC      Cueing 7 Verbal;Tactile  -CC      Reps 7 5- pt supine  -CC      Time 7 5 sec  -CC         Exercise 8    Exercise Name 8 Towel IR stretch  -CC      Cueing 8 Demo  -CC      Reps 8 10  -CC         Exercise 9    Exercise Name 9 FF wall climb  -CC      Cueing 9 Verbal;Demo  -CC      Reps 9 10  -CC         Exercise 10    Exercise Name 10 Standing R gastroc stretch  -CC      Cueing 10 Verbal;Demo  -CC      Reps 10 3  -CC      Time 10 15 sec  -CC         Exercise 11    Exercise Name 11 Seated A/A scaption with cane  -CC      Cueing 11 Verbal;Demo  -CC      Reps 11 10  HEP  -CC         Exercise 12    Exercise Name 12 B rows vs TB  -CC      Cueing 12 Verbal;Tactile  -CC      Reps 12 2  -CC      Time 12 x10 use of green TB  -CC         Exercise 13    Exercise Name 13 ER/IR vs TB  -CC      Cueing 13 Verbal;Demo  -CC      Reps 13 2  -CC      Time 13 10 ea- red TB  -CC        User Key  (r) = Recorded By,  (t) = Taken By, (c) = Cosigned By    Initials Name Provider Type    Raquel Hirsch, PT Physical Therapist                        Manual Rx (last 36 hours)      Manual Treatments     Row Name 08/02/18 1100             Manual Rx 1    Manual Rx 1 Location L shoulder-pt supine elevated HOB  -CC      Manual Rx 1 Type MET  -CC      Manual Rx 1 Grade Prom L shoulder -all planes  -CC      Manual Rx 1 Duration x10  -CC         Manual Rx 2    Manual Rx 2 Location L shoulder  -CC      Manual Rx 2 Type GH distraction- A-P glides  -CC      Manual Rx 2 Grade Gr II  -CC      Manual Rx 2 Duration x 10  -CC        User Key  (r) = Recorded By, (t) = Taken By, (c) = Cosigned By    Initials Name Provider Type    Raquel Hirsch PT Physical Therapist              Therapy Education  Education Details: Increase TB home exercise to 2 set of 10 ER/IR- also reinforced pt continue with home stretches for R planter fascia since pt related she had been forgetting them  Given: HEP  Program: Reinforced  How Provided: Verbal  Provided to: Patient  Level of Understanding: Verbalized              Time Calculation:   Start Time: 1050  Stop Time: 1145  Time Calculation (min): 55 min  Therapy Suggested Charges     Code   Minutes Charges    None           Therapy Charges for Today     Code Description Service Date Service Provider Modifiers Qty    58308139660 HC PT THER PROC EA 15 MIN 8/2/2018 Raquel Manning, PT GP 1    58993861095 HC PT ULTRASOUND EA 15 MIN 8/2/2018 Raquel Manning, PT GP 1    10706060564 HC PT MANUAL THERAPY EA 15 MIN 8/2/2018 Raquel Manning, PT GP 1                    Raquel Manning PT  8/2/2018

## 2018-08-07 ENCOUNTER — APPOINTMENT (OUTPATIENT)
Dept: PHYSICAL THERAPY | Facility: HOSPITAL | Age: 73
End: 2018-08-07

## 2018-08-10 ENCOUNTER — HOSPITAL ENCOUNTER (OUTPATIENT)
Dept: PHYSICAL THERAPY | Facility: HOSPITAL | Age: 73
Setting detail: THERAPIES SERIES
Discharge: HOME | End: 2018-08-10

## 2018-08-10 DIAGNOSIS — M75.02 ADHESIVE CAPSULITIS OF LEFT SHOULDER: Primary | ICD-10-CM

## 2018-08-10 PROCEDURE — 97035 APP MDLTY 1+ULTRASOUND EA 15: CPT

## 2018-08-10 PROCEDURE — 97110 THERAPEUTIC EXERCISES: CPT

## 2018-08-10 PROCEDURE — 97140 MANUAL THERAPY 1/> REGIONS: CPT

## 2018-08-10 NOTE — THERAPY TREATMENT NOTE
Outpatient Physical Therapy Ortho Treatment Note  [facility]     Patient Name: Mary Jack  : 1945  MRN: [id number]  Today's Date: 8/10/2018      Visit Date: 08/10/2018    Visit Dx:    ICD-10-CM ICD-9-CM   1. 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)   • Panlobular emphysema (CMS/HCC)   • Chronic coronary artery disease   • Iron deficiency anemia due to chronic blood loss   • Mixed hyperlipidemia   • Tobacco abuse   • Abdominal aortic aneurysm (AAA) without rupture (CMS/HCC)   • Pulmonary arterial hypertension   • 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   • Hiatal hernia   • Medicare annual wellness visit, subsequent   • Screening for breast cancer   • Menopause   • Bilateral carotid artery disease (CMS/HCC)   • Osteopenia of multiple sites   • Nicotine dependence        Past Medical History:   Diagnosis Date   • AAA (abdominal aortic aneurysm) (CMS/HCC)    • Allergic not sure    CODINE,MORFINE AND DYE SOLUTION   • Anemia    • Aneurysm of abdominal aorta (CMS/HCC)    • Arthritis    • Zepeda esophagus    • CAD (coronary artery disease)    • Chest pain    • Constipation    • COPD (chronic obstructive pulmonary disease) (CMS/HCC)    • Emphysema lung (CMS/HCC)    • Empyema (CMS/HCC)    • Fatigue    • GERD (gastroesophageal reflux disease)    • Headache    • Hiatal hernia    • High risk medication use    • History of cardiac catheterization     CATH STENT PLACEMENT: CIRCUMFLEX BRANCH   • Hyperlipidemia    • Hypertension    • Infectious viral hepatitis Jaundice in    • Jaundice    • Lumbosacral disc disease    • Myocardial infarction    • Myocardial infarction    • Occult blood in stools    • On home O2    • Peripheral artery disease (CMS/HCC)    • Reflux gastritis    • Scoliosis    • Visual impairment     BLURRY VISION IN RT. EYE        Past Surgical History:   Procedure  Laterality Date   • CARDIAC CATHETERIZATION     • CARDIAC CATHETERIZATION N/A 5/12/2017    Procedure: Left Heart Cath;  Surgeon: Herber Hayes MD;  Location:  [facility];  Service:    • CHOLECYSTECTOMY     • COLONOSCOPY     • COLONOSCOPY N/A 6/10/2016    Procedure: COLONOSCOPY with polypectomy;  Surgeon: Virgie Castelan MD;  Location:  [facility];  Service:    • CORONARY ANGIOPLASTY     • CORONARY STENT PLACEMENT      X2   • ENDOSCOPY N/A 6/10/2016    Procedure: ESOPHAGOGASTRODUODENOSCOPY WITH BIOPSY;  Surgeon: Virgie Castelan MD;  Location:  [facility];  Service:    • ENDOSCOPY     • ENDOSCOPY N/A 8/25/2017    Procedure: ESOPHAGOGASTRODUODENOSCOPY w/ biopsies;  Surgeon: Virgie Castelan MD;  Location:  [facility];  Service:                              PT Assessment/Plan     Row Name 08/10/18 1438          PT Assessment    Assessment Comments Pt continues to have greatest limitations and pain with A/Prom in Rosalind L shoulder.  Pt tolerated progression to blue and green TB with exercises as well as active scaption up and down.  Pt has 2 more US Rxs available and may be ready for discharge with HEP.                                                                        -CC        PT Plan    PT Plan Comments Continue PT per POC  -CC       User Key  (r) = Recorded By, (t) = Taken By, (c) = Cosigned By    Initials Name Provider Type    CC Raquel Manning, PT Physical Therapist                Modalities     Row Name 08/10/18 1100             Subjective Comments    Subjective Comments Pt reports moving her L arm in ER is still the most difficult  -CC         Moist Heat    Location R shoulder  -CC      MH S/P Rx --   pt declined prior to Prom today  -CC         Ultrasound 63311    Location L posterior shoulder  -CC      Frequency 1.0 MHz  -CC      Intensity - Wts/cm 1.5  -CC      08279 - PT Ultrasound Minutes 8  -CC        User Key  (r) = Recorded By, (t) = Taken By, (c) = Cosigned By    Initials  Name Provider Type    CC Raquel Manning, PT Physical Therapist                Exercises     Row Name 08/10/18 1100             Subjective Comments    Subjective Comments Pt reports moving her L arm in ER is still the most difficult  -CC         Exercise 1    Exercise Name 1 R DF stretch with sheet- knee extended and flexed  -CC      Cueing 1 Verbal;Tactile;Demo  -CC      Reps 1 4 ea ex  -CC      Time 1 HEP  -CC         Exercise 2    Exercise Name 2 L flexion of great toe  -CC      Cueing 2 Verbal  -CC      Reps 2 2   HEP  -CC      Time 2 30 sec  -CC         Exercise 4    Exercise Name 4 A/A FF with cane   -CC      Cueing 4 Verbal;Tactile  -CC      Time 4 10-pt seated  -CC         Exercise 5    Exercise Name 5 A/A ER/IR with cane  -CC      Cueing 5 Verbal;Tactile  -CC      Time 5 10 -pt seated  -CC         Exercise 6    Exercise Name 6 L shoulder pulley FF/scaption  -CC      Cueing 6 Verbal;Tactile  -CC      Reps 6 20 ea  -CC         Exercise 7    Exercise Name 7 Scapular retraction with ER  -CC      Cueing 7 Verbal;Tactile  -CC      Reps 7 5- pt supine  -CC      Time 7 5 sec  -CC         Exercise 8    Exercise Name 8 Towel IR stretch  -CC      Cueing 8 Demo  -CC      Reps 8 10  -CC         Exercise 9    Exercise Name 9 FF wall climb  -CC      Cueing 9 Verbal;Demo  -CC      Reps 9 10  -CC         Exercise 10    Exercise Name 10 Standing R gastroc stretch  -CC      Cueing 10 Verbal;Demo  -CC      Reps 10 3  -CC      Time 10 15 sec  -CC         Exercise 11    Exercise Name 11 Seated A/A scaption with cane  -CC      Cueing 11 Verbal;Demo  -CC      Reps 11 10  HEP  -CC         Exercise 12    Exercise Name 12 B rows vs TB  -CC      Cueing 12 Verbal;Tactile  -CC      Reps 12 2  -CC      Time 12 x10 use of blue TB  -CC         Exercise 13    Exercise Name 13 ER/IR vs TB  -CC      Cueing 13 Verbal;Demo  -CC      Time 13 10 ea- green TB  -CC         Exercise 14    Exercise Name 14 Scaption up  -CC      Cueing 14  Verbal;Demo  -CC      Reps 14 x10  -CC         Exercise 15    Exercise Name 15 Scaption down  -CC      Cueing 15 Verbal;Demo  -CC      Reps 15 x10  -CC        User Key  (r) = Recorded By, (t) = Taken By, (c) = Cosigned By    Initials Name Provider Type    Raquel Hirsch, PT Physical Therapist                        Manual Rx (last 36 hours)      Manual Treatments     Row Name 08/10/18 1100             Manual Rx 1    Manual Rx 1 Location L shoulder-pt supine elevated HOB  -CC      Manual Rx 1 Type MET  -CC      Manual Rx 1 Grade Prom L shoulder -all planes  -CC      Manual Rx 1 Duration x10  -CC         Manual Rx 2    Manual Rx 2 Location L shoulder  -CC      Manual Rx 2 Type GH distraction- A-P glides  -CC      Manual Rx 2 Grade Gr II  -CC      Manual Rx 2 Duration x 10  -CC        User Key  (r) = Recorded By, (t) = Taken By, (c) = Cosigned By    Initials Name Provider Type    Raquel Hirsch, PT Physical Therapist                PT OP Goals     Row Name 08/10/18 1100          PT Short Term Goals    STG Date to Achieve 07/26/18  -CC     STG 1 Pt compliant with HEP for R foot and L shoulder as instructed  -CC     STG 1 Progress Met  -CC     STG 2 Pt reports decreasing sx R foot with max pain less/equal 3-4/10  -CC     STG 2 Progress Met  -CC     STG 3 Pt shows improved gait R foot with heel/toe mechanics by 25-50 %  -CC     STG 3 Progress Met  -CC     STG 4 Pt shows improved PROM L shoulder FF/scaption 140-150 degrees  -CC     STG 4 Progress Ongoing;Progressing  -CC     STG 5 Pt shows improved PROM L shoulder in ER/IR 50-60 degrees  -CC     STG 5 Progress Ongoing;Progressing  -        Long Term Goals    LTG Date to Achieve 08/27/18  -CC     LTG 1 Observe improved wt shifting R foot with normalized heel-toe mechanics  -CC     LTG 2 Pt shows Arom L shoulder FF-scaption 150-160 degrees  -CC     LTG 3 Pt shows muscle strength L shoulder 4-4+/5 RC  -CC       User Key  (r) = Recorded By, (t) =  Taken By, (c) = Cosigned By    Initials Name Provider Type    CC Raquel Manning, PT Physical Therapist          Therapy Education  Education Details: Issued handout for new scaption exercises to add to HEP along with blue TB to progress rows  Given: HEP  Program: New  How Provided: Verbal, Demonstration, Written  Provided to: Patient  Level of Understanding: Verbalized, Demonstrated              Time Calculation:   Start Time: 1100  Stop Time: 1150  Time Calculation (min): 50 min  Therapy Suggested Charges     Code   Minutes Charges    13633 (CPT®) Hc Pt Neuromusc Re Education Ea 15 Min      61896 (CPT®) Hc Pt Ther Proc Ea 15 Min      17821 (CPT®) Hc Gait Training Ea 15 Min      32108 (CPT®) Hc Pt Therapeutic Act Ea 15 Min      64946 (CPT®) Hc Pt Manual Therapy Ea 15 Min      15128 (CPT®) Hc Pt Ther Massage- Per 15 Min      90543 (CPT®) Hc Pt Iontophoresis Ea 15 Min      89714 (CPT®) Hc Pt Elec Stim Ea-Per 15 Min      42350 (CPT®) Hc Pt Ultrasound Ea 15 Min 8 1    53143 (CPT®) Hc Pt Self Care/Mgmt/Train Ea 15 Min      Total  8 1        Therapy Charges for Today     Code Description Service Date Service Provider Modifiers Qty    04128665825 HC PT MANUAL THERAPY EA 15 MIN 8/10/2018 Raquel Manning, PT GP 1    67282009345 HC PT ULTRASOUND EA 15 MIN 8/10/2018 Raquel Manning, PT GP 1    95028766025 HC PT THER PROC EA 15 MIN 8/10/2018 Raquel Manning, PT GP 1                    Raquel Manning, PT  8/10/2018

## 2018-08-13 ENCOUNTER — HOSPITAL ENCOUNTER (OUTPATIENT)
Dept: PHYSICAL THERAPY | Facility: HOSPITAL | Age: 73
Setting detail: THERAPIES SERIES
Discharge: HOME | End: 2018-08-13

## 2018-08-13 DIAGNOSIS — M75.02 ADHESIVE CAPSULITIS OF LEFT SHOULDER: Primary | ICD-10-CM

## 2018-08-13 PROCEDURE — 97140 MANUAL THERAPY 1/> REGIONS: CPT

## 2018-08-13 PROCEDURE — 97035 APP MDLTY 1+ULTRASOUND EA 15: CPT

## 2018-08-13 PROCEDURE — 97110 THERAPEUTIC EXERCISES: CPT

## 2018-08-13 NOTE — THERAPY TREATMENT NOTE
Outpatient Physical Therapy Ortho Treatment Note  [facility]     Patient Name: Mary Jack  : 1945  MRN: [id number]  Today's Date: 2018      Visit Date: 2018    Visit Dx:    ICD-10-CM ICD-9-CM   1. 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)   • Panlobular emphysema (CMS/HCC)   • Chronic coronary artery disease   • Iron deficiency anemia due to chronic blood loss   • Mixed hyperlipidemia   • Tobacco abuse   • Abdominal aortic aneurysm (AAA) without rupture (CMS/HCC)   • Pulmonary arterial hypertension   • 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   • Hiatal hernia   • Medicare annual wellness visit, subsequent   • Screening for breast cancer   • Menopause   • Bilateral carotid artery disease (CMS/HCC)   • Osteopenia of multiple sites   • Nicotine dependence        Past Medical History:   Diagnosis Date   • AAA (abdominal aortic aneurysm) (CMS/HCC)    • Allergic not sure    CODINE,MORFINE AND DYE SOLUTION   • Anemia    • Aneurysm of abdominal aorta (CMS/HCC)    • Arthritis    • Zepeda esophagus    • CAD (coronary artery disease)    • Chest pain    • Constipation    • COPD (chronic obstructive pulmonary disease) (CMS/HCC)    • Emphysema lung (CMS/HCC)    • Empyema (CMS/HCC)    • Fatigue    • GERD (gastroesophageal reflux disease)    • Headache    • Hiatal hernia    • High risk medication use    • History of cardiac catheterization     CATH STENT PLACEMENT: CIRCUMFLEX BRANCH   • Hyperlipidemia    • Hypertension    • Infectious viral hepatitis Jaundice in    • Jaundice    • Lumbosacral disc disease    • Myocardial infarction    • Myocardial infarction    • Occult blood in stools    • On home O2    • Peripheral artery disease (CMS/HCC)    • Reflux gastritis    • Scoliosis    • Visual impairment     BLURRY VISION IN RT. EYE        Past Surgical History:   Procedure  Laterality Date   • CARDIAC CATHETERIZATION     • CARDIAC CATHETERIZATION N/A 5/12/2017    Procedure: Left Heart Cath;  Surgeon: Herber Hayes MD;  Location:  [facility];  Service:    • CHOLECYSTECTOMY     • COLONOSCOPY     • COLONOSCOPY N/A 6/10/2016    Procedure: COLONOSCOPY with polypectomy;  Surgeon: Virgie Castelan MD;  Location:  [facility];  Service:    • CORONARY ANGIOPLASTY     • CORONARY STENT PLACEMENT      X2   • ENDOSCOPY N/A 6/10/2016    Procedure: ESOPHAGOGASTRODUODENOSCOPY WITH BIOPSY;  Surgeon: Virgie Castelan MD;  Location:  [facility];  Service:    • ENDOSCOPY     • ENDOSCOPY N/A 8/25/2017    Procedure: ESOPHAGOGASTRODUODENOSCOPY w/ biopsies;  Surgeon: Virgie Castelan MD;  Location: [facility];  Service:                              PT Assessment/Plan     Row Name 08/13/18 1157          PT Assessment    Assessment Comments Pt with c/c increase sx L shoulder since last session 8- 10-18. Pt with probable flare up due to introduction of active scaption exercises. Pt to discontinue scaption exercises and perform TB exercises alternate day to allow inflammatory flare up to reduce to previous level. Pt,s ROM did not decrease but more sx at end range today.  Pt stated CP helped decrease sx after today's session.                                                                                    -CC        PT Plan    PT Plan Comments Continue f/u 1 week- pt has 1 more available US for program  -CC       User Key  (r) = Recorded By, (t) = Taken By, (c) = Cosigned By    Initials Name Provider Type    CC Raquel Mnaning, PT Physical Therapist                Modalities     Row Name 08/13/18 1100             Subjective Comments    Subjective Comments Pt reports had increase aching in L shoulder x 2 days up to 4-5/10 . Pt states better but ache is still present 1/10  -CC         Subjective Pain    Subjective Pain Comment Pt relates had pain at night which she had stopped having  -CC          Moist Heat    MH S/P Rx --   =  -CC         Ice    Ice Applied Yes  -CC      Location L shoulder  -CC      Rx Minutes 10 mins  -CC      Ice S/P Rx Yes  -CC         Ultrasound 91308    Location L posterior shoulder  -CC      Frequency 1.0 MHz  -CC      Intensity - Wts/cm 1.5  -CC        User Key  (r) = Recorded By, (t) = Taken By, (c) = Cosigned By    Initials Name Provider Type    CC Raquel Manning, PT Physical Therapist                Exercises     Row Name 08/13/18 1100             Subjective Comments    Subjective Comments Pt reports had increase aching in L shoulder x 2 days up to 4-5/10 . Pt states better but ache is still present 1/10  -CC         Subjective Pain    Subjective Pain Comment Pt relates had pain at night which she had stopped having  -CC         Exercise 1    Exercise Name 1 R DF stretch with sheet- knee extended and flexed  -CC      Cueing 1 Verbal;Tactile;Demo  -CC      Reps 1 4 ea ex  -CC      Time 1 HEP  -CC         Exercise 2    Exercise Name 2 L flexion of great toe  -CC      Cueing 2 Verbal  -CC      Reps 2 2   HEP  -CC      Time 2 30 sec  -CC         Exercise 4    Exercise Name 4 A/A FF with cane   -CC      Cueing 4 Verbal;Tactile  -CC      Time 4 10-pt seated  -CC         Exercise 5    Exercise Name 5 A/A ER/IR with cane  -CC      Cueing 5 Verbal;Tactile  -CC      Time 5 10 -pt seated  -CC         Exercise 6    Exercise Name 6 L shoulder pulley FF/scaption  -CC      Cueing 6 Verbal;Tactile  -CC      Reps 6 20 ea  -CC         Exercise 7    Exercise Name 7 Scapular retraction with ER  -CC      Cueing 7 Verbal;Tactile  -CC      Reps 7 5- pt supine  -CC      Time 7 5 sec  -CC         Exercise 8    Exercise Name 8 Towel IR stretch  -CC      Cueing 8 Demo  -CC      Reps 8 10  -CC         Exercise 9    Exercise Name 9 FF wall climb  -CC      Cueing 9 Verbal;Demo  -CC      Reps 9 10  -CC         Exercise 10    Exercise Name 10 Standing R gastroc stretch  -CC      Cueing 10  Verbal;Demo  -CC      Reps 10 3  -CC      Time 10 15 sec  -CC         Exercise 11    Exercise Name 11 Seated A/A scaption with cane  -CC      Cueing 11 Verbal;Demo  -CC      Reps 11 10  HEP  -CC         Exercise 12    Exercise Name 12 B rows vs TB  -CC      Cueing 12 Verbal;Tactile  -CC      Reps 12 2  -CC      Time 12 x10 use of blue TB  -CC         Exercise 13    Exercise Name 13 ER/IR vs TB  -CC      Cueing 13 Verbal;Demo  -CC      Time 13 10 ea- green TB  -CC         Exercise 14    Exercise Name 14 Scaption up  -CC      Cueing 14 Verbal;Demo  -CC      Reps 14 x10  -CC      Additional Comments Hold  -CC         Exercise 15    Exercise Name 15 Scaption down  -CC      Cueing 15 Verbal;Demo  -CC      Reps 15 x10  -CC      Additional Comments Hold  -CC        User Key  (r) = Recorded By, (t) = Taken By, (c) = Cosigned By    Initials Name Provider Type    Raquel Hirsch, PT Physical Therapist                        Manual Rx (last 36 hours)      Manual Treatments     Row Name 08/13/18 1100             Manual Rx 1    Manual Rx 1 Location L shoulder-pt supine elevated HOB  -CC      Manual Rx 1 Type MET  -CC      Manual Rx 1 Grade Prom L shoulder -all planes  -CC      Manual Rx 1 Duration x10  -CC         Manual Rx 2    Manual Rx 2 Location L shoulder  -CC      Manual Rx 2 Type GH distraction- A-P glides  -CC      Manual Rx 2 Grade Gr II  -CC      Manual Rx 2 Duration x 10  -CC        User Key  (r) = Recorded By, (t) = Taken By, (c) = Cosigned By    Initials Name Provider Type    Raquel Hirsch, PT Physical Therapist                PT OP Goals     Row Name 08/13/18 1100          PT Short Term Goals    STG Date to Achieve 07/26/18  -     STG 1 Pt compliant with HEP for R foot and L shoulder as instructed  -CC     STG 1 Progress Met  -CC     STG 2 Pt reports decreasing sx R foot with max pain less/equal 3-4/10  -CC     STG 2 Progress Met  -CC     STG 3 Pt shows improved gait R foot with heel/toe  mechanics by 25-50 %  -     STG 3 Progress Met  -CC     STG 4 Pt shows improved PROM L shoulder FF/scaption 140-150 degrees  -CC     STG 4 Progress Ongoing;Progressing  -CC     STG 5 Pt shows improved PROM L shoulder in ER/IR 50-60 degrees  -     STG 5 Progress Ongoing;Progressing  -        Long Term Goals    LTG Date to Achieve 08/27/18  -     LTG 1 Observe improved wt shifting R foot with normalized heel-toe mechanics  -     LTG 2 Pt shows Arom L shoulder FF-scaption 150-160 degrees  -     LTG 3 Pt shows muscle strength L shoulder 4-4+/5 RC  -       User Key  (r) = Recorded By, (t) = Taken By, (c) = Cosigned By    Initials Name Provider Type    CC Raquel Manning, PT Physical Therapist          Therapy Education  Education Details: Instructed pt to stop doing scaption exercises and to do TB ex alternate days vs daily since inflamation L shoulder joint is flared  Program: Reinforced  How Provided: Verbal  Provided to: Patient  Level of Understanding: Verbalized              Time Calculation:   Start Time: 1105  Stop Time: 1204  Therapy Suggested Charges     Code   Minutes Charges    None           Therapy Charges for Today     Code Description Service Date Service Provider Modifiers Qty    19527425473 HC PT MANUAL THERAPY EA 15 MIN 8/13/2018 Raquel Manning, PT GP 1    81214382625 HC PT ULTRASOUND EA 15 MIN 8/13/2018 Raquel Manning, PT GP 1    23439984864 HC PT THER PROC EA 15 MIN 8/13/2018 Raquel Manning, PT GP 1    [id number]  PT HOT OR COLD PACK TREAT [facility] 8/13/2018 Raquel Manning, PT GP 1                    Raquel Manning PT  8/13/2018

## 2018-08-14 ENCOUNTER — OFFICE VISIT (OUTPATIENT)
Dept: GASTROENTEROLOGY | Facility: CLINIC | Age: 73
End: 2018-08-14

## 2018-08-14 VITALS
BODY MASS INDEX: 29.53 KG/M2 | HEIGHT: 64 IN | SYSTOLIC BLOOD PRESSURE: 118 MMHG | WEIGHT: 173 LBS | DIASTOLIC BLOOD PRESSURE: 66 MMHG

## 2018-08-14 DIAGNOSIS — K21.9 GASTROESOPHAGEAL REFLUX DISEASE, ESOPHAGITIS PRESENCE NOT SPECIFIED: Primary | ICD-10-CM

## 2018-08-14 DIAGNOSIS — K22.70 BARRETT'S ESOPHAGUS WITHOUT DYSPLASIA: ICD-10-CM

## 2018-08-14 PROCEDURE — 99213 OFFICE O/P EST LOW 20 MIN: CPT | Performed by: INTERNAL MEDICINE

## 2018-08-14 RX ORDER — OMEPRAZOLE 40 MG/1
40 CAPSULE, DELAYED RELEASE ORAL DAILY
Qty: 90 CAPSULE | Refills: 3 | Status: SHIPPED | OUTPATIENT
Start: 2018-08-14 | End: 2019-08-27 | Stop reason: SDUPTHER

## 2018-08-14 NOTE — PROGRESS NOTES
PATIENT INFORMATION  Mary Jack       - 1945    CHIEF COMPLAINT  Chief Complaint   Patient presents with   • Heartburn       HISTORY OF PRESENT ILLNESS  Heartburn       She is here in follow up for GERD and barretts esophagus. She is maintained on prilosec. No dysphagia. She is needing refills on omeprazole.   No pain.    REVIEW OF SYSTEMS  Review of Systems   Gastrointestinal:        Reflux   All other systems reviewed and are negative.        ACTIVE PROBLEMS  Patient Active Problem List    Diagnosis   • Nicotine dependence [F17.200]   • Osteopenia of multiple sites [M85.89]   • Medicare annual wellness visit, subsequent [Z00.00]   • Screening for breast cancer [Z12.31]   • Menopause [Z78.0]   • Bilateral carotid artery disease (CMS/HCC) [I77.9]   • Hiatal hernia [K44.9]   • 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 [K22.70]     Overview Note:     Added automatically from request for surgery [id number]     • Abdominal aortic aneurysm (AAA) without rupture (CMS/HCC) [I71.4]     Overview Note:     Dilation of the infrarenal abdominal aorta up to 3.4 cm.     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 (CMS/HCC) [I24.0]   • Panlobular emphysema (CMS/HCC) [J43.1]   • Chronic coronary artery disease [I25.10]     Overview Note:     Description: RCA 80% (nonSTLMI), LAD 40%, Circum branch     • Iron deficiency anemia due to chronic blood loss [D50.0]   • Mixed hyperlipidemia [E78.2]         PAST MEDICAL HISTORY  Past Medical History:   Diagnosis Date   • AAA (abdominal aortic aneurysm) (CMS/HCC)    • Allergic not sure    CODINE,MORFINE AND DYE SOLUTION   • Anemia    • Aneurysm of abdominal aorta (CMS/HCC)    • Arthritis    • Zepeda esophagus    • CAD (coronary artery disease)    • Chest pain    • Constipation    • COPD (chronic obstructive pulmonary disease)  (CMS/HCC)    • Emphysema lung (CMS/HCC)    • Empyema (CMS/HCC)    • Fatigue    • GERD (gastroesophageal reflux disease)    • Headache    • Hiatal hernia    • High risk medication use    • History of cardiac catheterization     CATH STENT PLACEMENT: CIRCUMFLEX BRANCH   • Hyperlipidemia    • Hypertension    • Infectious viral hepatitis Jaundice in 1963   • Jaundice    • Lumbosacral disc disease    • Myocardial infarction    • Myocardial infarction    • Occult blood in stools    • On home O2    • Peripheral artery disease (CMS/HCC)    • Reflux gastritis    • Scoliosis    • Visual impairment     BLURRY VISION IN RT. EYE         SURGICAL HISTORY  Past Surgical History:   Procedure Laterality Date   • CARDIAC CATHETERIZATION     • CARDIAC CATHETERIZATION N/A 5/12/2017    Procedure: Left Heart Cath;  Surgeon: Herber Hayes MD;  Location: [facility] CATH INVASIVE LOCATION;  Service:    • CHOLECYSTECTOMY     • COLONOSCOPY     • COLONOSCOPY N/A 6/10/2016    Procedure: COLONOSCOPY with polypectomy;  Surgeon: Virgie Castelan MD;  Location: [facility] OR;  Service:    • CORONARY ANGIOPLASTY     • CORONARY STENT PLACEMENT      X2   • ENDOSCOPY N/A 6/10/2016    Procedure: ESOPHAGOGASTRODUODENOSCOPY WITH BIOPSY;  Surgeon: Virgie Castelan MD;  Location: [facility] OR;  Service:    • ENDOSCOPY     • ENDOSCOPY N/A 8/25/2017    Procedure: ESOPHAGOGASTRODUODENOSCOPY w/ biopsies;  Surgeon: Virgie Castelan MD;  Location: [facility] OR;  Service:          FAMILY HISTORY  Family History   Problem Relation Age of Onset   • Colon cancer Mother    • Breast cancer Mother    • Other Father         cardiac disorder   • Hypertension Father    • Heart disease Father    • Other Sister         cardiac disorder   • Hypertension Sister    • Lung disease Sister    • Thyroid disease Sister    • Other Brother         cardiac disorder   • Thyroid disease Daughter    • Heart disease Sister    • Hypertension Sister    • Pulmonary fibrosis Sister    • Heart disease  Brother    • Other Brother    • Thyroid disease Sister    • Other Sister    • Thyroid disease Daughter          SOCIAL HISTORY  Social History     Occupational History   • retired [employer]     Social History Main Topics   • Smoking status: Current Every Day Smoker     Packs/day: 1.00     Years: 50.00     Types: Cigarettes     Start date: 8/3/2016     Last attempt to quit: 6/3/2016   • Smokeless tobacco: Never Used      Comment: Quit for about 6 weeks then started back   • Alcohol use No   • Drug use: No   • Sexual activity: Not Currently     Partners: Male     Birth control/ protection: Post-menopausal         CURRENT MEDICATIONS    Current Outpatient Prescriptions:   •  ANORO ELLIPTA 62.5-25 MCG/INH aerosol powder , USE 1 PUFF BY MOUTH DAILY., Disp: , Rfl: 0  •  aspirin 81 MG tablet, Take 1 tablet by mouth daily., Disp: , Rfl:   •  atorvastatin (LIPITOR) 10 MG tablet, Take 1 tablet by mouth Daily., Disp: 90 tablet, Rfl: 1  •  cholecalciferol (VITAMIN D3) 1000 UNITS tablet, Take 2,000 Units by mouth Daily., Disp: , Rfl:   •  meloxicam (MOBIC) 7.5 MG tablet, Take 1 tablet by mouth Daily., Disp: 30 tablet, Rfl: 5  •  metoprolol tartrate (LOPRESSOR) 25 MG tablet, Take 1 tablet by mouth 2 (Two) Times a Day., Disp: 60 tablet, Rfl: 11  •  nitroglycerin (NITROSTAT) 0.4 MG SL tablet, Place 0.4 mg under the tongue Every 5 (Five) Minutes As Needed for Chest Pain. Take no more than 3 doses in 15 minutes., Disp: , Rfl:   •  omeprazole (priLOSEC) 40 MG capsule, Take 1 capsule by mouth Daily., Disp: 90 capsule, Rfl: 3  •  polyethylene glycol (MIRALAX) packet, Take 17 g by mouth Daily., Disp: 850 g, Rfl: 3  •  vitamin A 52235 UNIT capsule, Take 10,000 Units by mouth Daily., Disp: , Rfl:   •  vitamin B-12 (CYANOCOBALAMIN) 1000 MCG tablet, Take 1,000 mcg by mouth Daily., Disp: , Rfl:     ALLERGIES  Iodinated diagnostic agents; Codeine; and Morphine and related    VITALS  Vitals:    08/14/18 1414   BP: 118/66  "  Weight: 78.5 kg (173 lb)   Height: 162.6 cm (64.02\")       LAST RESULTS   Orders Only on 07/10/2018   Component Date Value Ref Range Status   • Glucose 07/11/2018 97  65 - 99 mg/dL Final   • BUN 07/11/2018 13  8 - 23 mg/dL Final   • Creatinine 07/11/2018 0.84  0.57 - 1.00 mg/dL Final   • eGFR Non  Am 07/11/2018 67  >60 mL/min/1.73 Final   • eGFR African Am 07/11/2018 81  >60 mL/min/1.73 Final   • BUN/Creatinine Ratio 07/11/2018 15.5  7.0 - 25.0 Final   • Sodium 07/11/2018 136  136 - 145 mmol/L Final   • Potassium 07/11/2018 5.2  3.5 - 5.2 mmol/L Final   • Chloride 07/11/2018 95* 98 - 107 mmol/L Final   • Total CO2 07/11/2018 31.3* 22.0 - 29.0 mmol/L Final   • Calcium 07/11/2018 10.6* 8.8 - 10.5 mg/dL Final   • ALT (SGPT) 07/11/2018 12  5 - 33 U/L Final   • WBC 07/11/2018 5.85  4.80 - 10.80 10*3/mm3 Final   • RBC 07/11/2018 3.65* 4.20 - 5.40 10*6/mm3 Final   • Hemoglobin 07/11/2018 11.7* 12.0 - 16.0 g/dL Final   • Hematocrit 07/11/2018 36.3* 37.0 - 47.0 % Final   • MCV 07/11/2018 99.5* 81.0 - 99.0 fL Final   • MCH 07/11/2018 32.1* 27.0 - 31.0 pg Final   • MCHC 07/11/2018 32.2  31.0 - 37.0 g/dL Final   • RDW 07/11/2018 13.4  11.5 - 14.5 % Final   • Platelets 07/11/2018 224  140 - 500 10*3/mm3 Final   • TSH 07/11/2018 2.880  0.270 - 4.200 mIU/mL Final   • Specific Gravity, UA 07/11/2018 1.015  1.003 - 1.030 Final   • pH, UA 07/11/2018 7.5  4.5 - 8.0 Final   • Color, UA 07/11/2018 Straw   Final    Comment: Urine microscopic not indicated.  REFERENCE RANGE: Yellow, Straw     • Appearance, UA 07/11/2018 Clear  Clear Final   • Leukocytes, UA 07/11/2018 Negative  Negative Final   • Protein 07/11/2018 Negative  Negative Final   • Glucose, UA 07/11/2018 Negative  Negative Final   • Ketones 07/11/2018 Negative   Final    REFERENCE RANGE: Negative, 80 mg/dL (3+), >=160 mg/dL (4+)   • Blood, UA 07/11/2018 Negative  Negative Final   • Bilirubin, UA 07/11/2018 Negative  Negative Final   • Urobilinogen, UA 07/11/2018 " Comment   Final    Comment: 0.2 E.U./dL  REFERENCE RANGE: 0.2 - 1.0 E.U./dL     • Nitrite, UA 07/11/2018 Negative  Negative Final   • Total Cholesterol 07/11/2018 151  0 - 200 mg/dL Final   • Triglycerides 07/11/2018 109  0 - 150 mg/dL Final   • HDL Cholesterol 07/11/2018 70* 40 - 60 mg/dL Final   • VLDL Cholesterol 07/11/2018 21.8  7 - 27 mg/dL Final   • LDL Cholesterol  07/11/2018 59  0 - 100 mg/dL Final   • LDL/HDL Ratio 07/11/2018 0.85   Final     Dexa Bone Density, Axial (every 2 Years)    Result Date: 7/26/2018  Narrative: DXA BONE MINERAL DENSITY MEASUREMENT, 7/25/2018  INDICATION: 72-year-old postmenopausal female referred for bone mineral density assessment. Family history of osteoporosis.  TECHNIQUE: DXA bone mineral density measurements were obtained at the lumbar spine and left hip.  FINDINGS: At the left hip, lowest femoral neck T score measures -2.1, indicating osteopenia.  Lumbar BMD is within normal limits. T score from L1 through L4 measures -0.3.      Impression: 1. Osteopenia. 2. Left femoral neck T score measures -2.1.  This report was finalized on 7/26/2018 7:39 AM by Dr. Indra Lemon MD.      Mammo Screening Digital Tomosynthesis Bilateral With Cad    Result Date: 7/26/2018  Narrative: EXAM, 7/25/2018: 1. Bilateral digital screening mammogram with CAD. 2. Bilateral digital screening breast tomosynthesis.  INDICATION: Routine screening. Family history of breast cancer.  TECHNIQUE: Bilateral digital screening mammogram images were obtained and reviewed with CAD. Digital breast tomosynthesis images were also reviewed.  COMPARISON: *  Screening mammogram, [facility], 2/14/2007.  FINDINGS: There are scattered areas of fibroglandular density. No significant change when compared with prior images. No mammographic evidence of malignancy. Recommend repeat screening mammogram in one year.      Impression: Negative screening mammogram.  BI-RADS CATEGORY 1: Negative   Women over the age  of 40 undergoing screening mammography are entered into a reminder system with target due date for the next mammogram.  This report was finalized on 7/26/2018 8:17 AM by Dr. Indra Lemon MD.        PHYSICAL EXAM  Physical Exam   Constitutional: She is oriented to person, place, and time. She appears well-developed and well-nourished. No distress.   HENT:   Head: Normocephalic and atraumatic.   Mouth/Throat: Oropharynx is clear and moist.   Eyes: Pupils are equal, round, and reactive to light. EOM are normal.   Neck: Normal range of motion. No tracheal deviation present.   Cardiovascular: Normal rate, regular rhythm, normal heart sounds and intact distal pulses.  Exam reveals no gallop and no friction rub.    No murmur heard.  Pulmonary/Chest: Effort normal and breath sounds normal. No stridor. No respiratory distress. She has no wheezes. She has no rales. She exhibits no tenderness.   Abdominal: Soft. Bowel sounds are normal. She exhibits no distension. There is no tenderness. There is no rebound and no guarding.   Musculoskeletal: She exhibits edema.   Lymphadenopathy:     She has no cervical adenopathy.   Neurological: She is alert and oriented to person, place, and time.   Skin: Skin is warm. She is not diaphoretic.   Psychiatric: She has a normal mood and affect. Her behavior is normal. Judgment and thought content normal.   Nursing note and vitals reviewed.      ASSESSMENT  Diagnoses and all orders for this visit:    Gastroesophageal reflux disease, esophagitis presence not specified    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    Other orders  -     omeprazole (priLOSEC) 40 MG capsule; Take 1 capsule by mouth Daily.          PLAN  Return in about 1 year (around 8/14/2019).      Antireflux measures and dietary modifications reviewed. Low acid diet reviewed. Keep head of bed elevated. Stop eating/drinking at least 3 hours prior to bedtime. Eliminate caffeine and carbonated beverages.  Weight loss encouraged if  BMI over 25.      cls in 2016, with TA on recall in 2021.

## 2018-08-17 ENCOUNTER — TRANSCRIBE ORDERS (OUTPATIENT)
Dept: ADMINISTRATIVE | Facility: HOSPITAL | Age: 73
End: 2018-08-17

## 2018-08-17 DIAGNOSIS — Z12.2 ENCOUNTER FOR SCREENING FOR LUNG CANCER: Primary | ICD-10-CM

## 2018-08-21 ENCOUNTER — HOSPITAL ENCOUNTER (OUTPATIENT)
Dept: CT IMAGING | Facility: HOSPITAL | Age: 73
Discharge: HOME | End: 2018-08-21
Attending: INTERNAL MEDICINE | Admitting: INTERNAL MEDICINE

## 2018-08-21 DIAGNOSIS — Z12.2 ENCOUNTER FOR SCREENING FOR LUNG CANCER: ICD-10-CM

## 2018-08-21 PROCEDURE — G0297 LDCT FOR LUNG CA SCREEN: HCPCS

## 2018-08-22 ENCOUNTER — HOSPITAL ENCOUNTER (OUTPATIENT)
Dept: PHYSICAL THERAPY | Facility: HOSPITAL | Age: 73
Setting detail: THERAPIES SERIES
Discharge: HOME | End: 2018-08-22

## 2018-08-22 DIAGNOSIS — M75.02 ADHESIVE CAPSULITIS OF LEFT SHOULDER: Primary | ICD-10-CM

## 2018-08-22 PROCEDURE — 97140 MANUAL THERAPY 1/> REGIONS: CPT

## 2018-08-22 PROCEDURE — 97110 THERAPEUTIC EXERCISES: CPT

## 2018-08-22 PROCEDURE — 97035 APP MDLTY 1+ULTRASOUND EA 15: CPT

## 2018-08-22 NOTE — THERAPY TREATMENT NOTE
Outpatient Physical Therapy Ortho Treatment Note  [facility]     Patient Name: Mary Jack  : 1945  MRN: [id number]  Today's Date: 2018      Visit Date: 2018    Visit Dx:    ICD-10-CM ICD-9-CM   1. 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)   • Panlobular emphysema (CMS/HCC)   • Chronic coronary artery disease   • Iron deficiency anemia due to chronic blood loss   • Mixed hyperlipidemia   • Tobacco abuse   • Abdominal aortic aneurysm (AAA) without rupture (CMS/HCC)   • Pulmonary arterial hypertension   • 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   • Hiatal hernia   • Medicare annual wellness visit, subsequent   • Screening for breast cancer   • Menopause   • Bilateral carotid artery disease (CMS/HCC)   • Osteopenia of multiple sites   • Nicotine dependence        Past Medical History:   Diagnosis Date   • AAA (abdominal aortic aneurysm) (CMS/HCC)    • Allergic not sure    CODINE,MORFINE AND DYE SOLUTION   • Anemia    • Aneurysm of abdominal aorta (CMS/HCC)    • Arthritis    • Zepeda esophagus    • CAD (coronary artery disease)    • Chest pain    • Constipation    • COPD (chronic obstructive pulmonary disease) (CMS/HCC)    • Emphysema lung (CMS/HCC)    • Empyema (CMS/HCC)    • Fatigue    • GERD (gastroesophageal reflux disease)    • Headache    • Hiatal hernia    • High risk medication use    • History of cardiac catheterization     CATH STENT PLACEMENT: CIRCUMFLEX BRANCH   • Hyperlipidemia    • Hypertension    • Infectious viral hepatitis Jaundice in    • Jaundice    • Lumbosacral disc disease    • Myocardial infarction    • Myocardial infarction    • Occult blood in stools    • On home O2    • Peripheral artery disease (CMS/HCC)    • Reflux gastritis    • Scoliosis    • Visual impairment     BLURRY VISION IN RT. EYE        Past Surgical History:   Procedure  Laterality Date   • CARDIAC CATHETERIZATION     • CARDIAC CATHETERIZATION N/A 5/12/2017    Procedure: Left Heart Cath;  Surgeon: Herber Hayes MD;  Location:  [facility] CATH INVASIVE LOCATION;  Service:    • CHOLECYSTECTOMY     • COLONOSCOPY     • COLONOSCOPY N/A 6/10/2016    Procedure: COLONOSCOPY with polypectomy;  Surgeon: Virgie Castelan MD;  Location:  [facility] OR;  Service:    • CORONARY ANGIOPLASTY     • CORONARY STENT PLACEMENT      X2   • ENDOSCOPY N/A 6/10/2016    Procedure: ESOPHAGOGASTRODUODENOSCOPY WITH BIOPSY;  Surgeon: Virgie Castelan MD;  Location:  [facility] OR;  Service:    • ENDOSCOPY     • ENDOSCOPY N/A 8/25/2017    Procedure: ESOPHAGOGASTRODUODENOSCOPY w/ biopsies;  Surgeon: Virgie Castelan MD;  Location:  [facility] OR;  Service:                              PT Assessment/Plan     Row Name 08/22/18 1645          PT Assessment    Assessment Comments Pt with increased sx L shoulder and relates sx to performing TB exercises with HEP. Modified HEP again and will hold strengthening exercises with TB . Probable pt over worked RC muscles and increased inflammation. Pt has functional improvement with use of L shoulder and typically out of pain unless doing ROM exercises since still some limitations in ER/IR                                                                                                                       -CC        PT Plan    PT Plan Comments F/U 1 week to see if pt's sx reduce with modified HEP- Pt has completed US series today  -       User Key  (r) = Recorded By, (t) = Taken By, (c) = Cosigned By    Initials Name Provider Type    CC Raquel Manning, PT Physical Therapist                Modalities     Row Name 08/22/18 1100             Subjective Comments    Subjective Comments Pt relates she is having more aching in mid L upper arm and thinks it is related to HEP using the bands  -CC         Subjective Pain    Subjective Pain Comment Pt denies sx at rest at PT session today  -          Ultrasound 65941    Location L mid arm and posterior shoulder  -CC      Frequency 1.0 MHz  -CC      Intensity - Wts/cm 1.5  -CC      87824 - PT Ultrasound Minutes 8  -CC        User Key  (r) = Recorded By, (t) = Taken By, (c) = Cosigned By    Initials Name Provider Type    CC Raquel Manning, PT Physical Therapist                Exercises     Row Name 08/22/18 1100             Subjective Comments    Subjective Comments Pt relates she is having more aching in mid L upper arm and thinks it is related to HEP using the bands  -CC         Subjective Pain    Subjective Pain Comment Pt denies sx at rest at PT session today  -CC         Exercise 1    Exercise Name 1 R DF stretch with sheet- knee extended and flexed  -CC      Cueing 1 Verbal;Tactile;Demo  -CC      Reps 1 4 ea ex  -CC      Time 1 HEP  -CC         Exercise 2    Exercise Name 2 L flexion of great toe  -CC      Cueing 2 Verbal  -CC      Reps 2 2   HEP  -CC      Time 2 30 sec  -CC         Exercise 4    Exercise Name 4 A/A FF with cane   -CC      Cueing 4 Verbal;Tactile  -CC      Time 4 10-pt seated  -CC         Exercise 5    Exercise Name 5 A/A ER/IR with cane  -CC      Cueing 5 Verbal;Tactile  -CC      Time 5 10 -pt seated  -CC         Exercise 6    Exercise Name 6 L shoulder pulley FF/scaption  -CC      Cueing 6 Verbal;Tactile  -CC      Reps 6 20 ea  -CC         Exercise 7    Exercise Name 7 Scapular retraction with ER  -CC      Cueing 7 Verbal;Tactile  -CC      Reps 7 5- pt supine  -CC      Time 7 5 sec  -CC         Exercise 8    Exercise Name 8 Towel IR stretch  -CC      Cueing 8 Demo  -CC      Reps 8 10  -CC         Exercise 9    Exercise Name 9 FF wall climb  -CC      Cueing 9 Verbal;Demo  -CC      Reps 9 10  -CC         Exercise 10    Exercise Name 10 Standing R gastroc stretch  -CC      Cueing 10 Verbal;Demo  -CC      Reps 10 3  -CC      Time 10 15 sec  -CC      Additional Comments HEP  -CC         Exercise 11    Exercise Name 11 Seated A/A  scaption with cane  -CC      Cueing 11 Verbal;Demo  -CC      Reps 11 10  HEP  -CC         Exercise 12    Exercise Name 12 B rows vs TB  -CC      Cueing 12 Verbal;Tactile  -CC      Reps 12 2  -CC      Time 12 x10 use of blue TB  -CC      Additional Comments HOLD  -CC         Exercise 13    Exercise Name 13 ER/IR vs TB  -CC      Cueing 13 Verbal;Demo  -CC      Time 13 10 ea- green TB  -CC      Additional Comments HOLD  -CC        User Key  (r) = Recorded By, (t) = Taken By, (c) = Cosigned By    Initials Name Provider Type    Raquel Hirsch, PT Physical Therapist                        Manual Rx (last 36 hours)      Manual Treatments     Row Name 08/22/18 1100             Manual Rx 1    Manual Rx 1 Location L shoulder-pt supine elevated HOB  -CC      Manual Rx 1 Type MET  -CC      Manual Rx 1 Grade Prom L shoulder -all planes  -CC      Manual Rx 1 Duration x10  -CC         Manual Rx 2    Manual Rx 2 Location L shoulder  -CC      Manual Rx 2 Type GH distraction- A-P glides  -CC      Manual Rx 2 Grade Gr II  -CC      Manual Rx 2 Duration x 10  -CC        User Key  (r) = Recorded By, (t) = Taken By, (c) = Cosigned By    Initials Name Provider Type    Raquel Hirsch, PT Physical Therapist                PT OP Goals     Row Name 08/22/18 1100          PT Short Term Goals    STG Date to Achieve 07/26/18  -     STG 1 Pt compliant with HEP for R foot and L shoulder as instructed  -CC     STG 1 Progress Met  -CC     STG 2 Pt reports decreasing sx R foot with max pain less/equal 3-4/10  -CC     STG 2 Progress Met  -CC     STG 3 Pt shows improved gait R foot with heel/toe mechanics by 25-50 %  -CC     STG 3 Progress Met  -CC     STG 4 Pt shows improved PROM L shoulder FF/scaption 140-150 degrees  -CC     STG 4 Progress Ongoing;Progressing  -CC     STG 5 Pt shows improved PROM L shoulder in ER/IR 50-60 degrees  -CC     STG 5 Progress Ongoing;Progressing  -CC        Long Term Goals    LTG Date to Achieve  08/27/18  -     LTG 1 Observe improved wt shifting R foot with normalized heel-toe mechanics  -     LTG 2 Pt shows Arom L shoulder FF-scaption 150-160 degrees  -     LTG 3 Pt shows muscle strength L shoulder 4-4+/5 RC  -       User Key  (r) = Recorded By, (t) = Taken By, (c) = Cosigned By    Initials Name Provider Type     Raquel Manning, PT Physical Therapist          Therapy Education  Education Details: Pt to hold all TB exercises for 3 days and if notices sx resolved/reduced to begin Rows with use of green TB and ER/IR with red TB and do alternate days and will assess next appointment  Given: HEP  Program: Reinforced  How Provided: Verbal  Provided to: Patient  Level of Understanding: Verbalized              Time Calculation:   Start Time: 1100  Stop Time: 1150  Time Calculation (min): 50 min  Therapy Suggested Charges     Code   Minutes Charges    06451 (CPT®) Hc Pt Neuromusc Re Education Ea 15 Min      34453 (CPT®) Hc Pt Ther Proc Ea 15 Min      41727 (CPT®) Hc Gait Training Ea 15 Min      34987 (CPT®) Hc Pt Therapeutic Act Ea 15 Min      69058 (CPT®) Hc Pt Manual Therapy Ea 15 Min      89043 (CPT®) Hc Pt Ther Massage- Per 15 Min      32507 (CPT®) Hc Pt Iontophoresis Ea 15 Min      60496 (CPT®) Hc Pt Elec Stim Ea-Per 15 Min      67871 (CPT®) Hc Pt Ultrasound Ea 15 Min 8 1    75733 (CPT®) Hc Pt Self Care/Mgmt/Train Ea 15 Min      43005 (CPT®) Hc Pt Prosthetic (S) Train Initial Encounter, Each 15 Min      96380 (CPT®) Hc Orthotic(S) Mgmt/Train Initial Encounter, Each 15min      68209 (CPT®) Hc Pt Aquatic Therapy Ea 15 Min      70888 (CPT®) Hc Pt Orthotic(S)/Prosthetic(S) Encounter, Each 15 Min      Total  8 1        Therapy Charges for Today     Code Description Service Date Service Provider Modifiers Qty    94133733947 HC PT MANUAL THERAPY EA 15 MIN 8/22/2018 Raquel Manning, PT GP 1    22531183774 HC PT ULTRASOUND EA 15 MIN 8/22/2018 Raquel Manning, PT GP 1    [id number]  HC PT THER PROC EA 15 MIN 8/22/2018 Raquel Manning, PT GP 1                    Raquel BALLARD. Nigel, PT  8/22/2018

## 2018-08-29 ENCOUNTER — HOSPITAL ENCOUNTER (OUTPATIENT)
Dept: PHYSICAL THERAPY | Facility: HOSPITAL | Age: 73
Setting detail: THERAPIES SERIES
Discharge: HOME | End: 2018-08-29

## 2018-08-29 DIAGNOSIS — M75.02 ADHESIVE CAPSULITIS OF LEFT SHOULDER: Primary | ICD-10-CM

## 2018-08-29 PROCEDURE — 97035 APP MDLTY 1+ULTRASOUND EA 15: CPT

## 2018-08-29 PROCEDURE — G8979 MOBILITY GOAL STATUS: HCPCS

## 2018-08-29 PROCEDURE — 97110 THERAPEUTIC EXERCISES: CPT

## 2018-08-29 PROCEDURE — G8978 MOBILITY CURRENT STATUS: HCPCS

## 2018-08-29 PROCEDURE — 97140 MANUAL THERAPY 1/> REGIONS: CPT

## 2018-08-29 NOTE — THERAPY TREATMENT NOTE
Outpatient Physical Therapy Ortho Treatment Note  [facility]     Patient Name: Mary Jack  : 1945  MRN: [id number]  Today's Date: 2018      Visit Date: 2018    Visit Dx:    ICD-10-CM ICD-9-CM   1. 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)   • Panlobular emphysema (CMS/HCC)   • Chronic coronary artery disease   • Iron deficiency anemia due to chronic blood loss   • Mixed hyperlipidemia   • Tobacco abuse   • Abdominal aortic aneurysm (AAA) without rupture (CMS/HCC)   • Pulmonary arterial hypertension   • 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   • Hiatal hernia   • Medicare annual wellness visit, subsequent   • Screening for breast cancer   • Menopause   • Bilateral carotid artery disease (CMS/HCC)   • Osteopenia of multiple sites   • Nicotine dependence        Past Medical History:   Diagnosis Date   • AAA (abdominal aortic aneurysm) (CMS/HCC)    • Allergic not sure    CODINE,MORFINE AND DYE SOLUTION   • Anemia    • Aneurysm of abdominal aorta (CMS/HCC)    • Arthritis    • Zepeda esophagus    • CAD (coronary artery disease)    • Chest pain    • Constipation    • COPD (chronic obstructive pulmonary disease) (CMS/HCC)    • Emphysema lung (CMS/HCC)    • Empyema (CMS/HCC)    • Fatigue    • GERD (gastroesophageal reflux disease)    • Headache    • Hiatal hernia    • High risk medication use    • History of cardiac catheterization     CATH STENT PLACEMENT: CIRCUMFLEX BRANCH   • Hyperlipidemia    • Hypertension    • Infectious viral hepatitis Jaundice in    • Jaundice    • Lumbosacral disc disease    • Myocardial infarction    • Myocardial infarction    • Occult blood in stools    • On home O2    • Peripheral artery disease (CMS/HCC)    • Reflux gastritis    • Scoliosis    • Visual impairment     BLURRY VISION IN RT. EYE        Past Surgical History:   Procedure  Laterality Date   • CARDIAC CATHETERIZATION     • CARDIAC CATHETERIZATION N/A 5/12/2017    Procedure: Left Heart Cath;  Surgeon: Herber Hayes MD;  Location:  [facility];  Service:    • CHOLECYSTECTOMY     • COLONOSCOPY     • COLONOSCOPY N/A 6/10/2016    Procedure: COLONOSCOPY with polypectomy;  Surgeon: Virgie Castelan MD;  Location:  [facility] OR;  Service:    • CORONARY ANGIOPLASTY     • CORONARY STENT PLACEMENT      X2   • ENDOSCOPY N/A 6/10/2016    Procedure: ESOPHAGOGASTRODUODENOSCOPY WITH BIOPSY;  Surgeon: Virgie Castelan MD;  Location:  [facility] OR;  Service:    • ENDOSCOPY     • ENDOSCOPY N/A 8/25/2017    Procedure: ESOPHAGOGASTRODUODENOSCOPY w/ biopsies;  Surgeon: Virgie Castelan MD;  Location:  [facility] OR;  Service:              PT Ortho     Row Name 08/29/18 1100       Left Upper Ext    Lt Shoulder Abduction AROM 170  -CC    Lt Shoulder Flexion AROM 155  -CC    Lt Shoulder External Rotation AROM 50  -CC    Lt Shoulder External Rotation PROM 60  -CC    Lt Shoulder Internal Rotation AROM 70   -CC    Lt Shoulder Internal Rotation PROM 75  -CC       Left Shoulder (Manual Muscle Testing)    MMT: Flexion, Left Shoulder flexion  -CC    MMT, Gross Movement: Left Shoulder Flexion (4/5) good  -CC    MMT: ABduction, Left Shoulder abduction  -CC    MMT, Gross Movement: Left Shoulder ABduction (4/5) good  -CC    MMT: Internal Rotation, Left Shoulder internal rotation  -CC    MMT, Gross Movement: Left Shoulder Internal Rotation (4/5) good  -CC    MMT: External Rotation, Left Shoulder external rotation  -CC    MMT, Gross Movement: Left Shoulder External Rotation (4/5) good  -CC      User Key  (r) = Recorded By, (t) = Taken By, (c) = Cosigned By    Initials Name Provider Type    Raquel Hirsch W, PT Physical Therapist                            PT Assessment/Plan     Row Name 08/29/18 1449 08/29/18 1444       PT Assessment    Assessment Comments  -- Pt has completed US series for L shoulder. Pt  has met all goals but still has some residual sx with end range FF and rotations also but only momentary sx.  Pt has improved functional use of LUE and marked pain reduction. Pt has MD f/u next   -CC    Please refer to paper survey for additional self-reported information Yes   Quick DASH score 18- score on eval 45  -CC  --       PT Plan    PT Plan Comments  -- Will plan to discharge with HEP  and will f/u with Dr Lundy.  -CC      User Key  (r) = Recorded By, (t) = Taken By, (c) = Cosigned By    Initials Name Provider Type    Raquel Hirsch, PT Physical Therapist                Modalities     Row Name 08/29/18 1100             Moist Heat    MH S/P Rx --   =  -CC         Ice    Location L shoulder  -CC      Rx Minutes 10 mins  -CC      Ice S/P Rx Yes  -CC         Ultrasound [id number]    Location L posterior shoulder  -CC      Frequency 1.0 MHz  -CC      Intensity - Wts/cm 1.5  -CC        User Key  (r) = Recorded By, (t) = Taken By, (c) = Cosigned By    Initials Name Provider Type    Raquel Hirsch, PT Physical Therapist                Exercises     Row Name 08/29/18 1435 08/29/18 1100          Exercise 1    Exercise Name 1  -- R DF stretch with sheet- knee extended and flexed  -CC     Cueing 1  -- Verbal;Tactile;Demo  -CC     Reps 1  -- 4 ea ex  -CC     Time 1  -- HEP  -CC        Exercise 2    Exercise Name 2  -- L flexion of great toe  -CC     Cueing 2  -- Verbal  -CC     Reps 2  -- 2   HEP  -CC     Time 2  -- 30 sec  -CC        Exercise 4    Exercise Name 4  -- A/A FF with cane   -CC     Cueing 4  -- Verbal;Tactile  -CC     Time 4  -- 10-pt seated  -CC        Exercise 5    Exercise Name 5  -- A/A ER/IR with cane  -CC     Cueing 5  -- Verbal;Tactile  -CC     Time 5  -- 10 -pt seated  -CC        Exercise 6    Exercise Name 6  -- L shoulder pulley FF/scaption  -CC     Cueing 6  -- Verbal;Tactile  -CC     Reps 6  -- 20 ea  -CC        Exercise 7    Exercise Name 7  -- Scapular retraction with ER   -CC     Cueing 7  -- Verbal;Tactile  -CC     Reps 7  -- 5- pt supine  -CC     Time 7  -- 5 sec  -CC        Exercise 8    Exercise Name 8  -- Towel IR stretch  -CC     Cueing 8  -- Demo  -CC     Reps 8  -- 10  -CC        Exercise 9    Exercise Name 9  -- FF wall climb  -CC     Cueing 9  -- Verbal;Demo  -CC     Reps 9 10  -CC 10  -CC        Exercise 10    Exercise Name 10  -- Standing R gastroc stretch  -CC     Cueing 10  -- Verbal;Demo  -CC     Reps 10  -- 3  -CC     Time 10  -- 15 sec  -CC        Exercise 11    Exercise Name 11  -- Seated A/A scaption with cane  -CC     Cueing 11  -- Verbal;Demo  -CC     Reps 11  -- 10  HEP  -CC        Exercise 12    Exercise Name 12  -- B rows vs TB  -CC     Cueing 12  -- Verbal;Tactile  -CC     Reps 12  -- 2  -CC     Time 12  -- 10- green TB  -CC        Exercise 13    Exercise Name 13  -- ER/IR vs TB  -CC     Cueing 13  -- Verbal;Demo  -CC     Time 13  -- 10 ea- red TB  -CC       User Key  (r) = Recorded By, (t) = Taken By, (c) = Cosigned By    Initials Name Provider Type    Raquel Hirsch, PT Physical Therapist                        Manual Rx (last 36 hours)      Manual Treatments     Row Name 08/29/18 1100             Manual Rx 1    Manual Rx 1 Location L shoulder-pt supine elevated HOB  -CC      Manual Rx 1 Type MET  -CC      Manual Rx 1 Grade Prom L shoulder -all planes  -CC      Manual Rx 1 Duration x10  -CC         Manual Rx 2    Manual Rx 2 Location L shoulder  -CC      Manual Rx 2 Type GH distraction- A-P glides  -CC      Manual Rx 2 Grade Gr II  -CC      Manual Rx 2 Duration x 10  -CC        User Key  (r) = Recorded By, (t) = Taken By, (c) = Cosigned By    Initials Name Provider Type    Raquel Hirsch, PT Physical Therapist                PT OP Goals     Row Name 08/29/18 1100          PT Short Term Goals    STG Date to Achieve 07/26/18  -CC     STG 1 Pt compliant with HEP for R foot and L shoulder as instructed  -CC     STG 1 Progress Met  -CC      STG 2 Pt reports decreasing sx R foot with max pain less/equal 3-4/10  -CC     STG 2 Progress Met  -CC     STG 3 Pt shows improved gait R foot with heel/toe mechanics by 25-50 %  -CC     STG 3 Progress Met  -CC     STG 4 Pt shows improved PROM L shoulder FF/scaption 140-150 degrees  -CC     STG 4 Progress Met  -CC     STG 5 Pt shows improved PROM L shoulder in ER/IR 50-60 degrees  -CC     STG 5 Progress Met  -        Long Term Goals    LTG Date to Achieve 08/27/18  -     LTG 1 Observe improved wt shifting R foot with normalized heel-toe mechanics  -     LTG 1 Progress Met  -CC     LTG 2 Pt shows Arom L shoulder FF-scaption 150-160 degrees  -CC     LTG 2 Progress Met  -CC     LTG 3 Pt shows muscle strength L shoulder 4-4+/5 RC  -CC     LTG 3 Progress Met  -CC       User Key  (r) = Recorded By, (t) = Taken By, (c) = Cosigned By    Initials Name Provider Type    CC Raquel Manning, PT Physical Therapist          Therapy Education  Education Details: Reinforced pt to continue to keep up with daily stretches/Rom and can do TB exercises alternate days /3x week on discharge   Given: HEP  Program: Reinforced  How Provided: Verbal  Provided to: Patient  Level of Understanding: Verbalized              Time Calculation:   Start Time: 1105  Stop Time: 1200  Time Calculation (min): 55 min  Therapy Suggested Charges     Code   Minutes Charges    None           Therapy Charges for Today     Code Description Service Date Service Provider Modifiers Qty    37260432211 HC PT ULTRASOUND EA 15 MIN 8/29/2018 Raquel Manning, PT GP 1    90457500706 HC PT THER PROC EA 15 MIN 8/29/2018 Raquel Manning, PT GP 1    03403776152 HC PT HOT OR COLD PACK TREAT MCARE 8/29/2018 Raquel Manning, PT GP 1    40991872254 HC PT MANUAL THERAPY EA 15 MIN 8/29/2018 Raquel Manning, PT GP 1    18354596085 HC PT MOBILITY CURRENT 8/29/2018 Raquel Manning, PT GP, CJ 1    68313582990 HC PT MOBILITY  PROJECTED 8/29/2018 Raquel Manning, PT GP, CJ 1          PT G-Codes  Functional Limitation: Carrying, moving and handling objects  Mobility: Walking and Moving Around Current Status (): At least 20 percent but less than 40 percent impaired, limited or restricted  Mobility: Walking and Moving Around Goal Status (): At least 20 percent but less than 40 percent impaired, limited or restricted         Raquel Manning, PT  8/29/2018

## 2018-09-05 ENCOUNTER — OFFICE VISIT (OUTPATIENT)
Dept: ORTHOPEDIC SURGERY | Facility: CLINIC | Age: 73
End: 2018-09-05

## 2018-09-05 DIAGNOSIS — M75.50 SUBACROMIAL BURSITIS: ICD-10-CM

## 2018-09-05 DIAGNOSIS — M75.02 ADHESIVE CAPSULITIS OF LEFT SHOULDER: ICD-10-CM

## 2018-09-05 DIAGNOSIS — R52 PAIN: Primary | ICD-10-CM

## 2018-09-05 PROCEDURE — 99213 OFFICE O/P EST LOW 20 MIN: CPT | Performed by: ORTHOPAEDIC SURGERY

## 2018-09-05 NOTE — PROGRESS NOTES
Subjective: Left shoulder pain     Patient ID: Mary Jack is a 72 y.o. female.    Chief Complaint:    History of Present Illness patient seen in follow-up to the subacromial bursitis and adhesive capsulitis of the left shoulder.  Has been involved in physical therapy and is making progress for motion less pain and discomfort.  She is completed her therapy program.       Social History     Occupational History   • retired [employer]     Social History Main Topics   • Smoking status: Current Every Day Smoker     Packs/day: 1.00     Years: 50.00     Types: Cigarettes     Start date: 8/3/2016     Last attempt to quit: 6/3/2016   • Smokeless tobacco: Never Used      Comment: Quit for about 6 weeks then started back   • Alcohol use No   • Drug use: No   • Sexual activity: Not Currently     Partners: Male     Birth control/ protection: Post-menopausal      Review of Systems   Constitutional: Negative for chills, diaphoresis, fever and unexpected weight change.   HENT: Negative for hearing loss, nosebleeds, sore throat and tinnitus.    Eyes: Negative for pain and visual disturbance.   Respiratory: Negative for cough, shortness of breath and wheezing.    Cardiovascular: Negative for chest pain and palpitations.   Gastrointestinal: Negative for abdominal pain, diarrhea, nausea and vomiting.   Endocrine: Negative for cold intolerance, heat intolerance and polydipsia.   Genitourinary: Negative for difficulty urinating, dysuria and hematuria.   Musculoskeletal: Positive for arthralgias. Negative for joint swelling and myalgias.   Skin: Negative for rash and wound.   Allergic/Immunologic: Negative for environmental allergies.   Neurological: Negative for dizziness, syncope and numbness.   Hematological: Does not bruise/bleed easily.   Psychiatric/Behavioral: Negative for dysphoric mood and sleep disturbance. The patient is not nervous/anxious.    All other systems reviewed and are negative.        Past  Medical History:   Diagnosis Date   • AAA (abdominal aortic aneurysm) (CMS/HCC)    • Allergic not sure    CODINE,MORFINE AND DYE SOLUTION   • Anemia    • Aneurysm of abdominal aorta (CMS/HCC)    • Arthritis    • Zepeda esophagus    • CAD (coronary artery disease)    • Chest pain    • Constipation    • COPD (chronic obstructive pulmonary disease) (CMS/HCC)    • Emphysema lung (CMS/HCC)    • Empyema (CMS/HCC)    • Fatigue    • GERD (gastroesophageal reflux disease)    • Headache    • Hiatal hernia    • High risk medication use    • History of cardiac catheterization     CATH STENT PLACEMENT: CIRCUMFLEX BRANCH   • Hyperlipidemia    • Hypertension    • Infectious viral hepatitis Jaundice in 1963   • Jaundice    • Lumbosacral disc disease    • Myocardial infarction    • Myocardial infarction    • Occult blood in stools    • On home O2    • Peripheral artery disease (CMS/HCC)    • Reflux gastritis    • Scoliosis    • Visual impairment     BLURRY VISION IN RT. EYE     Past Surgical History:   Procedure Laterality Date   • CARDIAC CATHETERIZATION     • CARDIAC CATHETERIZATION N/A 5/12/2017    Procedure: Left Heart Cath;  Surgeon: Herber Hayes MD;  Location: [facility] CATH INVASIVE LOCATION;  Service:    • CHOLECYSTECTOMY     • COLONOSCOPY     • COLONOSCOPY N/A 6/10/2016    Procedure: COLONOSCOPY with polypectomy;  Surgeon: Virgie Castelan MD;  Location: [facility] OR;  Service:    • CORONARY ANGIOPLASTY     • CORONARY STENT PLACEMENT      X2   • ENDOSCOPY N/A 6/10/2016    Procedure: ESOPHAGOGASTRODUODENOSCOPY WITH BIOPSY;  Surgeon: Virgei Castelan MD;  Location: [facility] OR;  Service:    • ENDOSCOPY     • ENDOSCOPY N/A 8/25/2017    Procedure: ESOPHAGOGASTRODUODENOSCOPY w/ biopsies;  Surgeon: Virgie Castelan MD;  Location: [facility] OR;  Service:      Family History   Problem Relation Age of Onset   • Colon cancer Mother    • Breast cancer Mother    • Other Father         cardiac disorder   • Hypertension Father    • Heart  disease Father    • Other Sister         cardiac disorder   • Hypertension Sister    • Lung disease Sister    • Thyroid disease Sister    • Other Brother         cardiac disorder   • Thyroid disease Daughter    • Heart disease Sister    • Hypertension Sister    • Pulmonary fibrosis Sister    • Heart disease Brother    • Other Brother    • Thyroid disease Sister    • Other Sister    • Thyroid disease Daughter          Objective:  There were no vitals filed for this visit.  There were no vitals filed for this visit.  There is no height or weight on file to calculate BMI.       Ortho Exam  is alert and oriented ×3.  She has near full extension and abduction of the shoulder.  External rotation is 45° internal rotation still limited to about 20° his full range of motion of the elbow.  Negative Montiel sign..  No motor sensory deficit good distal pulses good capillary refill.  Tolerating anti-inflammatories without any GI side effects.    Assessment:       1. Pain    2. Subacromial bursitis    3. Adhesive capsulitis of left shoulder          Plan: She continue therapy at home on her own instructed her on exercises doing the shower.  Continue the meloxicam.  Return in 4 weeks if she is still having some discomfort particularly for his limitations as far as internal rotation we can reinject the shoulder.  Otherwise return when necessary.            Work Status:    LILIAN query complete.    Orders:  No orders of the defined types were placed in this encounter.      Medications:  No orders of the defined types were placed in this encounter.      Followup:  Return in about 4 weeks (around 10/3/2018).          Dictated utilizing Dragon dictation

## 2018-09-08 DIAGNOSIS — I24.0 ISCHEMIC HEART DISEASE DUE TO CORONARY ARTERY OBSTRUCTION (HCC): ICD-10-CM

## 2018-09-08 DIAGNOSIS — I25.9 ISCHEMIC HEART DISEASE DUE TO CORONARY ARTERY OBSTRUCTION (HCC): ICD-10-CM

## 2018-09-28 ENCOUNTER — DOCUMENTATION (OUTPATIENT)
Dept: PHYSICAL THERAPY | Facility: HOSPITAL | Age: 73
End: 2018-09-28

## 2018-09-28 DIAGNOSIS — M75.02 ADHESIVE CAPSULITIS OF LEFT SHOULDER: Primary | ICD-10-CM

## 2018-10-08 RX ORDER — MELOXICAM 7.5 MG/1
TABLET ORAL
Qty: 90 TABLET | Refills: 1 | Status: SHIPPED | OUTPATIENT
Start: 2018-10-08 | End: 2019-08-21

## 2018-10-31 RX ORDER — POLYETHYLENE GLYCOL 3350 17 G/17G
17 POWDER, FOR SOLUTION ORAL DAILY
Qty: 850 G | Refills: 3 | Status: SHIPPED | OUTPATIENT
Start: 2018-10-31

## 2018-11-05 DIAGNOSIS — I25.9 ISCHEMIC HEART DISEASE DUE TO CORONARY ARTERY OBSTRUCTION (HCC): ICD-10-CM

## 2018-11-05 DIAGNOSIS — I24.0 ISCHEMIC HEART DISEASE DUE TO CORONARY ARTERY OBSTRUCTION (HCC): ICD-10-CM

## 2018-11-28 ENCOUNTER — OFFICE VISIT (OUTPATIENT)
Dept: CARDIOLOGY | Age: 73
End: 2018-11-28

## 2018-11-28 VITALS
HEIGHT: 64 IN | SYSTOLIC BLOOD PRESSURE: 129 MMHG | DIASTOLIC BLOOD PRESSURE: 78 MMHG | BODY MASS INDEX: 29.71 KG/M2 | HEART RATE: 75 BPM | WEIGHT: 174 LBS

## 2018-11-28 DIAGNOSIS — I25.9 ISCHEMIC HEART DISEASE DUE TO CORONARY ARTERY OBSTRUCTION (HCC): ICD-10-CM

## 2018-11-28 DIAGNOSIS — I65.23 BILATERAL CAROTID ARTERY STENOSIS: Primary | ICD-10-CM

## 2018-11-28 DIAGNOSIS — E78.2 MIXED HYPERLIPIDEMIA: ICD-10-CM

## 2018-11-28 DIAGNOSIS — Z72.0 TOBACCO ABUSE: ICD-10-CM

## 2018-11-28 DIAGNOSIS — I24.0 ISCHEMIC HEART DISEASE DUE TO CORONARY ARTERY OBSTRUCTION (HCC): ICD-10-CM

## 2018-11-28 PROCEDURE — 99213 OFFICE O/P EST LOW 20 MIN: CPT | Performed by: INTERNAL MEDICINE

## 2018-11-28 PROCEDURE — 93000 ELECTROCARDIOGRAM COMPLETE: CPT | Performed by: INTERNAL MEDICINE

## 2018-12-04 ENCOUNTER — HOSPITAL ENCOUNTER (OUTPATIENT)
Dept: ULTRASOUND IMAGING | Facility: HOSPITAL | Age: 73
Discharge: HOME | End: 2018-12-04
Attending: INTERNAL MEDICINE | Admitting: INTERNAL MEDICINE

## 2018-12-04 DIAGNOSIS — I65.23 BILATERAL CAROTID ARTERY STENOSIS: ICD-10-CM

## 2018-12-04 PROCEDURE — 93880 EXTRACRANIAL BILAT STUDY: CPT

## 2018-12-06 DIAGNOSIS — R93.89 ABNORMAL CAROTID ULTRASOUND: Primary | ICD-10-CM

## 2018-12-11 ENCOUNTER — TRANSCRIBE ORDERS (OUTPATIENT)
Dept: ADMINISTRATIVE | Facility: HOSPITAL | Age: 73
End: 2018-12-11

## 2018-12-11 DIAGNOSIS — I65.23 CAROTID STENOSIS, BILATERAL: Primary | ICD-10-CM

## 2018-12-18 ENCOUNTER — HOSPITAL ENCOUNTER (OUTPATIENT)
Dept: CT IMAGING | Facility: HOSPITAL | Age: 73
Discharge: HOME | End: 2018-12-18

## 2018-12-18 ENCOUNTER — HOSPITAL ENCOUNTER (OUTPATIENT)
Dept: CT IMAGING | Facility: HOSPITAL | Age: 73
Discharge: HOME | End: 2018-12-18
Admitting: SURGERY

## 2018-12-18 DIAGNOSIS — I65.23 CAROTID STENOSIS, BILATERAL: ICD-10-CM

## 2018-12-18 PROCEDURE — 70498 CT ANGIOGRAPHY NECK: CPT

## 2018-12-18 PROCEDURE — 0 IOPAMIDOL PER 1 ML: Performed by: SURGERY

## 2018-12-18 PROCEDURE — 70496 CT ANGIOGRAPHY HEAD: CPT

## 2018-12-18 RX ADMIN — IOPAMIDOL 100 ML: 755 INJECTION, SOLUTION INTRAVENOUS at 14:10

## 2019-01-04 RX ORDER — ATORVASTATIN CALCIUM 10 MG/1
10 TABLET, FILM COATED ORAL DAILY
Qty: 90 TABLET | Refills: 3 | Status: SHIPPED | OUTPATIENT
Start: 2019-01-04 | End: 2019-12-01 | Stop reason: SDUPTHER

## 2019-02-12 DIAGNOSIS — I25.9 ISCHEMIC HEART DISEASE DUE TO CORONARY ARTERY OBSTRUCTION (HCC): ICD-10-CM

## 2019-02-12 DIAGNOSIS — I24.0 ISCHEMIC HEART DISEASE DUE TO CORONARY ARTERY OBSTRUCTION (HCC): ICD-10-CM

## 2019-06-30 DIAGNOSIS — I25.9 ISCHEMIC HEART DISEASE DUE TO CORONARY ARTERY OBSTRUCTION (HCC): ICD-10-CM

## 2019-06-30 DIAGNOSIS — I24.0 ISCHEMIC HEART DISEASE DUE TO CORONARY ARTERY OBSTRUCTION (HCC): ICD-10-CM

## 2019-07-30 DIAGNOSIS — Z51.81 ENCOUNTER FOR THERAPEUTIC DRUG MONITORING: ICD-10-CM

## 2019-07-30 DIAGNOSIS — R53.83 FATIGUE, UNSPECIFIED TYPE: ICD-10-CM

## 2019-07-30 DIAGNOSIS — E78.2 MIXED HYPERLIPIDEMIA: Primary | ICD-10-CM

## 2019-07-30 DIAGNOSIS — D50.0 CHRONIC BLOOD LOSS ANEMIA: ICD-10-CM

## 2019-08-01 LAB
ALT SERPL-CCNC: 8 U/L (ref 1–33)
APPEARANCE UR: CLEAR
BILIRUB UR QL STRIP: NEGATIVE
BUN SERPL-MCNC: 13 MG/DL (ref 8–23)
BUN/CREAT SERPL: 13.7 (ref 7–25)
CALCIUM SERPL-MCNC: 10.4 MG/DL (ref 8.6–10.5)
CHLORIDE SERPL-SCNC: 94 MMOL/L (ref 98–107)
CHOLEST SERPL-MCNC: 145 MG/DL (ref 0–200)
CO2 SERPL-SCNC: 28.7 MMOL/L (ref 22–29)
COLOR UR: YELLOW
CREAT SERPL-MCNC: 0.95 MG/DL (ref 0.57–1)
ERYTHROCYTE [DISTWIDTH] IN BLOOD BY AUTOMATED COUNT: 13.2 % (ref 12.3–15.4)
GLUCOSE SERPL-MCNC: 100 MG/DL (ref 65–99)
GLUCOSE UR QL: NEGATIVE
HCT VFR BLD AUTO: 38.4 % (ref 34–46.6)
HDLC SERPL-MCNC: 60 MG/DL (ref 40–60)
HGB BLD-MCNC: 12 G/DL (ref 12–15.9)
HGB UR QL STRIP: NEGATIVE
KETONES UR QL STRIP: NEGATIVE
LDLC SERPL CALC-MCNC: 62 MG/DL (ref 0–100)
LEUKOCYTE ESTERASE UR QL STRIP: NEGATIVE
MCH RBC QN AUTO: 31 PG (ref 26.6–33)
MCHC RBC AUTO-ENTMCNC: 31.3 G/DL (ref 31.5–35.7)
MCV RBC AUTO: 99.2 FL (ref 79–97)
NITRITE UR QL STRIP: NEGATIVE
PH UR STRIP: 7 [PH] (ref 5–8)
PLATELET # BLD AUTO: 254 10*3/MM3 (ref 140–450)
POTASSIUM SERPL-SCNC: 4.9 MMOL/L (ref 3.5–5.2)
PROT UR QL STRIP: NEGATIVE
RBC # BLD AUTO: 3.87 10*6/MM3 (ref 3.77–5.28)
SODIUM SERPL-SCNC: 135 MMOL/L (ref 136–145)
SP GR UR: 1.01 (ref 1–1.03)
TRIGL SERPL-MCNC: 117 MG/DL (ref 0–150)
TSH SERPL DL<=0.005 MIU/L-ACNC: 3.16 MIU/ML (ref 0.27–4.2)
UROBILINOGEN UR STRIP-MCNC: NORMAL MG/DL
VLDLC SERPL CALC-MCNC: 23.4 MG/DL
WBC # BLD AUTO: 5.52 10*3/MM3 (ref 3.4–10.8)

## 2019-08-07 DIAGNOSIS — I24.0 ISCHEMIC HEART DISEASE DUE TO CORONARY ARTERY OBSTRUCTION (HCC): ICD-10-CM

## 2019-08-07 DIAGNOSIS — I25.9 ISCHEMIC HEART DISEASE DUE TO CORONARY ARTERY OBSTRUCTION (HCC): ICD-10-CM

## 2019-08-21 ENCOUNTER — OFFICE VISIT (OUTPATIENT)
Dept: GASTROENTEROLOGY | Facility: CLINIC | Age: 74
End: 2019-08-21

## 2019-08-21 VITALS — WEIGHT: 176 LBS | HEIGHT: 64 IN | BODY MASS INDEX: 30.05 KG/M2

## 2019-08-21 DIAGNOSIS — K22.70 BARRETT'S ESOPHAGUS WITHOUT DYSPLASIA: ICD-10-CM

## 2019-08-21 DIAGNOSIS — K21.9 GASTROESOPHAGEAL REFLUX DISEASE, ESOPHAGITIS PRESENCE NOT SPECIFIED: Primary | ICD-10-CM

## 2019-08-21 PROCEDURE — 99212 OFFICE O/P EST SF 10 MIN: CPT | Performed by: INTERNAL MEDICINE

## 2019-08-21 NOTE — PROGRESS NOTES
PATIENT INFORMATION  Mary Jack       - 1945    CHIEF COMPLAINT  Chief Complaint   Patient presents with   • Follow-up     yearly follow up on GERD       HISTORY OF PRESENT ILLNESS  HPI  She is here today in follow up for gerd and barretts esophagus. She is maintained on ppi. Symptoms controlled as long as continuing on this. No melena or hematochezia.  Last egd with esophageal biopsies was in 2016.   She is currently on home o2 2l all the time.        REVIEW OF SYSTEMS  Review of Systems   HENT: Positive for mouth sores.    Respiratory: Positive for cough, shortness of breath, wheezing and stridor.    Gastrointestinal:        Reflux   Endocrine: Positive for polyuria.   Genitourinary: Positive for frequency.   Musculoskeletal: Positive for back pain.   All other systems reviewed and are negative.        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 (CMS/HCC) [I24.0]   • Panlobular emphysema (CMS/HCC) [J43.1]   • Chronic coronary artery disease [I25.10]   • Iron deficiency anemia due to chronic blood loss [D50.0]   • Mixed hyperlipidemia [E78.2]         PAST MEDICAL HISTORY  Past Medical History:   Diagnosis Date   • AAA (abdominal aortic aneurysm) (CMS/HCC)    • Allergic not sure    CODINE,MORFINE AND DYE SOLUTION   • Anemia    • Aneurysm of abdominal aorta (CMS/HCC)    • Arthritis    • Zepeda esophagus    •  CAD (coronary artery disease)    • Chest pain    • Constipation    • COPD (chronic obstructive pulmonary disease) (CMS/HCC)    • Emphysema lung (CMS/HCC)    • Empyema (CMS/HCC)    • Fatigue    • GERD (gastroesophageal reflux disease)    • Headache    • Hiatal hernia    • High risk medication use    • History of cardiac catheterization     CATH STENT PLACEMENT: CIRCUMFLEX BRANCH   • Hyperlipidemia    • Hypertension    • Infectious viral hepatitis Jaundice in 1963   • Jaundice    • Lumbosacral disc disease    • Myocardial infarction (CMS/HCC)    • Myocardial infarction (CMS/HCC)    • Occult blood in stools    • On home O2    • Peripheral artery disease (CMS/HCC)    • Reflux gastritis    • Scoliosis    • Visual impairment     BLURRY VISION IN RT. EYE         SURGICAL HISTORY  Past Surgical History:   Procedure Laterality Date   • CARDIAC CATHETERIZATION     • CARDIAC CATHETERIZATION N/A 5/12/2017    Procedure: Left Heart Cath;  Surgeon: Herber Hayes MD;  Location: [facility] CATH INVASIVE LOCATION;  Service:    • CHOLECYSTECTOMY     • COLONOSCOPY     • COLONOSCOPY N/A 6/10/2016    Procedure: COLONOSCOPY with polypectomy;  Surgeon: Virgie Castelan MD;  Location: [facility] OR;  Service:    • CORONARY ANGIOPLASTY     • CORONARY STENT PLACEMENT      X2   • ENDOSCOPY N/A 6/10/2016    Procedure: ESOPHAGOGASTRODUODENOSCOPY WITH BIOPSY;  Surgeon: Virgie Castelan MD;  Location: [facility] OR;  Service:    • ENDOSCOPY     • ENDOSCOPY N/A 8/25/2017    Procedure: ESOPHAGOGASTRODUODENOSCOPY w/ biopsies;  Surgeon: Virgie Castelan MD;  Location: [facility] OR;  Service:          FAMILY HISTORY  Family History   Problem Relation Age of Onset   • Colon cancer Mother    • Breast cancer Mother    • Other Father         cardiac disorder   • Hypertension Father    • Heart disease Father    • Other Sister         cardiac disorder   • Hypertension Sister    • Lung disease Sister    • Thyroid disease Sister    • Other Brother         cardiac disorder    • Thyroid disease Daughter    • Heart disease Sister    • Hypertension Sister    • Pulmonary fibrosis Sister    • Heart disease Brother    • Other Brother    • Thyroid disease Sister    • Other Sister    • Thyroid disease Daughter          SOCIAL HISTORY  Social History     Occupational History   • Occupation: retired     Employer: [employer]   Tobacco Use   • Smoking status: Current Every Day Smoker     Packs/day: 1.00     Years: 50.00     Pack years: 50.00     Types: Cigarettes     Start date: 8/3/2016     Last attempt to quit: 6/3/2016     Years since quitting: 3.2   • Smokeless tobacco: Never Used   • Tobacco comment: Quit for about 6 weeks then started back   Substance and Sexual Activity   • Alcohol use: No   • Drug use: No   • Sexual activity: Not Currently     Partners: Male     Birth control/protection: Post-menopausal       Debilities/Disabilities Identified: None    Emotional Behavior: Appropriate    CURRENT MEDICATIONS    Current Outpatient Medications:   •  ANORO ELLIPTA 62.5-25 MCG/INH aerosol powder , USE 1 PUFF BY MOUTH DAILY., Disp: , Rfl: 0  •  aspirin 81 MG tablet, Take 1 tablet by mouth daily., Disp: , Rfl:   •  atorvastatin (LIPITOR) 10 MG tablet, Take 1 tablet by mouth Daily., Disp: 90 tablet, Rfl: 3  •  Calcium Carbonate-Vitamin D (CALCIUM 600+D HIGH POTENCY PO), , Disp: , Rfl:   •  cholecalciferol (VITAMIN D3) 1000 UNITS tablet, Take 2,000 Units by mouth Daily., Disp: , Rfl:   •  metoprolol tartrate (LOPRESSOR) 25 MG tablet, TAKE 1 TABLET BY MOUTH TWICE DAILY, Disp: 60 tablet, Rfl: 0  •  nitroglycerin (NITROSTAT) 0.4 MG SL tablet, Place 0.4 mg under the tongue Every 5 (Five) Minutes As Needed for Chest Pain. Take no more than 3 doses in 15 minutes., Disp: , Rfl:   •  omeprazole (priLOSEC) 40 MG capsule, Take 1 capsule by mouth Daily., Disp: 90 capsule, Rfl: 3  •  polyethylene glycol (MIRALAX) packet, Take 17 g by mouth Daily., Disp: 850 g, Rfl: 3  •  vitamin A 11227 UNIT  "capsule, Take 10,000 Units by mouth Daily., Disp: , Rfl:   •  vitamin B-12 (CYANOCOBALAMIN) 1000 MCG tablet, Take 1,000 mcg by mouth Daily., Disp: , Rfl:     ALLERGIES  Iodinated diagnostic agents; Codeine; and Morphine and related    VITALS  Vitals:    08/21/19 1418   Weight: 79.8 kg (176 lb)   Height: 162.6 cm (64.02\")       LAST RESULTS   Orders Only on 07/30/2019   Component Date Value Ref Range Status   • Total Cholesterol 07/31/2019 145  0 - 200 mg/dL Final   • Triglycerides 07/31/2019 117  0 - 150 mg/dL Final   • HDL Cholesterol 07/31/2019 60  40 - 60 mg/dL Final   • VLDL Cholesterol 07/31/2019 23.4  mg/dL Final   • LDL Cholesterol  07/31/2019 62  0 - 100 mg/dL Final   • WBC 07/31/2019 5.52  3.40 - 10.80 10*3/mm3 Final   • RBC 07/31/2019 3.87  3.77 - 5.28 10*6/mm3 Final   • Hemoglobin 07/31/2019 12.0  12.0 - 15.9 g/dL Final   • Hematocrit 07/31/2019 38.4  34.0 - 46.6 % Final   • MCV 07/31/2019 99.2* 79.0 - 97.0 fL Final   • MCH 07/31/2019 31.0  26.6 - 33.0 pg Final   • MCHC 07/31/2019 31.3* 31.5 - 35.7 g/dL Final   • RDW 07/31/2019 13.2  12.3 - 15.4 % Final   • Platelets 07/31/2019 254  140 - 450 10*3/mm3 Final   • TSH 07/31/2019 3.160  0.270 - 4.200 mIU/mL Final   • ALT (SGPT) 07/31/2019 8  1 - 33 U/L Final   • Glucose 07/31/2019 100* 65 - 99 mg/dL Final   • BUN 07/31/2019 13  8 - 23 mg/dL Final   • Creatinine 07/31/2019 0.95  0.57 - 1.00 mg/dL Final   • eGFR Non African Am 07/31/2019 58* >60 mL/min/1.73 Final    Comment: The MDRD GFR formula is only valid for adults with stable  renal function between ages 18 and 70.     • eGFR  Am 07/31/2019 70  >60 mL/min/1.73 Final   • BUN/Creatinine Ratio 07/31/2019 13.7  7.0 - 25.0 Final   • Sodium 07/31/2019 135* 136 - 145 mmol/L Final   • Potassium 07/31/2019 4.9  3.5 - 5.2 mmol/L Final   • Chloride 07/31/2019 94* 98 - 107 mmol/L Final   • Total CO2 07/31/2019 28.7  22.0 - 29.0 mmol/L Final   • Calcium 07/31/2019 10.4  8.6 - 10.5 mg/dL Final   • Specific " Gravity, UA 07/31/2019 1.011  1.005 - 1.030 Final   • pH, UA 07/31/2019 7.0  5.0 - 8.0 Final   • Color, UA 07/31/2019 Yellow   Final    Comment: Urine microscopic not indicated.  REFERENCE RANGE: Yellow, Straw     • Appearance, UA 07/31/2019 Clear  Clear Final   • Leukocytes, UA 07/31/2019 Negative  Negative Final   • Protein 07/31/2019 Negative  Negative Final   • Glucose, UA 07/31/2019 Negative  Negative Final   • Ketones 07/31/2019 Negative  Negative Final   • Blood, UA 07/31/2019 Negative  Negative Final   • Bilirubin, UA 07/31/2019 Negative  Negative Final   • Urobilinogen, UA 07/31/2019 Comment   Final    Comment: 0.2 E.U./dL  REFERENCE RANGE: 0.2 - 1.0 E.U./dL     • Nitrite, UA 07/31/2019 Negative  Negative Final     No results found.    PHYSICAL EXAM  Physical Exam   Constitutional: She is oriented to person, place, and time. She appears well-developed and well-nourished. No distress.   HENT:   Head: Normocephalic and atraumatic.   Mouth/Throat: Oropharynx is clear and moist.   Eyes: EOM are normal. Pupils are equal, round, and reactive to light.   Neck: Normal range of motion. No tracheal deviation present.   Cardiovascular: Normal rate, regular rhythm, normal heart sounds and intact distal pulses. Exam reveals no gallop and no friction rub.   No murmur heard.  Pulmonary/Chest: Effort normal and breath sounds normal. No stridor. No respiratory distress. She has no wheezes. She has no rales. She exhibits no tenderness.   Abdominal: Soft. Bowel sounds are normal. She exhibits no distension. There is no tenderness. There is no rebound and no guarding.   Musculoskeletal: She exhibits no edema.   Lymphadenopathy:     She has no cervical adenopathy.   Neurological: She is alert and oriented to person, place, and time.   Skin: Skin is warm. She is not diaphoretic.   Psychiatric: She has a normal mood and affect. Her behavior is normal. Judgment and thought content normal.   Nursing note and vitals  reviewed.      ASSESSMENT  Diagnoses and all orders for this visit:    Gastroesophageal reflux disease, esophagitis presence not specified    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          PLAN  No Follow-up on file.    Antireflux measures and dietary modifications reviewed. Low acid diet reviewed. Keep head of bed elevated. Stop eating/drinking at least 3 hours prior to bedtime. Eliminate caffeine and carbonated beverages.  Weight loss encouraged if BMI over 25.      She is due for egd this year or next, colon due in 2021. Guidelines recommend 3-5 years. So with her history of on oxygen use, can wait until 2021 as she is doing well and not having issues.

## 2019-08-27 ENCOUNTER — OFFICE VISIT (OUTPATIENT)
Dept: FAMILY MEDICINE CLINIC | Facility: CLINIC | Age: 74
End: 2019-08-27

## 2019-08-27 VITALS
WEIGHT: 176 LBS | DIASTOLIC BLOOD PRESSURE: 84 MMHG | BODY MASS INDEX: 30.05 KG/M2 | HEIGHT: 64 IN | SYSTOLIC BLOOD PRESSURE: 138 MMHG | HEART RATE: 78 BPM | OXYGEN SATURATION: 94 %

## 2019-08-27 DIAGNOSIS — Z00.00 MEDICARE ANNUAL WELLNESS VISIT, SUBSEQUENT: Primary | ICD-10-CM

## 2019-08-27 DIAGNOSIS — I77.9 BILATERAL CAROTID ARTERY DISEASE, UNSPECIFIED TYPE (HCC): ICD-10-CM

## 2019-08-27 DIAGNOSIS — I24.0 ISCHEMIC HEART DISEASE DUE TO CORONARY ARTERY OBSTRUCTION (HCC): ICD-10-CM

## 2019-08-27 DIAGNOSIS — E78.2 MIXED HYPERLIPIDEMIA: ICD-10-CM

## 2019-08-27 DIAGNOSIS — F17.200 NICOTINE DEPENDENCE, UNCOMPLICATED, UNSPECIFIED NICOTINE PRODUCT TYPE: ICD-10-CM

## 2019-08-27 DIAGNOSIS — I25.9 ISCHEMIC HEART DISEASE DUE TO CORONARY ARTERY OBSTRUCTION (HCC): ICD-10-CM

## 2019-08-27 DIAGNOSIS — J43.1 PANLOBULAR EMPHYSEMA (HCC): ICD-10-CM

## 2019-08-27 DIAGNOSIS — Z12.39 SCREENING FOR BREAST CANCER: ICD-10-CM

## 2019-08-27 DIAGNOSIS — M85.89 OSTEOPENIA OF MULTIPLE SITES: ICD-10-CM

## 2019-08-27 DIAGNOSIS — K22.70 BARRETT'S ESOPHAGUS WITHOUT DYSPLASIA: ICD-10-CM

## 2019-08-27 PROBLEM — I65.23 BILATERAL CAROTID ARTERY STENOSIS: Status: RESOLVED | Noted: 2018-11-28 | Resolved: 2019-08-27

## 2019-08-27 PROBLEM — Z72.0 TOBACCO ABUSE: Status: RESOLVED | Noted: 2017-02-22 | Resolved: 2019-08-27

## 2019-08-27 PROCEDURE — 96160 PT-FOCUSED HLTH RISK ASSMT: CPT | Performed by: FAMILY MEDICINE

## 2019-08-27 PROCEDURE — G0439 PPPS, SUBSEQ VISIT: HCPCS | Performed by: FAMILY MEDICINE

## 2019-08-27 PROCEDURE — 99406 BEHAV CHNG SMOKING 3-10 MIN: CPT | Performed by: FAMILY MEDICINE

## 2019-08-27 RX ORDER — OMEPRAZOLE 40 MG/1
40 CAPSULE, DELAYED RELEASE ORAL DAILY
Qty: 90 CAPSULE | Refills: 3 | Status: SHIPPED | OUTPATIENT
Start: 2019-08-27 | End: 2020-08-31 | Stop reason: SDUPTHER

## 2019-08-27 NOTE — PROGRESS NOTES
The ABCs of the Annual Wellness Visit  Subsequent Medicare Wellness Visit    Chief Complaint   Patient presents with   • Annual Exam     medicare        Subjective   History of Present Illness:  Mary Jack is a 73 y.o. female who presents for a Subsequent Medicare Wellness Visit.    revived her notes from Dr Castelan, cardio and vascular    HEALTH RISK ASSESSMENT    Recent Hospitalizations:  No hospitalization(s) within the last year.    Current Medical Providers:  Patient Care Team:  Delfino Matt MD as PCP - General  Oral Llanes MD as Consulting Physician (Pulmonary Disease)  Herber Hayes MD as Consulting Physician (Cardiology)  Virgie Castelan MD as Consulting Physician (Gastroenterology)  Maxi Lundy MD as Surgeon (Orthopedic Surgery)  Mahesh Salmon MD as Surgeon (Vascular Surgery)    Smoking Status:  Social History     Tobacco Use   Smoking Status Current Every Day Smoker   • Packs/day: 0.50   • Years: 50.00   • Pack years: 25.00   • Types: Cigarettes   • Start date: 8/3/2016   • Last attempt to quit: 6/3/2016   • Years since quitting: 3.2   Smokeless Tobacco Never Used       Alcohol Consumption:  Social History     Substance and Sexual Activity   Alcohol Use No       Depression Screen:   PHQ-2/PHQ-9 Depression Screening 8/27/2019   Little interest or pleasure in doing things 0   Feeling down, depressed, or hopeless 0   Trouble falling or staying asleep, or sleeping too much 0   Feeling tired or having little energy 1   Poor appetite or overeating 0   Feeling bad about yourself - or that you are a failure or have let yourself or your family down 0   Trouble concentrating on things, such as reading the newspaper or watching television 0   Moving or speaking so slowly that other people could have noticed. Or the opposite - being so fidgety or restless that you have been moving around a lot more than usual 0   Thoughts that you would be better off dead, or of hurting yourself in some  way 0   Total Score 1       Fall Risk Screen:  RACQUEL Fall Risk Assessment was completed, and patient is at LOW risk for falls.Assessment completed on:8/27/2019    Health Habits and Functional and Cognitive Screening:  Functional & Cognitive Status 8/27/2019   Do you have difficulty preparing food and eating? No   Do you have difficulty bathing yourself, getting dressed or grooming yourself? No   Do you have difficulty using the toilet? No   Do you have difficulty moving around from place to place? No   Do you have trouble with steps or getting out of a bed or a chair? No   Current Diet Unhealthy Diet   Dental Exam Not up to date   Eye Exam Not up to date   Exercise (times per week) 0 times per week   Current Exercise Activities Include None   Do you need help using the phone?  No   Are you deaf or do you have serious difficulty hearing?  No   Do you need help with transportation? No   Do you need help shopping? No   Do you need help preparing meals?  No   Do you need help with housework?  No   Do you need help with laundry? No   Do you need help taking your medications? No   Do you need help managing money? No   Do you ever drive or ride in a car without wearing a seat belt? No   Have you felt unusual stress, anger or loneliness in the last month? No   Who do you live with? Alone   If you need help, do you have trouble finding someone available to you? No   Have you been bothered in the last four weeks by sexual problems? No   Do you have difficulty concentrating, remembering or making decisions? No         Does the patient have evidence of cognitive impairment? No    Asprin use counseling:Taking ASA appropriately as indicated    Age-appropriate Screening Schedule:  Refer to the list below for future screening recommendations based on patient's age, sex and/or medical conditions. Orders for these recommended tests are listed in the plan section. The patient has been provided with a written plan.    Health  Maintenance   Topic Date Due   • TDAP/TD VACCINES (1 - Tdap) 09/06/1966   • ZOSTER VACCINE (2 of 3) 02/26/2014   • INFLUENZA VACCINE  08/01/2019   • MAMMOGRAM  07/25/2020   • LIPID PANEL  07/31/2020   • COLONOSCOPY  06/10/2026   • PNEUMOCOCCAL VACCINES (65+ LOW/MEDIUM RISK)  Completed          The following portions of the patient's history were reviewed and updated as appropriate: allergies, current medications, past family history, past medical history, past social history, past surgical history and problem list.    Outpatient Medications Prior to Visit   Medication Sig Dispense Refill   • ANORO ELLIPTA 62.5-25 MCG/INH aerosol powder  USE 1 PUFF BY MOUTH DAILY.  0   • aspirin 81 MG tablet Take 1 tablet by mouth daily.     • atorvastatin (LIPITOR) 10 MG tablet Take 1 tablet by mouth Daily. 90 tablet 3   • Calcium Carbonate-Vitamin D (CALCIUM 600+D HIGH POTENCY PO)      • cholecalciferol (VITAMIN D3) 1000 UNITS tablet Take 2,000 Units by mouth Daily.     • nitroglycerin (NITROSTAT) 0.4 MG SL tablet Place 0.4 mg under the tongue Every 5 (Five) Minutes As Needed for Chest Pain. Take no more than 3 doses in 15 minutes.     • polyethylene glycol (MIRALAX) packet Take 17 g by mouth Daily. 850 g 3   • vitamin A 03239 UNIT capsule Take 10,000 Units by mouth Daily.     • vitamin B-12 (CYANOCOBALAMIN) 1000 MCG tablet Take 1,000 mcg by mouth Daily.     • metoprolol tartrate (LOPRESSOR) 25 MG tablet TAKE 1 TABLET BY MOUTH TWICE DAILY 60 tablet 0   • omeprazole (priLOSEC) 40 MG capsule Take 1 capsule by mouth Daily. 90 capsule 3     No facility-administered medications prior to visit.        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)   • Panlobular emphysema (CMS/HCC)   • Chronic coronary artery disease   • Mixed hyperlipidemia   • Abdominal aortic aneurysm (AAA) without rupture (CMS/HCC)   • Pulmonary arterial hypertension (CMS/HCC)   • Zepeda's esophagus  "without dysplasia   • Hiatal hernia   • Medicare annual wellness visit, subsequent   • Screening for breast cancer   • Menopause   • Bilateral carotid artery disease (CMS/HCC)   • Osteopenia of multiple sites   • Nicotine dependence       Advanced Care Planning:  Patient has an advance directive - a copy has not been provided. Have asked the patient to send this to us to add to record    Review of Systems    Compared to one year ago, the patient feels her physical health is the same.  Compared to one year ago, the patient feels her mental health is the same.    Reviewed chart for potential of high risk medication in the elderly: yes  Reviewed chart for potential of harmful drug interactions in the elderly:yes    Objective         Vitals:    08/27/19 1402   BP: 138/84   BP Location: Left arm   Patient Position: Sitting   Cuff Size: Adult   Pulse: 78   SpO2: 94%   Weight: 79.8 kg (176 lb)   Height: 162.6 cm (64.02\")       Body mass index is 30.19 kg/m².  Discussed the patient's BMI with her. The BMI is above average; BMI management plan is completed.    Physical Exam   Constitutional: She appears well-developed and well-nourished.   Neck: Carotid bruit is present.   Cardiovascular: Normal rate.   Pulmonary/Chest: Effort normal. No respiratory distress.   Neurological: She is alert.   Psychiatric: She has a normal mood and affect.   Nursing note and vitals reviewed.      Lab Results   Component Value Date     (H) 07/31/2019    CHLPL 145 07/31/2019    TRIG 117 07/31/2019    HDL 60 07/31/2019    LDL 62 07/31/2019    VLDL 23.4 07/31/2019        Assessment/Plan   Medicare Risks and Personalized Health Plan  CMS Preventative Services Quick Reference  Abdominal Aortic Aneurysm Screening  Breast Cancer/Mammogram Screening  Cardiovascular risk  Colon Cancer Screening  Diabetic Lab Screening   Lung Cancer Risk  Obesity/Overweight   Osteoprorosis Risk  Tobacco Use/Dependance (use dotphrase .tobaccocessation for " documentation)    The above risks/problems have been discussed with the patient.  Pertinent information has been shared with the patient in the After Visit Summary.  Follow up plans and orders are seen below in the Assessment/Plan Section.    Mary Jack is a current cigarettes user.  She currently smokes 1 pack of cigarettes per day for a duration of 55 years. I have educated her on the risk of diseases from using tobacco products such as cancer, COPD and heart diease.     I advised her to quit and she is willing to quit. We have discussed the following method/s for tobacco cessation:  Counseling.  Together we have set a quit date for 1 week from today.  She will follow up with me in 1 yr or sooner to check on her progress.    I spent 3  minutes counseling the patient.          Diagnoses and all orders for this visit:    1. Medicare annual wellness visit, subsequent (Primary)    2. 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  Dispense: 180 tablet; Refill: 3    3. Mixed hyperlipidemia    4. Nicotine dependence, uncomplicated, unspecified nicotine product type    5. Osteopenia of multiple sites    6. Panlobular emphysema (CMS/HCC)    7. Screening for breast cancer  -     Mammo Screening Bilateral With CAD; Future    8. Bilateral carotid artery disease, unspecified type (CMS/HCC)    9. 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  -     omeprazole (priLOSEC) 40 MG capsule; Take 1 capsule by mouth Daily.  Dispense: 90 capsule; Refill: 3    Other orders  -     Cancel: FluZone High Dose 65YR+ ([id number])    bp has been trending up, go back to two Toprol a day.    Asking about vaping, probably to try  As she wants to quit smoking but has failed numerous times    Follow Up:  Return in about 1 year (around 8/27/2020) for Annual physical.     An After Visit Summary and PPPS were given to the patient.

## 2019-09-05 ENCOUNTER — HOSPITAL ENCOUNTER (OUTPATIENT)
Dept: MAMMOGRAPHY | Facility: HOSPITAL | Age: 74
Discharge: HOME | End: 2019-09-05
Admitting: FAMILY MEDICINE

## 2019-09-05 DIAGNOSIS — Z12.39 SCREENING FOR BREAST CANCER: ICD-10-CM

## 2019-09-05 PROCEDURE — 77063 BREAST TOMOSYNTHESIS BI: CPT

## 2019-09-05 PROCEDURE — 77067 SCR MAMMO BI INCL CAD: CPT

## 2019-10-30 ENCOUNTER — TRANSCRIBE ORDERS (OUTPATIENT)
Dept: ADMINISTRATIVE | Facility: HOSPITAL | Age: 74
End: 2019-10-30

## 2019-10-30 DIAGNOSIS — Z12.2 ENCOUNTER FOR SCREENING FOR LUNG CANCER: Primary | ICD-10-CM

## 2019-11-05 ENCOUNTER — TELEPHONE (OUTPATIENT)
Dept: FAMILY MEDICINE CLINIC | Facility: CLINIC | Age: 74
End: 2019-11-05

## 2019-11-05 PROBLEM — T50.Z95A VACCINATION REACTION: Status: ACTIVE | Noted: 2019-11-05

## 2019-11-05 NOTE — TELEPHONE ENCOUNTER
Pt had the first shingle vaccine but had a reaction to it, she stated she was throwing up for two days. Pharmacy will not let her get the second shot unless you write a script for it. Please advise - pt seemed to not want it anyway.     I would advise not getting the second Shingrix vaccine.  Delfino Matt MD

## 2019-11-06 ENCOUNTER — HOSPITAL ENCOUNTER (OUTPATIENT)
Dept: CT IMAGING | Facility: HOSPITAL | Age: 74
Discharge: HOME | End: 2019-11-06
Admitting: INTERNAL MEDICINE

## 2019-11-06 DIAGNOSIS — Z12.2 ENCOUNTER FOR SCREENING FOR LUNG CANCER: ICD-10-CM

## 2019-11-06 PROCEDURE — G0297 LDCT FOR LUNG CA SCREEN: HCPCS

## 2019-11-27 ENCOUNTER — OFFICE VISIT (OUTPATIENT)
Dept: CARDIOLOGY | Age: 74
End: 2019-11-27

## 2019-11-27 VITALS
SYSTOLIC BLOOD PRESSURE: 131 MMHG | BODY MASS INDEX: 30.19 KG/M2 | HEART RATE: 71 BPM | DIASTOLIC BLOOD PRESSURE: 85 MMHG | HEIGHT: 64 IN

## 2019-11-27 DIAGNOSIS — I71.40 ABDOMINAL AORTIC ANEURYSM (AAA) WITHOUT RUPTURE (HCC): ICD-10-CM

## 2019-11-27 DIAGNOSIS — E78.2 MIXED HYPERLIPIDEMIA: ICD-10-CM

## 2019-11-27 DIAGNOSIS — I25.10 CHRONIC CORONARY ARTERY DISEASE: Primary | ICD-10-CM

## 2019-11-27 PROCEDURE — 93000 ELECTROCARDIOGRAM COMPLETE: CPT | Performed by: INTERNAL MEDICINE

## 2019-11-27 PROCEDURE — 99213 OFFICE O/P EST LOW 20 MIN: CPT | Performed by: INTERNAL MEDICINE

## 2019-12-03 RX ORDER — ATORVASTATIN CALCIUM 10 MG/1
TABLET, FILM COATED ORAL
Qty: 90 TABLET | Refills: 3 | Status: SHIPPED | OUTPATIENT
Start: 2019-12-03 | End: 2020-12-15

## 2020-01-14 ENCOUNTER — TRANSCRIBE ORDERS (OUTPATIENT)
Dept: ADMINISTRATIVE | Facility: HOSPITAL | Age: 75
End: 2020-01-14

## 2020-01-14 DIAGNOSIS — I65.29 CAROTID STENOSIS, ASYMPTOMATIC, UNSPECIFIED LATERALITY: Primary | ICD-10-CM

## 2020-01-20 ENCOUNTER — HOSPITAL ENCOUNTER (OUTPATIENT)
Dept: CT IMAGING | Facility: HOSPITAL | Age: 75
Discharge: HOME | End: 2020-01-20
Admitting: SURGERY

## 2020-01-20 DIAGNOSIS — I65.29 CAROTID STENOSIS, ASYMPTOMATIC, UNSPECIFIED LATERALITY: ICD-10-CM

## 2020-01-20 LAB — CREAT BLDA-MCNC: 0.8 MG/DL (ref 0.6–1.3)

## 2020-01-20 PROCEDURE — 82565 ASSAY OF CREATININE: CPT

## 2020-01-20 PROCEDURE — 0 IOPAMIDOL PER 1 ML: Performed by: SURGERY

## 2020-01-20 PROCEDURE — 70496 CT ANGIOGRAPHY HEAD: CPT

## 2020-01-20 PROCEDURE — 70498 CT ANGIOGRAPHY NECK: CPT

## 2020-01-20 RX ADMIN — IOPAMIDOL 100 ML: 755 INJECTION, SOLUTION INTRAVENOUS at 13:12

## 2020-01-29 ENCOUNTER — TELEPHONE (OUTPATIENT)
Dept: FAMILY MEDICINE CLINIC | Facility: CLINIC | Age: 75
End: 2020-01-29

## 2020-01-29 ENCOUNTER — TELEPHONE (OUTPATIENT)
Dept: CARDIOLOGY | Facility: CLINIC | Age: 75
End: 2020-01-29

## 2020-01-29 NOTE — TELEPHONE ENCOUNTER
Pt is needing to have surgery for a carotid artery in her neck. She would like a second opinion from you if you think she should get it done. She said she made a appointment to see a different vascular doctor next week. Also the pharmacist said she needs to get the second shingrix shot, but the first made her very sick and was told by you not to get it. Please advise.

## 2020-01-29 NOTE — TELEPHONE ENCOUNTER
----- Message from Shanita Gerber sent at 1/28/2020 12:21 PM EST -----  Regarding: CLEARANCE  Contact: [phone]  ELIANE WITH SURGICAL CARE PROCEDURE ON MARCH 16 NEEDS CLEARANCE      Pt will need testing prior.    Called l/m for eliane to r/c

## 2020-01-30 NOTE — TELEPHONE ENCOUNTER
Your doppler revealed 80% blockage in the carotid artery  That is standard for surgery    You may certainly see another vascular group for a second opinion if you wish.    Regarding the Shingrix.  We do not the effectiveness of just one injection  Unless you had a specific allergic reaction, proceed on for the second one.  Feeling flu like is a known common reaction to any vaccination.    May want to wait until after your carotid surgery.

## 2020-03-04 ENCOUNTER — OFFICE VISIT (OUTPATIENT)
Dept: CARDIOLOGY | Age: 75
End: 2020-03-04

## 2020-03-04 VITALS
SYSTOLIC BLOOD PRESSURE: 145 MMHG | BODY MASS INDEX: 31.54 KG/M2 | HEART RATE: 74 BPM | WEIGHT: 178 LBS | HEIGHT: 63 IN | DIASTOLIC BLOOD PRESSURE: 76 MMHG

## 2020-03-04 DIAGNOSIS — I25.9 ISCHEMIC HEART DISEASE DUE TO CORONARY ARTERY OBSTRUCTION (HCC): ICD-10-CM

## 2020-03-04 DIAGNOSIS — R06.02 SHORTNESS OF BREATH: ICD-10-CM

## 2020-03-04 DIAGNOSIS — I24.0 ISCHEMIC HEART DISEASE DUE TO CORONARY ARTERY OBSTRUCTION (HCC): ICD-10-CM

## 2020-03-04 DIAGNOSIS — Z01.818 PREOPERATIVE EXAMINATION, UNSPECIFIED: Primary | ICD-10-CM

## 2020-03-04 DIAGNOSIS — I25.10 CHRONIC CORONARY ARTERY DISEASE: ICD-10-CM

## 2020-03-04 DIAGNOSIS — E78.2 MIXED HYPERLIPIDEMIA: ICD-10-CM

## 2020-03-04 PROCEDURE — 99213 OFFICE O/P EST LOW 20 MIN: CPT | Performed by: INTERNAL MEDICINE

## 2020-03-04 RX ORDER — ASCORBIC ACID 500 MG
500 TABLET ORAL EVERY MORNING
COMMUNITY

## 2020-03-04 NOTE — PROGRESS NOTES
Subjective:        Mary Jack is a 74 y.o. 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    The following portions of the patient's history were reviewed and updated as appropriate: allergies, current medications, past family history, past medical history, past social history, past surgical history and problem list.    Past Medical History:   Diagnosis Date   • AAA (abdominal aortic aneurysm) (CMS/HCC)    • Allergic not sure    CODINE,MORFINE AND DYE SOLUTION   • Anemia    • Aneurysm of abdominal aorta (CMS/HCC)    • Arthritis    • Zepeda esophagus    • CAD (coronary artery disease)    • Chest pain    • Constipation    • COPD (chronic obstructive pulmonary disease) (CMS/HCC)    • Emphysema lung (CMS/HCC)    • Empyema (CMS/HCC)    • Fatigue    • GERD (gastroesophageal reflux disease)    • Headache    • Hiatal hernia    • High risk medication use    • History of cardiac catheterization     CATH STENT PLACEMENT: CIRCUMFLEX BRANCH   • Hyperlipidemia    • Hypertension    • Infectious viral hepatitis Jaundice in 1963   •  "Jaundice    • Lumbosacral disc disease    • Myocardial infarction (CMS/HCC)    • Myocardial infarction (CMS/HCC)    • Occult blood in stools    • On home O2    • Peripheral artery disease (CMS/HCC)    • Reflux gastritis    • Scoliosis    • Visual impairment     BLURRY VISION IN RT. EYE     reports that she has been smoking cigarettes. She started smoking about 3 years ago. She has a 25.00 pack-year smoking history. She has never used smokeless tobacco. She reports that she does not drink alcohol or use drugs.   Family History   Problem Relation Age of Onset   • Colon cancer Mother    • Breast cancer Mother    • Other Father         cardiac disorder   • Hypertension Father    • Heart disease Father    • Other Sister         cardiac disorder   • Hypertension Sister    • Lung disease Sister    • Thyroid disease Sister    • Other Brother         cardiac disorder   • Thyroid disease Daughter    • Heart disease Sister    • Hypertension Sister    • Pulmonary fibrosis Sister    • Heart disease Brother    • Other Brother    • Thyroid disease Sister    • Other Sister    • Thyroid disease Daughter        Review of Systems  Constitutional: No wt loss, fever, fatigue  Gastrointestinal: No nausea, abdominal pain  Behavioral/Psych: No insomnia or anxiety   Cardiovascular no chest pains or tightness in the chest  Objective:       Physical Exam    /76   Pulse 74   Ht 160.7 cm (63.25\")   Wt 80.7 kg (178 lb)   BMI 31.28 kg/m²   General appearance: No acute changes   Neck: Trachea midline; NECK, supple, no thyromegaly or lymphadenopathy   Lungs: Normal size and shape, normal breath sounds, equal distribution of air, no rales and rhonchi   CV: S1-S2 regular, no murmurs, no rub, no gallop   Abdomen: Soft, non-tender; no masses , no abnormal abdominal sounds   Extremities: No deformity , normal color , no peripheral edema   Skin: Normal temperature, turgor and texture; no rash, ulcers          Procedures      Echocardiogram:  "       Current Outpatient Medications:   •  ANORO ELLIPTA 62.5-25 MCG/INH aerosol powder , USE 1 PUFF BY MOUTH DAILY., Disp: , Rfl: 0  •  aspirin 81 MG tablet, Take 1 tablet by mouth Daily., Disp: 90 tablet, Rfl: 0  •  atorvastatin (LIPITOR) 10 MG tablet, TAKE 1 TABLET EVERY DAY, Disp: 90 tablet, Rfl: 3  •  Calcium Carbonate-Vitamin D (CALCIUM 600+D HIGH POTENCY PO), , Disp: , Rfl:   •  cholecalciferol (VITAMIN D3) 1000 UNITS tablet, Take 2,000 Units by mouth Daily., Disp: , Rfl:   •  metoprolol tartrate (LOPRESSOR) 25 MG tablet, Take 1 tablet by mouth 2 (Two) Times a Day., Disp: 180 tablet, Rfl: 3  •  omeprazole (priLOSEC) 40 MG capsule, Take 1 capsule by mouth Daily., Disp: 90 capsule, Rfl: 3  •  polyethylene glycol (MIRALAX) packet, Take 17 g by mouth Daily., Disp: 850 g, Rfl: 3  •  vitamin A 33556 UNIT capsule, Take 10,000 Units by mouth Daily., Disp: , Rfl:   •  vitamin B-12 (CYANOCOBALAMIN) 1000 MCG tablet, Take 1,000 mcg by mouth Daily., Disp: , Rfl:   •  vitamin C (ASCORBIC ACID) 500 MG tablet, Take 500 mg by mouth Daily., Disp: , Rfl:   •  nitroglycerin (NITROSTAT) 0.4 MG SL tablet, Place 0.4 mg under the tongue Every 5 (Five) Minutes As Needed for Chest Pain. 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 Preoperative examination,  unspecified Z01.818 V72.84   2. Shortness of breath R06.02 786.05   3. 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 Mixed hyperlipidemia E78.2 272.2   5. Chronic coronary artery disease I25.10 414.00     1. Preoperative examination, unspecified  Considering the patient's symptoms as well as clinical situation and  EKG findings, along with cardiac risk factors, ischemic workup is necessary to rule out ischemic cardiomyopathy, stress induced arrhythmias, and functional capacity for diagnosis as well as prognostic consideration    - Stress Test With Myocardial Perfusion (1 Day)  - Adult Transthoracic Echo Complete W/ Cont if Necessary Per Protocol    2. Shortness of breath  Considering patient's medical condition as well as the risk factors, patient will require echocardiogram for further evaluation for the LV function, four-chamber evaluation, including the pressures, valvular function and  pericardial disease and pericardial effusion    - Stress Test With Myocardial Perfusion (1 Day)  - Adult Transthoracic Echo Complete W/ Cont if Necessary Per Protocol    3. 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 Mixed hyperlipidemia  Continue current treatment    5. Chronic coronary artery disease  Considering the patient's symptoms as well as clinical situation and  EKG findings, along with cardiac risk factors, ischemic workup is necessary to rule out ischemic cardiomyopathy, stress induced arrhythmias, and functional capacity for diagnosis as well as prognostic consideration         LEXISCAN, ECHO  BEFORE SURGERY  COUNSELING:    Mary Sands was given to  patient for the following topics: diagnostic results, risk factor reductions, impressions, risks and benefits of treatment options and importance of treatment compliance .       SMOKING COUNSELING:    [unfilled]    Dictated using Dragon dictation

## 2020-03-09 ENCOUNTER — APPOINTMENT (OUTPATIENT)
Dept: PREADMISSION TESTING | Facility: HOSPITAL | Age: 75
End: 2020-03-09

## 2020-03-09 VITALS
BODY MASS INDEX: 31.36 KG/M2 | RESPIRATION RATE: 20 BRPM | SYSTOLIC BLOOD PRESSURE: 124 MMHG | DIASTOLIC BLOOD PRESSURE: 65 MMHG | TEMPERATURE: 96 F | OXYGEN SATURATION: 92 % | HEIGHT: 63 IN | HEART RATE: 66 BPM | WEIGHT: 177 LBS

## 2020-03-09 LAB
ANION GAP SERPL CALCULATED.3IONS-SCNC: 11.8 MMOL/L (ref 5–15)
BUN BLD-MCNC: 12 MG/DL (ref 8–23)
BUN/CREAT SERPL: 14.6 (ref 7–25)
CALCIUM SPEC-SCNC: 9.6 MG/DL (ref 8.6–10.5)
CHLORIDE SERPL-SCNC: 97 MMOL/L (ref 98–107)
CO2 SERPL-SCNC: 25.2 MMOL/L (ref 22–29)
CREAT BLD-MCNC: 0.82 MG/DL (ref 0.57–1)
DEPRECATED RDW RBC AUTO: 42.2 FL (ref 37–54)
ERYTHROCYTE [DISTWIDTH] IN BLOOD BY AUTOMATED COUNT: 12.1 % (ref 12.3–15.4)
GFR SERPL CREATININE-BSD FRML MDRD: 69 ML/MIN/1.73
GLUCOSE BLD-MCNC: 91 MG/DL (ref 65–99)
HCT VFR BLD AUTO: 31.8 % (ref 34–46.6)
HGB BLD-MCNC: 10.8 G/DL (ref 12–15.9)
MCH RBC QN AUTO: 31.9 PG (ref 26.6–33)
MCHC RBC AUTO-ENTMCNC: 34 G/DL (ref 31.5–35.7)
MCV RBC AUTO: 93.8 FL (ref 79–97)
PLATELET # BLD AUTO: 206 10*3/MM3 (ref 140–450)
PMV BLD AUTO: 9.6 FL (ref 6–12)
POTASSIUM BLD-SCNC: 4.8 MMOL/L (ref 3.5–5.2)
RBC # BLD AUTO: 3.39 10*6/MM3 (ref 3.77–5.28)
SODIUM BLD-SCNC: 134 MMOL/L (ref 136–145)
WBC NRBC COR # BLD: 5.32 10*3/MM3 (ref 3.4–10.8)

## 2020-03-09 PROCEDURE — 80048 BASIC METABOLIC PNL TOTAL CA: CPT | Performed by: SURGERY

## 2020-03-09 PROCEDURE — 36415 COLL VENOUS BLD VENIPUNCTURE: CPT

## 2020-03-09 PROCEDURE — 85027 COMPLETE CBC AUTOMATED: CPT | Performed by: SURGERY

## 2020-03-09 NOTE — DISCHARGE INSTRUCTIONS
Take the following medications the morning of surgery: anora, metoprolol, omeprazole    Arrival time 8:30 am    General Instructions:  • Do not eat solid food after midnight the night before surgery.  • You may drink clear liquids day of surgery but must stop at least one hour before your hospital arrival time.  • It is beneficial for you to have a clear drink that contains carbohydrates the day of surgery.  We suggest a 12 to 20 ounce bottle of Gatorade or Powerade for non-diabetic patients or a 12 to 20 ounce bottle of G2 or Powerade Zero for diabetic patients. (Pediatric patients, are not advised to drink a 12 to 20 ounce carbohydrate drink)    Clear liquids are liquids you can see through.  Nothing red in color.     Plain water                               Sports drinks  Sodas                                   Gelatin (Jell-O)  Fruit juices without pulp such as white grape juice and apple juice  Popsicles that contain no fruit or yogurt  Tea or coffee (no cream or milk added)  Gatorade / Powerade  G2 / Powerade Zero    • Infants may have breast milk up to four hours before surgery.  • Infants drinking formula may drink formula up to six hours before surgery.   • Patients who avoid smoking, chewing tobacco and alcohol for 4 weeks prior to surgery have a reduced risk of post-operative complications.  Quit smoking as many days before surgery as you can.  • Do not smoke, use chewing tobacco or drink alcohol the day of surgery.   • If applicable bring your C-PAP/ BI-PAP machine.  • Bring any papers given to you in the doctor’s office.  • Wear clean comfortable clothes.  • Do not wear contact lenses, false eyelashes or make-up.  Bring a case for your glasses.   • Bring crutches or walker if applicable.  • Remove all piercings.  Leave jewelry and any other valuables at home.  • Hair extensions with metal clips must be removed prior to surgery.  • The Pre-Admission Testing nurse will instruct you to bring medications  if unable to obtain an accurate list in Pre-Admission Testing.        If you were given a blood bank ID arm band remember to bring it with you the day of surgery.    Preventing a Surgical Site Infection:  • For 2 to 3 days before surgery, avoid shaving with a razor because the razor can irritate skin and make it easier to develop an infection.    • Any areas of open skin can increase the risk of a post-operative wound infection by allowing bacteria to enter and travel throughout the body.  Notify your surgeon if you have any skin wounds / rashes even if it is not near the expected surgical site.  The area will need assessed to determine if surgery should be delayed until it is healed.  • The night prior to surgery shower using a fresh bar of anti-bacterial soap (such as Dial) and clean washcloth.  Sleep in a clean bed with clean clothing.  Do not allow pets to sleep with you.  • Shower on the morning of surgery using a fresh bar of anti-bacterial soap (such as Dial) and clean washcloth.  Dry with a clean towel and dress in clean clothing.  • Ask your surgeon if you will be receiving antibiotics prior to surgery.  • Make sure you, your family, and all healthcare providers clean their hands with soap and water or an alcohol based hand  before caring for you or your wound.    Day of surgery:  Your arrival time is approximately two hours before your scheduled surgery time.  Upon arrival, a Pre-op nurse and Anesthesiologist will review your health history, obtain vital signs, and answer questions you may have.  The only belongings needed at this time will be a list of your home medications and if applicable your C-PAP/BI-PAP machine.  If you are staying overnight your family can leave the rest of your belongings in the car and bring them to your room later.  A Pre-op nurse will start an IV and you may receive medication in preparation for surgery, including something to help you relax.  Your family will be able  to see you in the Pre-op area.  Two visitors at a time will be allowed in the Pre-op room.  While you are in surgery your family should notify the waiting room  if they leave the waiting room area and provide a contact phone number.    Please be aware that surgery does come with discomfort.  We want to make every effort to control your discomfort so please discuss any uncontrolled symptoms with your nurse.   Your doctor will most likely have prescribed pain medications.      If you are going home after surgery you will receive individualized written care instructions before being discharged.  A responsible adult must drive you to and from the hospital on the day of your surgery and stay with you for 24 hours.    If you are staying overnight following surgery, you will be transported to your hospital room following the recovery period.  [facility] has all private rooms.    If you have any questions please call Pre-Admission Testing at [phone].  Deductibles and co-payments are collected on the day of service. Please be prepared to pay the required co-pay, deductible or deposit on the day of service as defined by your plan.CHLORHEXIDINE CLOTH INSTRUCTIONS  The morning of surgery follow these instructions using the Chlorhexidine cloths you've been given.  These steps reduce bacteria on the body.  Do not use the cloths near your eyes, ears mouth, genitalia or on open wounds.  Throw the cloths away after use but do not try to flush them down a toilet.      • Open and remove one cloth at a time from the package.    • Leave the cloth unfolded and begin the bathing.  • Massage the skin with the cloths using gentle pressure to remove bacteria.  Do not scrub harshly.   • Follow the steps below with one 2% CHG cloth per area (6 total cloths).  • One cloth for neck, shoulders and chest.  • One cloth for both arms, hands, fingers and underarms (do underarms last).  • One cloth for the abdomen  followed by groin.  • One cloth for right leg and foot including between the toes.  • One cloth for left leg and foot including between the toes.  • The last cloth is to be used for the back of the neck, back and buttocks.    Allow the CHG to air dry 3 minutes on the skin which will give it time to work and decrease the chance of irritation.  The skin may feel sticky until it is dry.  Do not rinse with water or any other liquid or you will lose the beneficial effects of the CHG.  If mild skin irritation occurs, do rinse the skin to remove the CHG.  Report this to the nurse at time of admission.  Do not apply lotions, creams, ointments, deodorants or perfumes after using the clothes. Dress in clean clothes before coming to the hospital.

## 2020-03-11 ENCOUNTER — HOSPITAL ENCOUNTER (OUTPATIENT)
Dept: CARDIOLOGY | Facility: HOSPITAL | Age: 75
Discharge: HOME | End: 2020-03-11

## 2020-03-11 ENCOUNTER — HOSPITAL ENCOUNTER (OUTPATIENT)
Dept: CARDIOLOGY | Facility: HOSPITAL | Age: 75
Discharge: HOME | End: 2020-03-11
Admitting: INTERNAL MEDICINE

## 2020-03-11 VITALS — HEIGHT: 63 IN | WEIGHT: 178 LBS | BODY MASS INDEX: 31.54 KG/M2

## 2020-03-11 VITALS
DIASTOLIC BLOOD PRESSURE: 70 MMHG | HEIGHT: 63 IN | SYSTOLIC BLOOD PRESSURE: 128 MMHG | HEART RATE: 68 BPM | BODY MASS INDEX: 31.53 KG/M2

## 2020-03-11 PROBLEM — Z01.818 PREOPERATIVE EXAMINATION, UNSPECIFIED: Status: ACTIVE | Noted: 2020-03-11

## 2020-03-11 LAB
AORTIC DIMENSIONLESS INDEX: 0.9 (DI)
BH CV ECHO MEAS - ACS: 2.2 CM
BH CV ECHO MEAS - AO MAX PG (FULL): 3.7 MMHG
BH CV ECHO MEAS - AO MAX PG: 9.9 MMHG
BH CV ECHO MEAS - AO MEAN PG (FULL): 2 MMHG
BH CV ECHO MEAS - AO MEAN PG: 5 MMHG
BH CV ECHO MEAS - AO ROOT AREA (BSA CORRECTED): 1.8
BH CV ECHO MEAS - AO ROOT AREA: 8.6 CM^2
BH CV ECHO MEAS - AO ROOT DIAM: 3.3 CM
BH CV ECHO MEAS - AO V2 MAX: 157 CM/SEC
BH CV ECHO MEAS - AO V2 MEAN: 98 CM/SEC
BH CV ECHO MEAS - AO V2 VTI: 31.7 CM
BH CV ECHO MEAS - ASC AORTA: 2.4 CM
BH CV ECHO MEAS - AVA(I,A): 3.3 CM^2
BH CV ECHO MEAS - AVA(I,D): 3.3 CM^2
BH CV ECHO MEAS - AVA(V,A): 3 CM^2
BH CV ECHO MEAS - AVA(V,D): 3 CM^2
BH CV ECHO MEAS - BSA(HAYCOCK): 1.9 M^2
BH CV ECHO MEAS - BSA: 1.8 M^2
BH CV ECHO MEAS - BZI_BMI: 31.4 KILOGRAMS/M^2
BH CV ECHO MEAS - BZI_METRIC_HEIGHT: 160 CM
BH CV ECHO MEAS - BZI_METRIC_WEIGHT: 80.3 KG
BH CV ECHO MEAS - EDV(CUBED): 68.9 ML
BH CV ECHO MEAS - EDV(MOD-SP2): 73 ML
BH CV ECHO MEAS - EDV(MOD-SP4): 75 ML
BH CV ECHO MEAS - EDV(TEICH): 74.2 ML
BH CV ECHO MEAS - EF(CUBED): 77.3 %
BH CV ECHO MEAS - EF(MOD-BP): 62 %
BH CV ECHO MEAS - EF(MOD-SP2): 60.3 %
BH CV ECHO MEAS - EF(MOD-SP4): 64 %
BH CV ECHO MEAS - EF(TEICH): 69.9 %
BH CV ECHO MEAS - ESV(CUBED): 15.6 ML
BH CV ECHO MEAS - ESV(MOD-SP2): 29 ML
BH CV ECHO MEAS - ESV(MOD-SP4): 27 ML
BH CV ECHO MEAS - ESV(TEICH): 22.3 ML
BH CV ECHO MEAS - FS: 39 %
BH CV ECHO MEAS - IVS/LVPW: 0.9
BH CV ECHO MEAS - IVSD: 0.9 CM
BH CV ECHO MEAS - LAT PEAK E' VEL: 7.5 CM/SEC
BH CV ECHO MEAS - LV DIASTOLIC VOL/BSA (35-75): 40.9 ML/M^2
BH CV ECHO MEAS - LV MASS(C)D: 123 GRAMS
BH CV ECHO MEAS - LV MASS(C)DI: 67 GRAMS/M^2
BH CV ECHO MEAS - LV MAX PG: 6.2 MMHG
BH CV ECHO MEAS - LV MEAN PG: 3 MMHG
BH CV ECHO MEAS - LV SYSTOLIC VOL/BSA (12-30): 14.7 ML/M^2
BH CV ECHO MEAS - LV V1 MAX: 124 CM/SEC
BH CV ECHO MEAS - LV V1 MEAN: 81.4 CM/SEC
BH CV ECHO MEAS - LV V1 VTI: 27.4 CM
BH CV ECHO MEAS - LVIDD: 4.1 CM
BH CV ECHO MEAS - LVIDS: 2.5 CM
BH CV ECHO MEAS - LVLD AP2: 6.3 CM
BH CV ECHO MEAS - LVLD AP4: 6.1 CM
BH CV ECHO MEAS - LVLS AP2: 5 CM
BH CV ECHO MEAS - LVLS AP4: 5 CM
BH CV ECHO MEAS - LVOT AREA (M): 3.8 CM^2
BH CV ECHO MEAS - LVOT AREA: 3.8 CM^2
BH CV ECHO MEAS - LVOT DIAM: 2.2 CM
BH CV ECHO MEAS - LVPWD: 1 CM
BH CV ECHO MEAS - MED PEAK E' VEL: 6.96 CM/SEC
BH CV ECHO MEAS - MR MAX PG: 114.9 MMHG
BH CV ECHO MEAS - MR MAX VEL: 536 CM/SEC
BH CV ECHO MEAS - MV A DUR: 0.11 SEC
BH CV ECHO MEAS - MV A MAX VEL: 119 CM/SEC
BH CV ECHO MEAS - MV DEC SLOPE: 907 CM/SEC^2
BH CV ECHO MEAS - MV DEC TIME: 164 SEC
BH CV ECHO MEAS - MV E MAX VEL: 85.7 CM/SEC
BH CV ECHO MEAS - MV E/A: 0.72
BH CV ECHO MEAS - MV MAX PG: 6.3 MMHG
BH CV ECHO MEAS - MV MEAN PG: 3 MMHG
BH CV ECHO MEAS - MV P1/2T MAX VEL: 148 CM/SEC
BH CV ECHO MEAS - MV P1/2T: 47.8 MSEC
BH CV ECHO MEAS - MV V2 MAX: 125 CM/SEC
BH CV ECHO MEAS - MV V2 MEAN: 81.7 CM/SEC
BH CV ECHO MEAS - MV V2 VTI: 27.5 CM
BH CV ECHO MEAS - MVA P1/2T LCG: 1.5 CM^2
BH CV ECHO MEAS - MVA(P1/2T): 4.6 CM^2
BH CV ECHO MEAS - MVA(VTI): 3.8 CM^2
BH CV ECHO MEAS - PA ACC TIME: 0.08 SEC
BH CV ECHO MEAS - PA MAX PG (FULL): 0.95 MMHG
BH CV ECHO MEAS - PA MAX PG: 5.2 MMHG
BH CV ECHO MEAS - PA PR(ACCEL): 44.4 MMHG
BH CV ECHO MEAS - PA V2 MAX: 114 CM/SEC
BH CV ECHO MEAS - PULM A REVS DUR: 0.11 SEC
BH CV ECHO MEAS - PULM A REVS VEL: 45.8 CM/SEC
BH CV ECHO MEAS - PULM DIAS VEL: 35.8 CM/SEC
BH CV ECHO MEAS - PULM S/D: 2.1
BH CV ECHO MEAS - PULM SYS VEL: 73.8 CM/SEC
BH CV ECHO MEAS - PVA(V,A): 5.6 CM^2
BH CV ECHO MEAS - PVA(V,D): 5.6 CM^2
BH CV ECHO MEAS - QP/QS: 1.2
BH CV ECHO MEAS - RAP SYSTOLE: 3 MMHG
BH CV ECHO MEAS - RV MAX PG: 4.2 MMHG
BH CV ECHO MEAS - RV MEAN PG: 2 MMHG
BH CV ECHO MEAS - RV V1 MAX: 103 CM/SEC
BH CV ECHO MEAS - RV V1 MEAN: 70.9 CM/SEC
BH CV ECHO MEAS - RV V1 VTI: 19.8 CM
BH CV ECHO MEAS - RVOT AREA: 6.2 CM^2
BH CV ECHO MEAS - RVOT DIAM: 2.8 CM
BH CV ECHO MEAS - RVSP: 23 MMHG
BH CV ECHO MEAS - SI(AO): 147.7 ML/M^2
BH CV ECHO MEAS - SI(CUBED): 29 ML/M^2
BH CV ECHO MEAS - SI(LVOT): 56.7 ML/M^2
BH CV ECHO MEAS - SI(MOD-SP2): 24 ML/M^2
BH CV ECHO MEAS - SI(MOD-SP4): 26.1 ML/M^2
BH CV ECHO MEAS - SI(TEICH): 28.3 ML/M^2
BH CV ECHO MEAS - SV(AO): 271.1 ML
BH CV ECHO MEAS - SV(CUBED): 53.3 ML
BH CV ECHO MEAS - SV(LVOT): 104.2 ML
BH CV ECHO MEAS - SV(MOD-SP2): 44 ML
BH CV ECHO MEAS - SV(MOD-SP4): 48 ML
BH CV ECHO MEAS - SV(RVOT): 121.9 ML
BH CV ECHO MEAS - SV(TEICH): 51.9 ML
BH CV ECHO MEAS - TAPSE (>1.6): 2.4 CM
BH CV ECHO MEAS - TR MAX PG: 20 MMHG
BH CV ECHO MEAS - TR MAX VEL: 223 CM/SEC
BH CV ECHO MEASUREMENTS AVERAGE E/E' RATIO: 11.85
BH CV XLRA - RV BASE: 3.7 CM
BH CV XLRA - RV LENGTH: 5.6 CM
BH CV XLRA - RV MID: 2.9 CM
BH CV XLRA - TDI S': 12.6 CM/SEC
LEFT ATRIUM VOLUME INDEX: 24 ML/M2
MAXIMAL PREDICTED HEART RATE: 148 BPM
STRESS TARGET HR: 126 BPM

## 2020-03-11 PROCEDURE — 93018 CV STRESS TEST I&R ONLY: CPT | Performed by: INTERNAL MEDICINE

## 2020-03-11 PROCEDURE — 78452 HT MUSCLE IMAGE SPECT MULT: CPT

## 2020-03-11 PROCEDURE — 93306 TTE W/DOPPLER COMPLETE: CPT

## 2020-03-11 PROCEDURE — 93306 TTE W/DOPPLER COMPLETE: CPT | Performed by: INTERNAL MEDICINE

## 2020-03-11 PROCEDURE — 93016 CV STRESS TEST SUPVJ ONLY: CPT | Performed by: NURSE PRACTITIONER

## 2020-03-11 PROCEDURE — 93017 CV STRESS TEST TRACING ONLY: CPT

## 2020-03-11 PROCEDURE — 0 TECHNETIUM SESTAMIBI: Performed by: INTERNAL MEDICINE

## 2020-03-11 PROCEDURE — 25010000002 REGADENOSON 0.4 MG/5ML SOLUTION: Performed by: INTERNAL MEDICINE

## 2020-03-11 PROCEDURE — A9500 TC99M SESTAMIBI: HCPCS | Performed by: INTERNAL MEDICINE

## 2020-03-11 PROCEDURE — 78452 HT MUSCLE IMAGE SPECT MULT: CPT | Performed by: INTERNAL MEDICINE

## 2020-03-11 RX ADMIN — TECHNETIUM TC 99M SESTAMIBI 1 DOSE: 1 INJECTION INTRAVENOUS at 09:43

## 2020-03-11 RX ADMIN — REGADENOSON 0.4 MG: 0.08 INJECTION, SOLUTION INTRAVENOUS at 09:43

## 2020-03-11 RX ADMIN — TECHNETIUM TC 99M SESTAMIBI 1 DOSE: 1 INJECTION INTRAVENOUS at 07:41

## 2020-03-12 ENCOUNTER — TELEPHONE (OUTPATIENT)
Dept: FAMILY MEDICINE CLINIC | Facility: CLINIC | Age: 75
End: 2020-03-12

## 2020-03-12 ENCOUNTER — TELEPHONE (OUTPATIENT)
Dept: CARDIOLOGY | Facility: CLINIC | Age: 75
End: 2020-03-12

## 2020-03-12 LAB
BH CV STRESS BP STAGE 1: NORMAL
BH CV STRESS COMMENTS STAGE 1: NORMAL
BH CV STRESS DOSE REGADENOSON STAGE 1: 0.4
BH CV STRESS DURATION MIN STAGE 1: 1
BH CV STRESS DURATION SEC STAGE 1: 0
BH CV STRESS HR STAGE 1: 93
BH CV STRESS O2 STAGE 1: 98
BH CV STRESS PROTOCOL 1: NORMAL
BH CV STRESS RECOVERY BP: NORMAL MMHG
BH CV STRESS RECOVERY HR: 91 BPM
BH CV STRESS RECOVERY O2: 95 %
BH CV STRESS STAGE 1: 1
LV EF NUC BP: 70 %
MAXIMAL PREDICTED HEART RATE: 148 BPM
PERCENT MAX PREDICTED HR: 62.84 %
STRESS BASELINE BP: NORMAL MMHG
STRESS BASELINE HR: 68 BPM
STRESS O2 SAT REST: 94 %
STRESS PERCENT HR: 74 %
STRESS POST ESTIMATED WORKLOAD: 1 METS
STRESS POST EXERCISE DUR MIN: 1 MIN
STRESS POST EXERCISE DUR SEC: 0 SEC
STRESS POST O2 SAT PEAK: 98 %
STRESS POST PEAK BP: NORMAL MMHG
STRESS POST PEAK HR: 93 BPM
STRESS TARGET HR: 126 BPM

## 2020-03-12 NOTE — TELEPHONE ENCOUNTER
TEST RESULTS HAVING SX ON MONDAY   Received: Today   Message Contents   Mu, Saumya JEAN BAPTISTE  [facility]   Phone Number: [phone]     Will discuss with Dr CONTE      Per Dr DG montiel to proceed with Carotid. Will see in office after to discuss testing further and next steps

## 2020-03-12 NOTE — TELEPHONE ENCOUNTER
Pt had pre op blood work done and her hemoglobin was at 10.8. She is wondering if she can take OTC iron 65 mg to help? Her surgery is scheduled for this coming Monday. Please advise

## 2020-03-16 ENCOUNTER — HOSPITAL ENCOUNTER (INPATIENT)
Facility: HOSPITAL | Age: 75
LOS: 3 days | Discharge: HOME | End: 2020-03-19
Attending: SURGERY | Admitting: SURGERY

## 2020-03-16 ENCOUNTER — ANESTHESIA EVENT (OUTPATIENT)
Dept: PERIOP | Facility: HOSPITAL | Age: 75
End: 2020-03-16

## 2020-03-16 ENCOUNTER — APPOINTMENT (OUTPATIENT)
Dept: CARDIOLOGY | Facility: HOSPITAL | Age: 75
End: 2020-03-16

## 2020-03-16 ENCOUNTER — ANESTHESIA (OUTPATIENT)
Dept: PERIOP | Facility: HOSPITAL | Age: 75
End: 2020-03-16

## 2020-03-16 DIAGNOSIS — I65.23 BILATERAL CAROTID ARTERY STENOSIS: Primary | ICD-10-CM

## 2020-03-16 LAB
BH CV XLRA MEAS RIGHT DIST CCA EDV: 25.4 CM/SEC
BH CV XLRA MEAS RIGHT DIST CCA PSV: 94.5 CM/SEC
BH CV XLRA MEAS RIGHT PROX ECA EDV: 12.7 CM/SEC
BH CV XLRA MEAS RIGHT PROX ECA PSV: 128 CM/SEC
BH CV XLRA MEAS RIGHT PROX ICA EDV: 24 CM/SEC
BH CV XLRA MEAS RIGHT PROX ICA PSV: 91.7 CM/SEC

## 2020-03-16 PROCEDURE — 25010000003 CEFAZOLIN IN DEXTROSE 2-4 GM/100ML-% SOLUTION: Performed by: SURGERY

## 2020-03-16 PROCEDURE — 03CK0ZZ EXTIRPATION OF MATTER FROM RIGHT INTERNAL CAROTID ARTERY, OPEN APPROACH: ICD-10-PCS | Performed by: SURGERY

## 2020-03-16 PROCEDURE — 94640 AIRWAY INHALATION TREATMENT: CPT

## 2020-03-16 PROCEDURE — 25010000002 ONDANSETRON PER 1 MG: Performed by: NURSE ANESTHETIST, CERTIFIED REGISTERED

## 2020-03-16 PROCEDURE — 94799 UNLISTED PULMONARY SVC/PX: CPT

## 2020-03-16 PROCEDURE — 25010000002 HEPARIN (PORCINE) PER 1000 UNITS: Performed by: SURGERY

## 2020-03-16 PROCEDURE — 25010000003 CEFAZOLIN PER 500 MG: Performed by: SURGERY

## 2020-03-16 PROCEDURE — 25010000002 FENTANYL CITRATE (PF) 100 MCG/2ML SOLUTION: Performed by: NURSE ANESTHETIST, CERTIFIED REGISTERED

## 2020-03-16 PROCEDURE — 25010000002 HYDROMORPHONE PER 4 MG: Performed by: NURSE ANESTHETIST, CERTIFIED REGISTERED

## 2020-03-16 PROCEDURE — 25010000002 PROPOFOL 10 MG/ML EMULSION: Performed by: NURSE ANESTHETIST, CERTIFIED REGISTERED

## 2020-03-16 PROCEDURE — 25010000002 FENTANYL CITRATE (PF) 100 MCG/2ML SOLUTION: Performed by: ANESTHESIOLOGY

## 2020-03-16 PROCEDURE — 93882 EXTRACRANIAL UNI/LTD STUDY: CPT

## 2020-03-16 PROCEDURE — 25010000002 PROTAMINE SULFATE PER 10 MG: Performed by: NURSE ANESTHETIST, CERTIFIED REGISTERED

## 2020-03-16 PROCEDURE — 03UK0KZ SUPPLEMENT RIGHT INTERNAL CAROTID ARTERY WITH NONAUTOLOGOUS TISSUE SUBSTITUTE, OPEN APPROACH: ICD-10-PCS | Performed by: SURGERY

## 2020-03-16 PROCEDURE — 25010000002 MIDAZOLAM PER 1 MG: Performed by: ANESTHESIOLOGY

## 2020-03-16 PROCEDURE — C1768 GRAFT, VASCULAR: HCPCS | Performed by: SURGERY

## 2020-03-16 PROCEDURE — 25010000002 PHENYLEPHRINE PER 1 ML: Performed by: NURSE ANESTHETIST, CERTIFIED REGISTERED

## 2020-03-16 PROCEDURE — 85347 COAGULATION TIME ACTIVATED: CPT

## 2020-03-16 PROCEDURE — 25010000002 NEOSTIGMINE PER 0.5 MG: Performed by: NURSE ANESTHETIST, CERTIFIED REGISTERED

## 2020-03-16 PROCEDURE — 25010000002 HEPARIN (PORCINE) PER 1000 UNITS: Performed by: NURSE ANESTHETIST, CERTIFIED REGISTERED

## 2020-03-16 DEVICE — PTCH VASC XENOSURE PLS BIO 0.8X8CM: Type: IMPLANTABLE DEVICE | Site: CAROTID | Status: FUNCTIONAL

## 2020-03-16 DEVICE — FLOSEAL HEMOSTATIC MATRIX, 5ML
Type: IMPLANTABLE DEVICE | Site: CAROTID | Status: FUNCTIONAL
Brand: FLOSEAL HEMOSTATIC MATRIX

## 2020-03-16 RX ORDER — PROMETHAZINE HYDROCHLORIDE 25 MG/ML
6.25 INJECTION, SOLUTION INTRAMUSCULAR; INTRAVENOUS
Status: DISCONTINUED | OUTPATIENT
Start: 2020-03-16 | End: 2020-03-16 | Stop reason: HOSPADM

## 2020-03-16 RX ORDER — OXYCODONE AND ACETAMINOPHEN 7.5; 325 MG/1; MG/1
1 TABLET ORAL ONCE AS NEEDED
Status: COMPLETED | OUTPATIENT
Start: 2020-03-16 | End: 2020-03-16

## 2020-03-16 RX ORDER — EPHEDRINE SULFATE 50 MG/ML
INJECTION, SOLUTION INTRAVENOUS AS NEEDED
Status: DISCONTINUED | OUTPATIENT
Start: 2020-03-16 | End: 2020-03-16 | Stop reason: SURG

## 2020-03-16 RX ORDER — ACETAMINOPHEN 325 MG/1
650 TABLET ORAL ONCE AS NEEDED
Status: DISCONTINUED | OUTPATIENT
Start: 2020-03-16 | End: 2020-03-16 | Stop reason: HOSPADM

## 2020-03-16 RX ORDER — IPRATROPIUM BROMIDE AND ALBUTEROL SULFATE 2.5; .5 MG/3ML; MG/3ML
3 SOLUTION RESPIRATORY (INHALATION)
Status: DISCONTINUED | OUTPATIENT
Start: 2020-03-16 | End: 2020-03-18

## 2020-03-16 RX ORDER — SODIUM CHLORIDE, SODIUM LACTATE, POTASSIUM CHLORIDE, CALCIUM CHLORIDE 600; 310; 30; 20 MG/100ML; MG/100ML; MG/100ML; MG/100ML
50 INJECTION, SOLUTION INTRAVENOUS CONTINUOUS
Status: DISCONTINUED | OUTPATIENT
Start: 2020-03-16 | End: 2020-03-17

## 2020-03-16 RX ORDER — FLUMAZENIL 0.1 MG/ML
0.2 INJECTION INTRAVENOUS AS NEEDED
Status: DISCONTINUED | OUTPATIENT
Start: 2020-03-16 | End: 2020-03-16 | Stop reason: HOSPADM

## 2020-03-16 RX ORDER — ONDANSETRON 2 MG/ML
4 INJECTION INTRAMUSCULAR; INTRAVENOUS EVERY 6 HOURS PRN
Status: DISCONTINUED | OUTPATIENT
Start: 2020-03-16 | End: 2020-03-19 | Stop reason: HOSPADM

## 2020-03-16 RX ORDER — ATORVASTATIN CALCIUM 10 MG/1
10 TABLET, FILM COATED ORAL NIGHTLY
Status: DISCONTINUED | OUTPATIENT
Start: 2020-03-16 | End: 2020-03-19 | Stop reason: HOSPADM

## 2020-03-16 RX ORDER — EPHEDRINE SULFATE 50 MG/ML
5 INJECTION, SOLUTION INTRAVENOUS ONCE AS NEEDED
Status: DISCONTINUED | OUTPATIENT
Start: 2020-03-16 | End: 2020-03-16 | Stop reason: HOSPADM

## 2020-03-16 RX ORDER — ONDANSETRON 2 MG/ML
INJECTION INTRAMUSCULAR; INTRAVENOUS AS NEEDED
Status: DISCONTINUED | OUTPATIENT
Start: 2020-03-16 | End: 2020-03-16 | Stop reason: SURG

## 2020-03-16 RX ORDER — PROTAMINE SULFATE 10 MG/ML
INJECTION, SOLUTION INTRAVENOUS AS NEEDED
Status: DISCONTINUED | OUTPATIENT
Start: 2020-03-16 | End: 2020-03-16 | Stop reason: SURG

## 2020-03-16 RX ORDER — GLYCOPYRROLATE 0.2 MG/ML
INJECTION INTRAMUSCULAR; INTRAVENOUS AS NEEDED
Status: DISCONTINUED | OUTPATIENT
Start: 2020-03-16 | End: 2020-03-16 | Stop reason: SURG

## 2020-03-16 RX ORDER — LIDOCAINE HYDROCHLORIDE 10 MG/ML
0.5 INJECTION, SOLUTION EPIDURAL; INFILTRATION; INTRACAUDAL; PERINEURAL ONCE AS NEEDED
Status: DISCONTINUED | OUTPATIENT
Start: 2020-03-16 | End: 2020-03-16 | Stop reason: HOSPADM

## 2020-03-16 RX ORDER — HYDROCODONE BITARTRATE AND ACETAMINOPHEN 7.5; 325 MG/1; MG/1
1 TABLET ORAL ONCE AS NEEDED
Status: DISCONTINUED | OUTPATIENT
Start: 2020-03-16 | End: 2020-03-16 | Stop reason: HOSPADM

## 2020-03-16 RX ORDER — LABETALOL HYDROCHLORIDE 5 MG/ML
5 INJECTION, SOLUTION INTRAVENOUS
Status: DISCONTINUED | OUTPATIENT
Start: 2020-03-16 | End: 2020-03-16 | Stop reason: HOSPADM

## 2020-03-16 RX ORDER — MORPHINE SULFATE 2 MG/ML
2 INJECTION, SOLUTION INTRAMUSCULAR; INTRAVENOUS EVERY 4 HOURS PRN
Status: DISCONTINUED | OUTPATIENT
Start: 2020-03-16 | End: 2020-03-16

## 2020-03-16 RX ORDER — ONDANSETRON 4 MG/1
4 TABLET, FILM COATED ORAL EVERY 6 HOURS PRN
Status: DISCONTINUED | OUTPATIENT
Start: 2020-03-16 | End: 2020-03-19 | Stop reason: HOSPADM

## 2020-03-16 RX ORDER — PROMETHAZINE HYDROCHLORIDE 25 MG/1
25 TABLET ORAL ONCE AS NEEDED
Status: DISCONTINUED | OUTPATIENT
Start: 2020-03-16 | End: 2020-03-16 | Stop reason: HOSPADM

## 2020-03-16 RX ORDER — PROMETHAZINE HYDROCHLORIDE 25 MG/1
25 SUPPOSITORY RECTAL ONCE AS NEEDED
Status: DISCONTINUED | OUTPATIENT
Start: 2020-03-16 | End: 2020-03-16 | Stop reason: HOSPADM

## 2020-03-16 RX ORDER — MIDAZOLAM HYDROCHLORIDE 1 MG/ML
1 INJECTION INTRAMUSCULAR; INTRAVENOUS
Status: DISCONTINUED | OUTPATIENT
Start: 2020-03-16 | End: 2020-03-16 | Stop reason: HOSPADM

## 2020-03-16 RX ORDER — FENTANYL CITRATE 50 UG/ML
50 INJECTION, SOLUTION INTRAMUSCULAR; INTRAVENOUS
Status: DISCONTINUED | OUTPATIENT
Start: 2020-03-16 | End: 2020-03-16 | Stop reason: HOSPADM

## 2020-03-16 RX ORDER — ONDANSETRON 2 MG/ML
4 INJECTION INTRAMUSCULAR; INTRAVENOUS ONCE AS NEEDED
Status: DISCONTINUED | OUTPATIENT
Start: 2020-03-16 | End: 2020-03-16 | Stop reason: HOSPADM

## 2020-03-16 RX ORDER — MIDAZOLAM HYDROCHLORIDE 1 MG/ML
2 INJECTION INTRAMUSCULAR; INTRAVENOUS
Status: DISCONTINUED | OUTPATIENT
Start: 2020-03-16 | End: 2020-03-16 | Stop reason: HOSPADM

## 2020-03-16 RX ORDER — LIDOCAINE HYDROCHLORIDE 20 MG/ML
INJECTION, SOLUTION INFILTRATION; PERINEURAL AS NEEDED
Status: DISCONTINUED | OUTPATIENT
Start: 2020-03-16 | End: 2020-03-16 | Stop reason: SURG

## 2020-03-16 RX ORDER — DIPHENHYDRAMINE HCL 25 MG
25 CAPSULE ORAL
Status: DISCONTINUED | OUTPATIENT
Start: 2020-03-16 | End: 2020-03-16 | Stop reason: HOSPADM

## 2020-03-16 RX ORDER — DIPHENHYDRAMINE HYDROCHLORIDE 50 MG/ML
12.5 INJECTION INTRAMUSCULAR; INTRAVENOUS
Status: DISCONTINUED | OUTPATIENT
Start: 2020-03-16 | End: 2020-03-16 | Stop reason: HOSPADM

## 2020-03-16 RX ORDER — NITROGLYCERIN 0.4 MG/1
0.4 TABLET SUBLINGUAL
Status: DISCONTINUED | OUTPATIENT
Start: 2020-03-16 | End: 2020-03-16 | Stop reason: SDUPTHER

## 2020-03-16 RX ORDER — HYDRALAZINE HYDROCHLORIDE 20 MG/ML
5 INJECTION INTRAMUSCULAR; INTRAVENOUS
Status: DISCONTINUED | OUTPATIENT
Start: 2020-03-16 | End: 2020-03-16 | Stop reason: HOSPADM

## 2020-03-16 RX ORDER — HYDROCODONE BITARTRATE AND ACETAMINOPHEN 5; 325 MG/1; MG/1
1 TABLET ORAL EVERY 4 HOURS PRN
Status: DISCONTINUED | OUTPATIENT
Start: 2020-03-16 | End: 2020-03-19 | Stop reason: HOSPADM

## 2020-03-16 RX ORDER — PROPOFOL 10 MG/ML
VIAL (ML) INTRAVENOUS AS NEEDED
Status: DISCONTINUED | OUTPATIENT
Start: 2020-03-16 | End: 2020-03-16 | Stop reason: SURG

## 2020-03-16 RX ORDER — HYDROMORPHONE HYDROCHLORIDE 1 MG/ML
0.2 INJECTION, SOLUTION INTRAMUSCULAR; INTRAVENOUS; SUBCUTANEOUS
Status: DISCONTINUED | OUTPATIENT
Start: 2020-03-16 | End: 2020-03-16 | Stop reason: HOSPADM

## 2020-03-16 RX ORDER — CEFAZOLIN SODIUM 2 G/100ML
2 INJECTION, SOLUTION INTRAVENOUS
Status: COMPLETED | OUTPATIENT
Start: 2020-03-17 | End: 2020-03-16

## 2020-03-16 RX ORDER — PROMETHAZINE HYDROCHLORIDE 25 MG/ML
12.5 INJECTION, SOLUTION INTRAMUSCULAR; INTRAVENOUS ONCE AS NEEDED
Status: DISCONTINUED | OUTPATIENT
Start: 2020-03-16 | End: 2020-03-16 | Stop reason: HOSPADM

## 2020-03-16 RX ORDER — MELATONIN
2000 EVERY MORNING
Status: DISCONTINUED | OUTPATIENT
Start: 2020-03-17 | End: 2020-03-19 | Stop reason: HOSPADM

## 2020-03-16 RX ORDER — NALOXONE HCL 0.4 MG/ML
0.4 VIAL (ML) INJECTION
Status: DISCONTINUED | OUTPATIENT
Start: 2020-03-16 | End: 2020-03-19 | Stop reason: HOSPADM

## 2020-03-16 RX ORDER — SODIUM CHLORIDE, SODIUM LACTATE, POTASSIUM CHLORIDE, CALCIUM CHLORIDE 600; 310; 30; 20 MG/100ML; MG/100ML; MG/100ML; MG/100ML
100 INJECTION, SOLUTION INTRAVENOUS CONTINUOUS
Status: DISCONTINUED | OUTPATIENT
Start: 2020-03-16 | End: 2020-03-17

## 2020-03-16 RX ORDER — FENTANYL CITRATE 50 UG/ML
INJECTION, SOLUTION INTRAMUSCULAR; INTRAVENOUS AS NEEDED
Status: DISCONTINUED | OUTPATIENT
Start: 2020-03-16 | End: 2020-03-16 | Stop reason: SURG

## 2020-03-16 RX ORDER — SODIUM CHLORIDE 0.9 % (FLUSH) 0.9 %
3 SYRINGE (ML) INJECTION EVERY 12 HOURS SCHEDULED
Status: DISCONTINUED | OUTPATIENT
Start: 2020-03-16 | End: 2020-03-16 | Stop reason: HOSPADM

## 2020-03-16 RX ORDER — ALBUTEROL SULFATE 2.5 MG/3ML
2.5 SOLUTION RESPIRATORY (INHALATION) ONCE AS NEEDED
Status: DISCONTINUED | OUTPATIENT
Start: 2020-03-16 | End: 2020-03-16 | Stop reason: HOSPADM

## 2020-03-16 RX ORDER — SODIUM CHLORIDE 0.9 % (FLUSH) 0.9 %
3-10 SYRINGE (ML) INJECTION AS NEEDED
Status: DISCONTINUED | OUTPATIENT
Start: 2020-03-16 | End: 2020-03-16 | Stop reason: HOSPADM

## 2020-03-16 RX ORDER — CEFAZOLIN SODIUM 2 G/100ML
2 INJECTION, SOLUTION INTRAVENOUS EVERY 8 HOURS
Status: COMPLETED | OUTPATIENT
Start: 2020-03-16 | End: 2020-03-17

## 2020-03-16 RX ORDER — ASPIRIN 81 MG/1
81 TABLET ORAL ONCE
Status: COMPLETED | OUTPATIENT
Start: 2020-03-16 | End: 2020-03-16

## 2020-03-16 RX ORDER — SODIUM CHLORIDE, SODIUM LACTATE, POTASSIUM CHLORIDE, CALCIUM CHLORIDE 600; 310; 30; 20 MG/100ML; MG/100ML; MG/100ML; MG/100ML
9 INJECTION, SOLUTION INTRAVENOUS CONTINUOUS
Status: DISCONTINUED | OUTPATIENT
Start: 2020-03-16 | End: 2020-03-16

## 2020-03-16 RX ORDER — ROCURONIUM BROMIDE 10 MG/ML
INJECTION, SOLUTION INTRAVENOUS AS NEEDED
Status: DISCONTINUED | OUTPATIENT
Start: 2020-03-16 | End: 2020-03-16 | Stop reason: SURG

## 2020-03-16 RX ORDER — HEPARIN SODIUM 1000 [USP'U]/ML
INJECTION, SOLUTION INTRAVENOUS; SUBCUTANEOUS AS NEEDED
Status: DISCONTINUED | OUTPATIENT
Start: 2020-03-16 | End: 2020-03-16 | Stop reason: SURG

## 2020-03-16 RX ORDER — PANTOPRAZOLE SODIUM 40 MG/1
40 TABLET, DELAYED RELEASE ORAL EVERY MORNING
Status: DISCONTINUED | OUTPATIENT
Start: 2020-03-17 | End: 2020-03-19 | Stop reason: HOSPADM

## 2020-03-16 RX ORDER — FAMOTIDINE 10 MG/ML
20 INJECTION, SOLUTION INTRAVENOUS ONCE
Status: COMPLETED | OUTPATIENT
Start: 2020-03-16 | End: 2020-03-16

## 2020-03-16 RX ORDER — NALOXONE HCL 0.4 MG/ML
0.2 VIAL (ML) INJECTION AS NEEDED
Status: DISCONTINUED | OUTPATIENT
Start: 2020-03-16 | End: 2020-03-16 | Stop reason: HOSPADM

## 2020-03-16 RX ORDER — NITROGLYCERIN 0.4 MG/1
0.4 TABLET SUBLINGUAL
Status: DISCONTINUED | OUTPATIENT
Start: 2020-03-16 | End: 2020-03-19 | Stop reason: HOSPADM

## 2020-03-16 RX ADMIN — IPRATROPIUM BROMIDE AND ALBUTEROL SULFATE 3 ML: 2.5; .5 SOLUTION RESPIRATORY (INHALATION) at 21:56

## 2020-03-16 RX ADMIN — PHENYLEPHRINE HYDROCHLORIDE 100 MCG: 10 INJECTION INTRAVENOUS at 12:22

## 2020-03-16 RX ADMIN — FENTANYL CITRATE 50 MCG: 50 INJECTION INTRAMUSCULAR; INTRAVENOUS at 13:25

## 2020-03-16 RX ADMIN — FENTANYL CITRATE 50 MCG: 50 INJECTION INTRAMUSCULAR; INTRAVENOUS at 11:06

## 2020-03-16 RX ADMIN — LIDOCAINE HYDROCHLORIDE 80 MG: 20 INJECTION, SOLUTION INFILTRATION; PERINEURAL at 11:06

## 2020-03-16 RX ADMIN — HYDROMORPHONE HYDROCHLORIDE 0.2 MG: 1 INJECTION, SOLUTION INTRAMUSCULAR; INTRAVENOUS; SUBCUTANEOUS at 14:11

## 2020-03-16 RX ADMIN — HYDROCODONE BITARTRATE AND ACETAMINOPHEN 1 TABLET: 5; 325 TABLET ORAL at 22:24

## 2020-03-16 RX ADMIN — CEFAZOLIN SODIUM 2 G: 2 INJECTION, SOLUTION INTRAVENOUS at 10:56

## 2020-03-16 RX ADMIN — NEOSTIGMINE METHYLSULFATE 4 MG: 1 INJECTION INTRAMUSCULAR; INTRAVENOUS; SUBCUTANEOUS at 12:59

## 2020-03-16 RX ADMIN — EPHEDRINE SULFATE 10 MG: 50 INJECTION INTRAVENOUS at 12:27

## 2020-03-16 RX ADMIN — SODIUM CHLORIDE, POTASSIUM CHLORIDE, SODIUM LACTATE AND CALCIUM CHLORIDE 100 ML/HR: 600; 310; 30; 20 INJECTION, SOLUTION INTRAVENOUS at 21:20

## 2020-03-16 RX ADMIN — OXYCODONE HYDROCHLORIDE AND ACETAMINOPHEN 1 TABLET: 7.5; 325 TABLET ORAL at 16:44

## 2020-03-16 RX ADMIN — FENTANYL CITRATE 50 MCG: 0.05 INJECTION, SOLUTION INTRAMUSCULAR; INTRAVENOUS at 10:09

## 2020-03-16 RX ADMIN — PROTAMINE SULFATE 50 MG: 10 INJECTION, SOLUTION INTRAVENOUS at 12:55

## 2020-03-16 RX ADMIN — MIDAZOLAM 2 MG: 1 INJECTION INTRAMUSCULAR; INTRAVENOUS at 10:10

## 2020-03-16 RX ADMIN — ROCURONIUM BROMIDE 50 MG: 10 INJECTION, SOLUTION INTRAVENOUS at 11:06

## 2020-03-16 RX ADMIN — PROPOFOL 150 MG: 10 INJECTION, EMULSION INTRAVENOUS at 11:06

## 2020-03-16 RX ADMIN — PHENYLEPHRINE HYDROCHLORIDE 0.6 MCG/KG/MIN: 10 INJECTION, SOLUTION INTRAMUSCULAR; INTRAVENOUS; SUBCUTANEOUS at 11:10

## 2020-03-16 RX ADMIN — SODIUM CHLORIDE, POTASSIUM CHLORIDE, SODIUM LACTATE AND CALCIUM CHLORIDE: 600; 310; 30; 20 INJECTION, SOLUTION INTRAVENOUS at 13:30

## 2020-03-16 RX ADMIN — METOPROLOL TARTRATE 25 MG: 25 TABLET ORAL at 21:20

## 2020-03-16 RX ADMIN — ONDANSETRON HYDROCHLORIDE 4 MG: 2 SOLUTION INTRAMUSCULAR; INTRAVENOUS at 12:55

## 2020-03-16 RX ADMIN — FENTANYL CITRATE 50 MCG: 0.05 INJECTION, SOLUTION INTRAMUSCULAR; INTRAVENOUS at 14:47

## 2020-03-16 RX ADMIN — EPHEDRINE SULFATE 5 MG: 50 INJECTION INTRAVENOUS at 11:08

## 2020-03-16 RX ADMIN — ATORVASTATIN CALCIUM 10 MG: 10 TABLET, FILM COATED ORAL at 21:20

## 2020-03-16 RX ADMIN — MIDAZOLAM 1 MG: 1 INJECTION INTRAMUSCULAR; INTRAVENOUS at 09:45

## 2020-03-16 RX ADMIN — SODIUM CHLORIDE, POTASSIUM CHLORIDE, SODIUM LACTATE AND CALCIUM CHLORIDE 9 ML/HR: 600; 310; 30; 20 INJECTION, SOLUTION INTRAVENOUS at 09:15

## 2020-03-16 RX ADMIN — CEFAZOLIN SODIUM 2 G: 2 INJECTION, SOLUTION INTRAVENOUS at 21:20

## 2020-03-16 RX ADMIN — HYDROMORPHONE HYDROCHLORIDE 0.2 MG: 1 INJECTION, SOLUTION INTRAMUSCULAR; INTRAVENOUS; SUBCUTANEOUS at 14:30

## 2020-03-16 RX ADMIN — HEPARIN SODIUM 5000 UNITS: 1000 INJECTION, SOLUTION INTRAVENOUS; SUBCUTANEOUS at 12:10

## 2020-03-16 RX ADMIN — HEPARIN SODIUM 8000 UNITS: 1000 INJECTION, SOLUTION INTRAVENOUS; SUBCUTANEOUS at 11:56

## 2020-03-16 RX ADMIN — ASPIRIN 81 MG: 81 TABLET, COATED ORAL at 21:20

## 2020-03-16 RX ADMIN — PROPOFOL 50 MG: 10 INJECTION, EMULSION INTRAVENOUS at 11:07

## 2020-03-16 RX ADMIN — GLYCOPYRROLATE 0.6 MG: 0.2 INJECTION INTRAMUSCULAR; INTRAVENOUS at 12:59

## 2020-03-16 RX ADMIN — SODIUM CHLORIDE, POTASSIUM CHLORIDE, SODIUM LACTATE AND CALCIUM CHLORIDE 50 ML/HR: 600; 310; 30; 20 INJECTION, SOLUTION INTRAVENOUS at 21:22

## 2020-03-16 RX ADMIN — FENTANYL CITRATE 50 MCG: 0.05 INJECTION, SOLUTION INTRAMUSCULAR; INTRAVENOUS at 13:47

## 2020-03-16 RX ADMIN — FAMOTIDINE 20 MG: 10 INJECTION INTRAVENOUS at 09:45

## 2020-03-16 NOTE — OP NOTE
Date of Admission:  3/16/2020  Today's Date:  03/16/20  Mahesh Salmon MD  [facility]    Preoperative Diagnosis:   Severe asymptomatic carotid artery stenosis.    Postoperative Diagnosis:   Same    Procedure Performed:   Right carotid endarterectomy with intraoperative shunting, bovine pericardial patch angioplasty, and completion duplex.    CPT:26740    Surgeon:   Mahesh Salmon MD    Assistant:    Shira LEONG , Provided critical assistance in exposure, retraction, and suction that overall decrease blood loss and operative time.    Anesthesia:   General    Estimated Blood Loss:    cc    Findings:    Severe focal high-grade stenosis of the right proximal internal iliac artery successfully treated with endarterectomy and bovine pericardial patch angioplasty.  Completion duplex looked good.    Implants:    Implant Name Type Inv. Item Serial No.  Lot No. LRB No. Used   PTCH VASC XENOSURE PLS BIO 0.8X8CM - [id number] Implant PTCH VASC XENOSURE PLS BIO 0.8X8CM  [facility] VASCULAR [id number] Right 1   KT MATRX FLOSEAL HEMO FAST PREP 5ML - [id number] Implant KT MATRX FLOSEAL HEMO FAST PREP 5ML  [facility] [id number] Right 1       Staff:   Circulator: Kristel Tirado RN; Spurling, Shannon, RN  Scrub Person: Renee Holt; Dex Hughes  Assistant: Shira Leonardo CSA    Specimen:   none    Complications:   none    Dispo:   to PACU    Indication for procedure:   74 y.o. female with severe asymptomatic high-grade stenosis of the right carotid bifurcation.  I discussed with her the risk benefits alternatives undergoing open endarterectomy.  Informed consent was obtained.    Description of procedure:   The patient was taken to the operating room. Anesthesia was induced without difficulty. Surgical sites were prepped and draped in the usual sterile manner. A full surgical timeout was performed. An oblique incision was made over the right neck in a previously existing skin  fold. Bovie electrocautery was used to dissect down through the platysmal muscle. Subplatysmal flaps were raised. The sternocleidomastoid was encountered. It was dissected and retracted laterally, exposing the carotid sheath. Vein was encountered. Facial vein was divided between silk ties, exposing the carotid bifurcation. The vagus nerve was exposed throughout the case and protected as well as the hypoglossal nerve. Proximal and distal control of the CCA and ICA was obtained as well as control of the ECA and the superior thyroid artery. Heparin was administered after adequate ACTs were obtained. Distal clamp on the ICA was placed followed by the other vessels. Arteriotomy was made and extended with Richards scissors. Stenosis was identified. Back bleeding was checked and appeared to be marginal. I elected to place an inline shunt with the  shunt. Shunt clamps were applied after appropriate back bleeding. Standard endarterectomy of the CCA and ICA and an eversion endarterectomy of the ECA. Meticulous care was taken to ensure no loose debris was present. Bovine pericardial patch angioplasty was performed using an 8 x 0.8 bovine pericardium sewn in with 6-0 Prolenes. Shunt was removed. Appropriate back bleeding measures were taken. The patch was secured. Vascular lab was summoned to the operative suite. Duplex ultrasound confirmed good flow throughout all vessels. Protamine was administered. Meticulous hemostasis was obtained. Sternocleidomastoid was reapproximated with 3-0 Vicryls, platysma with 4-0 Vicryls, and skin run close with 4-0 Vicryl in a subcuticular manner. The patient awoke from anesthesia moving all extremities without any gross focal motor neurologic deficits.       All instrument, sponge counts, and needle counts correct at case completion x2.    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

## 2020-03-16 NOTE — ANESTHESIA PREPROCEDURE EVALUATION
Anesthesia Evaluation     Patient summary reviewed and Nursing notes reviewed   NPO Solid Status: > 8 hours  NPO Liquid Status: > 2 hours           Airway   Mallampati: II  TM distance: >3 FB  Neck ROM: full  Dental - normal exam     Pulmonary - normal exam   (+) a smoker Current Smoked day of surgery, COPD, home oxygen, shortness of breath,   Cardiovascular - normal exam    ECG reviewed    (+) hypertension, past MI , CAD, PVD, hyperlipidemia,  carotid artery disease      Neuro/Psych  (+) headaches,     GI/Hepatic/Renal/Endo    (+)  hiatal hernia, GERD well controlled,  hepatitis, liver disease,     Musculoskeletal (-) negative ROS    Abdominal    Substance History - negative use     OB/GYN negative ob/gyn ROS         Other                      Anesthesia Plan    ASA 4     general   (Left art line radial)  intravenous induction     Anesthetic plan, all risks, benefits, and alternatives have been provided, discussed and informed consent has been obtained with: patient.

## 2020-03-16 NOTE — ANESTHESIA PROCEDURE NOTES
Airway  Urgency: elective    Date/Time: 3/16/2020 11:07 AM  Airway not difficult    General Information and Staff    Patient location during procedure: OR  Anesthesiologist: Juanita Johnson MD  CRNA: Neha Saavedra CRNA    Indications and Patient Condition  Indications for airway management: airway protection    Preoxygenated: yes  MILS not maintained throughout  Mask difficulty assessment: 1 - vent by mask    Final Airway Details  Final airway type: endotracheal airway      Successful airway: ETT  Cuffed: yes   Successful intubation technique: direct laryngoscopy  Facilitating devices/methods: intubating stylet  Endotracheal tube insertion site: oral  Blade: Dora  Blade size: 3  ETT size (mm): 7.0  Cormack-Lehane Classification: grade I - full view of glottis  Placement verified by: chest auscultation   Cuff volume (mL): 8  Measured from: lips  ETT/EBT  to lips (cm): 21  Number of attempts at approach: 1  Assessment: lips, teeth, and gum same as pre-op and atraumatic intubation    Additional Comments  PreO2 100% face mask, IV induction, easy mask, DVL x1, cords noted, tube through, cuff up, EBBSH, +etCO2, = chest movement, tube secured in place, atraumatic, teeth and lips intact as preop.

## 2020-03-16 NOTE — ANESTHESIA POSTPROCEDURE EVALUATION
Patient: Mary Jack    Procedure Summary     Date:  03/16/20 Room / Location:  [facility] OR 04 / [facility] MAIN OR    Anesthesia Start:  1053 Anesthesia Stop:  1337    Procedure:  RIGHT CAROTID ENDARTERECTOMY (Right Neck) Diagnosis:      Surgeon:  Mahesh Salmon MD Provider:  Juanita Johnson MD    Anesthesia Type:  general ASA Status:  4          Anesthesia Type: general    Vitals  Vitals Value Taken Time   /54 3/16/2020  2:35 PM   Temp 36.5 °C (97.7 °F) 3/16/2020  1:31 PM   Pulse 61 3/16/2020  2:41 PM   Resp 16 3/16/2020  2:20 PM   SpO2 95 % 3/16/2020  2:41 PM   Vitals shown include unvalidated device data.        Post Anesthesia Care and Evaluation    Patient location during evaluation: PACU  Patient participation: complete - patient participated  Level of consciousness: awake and alert  Pain management: adequate  Airway patency: patent  Anesthetic complications: No anesthetic complications    Cardiovascular status: acceptable  Respiratory status: acceptable  Hydration status: acceptable    Comments: --------------------            03/16/20               1425     --------------------   BP:                  Pulse:               Resp:                Temp:                SpO2:      95%      --------------------

## 2020-03-16 NOTE — H&P
Name: Mary Jack ADMIT: 3/16/2020   : 1945  PCP: Delfino Matt MD    MRN: [id number] LOS: 0 days   AGE/SEX: 74 y.o. female  ROOM: [facility] OR/[facility]    Pre-operative H&P    Patient Care Team:  Delfino Matt MD as PCP - General  Oral Llanes MD as Consulting Physician (Pulmonary Disease)  Herber Hayes MD as Consulting Physician (Cardiology)  Virgie Castelan MD as Consulting Physician (Gastroenterology)  Maxi Lundy MD as Surgeon (Orthopedic Surgery)  Mahesh Salmon MD as Surgeon (Vascular Surgery)  No chief complaint on file.    CC: Carotid stenosis preop evaluation.    Subjective     History of Present Illness  Review of Systems    Past Medical History:   Diagnosis Date   • AAA (abdominal aortic aneurysm) (CMS/HCC)    • Allergic not sure    CODINE,MORFINE AND DYE SOLUTION   • Anemia    • Aneurysm of abdominal aorta (CMS/HCC)    • Arthritis    • Zepeda esophagus    • CAD (coronary artery disease)    • Chest pain    • Constipation    • COPD (chronic obstructive pulmonary disease) (CMS/HCC)    • Emphysema lung (CMS/HCC)    • Empyema (CMS/HCC)    • Fatigue    • GERD (gastroesophageal reflux disease)    • Headache    • Hiatal hernia    • High risk medication use    • History of cardiac catheterization     CATH STENT PLACEMENT: CIRCUMFLEX BRANCH   • Hyperlipidemia    • Hypertension    • Infectious viral hepatitis Jaundice in    • Jaundice    • Lumbosacral disc disease    • Myocardial infarction (CMS/HCC)    • Myocardial infarction (CMS/HCC)    • Occult blood in stools    • On home O2    • Peripheral artery disease (CMS/HCC)    • Reflux gastritis    • Scoliosis    • Visual impairment     BLURRY VISION IN RT. EYE     Past Surgical History:   Procedure Laterality Date   • CARDIAC CATHETERIZATION     • CARDIAC CATHETERIZATION N/A 2017    Procedure: Left Heart Cath;  Surgeon: Herber Hayes MD;  Location: [facility] INVASIVE LOCATION;  Service:    •  CHOLECYSTECTOMY     • COLONOSCOPY     • COLONOSCOPY N/A 6/10/2016    Procedure: COLONOSCOPY with polypectomy;  Surgeon: Virgie Castelan MD;  Location:  [facility] OR;  Service:    • CORONARY ANGIOPLASTY     • CORONARY STENT PLACEMENT      X2   • ENDOSCOPY N/A 6/10/2016    Procedure: ESOPHAGOGASTRODUODENOSCOPY WITH BIOPSY;  Surgeon: Virgie Castelan MD;  Location:  [facility] OR;  Service:    • ENDOSCOPY     • ENDOSCOPY N/A 8/25/2017    Procedure: ESOPHAGOGASTRODUODENOSCOPY w/ biopsies;  Surgeon: Virgie Castelan MD;  Location:  [facility] OR;  Service:      Family History   Problem Relation Age of Onset   • Colon cancer Mother    • Breast cancer Mother    • Other Father         cardiac disorder   • Hypertension Father    • Heart disease Father    • Other Sister         cardiac disorder   • Hypertension Sister    • Lung disease Sister    • Thyroid disease Sister    • Other Brother         cardiac disorder   • Thyroid disease Daughter    • Heart disease Sister    • Hypertension Sister    • Pulmonary fibrosis Sister    • Heart disease Brother    • Other Brother    • Thyroid disease Sister    • Other Sister    • Thyroid disease Daughter    • Malig Hyperthermia Neg Hx        Social History     Tobacco Use   • Smoking status: Current Every Day Smoker     Packs/day: 0.50     Years: 50.00     Pack years: 25.00     Types: Cigarettes     Start date: 8/3/2016   • Smokeless tobacco: Never Used   Substance Use Topics   • Alcohol use: No   • Drug use: No     Medications Prior to Admission   Medication Sig Dispense Refill Last Dose   • ANORO ELLIPTA 62.5-25 MCG/INH aerosol powder  Inhale 1 puff Every Morning.  0 3/16/2020 at 0600   • aspirin 81 MG tablet Take 1 tablet by mouth Daily. (Patient taking differently: Take 81 mg by mouth Every Night.) 90 tablet 0 3/15/2020 at 1930   • atorvastatin (LIPITOR) 10 MG tablet TAKE 1 TABLET EVERY DAY (Patient taking differently: Take 10 mg by mouth Every Night.) 90 tablet 3 3/15/2020 at 1930   • Calcium  Carbonate-Vitamin D (CALCIUM 600+D HIGH POTENCY PO) Take  by mouth Every Morning.   3/15/2020 at 1130   • cholecalciferol (VITAMIN D3) 1000 UNITS tablet Take 2,000 Units by mouth Every Morning.   3/15/2020 at 1130   • Ferrous Fumarate-Vitamin C ER (MARCUS-SEQUELS) 65-25 MG CR tablet Take 1 tablet by mouth Every Night.   3/15/2020 at Unknown time   • metoprolol tartrate (LOPRESSOR) 25 MG tablet Take 1 tablet by mouth 2 (Two) Times a Day. 180 tablet 3 3/16/2020 at 0630   • nitroglycerin (NITROSTAT) 0.4 MG SL tablet Place 0.4 mg under the tongue Every 5 (Five) Minutes As Needed for Chest Pain. Take no more than 3 doses in 15 minutes.   Past Month at Unknown time   • O2 (OXYGEN) Inhale 3 L/min Continuous.   3/16/2020 at Unknown time   • omeprazole (priLOSEC) 40 MG capsule Take 1 capsule by mouth Daily. (Patient taking differently: Take 40 mg by mouth Every Morning.) 90 capsule 3 3/16/2020 at 0630   • polyethylene glycol (MIRALAX) packet Take 17 g by mouth Daily. (Patient taking differently: Take 17 g by mouth Every Morning.) 850 g 3 3/15/2020 at Unknown time   • vitamin A 63580 UNIT capsule Take 10,000 Units by mouth Every Morning.   3/15/2020 at 1130   • vitamin B-12 (CYANOCOBALAMIN) 1000 MCG tablet Take 1,000 mcg by mouth Every Morning.   3/15/2020 at 1130   • vitamin C (ASCORBIC ACID) 500 MG tablet Take 500 mg by mouth Every Morning.   3/15/2020 at 1130       sodium chloride 3 mL Intravenous Q12H       lactated ringers 9 mL/hr Last Rate: 9 mL/hr (03/16/20 0915)     •  fentanyl  •  lidocaine PF 1%  •  midazolam **OR** midazolam  •  sodium chloride  Iodinated diagnostic agents; Codeine; and Morphine and related    Objective     Physical Exam:  Physical Exam   Constitutional: She is oriented to person, place, and time. She appears well-developed.   Pulmonary/Chest: Effort normal. No respiratory distress.   Abdominal: Soft. She exhibits no distension.   Neurological: She is alert and oriented to person, place, and time.    "      Vital Signs and Labs:  Vital Signs   Patient Vitals for the past 24 hrs:   BP Temp Temp src Pulse Resp SpO2 Height Weight   03/16/20 0918 110/60 98.2 °F (36.8 °C) Oral 67 20 97 % 160 cm (63\") 80.4 kg (177 lb 3.2 oz)     I/O:  No intake/output data recorded.    CBC    Results from last 7 days   Lab Units 03/09/20  1223   WBC 10*3/mm3 5.32   HEMOGLOBIN g/dL 10.8*   PLATELETS 10*3/mm3 206     BMP   Results from last 7 days   Lab Units 03/09/20  1223   SODIUM mmol/L 134*   POTASSIUM mmol/L 4.8   CHLORIDE mmol/L 97*   CO2 mmol/L 25.2   BUN mg/dL 12   CREATININE mg/dL 0.82   GLUCOSE mg/dL 91     Cr Clearance Estimated Creatinine Clearance: 62.3 mL/min (by C-G formula based on SCr of 0.82 mg/dL).  Coag     HbA1C No results found for: HGBA1C  Blood Glucose No results found for: POCGLU  Infection     CMP   Results from last 7 days   Lab Units 03/09/20  1223   SODIUM mmol/L 134*   POTASSIUM mmol/L 4.8   CHLORIDE mmol/L 97*   CO2 mmol/L 25.2   BUN mg/dL 12   CREATININE mg/dL 0.82   GLUCOSE mg/dL 91     ABG      UA      MIRLANDE  No results found for: POCMETH, POCAMPHET, POCBARBITUR, POCBENZO, POCCOCAINE, POCOPIATES, POCOXYCODO, POCPHENCYC, POCPROPOXY, POCTHC, POCTRICYC  Radiology(recent) No radiology results for the last day    Active Hospital Problems    Diagnosis  POA   • **Bilateral carotid artery disease (CMS/HCC) [I73.9]  Yes   • Abdominal aortic aneurysm (AAA) without rupture (CMS/HCC) [I71.4]  Yes   • 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 female patient with high-grade asymptomatic stenosis of the right carotid artery.  I discussed with her the risk benefits alternatives undergoing a carotid " endarterectomy.  Informed consent was obtained.    I discussed the patients findings and my recommendations with patient.    Mahesh Salmon MD  03/16/20  09:52    Please call my office with any question: [phone]

## 2020-03-17 LAB
ACT BLD: 131 SECONDS (ref 82–152)
ACT BLD: 235 SECONDS (ref 82–152)
ACT BLD: 318 SECONDS (ref 82–152)

## 2020-03-17 PROCEDURE — 94799 UNLISTED PULMONARY SVC/PX: CPT

## 2020-03-17 PROCEDURE — 25010000002 ENOXAPARIN PER 10 MG: Performed by: SURGERY

## 2020-03-17 PROCEDURE — 25010000003 CEFAZOLIN IN DEXTROSE 2-4 GM/100ML-% SOLUTION: Performed by: SURGERY

## 2020-03-17 RX ORDER — ACETAMINOPHEN 325 MG/1
650 TABLET ORAL EVERY 6 HOURS PRN
Status: DISCONTINUED | OUTPATIENT
Start: 2020-03-17 | End: 2020-03-19 | Stop reason: HOSPADM

## 2020-03-17 RX ADMIN — ENOXAPARIN SODIUM 40 MG: 40 INJECTION SUBCUTANEOUS at 08:59

## 2020-03-17 RX ADMIN — PANTOPRAZOLE SODIUM 40 MG: 40 TABLET, DELAYED RELEASE ORAL at 06:02

## 2020-03-17 RX ADMIN — METOPROLOL TARTRATE 25 MG: 25 TABLET ORAL at 20:06

## 2020-03-17 RX ADMIN — CEFAZOLIN SODIUM 2 G: 2 INJECTION, SOLUTION INTRAVENOUS at 03:49

## 2020-03-17 RX ADMIN — METOPROLOL TARTRATE 25 MG: 25 TABLET ORAL at 08:59

## 2020-03-17 RX ADMIN — IPRATROPIUM BROMIDE AND ALBUTEROL SULFATE 3 ML: 2.5; .5 SOLUTION RESPIRATORY (INHALATION) at 06:52

## 2020-03-17 RX ADMIN — ATORVASTATIN CALCIUM 10 MG: 10 TABLET, FILM COATED ORAL at 20:06

## 2020-03-17 RX ADMIN — VITAMIN D, TAB 1000IU (100/BT) 2000 UNITS: 25 TAB at 06:02

## 2020-03-17 RX ADMIN — ACETAMINOPHEN 650 MG: 325 TABLET, FILM COATED ORAL at 15:10

## 2020-03-17 RX ADMIN — ACETAMINOPHEN 650 MG: 325 TABLET, FILM COATED ORAL at 23:12

## 2020-03-17 RX ADMIN — IPRATROPIUM BROMIDE AND ALBUTEROL SULFATE 3 ML: 2.5; .5 SOLUTION RESPIRATORY (INHALATION) at 23:25

## 2020-03-17 RX ADMIN — HYDROCODONE BITARTRATE AND ACETAMINOPHEN 1 TABLET: 5; 325 TABLET ORAL at 08:59

## 2020-03-17 NOTE — PROGRESS NOTES
Name: Mary Jack ADMIT: 3/16/2020   : 1945  PCP: Delfino Matt MD    MRN: [id number] LOS: 1 days   AGE/SEX: 74 y.o. female  ROOM: [address]     [place], Post Op [employer]    No chief complaint on file.    CC: Postop  Subjective     74 y.o. female complains of some shortness of breath and overall weak/fatigued feeling.    Review of Systems    Objective     Scheduled Medications:     atorvastatin 10 mg Oral Nightly   cholecalciferol 2,000 Units Oral QAM   enoxaparin 40 mg Subcutaneous Daily   ipratropium-albuterol 3 mL Nebulization Q8H - RT   metoprolol tartrate 25 mg Oral BID   pantoprazole 40 mg Oral QAM       Active Infusions:    lactated ringers 100 mL/hr Last Rate: Stopped (20)   lactated ringers 50 mL/hr Last Rate: 50 mL/hr (20)   O2 3 L/min Last Rate: 3 L/min (20)       As Needed Medications:  HYDROcodone-acetaminophen  •  [DISCONTINUED] Morphine **AND** naloxone  •  nitroglycerin  •  ondansetron **OR** ondansetron    Vital Signs  Vital Signs Patient Vitals for the past 24 hrs:   BP Temp Temp src Pulse Resp SpO2 Height Weight   20 0652 -- -- -- 72 18 94 % -- --   20 0300 114/70 97.6 °F (36.4 °C) Oral -- -- -- -- --   20 2315 98/55 -- Oral 77 18 97 % -- --   20 -- -- -- 61 18 100 % -- --   20 -- -- -- 57 18 94 % -- --   20 1945 119/60 97.5 °F (36.4 °C) Oral 60 16 -- -- --   20 1900 103/57 97.6 °F (36.4 °C) Oral 58 16 94 % -- --   20 1830 110/69 -- -- 56 16 95 % -- --   20 1800 97/53 -- -- 56 16 90 % -- --   20 1715 103/61 -- -- 55 16 92 % -- --   20 1700 109/55 -- -- 72 16 90 % -- --   20 1635 107/51 -- -- 58 16 96 % -- --   20 1620 103/50 -- -- 67 16 93 % -- --   20 1605 -- -- -- 52 16 96 % -- --   20 1600 108/53 -- -- 69 16 95 % -- --   20 1555 -- -- -- 57 16 95 % -- --   20 1550 98/54 -- -- 52 16 97 % -- --   20 1545 -- -- --  "52 -- 95 % -- --   03/16/20 1535 -- -- -- 52 16 95 % -- --   03/16/20 1530 -- -- -- 56 -- 96 % -- --   03/16/20 1525 -- -- -- 53 -- 94 % -- --   03/16/20 1520 99/56 -- -- 59 16 95 % -- --   03/16/20 1515 -- -- -- 57 -- 95 % -- --   03/16/20 1510 -- -- -- 67 -- 94 % -- --   03/16/20 1505 102/52 -- -- 61 16 94 % -- --   03/16/20 1500 -- -- -- 64 -- 95 % -- --   03/16/20 1455 -- -- -- 59 -- 95 % -- --   03/16/20 1450 90/48 -- -- 60 16 97 % -- --   03/16/20 1445 -- -- -- 58 -- 96 % -- --   03/16/20 1440 -- -- -- 59 -- 96 % -- --   03/16/20 1435 103/54 -- -- 61 16 98 % -- --   03/16/20 1430 -- -- -- 60 -- 95 % -- --   03/16/20 1425 -- -- -- 56 -- 95 % -- --   03/16/20 1420 -- -- -- -- 16 95 % -- --   03/16/20 1415 -- -- -- 54 -- 94 % -- --   03/16/20 1410 -- -- -- 64 -- 93 % -- --   03/16/20 1405 96/50 -- -- 68 16 97 % -- --   03/16/20 1400 -- -- -- 70 -- 94 % -- --   03/16/20 1355 -- -- -- 65 -- 93 % -- --   03/16/20 1350 102/72 -- -- 63 -- 93 % -- --   03/16/20 1345 112/59 -- -- 65 16 93 % -- --   03/16/20 1340 110/56 -- -- 67 16 94 % -- --   03/16/20 1335 111/51 -- -- 68 16 92 % -- --   03/16/20 1331 114/64 97.7 °F (36.5 °C) Oral 68 16 93 % -- --   03/16/20 1024 -- -- -- 65 14 95 % -- --   03/16/20 1014 154/71 -- -- 65 15 95 % -- --   03/16/20 1011 -- -- -- 67 16 95 % -- --   03/16/20 0958 130/64 -- -- 64 18 98 % -- --   03/16/20 0948 -- -- -- 68 20 99 % -- --   03/16/20 0918 110/60 98.2 °F (36.8 °C) Oral 67 20 97 % 160 cm (63\") 80.4 kg (177 lb 3.2 oz)     I/O:  I/O last 3 completed shifts:  In: 1000 [I.V.:1000]  Out: 1500 [Urine:1350; Blood:150]    Physical Exam:  Physical Exam    Results Review:     CBC      BMP     Cr Clearance Estimated Creatinine Clearance: 62.3 mL/min (by C-G formula based on SCr of 0.82 mg/dL).  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 female postop day #1 right carotid endarterectomy.  Neurologically intact.  Slow to recover after surgery.    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

## 2020-03-17 NOTE — PLAN OF CARE
Problem: Patient Care Overview  Goal: Plan of Care Review  3/17/2020 0617 by Pao Lane RN  Outcome: Ongoing (interventions implemented as appropriate)  Flowsheets (Taken 3/17/2020 0617)  Outcome Summary: VSS, incision CDI, no c/o pain. IVF and abx as ordered. Rested through night, will continue to monitor  3/17/2020 0617 by Pao Lane RN  Outcome: Ongoing (interventions implemented as appropriate)  Goal: Individualization and Mutuality  3/17/2020 0617 by Pao Lane RN  Outcome: Ongoing (interventions implemented as appropriate)  3/17/2020 0617 by Pao Lane RN  Outcome: Ongoing (interventions implemented as appropriate)  Goal: Discharge Needs Assessment  3/17/2020 0617 by Pao Lane RN  Outcome: Ongoing (interventions implemented as appropriate)  3/17/2020 0617 by Pao Lane RN  Outcome: Ongoing (interventions implemented as appropriate)  Goal: Interprofessional Rounds/Family Conf  3/17/2020 0617 by Pao Lane RN  Outcome: Ongoing (interventions implemented as appropriate)  3/17/2020 0617 by Pao Lane RN  Outcome: Ongoing (interventions implemented as appropriate)     Problem: Carotid Endarterectomy (Adult)  Goal: Signs and Symptoms of Listed Potential Problems Will be Absent, Minimized or Managed (Carotid Endarterectomy)  3/17/2020 0617 by Pao Lane RN  Outcome: Ongoing (interventions implemented as appropriate)  3/17/2020 0617 by Pao Lane RN  Outcome: Ongoing (interventions implemented as appropriate)  Goal: Anesthesia/Sedation Recovery  3/17/2020 0617 by Pao Lane RN  Outcome: Ongoing (interventions implemented as appropriate)  3/17/2020 0617 by Pao Lane RN  Outcome: Ongoing (interventions implemented as appropriate)

## 2020-03-17 NOTE — PROGRESS NOTES
Discharge Planning Assessment  [facility]     Patient Name: Mary Jack  MRN: [id number]  Today's Date: 3/17/2020    Admit Date: 3/16/2020    Discharge Needs Assessment     Row Name 03/17/20 1554       Living Environment    Lives With  alone    Current Living Arrangements  home/apartment/condo    Family Caregiver if Needed  child(juliet), adult    Quality of Family Relationships  supportive       Resource/Environmental Concerns    Transportation Concerns  car, none       Transition Planning    Patient/Family Anticipated Services at Transition  none    Transportation Anticipated  family or friend will provide       Discharge Needs Assessment    Equipment Currently Used at Home  oxygen        Discharge Plan     Row Name 03/17/20 1554       Plan    Plan  Home    Patient/Family in Agreement with Plan  yes    Plan Comments  Met with pt at bedside.  Introduced self, explained CCP role, facesheet verified.  Pt states she lives alone and is indpendent with ADLs.  Pt uses home O2 from [place], no other DME.  No history of HH or SNF.  Plans to return home, no known needs.  ANDERS Downey RN        Destination      Coordination has not been started for this encounter.      Durable Medical Equipment      Coordination has not been started for this encounter.      Dialysis/Infusion      Coordination has not been started for this encounter.      Home Medical Care      Coordination has not been started for this encounter.      Therapy      Coordination has not been started for this encounter.      Community Resources      Coordination has not been started for this encounter.          Demographic Summary     Row Name 03/17/20 1553       General Information    Admission Type  inpatient    Arrived From  home    Referral Source  admission list    Reason for Consult  discharge planning    Preferred Language  English       Contact Information    Permission Granted to Share Info With  family/designee Gayle Burt (sister) [phone]         Functional Status     Row Name 03/17/20 1553       Functional Status, IADL    Medications  independent    Meal Preparation  independent    Housekeeping  independent    Laundry  independent    Shopping  independent        Psychosocial    No documentation.       Abuse/Neglect    No documentation.       Legal    No documentation.       Substance Abuse    No documentation.       Patient Forms    No documentation.           Nannette Downey RN

## 2020-03-18 ENCOUNTER — APPOINTMENT (OUTPATIENT)
Dept: GENERAL RADIOLOGY | Facility: HOSPITAL | Age: 75
End: 2020-03-18

## 2020-03-18 LAB
ANION GAP SERPL CALCULATED.3IONS-SCNC: 10.2 MMOL/L (ref 5–15)
B PARAPERT DNA SPEC QL NAA+PROBE: NOT DETECTED
B PERT DNA SPEC QL NAA+PROBE: NOT DETECTED
BUN BLD-MCNC: 15 MG/DL (ref 8–23)
BUN/CREAT SERPL: 17.9 (ref 7–25)
C PNEUM DNA NPH QL NAA+NON-PROBE: NOT DETECTED
CALCIUM SPEC-SCNC: 8.9 MG/DL (ref 8.6–10.5)
CHLORIDE SERPL-SCNC: 92 MMOL/L (ref 98–107)
CO2 SERPL-SCNC: 25.8 MMOL/L (ref 22–29)
CREAT BLD-MCNC: 0.84 MG/DL (ref 0.57–1)
DEPRECATED RDW RBC AUTO: 41.1 FL (ref 37–54)
ERYTHROCYTE [DISTWIDTH] IN BLOOD BY AUTOMATED COUNT: 12.2 % (ref 12.3–15.4)
FLUAV H1 2009 PAND RNA NPH QL NAA+PROBE: NOT DETECTED
FLUAV H1 HA GENE NPH QL NAA+PROBE: NOT DETECTED
FLUAV H3 RNA NPH QL NAA+PROBE: NOT DETECTED
FLUAV SUBTYP SPEC NAA+PROBE: NOT DETECTED
FLUBV RNA ISLT QL NAA+PROBE: NOT DETECTED
GFR SERPL CREATININE-BSD FRML MDRD: 67 ML/MIN/1.73
GLUCOSE BLD-MCNC: 92 MG/DL (ref 65–99)
HADV DNA SPEC NAA+PROBE: NOT DETECTED
HCOV 229E RNA SPEC QL NAA+PROBE: NOT DETECTED
HCOV HKU1 RNA SPEC QL NAA+PROBE: NOT DETECTED
HCOV NL63 RNA SPEC QL NAA+PROBE: DETECTED
HCOV OC43 RNA SPEC QL NAA+PROBE: NOT DETECTED
HCT VFR BLD AUTO: 25.5 % (ref 34–46.6)
HGB BLD-MCNC: 8.6 G/DL (ref 12–15.9)
HMPV RNA NPH QL NAA+NON-PROBE: NOT DETECTED
HPIV1 RNA SPEC QL NAA+PROBE: NOT DETECTED
HPIV2 RNA SPEC QL NAA+PROBE: NOT DETECTED
HPIV3 RNA NPH QL NAA+PROBE: NOT DETECTED
HPIV4 P GENE NPH QL NAA+PROBE: NOT DETECTED
M PNEUMO IGG SER IA-ACNC: NOT DETECTED
MCH RBC QN AUTO: 31.6 PG (ref 26.6–33)
MCHC RBC AUTO-ENTMCNC: 33.7 G/DL (ref 31.5–35.7)
MCV RBC AUTO: 93.8 FL (ref 79–97)
PLATELET # BLD AUTO: 165 10*3/MM3 (ref 140–450)
PMV BLD AUTO: 10.1 FL (ref 6–12)
POTASSIUM BLD-SCNC: 4.3 MMOL/L (ref 3.5–5.2)
PROCALCITONIN SERPL-MCNC: 0.05 NG/ML (ref 0.1–0.25)
RBC # BLD AUTO: 2.72 10*6/MM3 (ref 3.77–5.28)
RHINOVIRUS RNA SPEC NAA+PROBE: NOT DETECTED
RSV RNA NPH QL NAA+NON-PROBE: NOT DETECTED
SODIUM BLD-SCNC: 128 MMOL/L (ref 136–145)
TROPONIN T SERPL-MCNC: <0.01 NG/ML (ref 0–0.03)
WBC NRBC COR # BLD: 5.33 10*3/MM3 (ref 3.4–10.8)

## 2020-03-18 PROCEDURE — 94799 UNLISTED PULMONARY SVC/PX: CPT

## 2020-03-18 PROCEDURE — 93010 ELECTROCARDIOGRAM REPORT: CPT | Performed by: INTERNAL MEDICINE

## 2020-03-18 PROCEDURE — 80048 BASIC METABOLIC PNL TOTAL CA: CPT | Performed by: SURGERY

## 2020-03-18 PROCEDURE — 25010000002 FUROSEMIDE PER 20 MG: Performed by: INTERNAL MEDICINE

## 2020-03-18 PROCEDURE — 84145 PROCALCITONIN (PCT): CPT | Performed by: INTERNAL MEDICINE

## 2020-03-18 PROCEDURE — 25010000002 ENOXAPARIN PER 10 MG: Performed by: SURGERY

## 2020-03-18 PROCEDURE — 0100U HC BIOFIRE FILMARRAY RESP PANEL 2: CPT | Performed by: INTERNAL MEDICINE

## 2020-03-18 PROCEDURE — 84484 ASSAY OF TROPONIN QUANT: CPT | Performed by: INTERNAL MEDICINE

## 2020-03-18 PROCEDURE — 93005 ELECTROCARDIOGRAM TRACING: CPT | Performed by: INTERNAL MEDICINE

## 2020-03-18 PROCEDURE — 71046 X-RAY EXAM CHEST 2 VIEWS: CPT

## 2020-03-18 PROCEDURE — 85027 COMPLETE CBC AUTOMATED: CPT | Performed by: SURGERY

## 2020-03-18 RX ORDER — IPRATROPIUM BROMIDE AND ALBUTEROL SULFATE 2.5; .5 MG/3ML; MG/3ML
3 SOLUTION RESPIRATORY (INHALATION)
Status: DISCONTINUED | OUTPATIENT
Start: 2020-03-18 | End: 2020-03-19 | Stop reason: HOSPADM

## 2020-03-18 RX ORDER — OXYMETAZOLINE HYDROCHLORIDE 0.05 G/100ML
2 SPRAY NASAL 2 TIMES DAILY
Status: DISCONTINUED | OUTPATIENT
Start: 2020-03-18 | End: 2020-03-19 | Stop reason: HOSPADM

## 2020-03-18 RX ORDER — FUROSEMIDE 10 MG/ML
20 INJECTION INTRAMUSCULAR; INTRAVENOUS ONCE
Status: COMPLETED | OUTPATIENT
Start: 2020-03-18 | End: 2020-03-18

## 2020-03-18 RX ORDER — PREDNISONE 20 MG/1
20 TABLET ORAL
Status: DISCONTINUED | OUTPATIENT
Start: 2020-03-19 | End: 2020-03-19 | Stop reason: HOSPADM

## 2020-03-18 RX ADMIN — ACETAMINOPHEN 650 MG: 325 TABLET, FILM COATED ORAL at 11:10

## 2020-03-18 RX ADMIN — PANTOPRAZOLE SODIUM 40 MG: 40 TABLET, DELAYED RELEASE ORAL at 06:03

## 2020-03-18 RX ADMIN — OXYMETAZOLINE HCL 2 SPRAY: 0.05 SPRAY NASAL at 20:02

## 2020-03-18 RX ADMIN — METOPROLOL TARTRATE 25 MG: 25 TABLET ORAL at 20:02

## 2020-03-18 RX ADMIN — ENOXAPARIN SODIUM 40 MG: 40 INJECTION SUBCUTANEOUS at 08:05

## 2020-03-18 RX ADMIN — METOPROLOL TARTRATE 25 MG: 25 TABLET ORAL at 08:05

## 2020-03-18 RX ADMIN — IPRATROPIUM BROMIDE AND ALBUTEROL SULFATE 3 ML: 2.5; .5 SOLUTION RESPIRATORY (INHALATION) at 15:21

## 2020-03-18 RX ADMIN — IPRATROPIUM BROMIDE AND ALBUTEROL SULFATE 3 ML: 2.5; .5 SOLUTION RESPIRATORY (INHALATION) at 06:54

## 2020-03-18 RX ADMIN — ATORVASTATIN CALCIUM 10 MG: 10 TABLET, FILM COATED ORAL at 20:02

## 2020-03-18 RX ADMIN — FUROSEMIDE 20 MG: 10 INJECTION, SOLUTION INTRAMUSCULAR; INTRAVENOUS at 16:31

## 2020-03-18 RX ADMIN — VITAMIN D, TAB 1000IU (100/BT) 2000 UNITS: 25 TAB at 06:03

## 2020-03-18 RX ADMIN — IPRATROPIUM BROMIDE AND ALBUTEROL SULFATE 3 ML: 2.5; .5 SOLUTION RESPIRATORY (INHALATION) at 20:32

## 2020-03-18 NOTE — PLAN OF CARE
Problem: Patient Care Overview  Goal: Plan of Care Review  Outcome: Ongoing (interventions implemented as appropriate)  Flowsheets (Taken 3/18/2020 1705)  Progress: no change  Plan of Care Reviewed With: patient  Outcome Summary: VSS, right neck incision CDI, complaints of pain managed with prn meds, pulmonary consulted related to increased O2 needs with activity, awaiting respiratory panel results, will ctm.     Problem: Fall Risk (Adult)  Goal: Identify Related Risk Factors and Signs and Symptoms  Outcome: Ongoing (interventions implemented as appropriate)  Flowsheets (Taken 3/18/2020 1705)  Related Risk Factors (Fall Risk): age-related changes; environment unfamiliar  Signs and Symptoms (Fall Risk): presence of risk factors     Problem: Fall Risk (Adult)  Goal: Absence of Fall  Outcome: Ongoing (interventions implemented as appropriate)  Flowsheets (Taken 3/18/2020 1705)  Absence of Fall: making progress toward outcome

## 2020-03-18 NOTE — CONSULTS
CONSULT NOTE    Patient Identification:  Mary Jack  74 y.o.  female  1945  [id number]            Requesting physician: Dr Mahesh Salmon    Reason for Consultation:  Ahrf, copd, pulmonary edema    CC: soa cough chest tightness    History of Present Illness:  Patient is a very nice 74-year-old female who presents today postoperatively with increased shortness of breath and increased oxygen requirements.  She has chronic hypoxic respiratory failure and COPD.  She wears 2-1/2 L at home at her baseline.  She described cough and congestion prior to arrival.  Some hemoptysis as well.  This has not occurred afterwards.  She has some nasal congestion as well.  No high fevers.  Feels chest tightness no pain no pleuritic chest pain.  Feels better after breathing treatments.  Symptoms getting worse not better.  No recent travel.    Review of Systems:  CONSTITUTIONAL:  Denies fevers or chills  EYE:  No new vision changes  EAR:  No change in hearing  CARDIAC:  No chest pain  PULMONARY:  +productive cough + shortness of breath  GI:  No diarrhea, hematemesis or hematochezia,  RENAL:  No dysuria or urinary frequency  MUSCULOSKELETAL:  No musculoskeletal complaints  ENDOCRINE:  No heat or cold intolerance  INTEGUMENTARY: No skin rashes  NEUROLOGICAL:  No dizziness or confusion.  No seizure activity  PSYCHIATRIC:  No new anxiety or depression  12 system review of systems performed and all else negative    Past Medical History:   Diagnosis Date   • AAA (abdominal aortic aneurysm) (CMS/HCC)    • Allergic not sure    CODINE,MORFINE AND DYE SOLUTION   • Anemia    • Aneurysm of abdominal aorta (CMS/HCC)    • Arthritis    • Zepeda esophagus    • CAD (coronary artery disease)    • Chest pain    • Constipation    • COPD (chronic obstructive pulmonary disease) (CMS/HCC)    • Emphysema lung (CMS/HCC)    • Empyema (CMS/HCC)    • Fatigue    • GERD (gastroesophageal reflux disease)    • Headache    • Hiatal hernia    • High  risk medication use    • History of cardiac catheterization     CATH STENT PLACEMENT: CIRCUMFLEX BRANCH   • Hyperlipidemia    • Hypertension    • Infectious viral hepatitis Jaundice in 1963   • Jaundice    • Lumbosacral disc disease    • Myocardial infarction (CMS/HCC)    • Myocardial infarction (CMS/HCC)    • Occult blood in stools    • On home O2    • Peripheral artery disease (CMS/HCC)    • Reflux gastritis    • Scoliosis    • Visual impairment     BLURRY VISION IN RT. EYE       Past Surgical History:   Procedure Laterality Date   • CARDIAC CATHETERIZATION     • CARDIAC CATHETERIZATION N/A 5/12/2017    Procedure: Left Heart Cath;  Surgeon: Herber Hayes MD;  Location: [facility] CATH INVASIVE LOCATION;  Service:    • CAROTID ENDARTERECTOMY Right 3/16/2020    Procedure: RIGHT CAROTID ENDARTERECTOMY;  Surgeon: Mahesh Salmon MD;  Location: [facility] OR;  Service: Vascular;  Laterality: Right;   • CHOLECYSTECTOMY     • COLONOSCOPY     • COLONOSCOPY N/A 6/10/2016    Procedure: COLONOSCOPY with polypectomy;  Surgeon: Virgie Castelan MD;  Location: [facility] OR;  Service:    • CORONARY ANGIOPLASTY     • CORONARY STENT PLACEMENT      X2   • ENDOSCOPY N/A 6/10/2016    Procedure: ESOPHAGOGASTRODUODENOSCOPY WITH BIOPSY;  Surgeon: Virgie Castelan MD;  Location: [facility] OR;  Service:    • ENDOSCOPY     • ENDOSCOPY N/A 8/25/2017    Procedure: ESOPHAGOGASTRODUODENOSCOPY w/ biopsies;  Surgeon: Virgie Castelan MD;  Location: [facility] OR;  Service:         Medications Prior to Admission   Medication Sig Dispense Refill Last Dose   • ANORO ELLIPTA 62.5-25 MCG/INH aerosol powder  Inhale 1 puff Every Morning.  0 3/16/2020 at 0600   • aspirin 81 MG tablet Take 1 tablet by mouth Daily. (Patient taking differently: Take 81 mg by mouth Every Night.) 90 tablet 0 3/15/2020 at 1930   • atorvastatin (LIPITOR) 10 MG tablet TAKE 1 TABLET EVERY DAY (Patient taking differently: Take 10 mg by mouth Every Night.) 90 tablet 3 3/15/2020 at 1930   •  Calcium Carbonate-Vitamin D (CALCIUM 600+D HIGH POTENCY PO) Take  by mouth Every Morning.   3/15/2020 at 1130   • cholecalciferol (VITAMIN D3) 1000 UNITS tablet Take 2,000 Units by mouth Every Morning.   3/15/2020 at 1130   • Ferrous Fumarate-Vitamin C ER (MARCUS-SEQUELS) 65-25 MG CR tablet Take 1 tablet by mouth Every Night.   3/15/2020 at Unknown time   • metoprolol tartrate (LOPRESSOR) 25 MG tablet Take 1 tablet by mouth 2 (Two) Times a Day. 180 tablet 3 3/16/2020 at 0630   • nitroglycerin (NITROSTAT) 0.4 MG SL tablet Place 0.4 mg under the tongue Every 5 (Five) Minutes As Needed for Chest Pain. Take no more than 3 doses in 15 minutes.   Past Month at Unknown time   • O2 (OXYGEN) Inhale 3 L/min Continuous.   3/16/2020 at Unknown time   • omeprazole (priLOSEC) 40 MG capsule Take 1 capsule by mouth Daily. (Patient taking differently: Take 40 mg by mouth Every Morning.) 90 capsule 3 3/16/2020 at 0630   • polyethylene glycol (MIRALAX) packet Take 17 g by mouth Daily. (Patient taking differently: Take 17 g by mouth Every Morning.) 850 g 3 3/15/2020 at Unknown time   • vitamin A 01162 UNIT capsule Take 10,000 Units by mouth Every Morning.   3/15/2020 at 1130   • vitamin B-12 (CYANOCOBALAMIN) 1000 MCG tablet Take 1,000 mcg by mouth Every Morning.   3/15/2020 at 1130   • vitamin C (ASCORBIC ACID) 500 MG tablet Take 500 mg by mouth Every Morning.   3/15/2020 at 1130       Allergies   Allergen Reactions   • Iodinated Diagnostic Agents Itching   • Codeine Palpitations   • Morphine And Related Palpitations       Social History     Socioeconomic History   • Marital status:      Spouse name: Not on file   • Number of children: Not on file   • Years of education: Not on file   • Highest education level: Not on file   Occupational History   • Occupation: retired     Employer: [employer]   Tobacco Use   • Smoking status: Current Every Day Smoker     Packs/day: 0.50     Years: 50.00     Pack years: 25.00      "Types: Cigarettes     Start date: 8/3/2016   • Smokeless tobacco: Never Used   Substance and Sexual Activity   • Alcohol use: No   • Drug use: No   • Sexual activity: Not Currently     Partners: Male     Birth control/protection: Post-menopausal       Family History   Problem Relation Age of Onset   • Colon cancer Mother    • Breast cancer Mother    • Other Father         cardiac disorder   • Hypertension Father    • Heart disease Father    • Other Sister         cardiac disorder   • Hypertension Sister    • Lung disease Sister    • Thyroid disease Sister    • Other Brother         cardiac disorder   • Thyroid disease Daughter    • Heart disease Sister    • Hypertension Sister    • Pulmonary fibrosis Sister    • Heart disease Brother    • Other Brother    • Thyroid disease Sister    • Other Sister    • Thyroid disease Daughter    • Malig Hyperthermia Neg Hx        Physical Exam:  /69 (BP Location: Right arm, Patient Position: Lying)   Pulse 91   Temp 97.7 °F (36.5 °C) (Oral)   Resp 16   Ht 160 cm (63\")   Wt 80.4 kg (177 lb 3.2 oz)   SpO2 90%   BMI 31.39 kg/m²   Body mass index is 31.39 kg/m².   General appearance: NAD, conversant   Eyes: anicteric sclerae, moist conjunctivae; no lid-lag; PERRLA  HENT: Atraumatic; oropharynx clear with moist mucous membranes and no mucosal ulcerations; normal hard and soft palate  Neck: Trachea midline; FROM, supple, no thyromegaly or lymphadenopathy  Lungs: coarse air entry slight exp wheeze no rhonchi, with normal respiratory effort and no intercostal retractions  CV: RRR, no rub  Abdomen: Soft, non-tender; no masses or HSM  Extremities: No peripheral edema or extremity lymphadenopathy  Skin: Normal temperature, turgor and texture; no rash, ulcers or subcutaneous nodules  Psych: Appropriate affect, alert and oriented to person, place and time    LABS:  Results from last 7 days   Lab Units 03/18/20  0426   WBC 10*3/mm3 5.33   HEMOGLOBIN g/dL 8.6*   PLATELETS 10*3/mm3 " 165     Results from last 7 days   Lab Units 03/18/20  0426   SODIUM mmol/L 128*   POTASSIUM mmol/L 4.3   CHLORIDE mmol/L 92*   CO2 mmol/L 25.8   BUN mg/dL 15   CREATININE mg/dL 0.84   GLUCOSE mg/dL 92   CALCIUM mg/dL 8.9   Estimated Creatinine Clearance: 60.8 mL/min (by C-G formula based on SCr of 0.84 mg/dL).    Imaging: I personally visualized the images of scans/x-rays performed within last 3 days.  Imaging Results (Most Recent)     Procedure Component Value Units Date/Time    XR Chest PA & Lateral [[id number]] Collected:  03/18/20 0946     Updated:  03/18/20 0950    Narrative:       TWO-VIEW CHEST     HISTORY: Hypoxia. Shortness of breath.     FINDINGS: There is cardiomegaly with slight vascular congestion and some  minimal interstitial prominence which is new since an earlier chest  x-ray dated 04/11/2015. The findings are concerning for changes of very  mild congestive heart failure.     This report was finalized on 3/18/2020 9:47 AM by Dr. Jeffrey Rogers M.D.             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  No longer help open up her nasal passages  Respiratory viral panel  Troponin  ECG  Little vascular congestion try gentle diuresis lasix 20mg iv x 1  Schedule duo nebs 4 x a day  We will give her low bit of prednisone  Hopefully be able to get her home tomorrow.    Thank you very much for this consultation we will continue to follow with her.      Real Llanes MD  [facility]  03/18/20  2:28 PM

## 2020-03-18 NOTE — NURSING NOTE
Lab called regarding respiratory panel showing a critical lab value of coronavirus NL63. MD Sayied called. No new orders.

## 2020-03-18 NOTE — PLAN OF CARE
Problem: Patient Care Overview  Goal: Plan of Care Review  Outcome: Ongoing (interventions implemented as appropriate)  Flowsheets (Taken 3/17/2020 0617)  Outcome Summary: VSS, incision CDI, no c/o pain. IVF and abx as ordered. Rested through night, will continue to monitor  Goal: Individualization and Mutuality  Outcome: Ongoing (interventions implemented as appropriate)  Goal: Discharge Needs Assessment  Outcome: Ongoing (interventions implemented as appropriate)  Goal: Interprofessional Rounds/Family Conf  Outcome: Ongoing (interventions implemented as appropriate)     Problem: Carotid Endarterectomy (Adult)  Goal: Signs and Symptoms of Listed Potential Problems Will be Absent, Minimized or Managed (Carotid Endarterectomy)  Outcome: Ongoing (interventions implemented as appropriate)  Goal: Anesthesia/Sedation Recovery  Outcome: Ongoing (interventions implemented as appropriate)     Problem: Fall Risk (Adult)  Goal: Identify Related Risk Factors and Signs and Symptoms  Outcome: Ongoing (interventions implemented as appropriate)  Goal: Absence of Fall  Outcome: Ongoing (interventions implemented as appropriate)

## 2020-03-18 NOTE — PROGRESS NOTES
Name: Mary Jack ADMIT: 3/16/2020   : 1945  PCP: Delfino Matt MD    MRN: [id number] LOS: 2 days   AGE/SEX: 74 y.o. female  ROOM: [address]    [facility]    No chief complaint on file.    CC: Postop carotid surgery follow-up.  Subjective     74 y.o. female patient remains a little shortness of breath with ambulation.  She is on chronic oxygen.  She is a patient of     Review of Systems    Objective     Scheduled Medications:     atorvastatin 10 mg Oral Nightly   cholecalciferol 2,000 Units Oral QAM   enoxaparin 40 mg Subcutaneous Daily   ipratropium-albuterol 3 mL Nebulization Q8H - RT   metoprolol tartrate 25 mg Oral BID   pantoprazole 40 mg Oral QAM       Active Infusions:    O2 3 L/min Last Rate: 3 L/min (20)       As Needed Medications:  •  acetaminophen  •  HYDROcodone-acetaminophen  •  [DISCONTINUED] Morphine **AND** naloxone  •  nitroglycerin  •  ondansetron **OR** ondansetron    Vital Signs  Vital Signs Patient Vitals for the past 24 hrs:   BP Temp Temp src Pulse Resp SpO2   20 2325 -- -- -- 65 18 97 %   20 2300 138/65 97.8 °F (36.6 °C) Oral 72 18 97 %   20 1900 132/74 97.2 °F (36.2 °C) Oral 71 18 94 %   20 1500 124/69 98 °F (36.7 °C) Oral 75 18 91 %   20 1300 -- -- -- -- -- 91 %   20 1100 120/56 97.9 °F (36.6 °C) Oral 69 18 96 %   20 0700 118/61 97.9 °F (36.6 °C) Oral 71 18 94 %   20 0652 -- -- -- 72 18 94 %     I/O:  I/O last 3 completed shifts:  In: 1960 [P.O.:960; I.V.:1000]  Out: 3000 [Urine:2850; Blood:150]    Physical Exam:  Physical Exam   Constitutional: She is oriented to person, place, and time. She appears well-developed.   Pulmonary/Chest: Effort normal. No respiratory distress.   Abdominal: Soft. She exhibits no distension.   Neurological: She is alert and oriented to person, place, and time.       Results Review:     CBC    Results from last 7 days   Lab Units 20  0426   WBC  10*3/mm3 5.33   HEMOGLOBIN g/dL 8.6*   PLATELETS 10*3/mm3 165     BMP   Results from last 7 days   Lab Units 03/18/20  0426   SODIUM mmol/L 128*   POTASSIUM mmol/L 4.3   CHLORIDE mmol/L 92*   CO2 mmol/L 25.8   BUN mg/dL 15   CREATININE mg/dL 0.84   GLUCOSE mg/dL 92     Cr Clearance Estimated Creatinine Clearance: 60.8 mL/min (by C-G formula based on SCr of 0.84 mg/dL).  Coag     HbA1C No results found for: HGBA1C  Blood Glucose No results found for: POCGLU  Infection     CMP   Results from last 7 days   Lab Units 03/18/20  0426   SODIUM mmol/L 128*   POTASSIUM mmol/L 4.3   CHLORIDE mmol/L 92*   CO2 mmol/L 25.8   BUN mg/dL 15   CREATININE mg/dL 0.84   GLUCOSE mg/dL 92     ABG      UA      MIRLANDE  No results found for: POCMETH, POCAMPHET, POCBARBITUR, POCBENZO, POCCOCAINE, POCOPIATES, POCOXYCODO, POCPHENCYC, POCPROPOXY, POCTHC, POCTRICYC  Radiology(recent) No radiology results for the last day    Assessment/Plan     Assessment & Plan      Bilateral carotid artery disease (CMS/HCC)    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 female Postop day #2 right carotid endarterectomy.  Remains neurologically intact.  Will ask patient's pulmonologist to see her.  Okay for home once cleared by pulmonology.    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

## 2020-03-19 VITALS
TEMPERATURE: 98.6 F | BODY MASS INDEX: 31.4 KG/M2 | HEIGHT: 63 IN | DIASTOLIC BLOOD PRESSURE: 71 MMHG | SYSTOLIC BLOOD PRESSURE: 153 MMHG | HEART RATE: 71 BPM | RESPIRATION RATE: 16 BRPM | OXYGEN SATURATION: 93 % | WEIGHT: 177.2 LBS

## 2020-03-19 PROCEDURE — 94799 UNLISTED PULMONARY SVC/PX: CPT

## 2020-03-19 PROCEDURE — 63710000001 PREDNISONE PER 1 MG: Performed by: INTERNAL MEDICINE

## 2020-03-19 PROCEDURE — 25010000002 ENOXAPARIN PER 10 MG: Performed by: SURGERY

## 2020-03-19 RX ORDER — HYDROCODONE BITARTRATE AND ACETAMINOPHEN 5; 325 MG/1; MG/1
1 TABLET ORAL EVERY 4 HOURS PRN
Qty: 15 TABLET | Refills: 0 | Status: SHIPPED | OUTPATIENT
Start: 2020-03-19 | End: 2020-03-26

## 2020-03-19 RX ORDER — PREDNISONE 20 MG/1
20 TABLET ORAL
Qty: 4 TABLET | Refills: 0 | Status: SHIPPED | OUTPATIENT
Start: 2020-03-20 | End: 2020-06-24

## 2020-03-19 RX ORDER — OXYMETAZOLINE HYDROCHLORIDE 0.05 G/100ML
2 SPRAY NASAL 2 TIMES DAILY
Qty: 1 ML | Refills: 0 | Status: SHIPPED | OUTPATIENT
Start: 2020-03-19 | End: 2020-03-21

## 2020-03-19 RX ADMIN — OXYMETAZOLINE HCL 2 SPRAY: 0.05 SPRAY NASAL at 08:27

## 2020-03-19 RX ADMIN — IPRATROPIUM BROMIDE AND ALBUTEROL SULFATE 3 ML: 2.5; .5 SOLUTION RESPIRATORY (INHALATION) at 11:35

## 2020-03-19 RX ADMIN — IPRATROPIUM BROMIDE AND ALBUTEROL SULFATE 3 ML: 2.5; .5 SOLUTION RESPIRATORY (INHALATION) at 08:11

## 2020-03-19 RX ADMIN — VITAMIN D, TAB 1000IU (100/BT) 2000 UNITS: 25 TAB at 08:26

## 2020-03-19 RX ADMIN — METOPROLOL TARTRATE 25 MG: 25 TABLET ORAL at 08:26

## 2020-03-19 RX ADMIN — PREDNISONE 20 MG: 20 TABLET ORAL at 08:26

## 2020-03-19 RX ADMIN — PANTOPRAZOLE SODIUM 40 MG: 40 TABLET, DELAYED RELEASE ORAL at 08:26

## 2020-03-19 RX ADMIN — ENOXAPARIN SODIUM 40 MG: 40 INJECTION SUBCUTANEOUS at 08:26

## 2020-03-19 NOTE — PROGRESS NOTES
Case Management Discharge Note      Final Note: Pt discharged home, no known needs.  ANDERS Downey RN         Destination      No service has been selected for the patient.      Durable Medical Equipment      No service has been selected for the patient.      Dialysis/Infusion      No service has been selected for the patient.      Home Medical Care      No service has been selected for the patient.      Therapy      No service has been selected for the patient.      Community Resources      No service has been selected for the patient.        Transportation Services  Private: Car    Final Discharge Disposition Code: 01 - home or self-care

## 2020-03-19 NOTE — DISCHARGE SUMMARY
Name: Mary Jack ADMIT: 3/16/2020   : 1945  PCP: Delfino Matt MD    MRN: [id number] LOS: 3 days   AGE/SEX: 74 y.o. 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       SECONDARY DIAGNOSES  Past Medical History:   Diagnosis Date   • AAA (abdominal aortic aneurysm) (CMS/HCC)    • Allergic not sure    CODINE,MORFINE AND DYE SOLUTION   • Anemia    • Aneurysm of abdominal aorta (CMS/HCC)    • Arthritis    • Zepeda esophagus    • CAD (coronary artery disease)    • Chest pain    • Constipation    • COPD (chronic obstructive pulmonary disease) (CMS/HCC)    • Emphysema lung (CMS/HCC)    • Empyema (CMS/HCC)    • Fatigue    • GERD (gastroesophageal reflux disease)    • Headache    • Hiatal hernia    • High risk medication use    • History of cardiac catheterization     CATH STENT PLACEMENT: CIRCUMFLEX BRANCH   • Hyperlipidemia    • Hypertension    • Infectious viral hepatitis Jaundice in    • Jaundice    • Lumbosacral disc disease    • Myocardial infarction (CMS/HCC)    • Myocardial infarction (CMS/HCC)    • Occult blood in stools    • On home O2    • Peripheral artery disease (CMS/HCC)    • Reflux gastritis    • Scoliosis    • Visual impairment     BLURRY VISION IN RT. EYE       CONSULTS   Consults     Date and Time Order Name Status Description    3/18/2020 0924 Inpatient Pulmonology Consult Completed           PROCEDURES PERFORMED    Date: 3/16/2020, right carotid endarterectomy.    HOSPITAL COURSE  Patient is  "a 74 y.o. female presented to [facility] admitted for postoperative care from above procedures..  Please see the admitting history and physical for further details.  Patient overall had a slow and steady recovery.  No neurologic events.  She did have slight worsening of her stable pulmonary disease for which pulmonology is aided in management.  They recommend burst dose of steroids.      VITAL SIGNS  /71 (BP Location: Right arm, Patient Position: Lying)   Pulse 71   Temp 98.6 °F (37 °C) (Oral)   Resp 16   Ht 160 cm (63\")   Wt 80.4 kg (177 lb 3.2 oz)   SpO2 93%   BMI 31.39 kg/m²   Objective  CONDITION ON DISCHARGE  Stable.      DISCHARGE DISPOSITION   Home or Self Care      DISCHARGE MEDICATIONS     Discharge Medications      New Medications      Instructions Start Date   HYDROcodone-acetaminophen 5-325 MG per tablet  Commonly known as:  NORCO   1 tablet, Oral, Every 4 Hours PRN      oxymetazoline 0.05 % nasal spray  Commonly known as:  AFRIN   2 sprays, Nasal, 2 Times Daily      predniSONE 20 MG tablet  Commonly known as:  DELTASONE   20 mg, Oral, Daily With Breakfast   Start Date:  March 20, 2020        Changes to Medications      Instructions Start Date   aspirin 81 MG tablet  What changed:  when to take this   81 mg, Oral, Daily      atorvastatin 10 MG tablet  Commonly known as:  LIPITOR  What changed:  when to take this   TAKE 1 TABLET EVERY DAY      omeprazole 40 MG capsule  Commonly known as:  priLOSEC  What changed:  when to take this   40 mg, Oral, Daily      polyethylene glycol packet  Commonly known as:  MIRALAX  What changed:  when to take this   17 g, Oral, Daily         Continue These Medications      Instructions Start Date   Anoro Ellipta 62.5-25 MCG/INH aerosol powder  inhaler  Generic drug:  umeclidinium-vilanterol   1 puff, Inhalation, Every Morning      CALCIUM 600+D HIGH POTENCY PO   Oral, Every Morning      cholecalciferol 25 MCG (1000 UT) tablet  Commonly known as:  VITAMIN D3   " 2,000 Units, Oral, Every Morning      Ferrous Fumarate-Vitamin C ER 65-25 MG CR tablet  Commonly known as:  MARCUS-SEQUELS   1 tablet, Oral, Nightly      metoprolol tartrate 25 MG tablet  Commonly known as:  LOPRESSOR   25 mg, Oral, 2 Times Daily      nitroglycerin 0.4 MG SL tablet  Commonly known as:  NITROSTAT   0.4 mg, Sublingual, Every 5 Minutes PRN, Take no more than 3 doses in 15 minutes.      O2  Commonly known as:  OXYGEN   3 L/min, Inhalation, Continuous      vitamin A 30303 UNIT capsule   10,000 Units, Oral, Every Morning      vitamin B-12 1000 MCG tablet  Commonly known as:  CYANOCOBALAMIN   1,000 mcg, Oral, Every Morning      vitamin C 500 MG tablet  Commonly known as:  ASCORBIC ACID   500 mg, Oral, Every Morning              Future Appointments   Date Time Provider Department Center   2020 10:00 AM [facility]  [facility] None   2020  1:45 PM Delfino Matt MD [facility] None     Follow-up Information     Mahesh Salmon MD Follow up in 3 month(s).    Specialty:  Vascular Surgery  Why:  With carotid artery ultrasound.  Please call the office in 2 weeks with a status update.  Contact information:  [address] [address] [phone]             Delfino Matt MD .    Specialty:  Family Medicine  Contact information:  [address] [facility] [address] [phone]                   TEST  RESULTS PENDING AT DISCHARGE       [facility] [employer]    Mahesh Salmon MD  Office Number [phone]    20  13:11

## 2020-03-19 NOTE — PLAN OF CARE
Problem: Patient Care Overview  Goal: Plan of Care Review  Outcome: Ongoing (interventions implemented as appropriate)  Flowsheets (Taken 3/19/2020 2609)  Progress: improving  Plan of Care Reviewed With: patient  Outcome Summary: meds per order, up to bsc, no falls, see labs and vs

## 2020-03-19 NOTE — PROGRESS NOTES
"  Daily Progress Note.   [address]  3/19/2020    Patient:  Name:  Mary Jack  MRN:  [id number]  1945  74 y.o.  female         CC:    Interval History:  Patient is doing very well today.  She says she feels about at her baseline.  No increase shortness of breath.  Did diurese well with 1 dose of Lasix no chest pain.        ROS: No fever, no diarrhea, no chest pain  PMFSSH: no change    Physical Exam:  /48 (BP Location: Right arm, Patient Position: Lying)   Pulse 77   Temp 97.9 °F (36.6 °C) (Oral)   Resp 16   Ht 160 cm (63\")   Wt 80.4 kg (177 lb 3.2 oz)   SpO2 91%   BMI 31.39 kg/m²   Body mass index is 31.39 kg/m².    Intake/Output Summary (Last 24 hours) at 3/19/2020 1056  Last data filed at 3/19/2020 0831  Gross per 24 hour   Intake 480 ml   Output 4350 ml   Net -3870 ml     General appearance: NAD, conversant   Eyes: anicteric sclerae, moist conjunctivae; no lid-lag; PERRLA  HENT: Atraumatic; oropharynx clear with moist mucous membranes and no mucosal ulcerations; normal hard and soft palate  Neck: Trachea midline; FROM, supple, no thyromegaly or lymphadenopathy  Lungs: coarse air entry slight exp wheeze no rhonchi, with normal respiratory effort and no intercostal retractions  CV: RRR, no rub  Abdomen: Soft, non-tender; no masses or HSM  Extremities: No peripheral edema or extremity lymphadenopathy  Skin: Normal temperature, turgor and texture; no rash, ulcers or subcutaneous nodules  Psych: Appropriate affect, alert and oriented to person, place and time     Data Review:  Notable Labs:  Results from last 7 days   Lab Units 03/18/20  0426   WBC 10*3/mm3 5.33   HEMOGLOBIN g/dL 8.6*   PLATELETS 10*3/mm3 165     Results from last 7 days   Lab Units 03/18/20  0426   SODIUM mmol/L 128*   POTASSIUM mmol/L 4.3   CHLORIDE mmol/L 92*   CO2 mmol/L 25.8   BUN mg/dL 15   CREATININE mg/dL 0.84   GLUCOSE mg/dL 92   CALCIUM mg/dL 8.9   Estimated Creatinine Clearance: 60.8 mL/min (by " C-G formula based on SCr of 0.84 mg/dL).    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  Warned to only do this for 3 days.  Coronavirus seasonal common cold type not the pandemic type positive on a respiratory viral panel.  Prednisone seems to be doing well.  We will do this for 5-day burst.  She will follow-up with Dr. Oral Llanes at [facility].  No contraindication for discharge follow-up with Dr. Viky Llanes MD  [facility]  03/19/20  7360

## 2020-03-20 ENCOUNTER — READMISSION MANAGEMENT (OUTPATIENT)
Dept: CALL CENTER | Facility: HOSPITAL | Age: 75
End: 2020-03-20

## 2020-03-20 NOTE — OUTREACH NOTE
Prep Survey      Responses   [facility] facility patient discharged from?  [place]   Is LACE score < 7 ?  No   Eligibility  Readm Mgmt   Discharge diagnosis  Bilateral carotid artery disease right carotid endarterectomy   Does the patient have one of the following disease processes/diagnoses(primary or secondary)?  Cardiothoracic surgery   Does the patient have Home health ordered?  No   Is there a DME ordered?  No   Prep survey completed?  Yes          Rama Cadena RN

## 2020-03-27 ENCOUNTER — READMISSION MANAGEMENT (OUTPATIENT)
Dept: CALL CENTER | Facility: HOSPITAL | Age: 75
End: 2020-03-27

## 2020-03-27 NOTE — OUTREACH NOTE
CT Surgery Week 1 Survey      Responses   [facility] patient discharged from?  [place]   Does the patient have one of the following disease processes/diagnoses(primary or secondary)?  Cardiothoracic surgery   Is there a successful TCM telephone encounter documented?  No   Week 1 attempt successful?  No   Unsuccessful attempts  Attempt 1            Josy Christopher RN

## 2020-04-01 ENCOUNTER — READMISSION MANAGEMENT (OUTPATIENT)
Dept: CALL CENTER | Facility: HOSPITAL | Age: 75
End: 2020-04-01

## 2020-04-01 NOTE — OUTREACH NOTE
CT Surgery Week 3 Survey      Responses   [facility] patient discharged from?  [place]   Does the patient have one of the following disease processes/diagnoses(primary or secondary)?  Cardiothoracic surgery   Week 3 attempt successful?  No   Unsuccessful attempts  Attempt 1          Neema Blankenship RN

## 2020-04-02 ENCOUNTER — READMISSION MANAGEMENT (OUTPATIENT)
Dept: CALL CENTER | Facility: HOSPITAL | Age: 75
End: 2020-04-02

## 2020-04-02 NOTE — OUTREACH NOTE
CT Surgery Week 3 Survey      Responses   [facility] patient discharged from?  [place]   Does the patient have one of the following disease processes/diagnoses(primary or secondary)?  Cardiothoracic surgery   Week 3 attempt successful?  Yes   Call start time  1625   Call end time  1631   Discharge diagnosis  Bilateral carotid artery disease right carotid endarterectomy   Meds reviewed with patient/caregiver?  Yes   Is the patient having any side effects they believe may be caused by any medication additions or changes?  No   Does the patient have all medications related to this admission filled (includes all antibiotics, pain medications, cardiac medications, etc.)  Yes   Is the patient taking all medications as directed (includes completed medication regime)?  Yes   Does the patient have a primary care provider?   Yes   Does the patient have an appointment scheduled with their C/T surgeon?  No   Nursing Interventions  Verified appointment date/time/provider   Has the patient kept scheduled appointments due by today?  Yes   Has home health visited the patient within 72 hours of discharge?  N/A   Psychosocial issues?  No   Did the patient receive a copy of their discharge instructions?  Yes   Nursing interventions  Reviewed instructions with patient   What is the patient's perception of their health status since discharge?  Improving   Nursing interventions  Nurse provided patient education   Is the patient/caregiver able to teach back normal signs of recovery?  Pain or discomfort at incisional site   Nursing interventions  Reassured on normal signs of recovery   Is the patient /caregiver able to teach back basic post-op care?  Keep incision areas clean, dry and protected, No tub bath, swimming, or hot tub until instructed by MD, Take showers only when approved by MD-sponge bathe until then, Drive as instructed by MD in discharge instructions, Practice 'cough and deep breath', Lifting as instructed by MD in  discharge instructions   Is the patient/caregiver able to teach back signs and symptoms of incisional infection?  Increased redness, swelling or pain at the incisonal site, Incisional warmth, Fever, Pus or odor from incision, Increased drainage or bleeding   Is the patient/caregiver able to teach back steps to recovery at home?  Set small, achievable goals for return to baseline health   Is the patient/caregiver able to teach back the hierarchy of who to call/visit for symptoms/problems? PCP, Specialist, Home health nurse, Urgent Care, ED, 911  Yes   Week 3 call completed?  Yes          Candido Cage RN

## 2020-04-09 ENCOUNTER — READMISSION MANAGEMENT (OUTPATIENT)
Dept: CALL CENTER | Facility: HOSPITAL | Age: 75
End: 2020-04-09

## 2020-04-09 NOTE — OUTREACH NOTE
CT Surgery Week 4 Survey      Responses   [facility] patient discharged from?  [place]   Does the patient have one of the following disease processes/diagnoses(primary or secondary)?  Cardiothoracic surgery   Week 4 attempt successful?  No          Meche Rodrigez RN

## 2020-06-24 ENCOUNTER — OFFICE VISIT (OUTPATIENT)
Dept: CARDIOLOGY | Age: 75
End: 2020-06-24

## 2020-06-24 VITALS
SYSTOLIC BLOOD PRESSURE: 146 MMHG | HEART RATE: 69 BPM | BODY MASS INDEX: 31.01 KG/M2 | WEIGHT: 175 LBS | HEIGHT: 63 IN | DIASTOLIC BLOOD PRESSURE: 88 MMHG

## 2020-06-24 DIAGNOSIS — E78.2 MIXED HYPERLIPIDEMIA: ICD-10-CM

## 2020-06-24 DIAGNOSIS — R06.02 SHORTNESS OF BREATH: Primary | ICD-10-CM

## 2020-06-24 DIAGNOSIS — I65.23 BILATERAL CAROTID ARTERY STENOSIS: ICD-10-CM

## 2020-06-24 DIAGNOSIS — I25.10 CHRONIC CORONARY ARTERY DISEASE: ICD-10-CM

## 2020-06-24 PROBLEM — I65.21 CAROTID STENOSIS, ASYMPTOMATIC, RIGHT: Status: ACTIVE | Noted: 2020-02-06

## 2020-06-24 PROCEDURE — 99213 OFFICE O/P EST LOW 20 MIN: CPT | Performed by: INTERNAL MEDICINE

## 2020-06-25 ENCOUNTER — LAB (OUTPATIENT)
Dept: LAB | Facility: HOSPITAL | Age: 75
End: 2020-06-25

## 2020-06-25 ENCOUNTER — TRANSCRIBE ORDERS (OUTPATIENT)
Dept: ADMINISTRATIVE | Facility: HOSPITAL | Age: 75
End: 2020-06-25

## 2020-06-25 DIAGNOSIS — Z01.818 OTHER SPECIFIED PRE-OPERATIVE EXAMINATION: Primary | ICD-10-CM

## 2020-06-25 LAB
ANION GAP SERPL CALCULATED.3IONS-SCNC: 11.5 MMOL/L (ref 5–15)
APTT PPP: 26.9 SECONDS (ref 24.3–38.1)
BASOPHILS # BLD AUTO: 0.05 10*3/MM3 (ref 0–0.2)
BASOPHILS NFR BLD AUTO: 1 % (ref 0–1.5)
BUN BLD-MCNC: 14 MG/DL (ref 8–23)
BUN/CREAT SERPL: 16.7 (ref 7–25)
CALCIUM SPEC-SCNC: 9.5 MG/DL (ref 8.6–10.5)
CHLORIDE SERPL-SCNC: 91 MMOL/L (ref 98–107)
CO2 SERPL-SCNC: 23.5 MMOL/L (ref 22–29)
CREAT BLD-MCNC: 0.84 MG/DL (ref 0.57–1)
DEPRECATED RDW RBC AUTO: 41.3 FL (ref 37–54)
EOSINOPHIL # BLD AUTO: 0.04 10*3/MM3 (ref 0–0.4)
EOSINOPHIL NFR BLD AUTO: 0.8 % (ref 0.3–6.2)
ERYTHROCYTE [DISTWIDTH] IN BLOOD BY AUTOMATED COUNT: 12.1 % (ref 12.3–15.4)
GFR SERPL CREATININE-BSD FRML MDRD: 67 ML/MIN/1.73
GLUCOSE BLD-MCNC: 127 MG/DL (ref 65–99)
HCT VFR BLD AUTO: 34 % (ref 34–46.6)
HGB BLD-MCNC: 11.6 G/DL (ref 12–15.9)
IMM GRANULOCYTES # BLD AUTO: 0.02 10*3/MM3 (ref 0–0.05)
IMM GRANULOCYTES NFR BLD AUTO: 0.4 % (ref 0–0.5)
INR PPP: 1.02 (ref 0.9–1.1)
LYMPHOCYTES # BLD AUTO: 1.27 10*3/MM3 (ref 0.7–3.1)
LYMPHOCYTES NFR BLD AUTO: 25.6 % (ref 19.6–45.3)
MCH RBC QN AUTO: 31.9 PG (ref 26.6–33)
MCHC RBC AUTO-ENTMCNC: 34.1 G/DL (ref 31.5–35.7)
MCV RBC AUTO: 93.4 FL (ref 79–97)
MONOCYTES # BLD AUTO: 0.64 10*3/MM3 (ref 0.1–0.9)
MONOCYTES NFR BLD AUTO: 12.9 % (ref 5–12)
NEUTROPHILS # BLD AUTO: 2.95 10*3/MM3 (ref 1.7–7)
NEUTROPHILS NFR BLD AUTO: 59.3 % (ref 42.7–76)
NRBC BLD AUTO-RTO: 0 /100 WBC (ref 0–0.2)
PLATELET # BLD AUTO: 212 10*3/MM3 (ref 140–450)
PMV BLD AUTO: 10.5 FL (ref 6–12)
POTASSIUM BLD-SCNC: 4.7 MMOL/L (ref 3.5–5.2)
PROTHROMBIN TIME: 13.1 SECONDS (ref 12.1–15)
RBC # BLD AUTO: 3.64 10*6/MM3 (ref 3.77–5.28)
SODIUM BLD-SCNC: 126 MMOL/L (ref 136–145)
WBC NRBC COR # BLD: 4.97 10*3/MM3 (ref 3.4–10.8)

## 2020-06-25 PROCEDURE — 80048 BASIC METABOLIC PNL TOTAL CA: CPT | Performed by: INTERNAL MEDICINE

## 2020-06-25 PROCEDURE — 85025 COMPLETE CBC W/AUTO DIFF WBC: CPT | Performed by: INTERNAL MEDICINE

## 2020-06-25 PROCEDURE — 85730 THROMBOPLASTIN TIME PARTIAL: CPT | Performed by: INTERNAL MEDICINE

## 2020-06-25 PROCEDURE — 85610 PROTHROMBIN TIME: CPT | Performed by: INTERNAL MEDICINE

## 2020-06-29 ENCOUNTER — LAB (OUTPATIENT)
Dept: LAB | Facility: HOSPITAL | Age: 75
End: 2020-06-29

## 2020-06-29 DIAGNOSIS — Z01.818 OTHER SPECIFIED PRE-OPERATIVE EXAMINATION: ICD-10-CM

## 2020-06-29 PROCEDURE — U0004 COV-19 TEST NON-CDC HGH THRU: HCPCS

## 2020-06-29 PROCEDURE — C9803 HOPD COVID-19 SPEC COLLECT: HCPCS

## 2020-06-29 PROCEDURE — U0002 COVID-19 LAB TEST NON-CDC: HCPCS

## 2020-06-30 LAB
REF LAB TEST METHOD: NORMAL
SARS-COV-2 RNA RESP QL NAA+PROBE: NOT DETECTED

## 2020-07-01 ENCOUNTER — HOSPITAL ENCOUNTER (OUTPATIENT)
Facility: HOSPITAL | Age: 75
Discharge: HOME | End: 2020-07-02
Attending: INTERNAL MEDICINE | Admitting: INTERNAL MEDICINE

## 2020-07-01 DIAGNOSIS — I25.10 CHRONIC CORONARY ARTERY DISEASE: ICD-10-CM

## 2020-07-01 LAB
ANION GAP SERPL CALCULATED.3IONS-SCNC: 17.2 MMOL/L (ref 5–15)
APTT PPP: 25.7 SECONDS (ref 22.7–35.4)
APTT PPP: >200 SECONDS (ref 22.7–35.4)
BACTERIA UR QL AUTO: NORMAL /HPF
BASOPHILS # BLD AUTO: 0.01 10*3/MM3 (ref 0–0.2)
BASOPHILS # BLD AUTO: 0.01 10*3/MM3 (ref 0–0.2)
BASOPHILS NFR BLD AUTO: 0.1 % (ref 0–1.5)
BASOPHILS NFR BLD AUTO: 0.1 % (ref 0–1.5)
BILIRUB UR QL STRIP: NEGATIVE
BUN SERPL-MCNC: 18 MG/DL (ref 8–23)
BUN/CREAT SERPL: 17.3 (ref 7–25)
CALCIUM SPEC-SCNC: 10.2 MG/DL (ref 8.6–10.5)
CHLORIDE SERPL-SCNC: 95 MMOL/L (ref 98–107)
CLARITY UR: CLEAR
CO2 SERPL-SCNC: 22.8 MMOL/L (ref 22–29)
COLOR UR: YELLOW
CORTIS SERPL-MCNC: 4.91 MCG/DL
CREAT SERPL-MCNC: 1.04 MG/DL (ref 0.57–1)
DEPRECATED RDW RBC AUTO: 42.2 FL (ref 37–54)
DEPRECATED RDW RBC AUTO: 45.1 FL (ref 37–54)
EOSINOPHIL # BLD AUTO: 0 10*3/MM3 (ref 0–0.4)
EOSINOPHIL # BLD AUTO: 0 10*3/MM3 (ref 0–0.4)
EOSINOPHIL NFR BLD AUTO: 0 % (ref 0.3–6.2)
EOSINOPHIL NFR BLD AUTO: 0 % (ref 0.3–6.2)
ERYTHROCYTE [DISTWIDTH] IN BLOOD BY AUTOMATED COUNT: 12.4 % (ref 12.3–15.4)
ERYTHROCYTE [DISTWIDTH] IN BLOOD BY AUTOMATED COUNT: 12.7 % (ref 12.3–15.4)
GFR SERPL CREATININE-BSD FRML MDRD: 52 ML/MIN/1.73
GLUCOSE SERPL-MCNC: 115 MG/DL (ref 65–99)
GLUCOSE UR STRIP-MCNC: NEGATIVE MG/DL
HCT VFR BLD AUTO: 32.4 % (ref 34–46.6)
HCT VFR BLD AUTO: 34.8 % (ref 34–46.6)
HGB BLD-MCNC: 11.2 G/DL (ref 12–15.9)
HGB BLD-MCNC: 11.6 G/DL (ref 12–15.9)
HGB UR QL STRIP.AUTO: ABNORMAL
HYALINE CASTS UR QL AUTO: NORMAL /LPF
IMM GRANULOCYTES # BLD AUTO: 0.03 10*3/MM3 (ref 0–0.05)
IMM GRANULOCYTES # BLD AUTO: 0.03 10*3/MM3 (ref 0–0.05)
IMM GRANULOCYTES NFR BLD AUTO: 0.3 % (ref 0–0.5)
IMM GRANULOCYTES NFR BLD AUTO: 0.4 % (ref 0–0.5)
INR PPP: 1 (ref 0.9–1.1)
INR PPP: 1.09 (ref 0.9–1.1)
KETONES UR QL STRIP: NEGATIVE
LEUKOCYTE ESTERASE UR QL STRIP.AUTO: NEGATIVE
LYMPHOCYTES # BLD AUTO: 0.77 10*3/MM3 (ref 0.7–3.1)
LYMPHOCYTES # BLD AUTO: 0.89 10*3/MM3 (ref 0.7–3.1)
LYMPHOCYTES NFR BLD AUTO: 8.2 % (ref 19.6–45.3)
LYMPHOCYTES NFR BLD AUTO: 9.6 % (ref 19.6–45.3)
MCH RBC QN AUTO: 31.9 PG (ref 26.6–33)
MCH RBC QN AUTO: 32.1 PG (ref 26.6–33)
MCHC RBC AUTO-ENTMCNC: 33.3 G/DL (ref 31.5–35.7)
MCHC RBC AUTO-ENTMCNC: 34.6 G/DL (ref 31.5–35.7)
MCV RBC AUTO: 92.8 FL (ref 79–97)
MCV RBC AUTO: 95.6 FL (ref 79–97)
MONOCYTES # BLD AUTO: 0.17 10*3/MM3 (ref 0.1–0.9)
MONOCYTES # BLD AUTO: 1.16 10*3/MM3 (ref 0.1–0.9)
MONOCYTES NFR BLD AUTO: 10.6 % (ref 5–12)
MONOCYTES NFR BLD AUTO: 2.1 % (ref 5–12)
NEUTROPHILS NFR BLD AUTO: 7.01 10*3/MM3 (ref 1.7–7)
NEUTROPHILS NFR BLD AUTO: 8.81 10*3/MM3 (ref 1.7–7)
NEUTROPHILS NFR BLD AUTO: 80.8 % (ref 42.7–76)
NEUTROPHILS NFR BLD AUTO: 87.8 % (ref 42.7–76)
NITRITE UR QL STRIP: NEGATIVE
NRBC BLD AUTO-RTO: 0 /100 WBC (ref 0–0.2)
NRBC BLD AUTO-RTO: 0 /100 WBC (ref 0–0.2)
NT-PROBNP SERPL-MCNC: 820 PG/ML (ref 5–900)
OSMOLALITY UR: 315 MOSM/KG
PH UR STRIP.AUTO: 5.5 [PH] (ref 5–8)
PLATELET # BLD AUTO: 223 10*3/MM3 (ref 140–450)
PLATELET # BLD AUTO: 225 10*3/MM3 (ref 140–450)
PMV BLD AUTO: 10 FL (ref 6–12)
PMV BLD AUTO: 9.6 FL (ref 6–12)
POTASSIUM SERPL-SCNC: 4.4 MMOL/L (ref 3.5–5.2)
PROT UR QL STRIP: ABNORMAL
PROTHROMBIN TIME: 13.1 SECONDS (ref 11.7–14.2)
PROTHROMBIN TIME: 14 SECONDS (ref 11.7–14.2)
RBC # BLD AUTO: 3.49 10*6/MM3 (ref 3.77–5.28)
RBC # BLD AUTO: 3.64 10*6/MM3 (ref 3.77–5.28)
RBC # UR: NORMAL /HPF
REF LAB TEST METHOD: NORMAL
SODIUM SERPL-SCNC: 135 MMOL/L (ref 136–145)
SODIUM UR-SCNC: 39 MMOL/L
SP GR UR STRIP: 1.02 (ref 1–1.03)
SQUAMOUS #/AREA URNS HPF: NORMAL /HPF
TSH SERPL DL<=0.05 MIU/L-ACNC: 0.96 UIU/ML (ref 0.27–4.2)
UROBILINOGEN UR QL STRIP: ABNORMAL
WBC # BLD AUTO: 10.9 10*3/MM3 (ref 3.4–10.8)
WBC # BLD AUTO: 7.99 10*3/MM3 (ref 3.4–10.8)
WBC UR QL AUTO: NORMAL /HPF

## 2020-07-01 PROCEDURE — 25010000002 FUROSEMIDE PER 20 MG: Performed by: INTERNAL MEDICINE

## 2020-07-01 PROCEDURE — A9270 NON-COVERED ITEM OR SERVICE: HCPCS | Performed by: INTERNAL MEDICINE

## 2020-07-01 PROCEDURE — 84443 ASSAY THYROID STIM HORMONE: CPT | Performed by: INTERNAL MEDICINE

## 2020-07-01 PROCEDURE — 81001 URINALYSIS AUTO W/SCOPE: CPT | Performed by: INTERNAL MEDICINE

## 2020-07-01 PROCEDURE — 63710000001 TICAGRELOR 90 MG TABLET: Performed by: INTERNAL MEDICINE

## 2020-07-01 PROCEDURE — 63710000001: Performed by: INTERNAL MEDICINE

## 2020-07-01 PROCEDURE — 85610 PROTHROMBIN TIME: CPT | Performed by: INTERNAL MEDICINE

## 2020-07-01 PROCEDURE — C1769 GUIDE WIRE: HCPCS | Performed by: INTERNAL MEDICINE

## 2020-07-01 PROCEDURE — 63710000001 ACETAMINOPHEN 325 MG TABLET: Performed by: INTERNAL MEDICINE

## 2020-07-01 PROCEDURE — 63710000001 VITAMIN B-12 500 MCG TABLET: Performed by: INTERNAL MEDICINE

## 2020-07-01 PROCEDURE — 63710000001 ASPIRIN 81 MG TABLET DELAYED-RELEASE: Performed by: INTERNAL MEDICINE

## 2020-07-01 PROCEDURE — G0378 HOSPITAL OBSERVATION PER HR: HCPCS

## 2020-07-01 PROCEDURE — 63710000001 ATORVASTATIN 10 MG TABLET: Performed by: INTERNAL MEDICINE

## 2020-07-01 PROCEDURE — 82784 ASSAY IGA/IGD/IGG/IGM EACH: CPT | Performed by: INTERNAL MEDICINE

## 2020-07-01 PROCEDURE — 25010000002 HEPARIN (PORCINE) PER 1000 UNITS: Performed by: INTERNAL MEDICINE

## 2020-07-01 PROCEDURE — 0 IOPAMIDOL PER 1 ML: Performed by: INTERNAL MEDICINE

## 2020-07-01 PROCEDURE — 63710000001 VITAMIN C 500 MG TABLET: Performed by: INTERNAL MEDICINE

## 2020-07-01 PROCEDURE — 83935 ASSAY OF URINE OSMOLALITY: CPT | Performed by: INTERNAL MEDICINE

## 2020-07-01 PROCEDURE — 80048 BASIC METABOLIC PNL TOTAL CA: CPT | Performed by: INTERNAL MEDICINE

## 2020-07-01 PROCEDURE — 85025 COMPLETE CBC W/AUTO DIFF WBC: CPT | Performed by: INTERNAL MEDICINE

## 2020-07-01 PROCEDURE — 86335 IMMUNFIX E-PHORSIS/URINE/CSF: CPT | Performed by: INTERNAL MEDICINE

## 2020-07-01 PROCEDURE — 84300 ASSAY OF URINE SODIUM: CPT | Performed by: INTERNAL MEDICINE

## 2020-07-01 PROCEDURE — 86334 IMMUNOFIX E-PHORESIS SERUM: CPT | Performed by: INTERNAL MEDICINE

## 2020-07-01 PROCEDURE — 83880 ASSAY OF NATRIURETIC PEPTIDE: CPT | Performed by: INTERNAL MEDICINE

## 2020-07-01 PROCEDURE — 25010000002 MIDAZOLAM PER 1 MG: Performed by: INTERNAL MEDICINE

## 2020-07-01 PROCEDURE — 93458 L HRT ARTERY/VENTRICLE ANGIO: CPT | Performed by: INTERNAL MEDICINE

## 2020-07-01 PROCEDURE — 25010000002 FENTANYL CITRATE (PF) 100 MCG/2ML SOLUTION: Performed by: INTERNAL MEDICINE

## 2020-07-01 PROCEDURE — 85730 THROMBOPLASTIN TIME PARTIAL: CPT | Performed by: INTERNAL MEDICINE

## 2020-07-01 PROCEDURE — 63710000001 METOPROLOL TARTRATE 25 MG TABLET: Performed by: INTERNAL MEDICINE

## 2020-07-01 PROCEDURE — 82533 TOTAL CORTISOL: CPT | Performed by: INTERNAL MEDICINE

## 2020-07-01 PROCEDURE — C1894 INTRO/SHEATH, NON-LASER: HCPCS | Performed by: INTERNAL MEDICINE

## 2020-07-01 RX ORDER — ACETAMINOPHEN 325 MG/1
650 TABLET ORAL EVERY 4 HOURS PRN
Status: DISCONTINUED | OUTPATIENT
Start: 2020-07-01 | End: 2020-07-02 | Stop reason: HOSPADM

## 2020-07-01 RX ORDER — PANTOPRAZOLE SODIUM 40 MG/1
40 TABLET, DELAYED RELEASE ORAL
Status: DISCONTINUED | OUTPATIENT
Start: 2020-07-02 | End: 2020-07-02 | Stop reason: HOSPADM

## 2020-07-01 RX ORDER — PREDNISONE 50 MG/1
50 TABLET ORAL DAILY
COMMUNITY
End: 2020-07-10

## 2020-07-01 RX ORDER — SODIUM CHLORIDE 9 MG/ML
75 INJECTION, SOLUTION INTRAVENOUS CONTINUOUS
Status: DISCONTINUED | OUTPATIENT
Start: 2020-07-01 | End: 2020-07-01

## 2020-07-01 RX ORDER — FERROUS SULFATE 325(65) MG
325 TABLET ORAL
COMMUNITY
End: 2020-08-28

## 2020-07-01 RX ORDER — ATORVASTATIN CALCIUM 10 MG/1
10 TABLET, FILM COATED ORAL DAILY
Status: DISCONTINUED | OUTPATIENT
Start: 2020-07-01 | End: 2020-07-02 | Stop reason: HOSPADM

## 2020-07-01 RX ORDER — TOLVAPTAN 15 MG/1
15 TABLET ORAL ONCE
Status: COMPLETED | OUTPATIENT
Start: 2020-07-01 | End: 2020-07-01

## 2020-07-01 RX ORDER — LIDOCAINE HYDROCHLORIDE 20 MG/ML
INJECTION, SOLUTION INFILTRATION; PERINEURAL AS NEEDED
Status: DISCONTINUED | OUTPATIENT
Start: 2020-07-01 | End: 2020-07-01 | Stop reason: HOSPADM

## 2020-07-01 RX ORDER — ASCORBIC ACID 500 MG
500 TABLET ORAL EVERY MORNING
Status: DISCONTINUED | OUTPATIENT
Start: 2020-07-01 | End: 2020-07-02 | Stop reason: HOSPADM

## 2020-07-01 RX ORDER — FENTANYL CITRATE 50 UG/ML
INJECTION, SOLUTION INTRAMUSCULAR; INTRAVENOUS AS NEEDED
Status: DISCONTINUED | OUTPATIENT
Start: 2020-07-01 | End: 2020-07-01 | Stop reason: HOSPADM

## 2020-07-01 RX ORDER — HEPARIN SODIUM 5000 [USP'U]/ML
51 INJECTION, SOLUTION INTRAVENOUS; SUBCUTANEOUS ONCE
Status: COMPLETED | OUTPATIENT
Start: 2020-07-01 | End: 2020-07-01

## 2020-07-01 RX ORDER — HEPARIN SODIUM 5000 [USP'U]/ML
30-51 INJECTION, SOLUTION INTRAVENOUS; SUBCUTANEOUS EVERY 6 HOURS PRN
Status: DISCONTINUED | OUTPATIENT
Start: 2020-07-01 | End: 2020-07-02 | Stop reason: HOSPADM

## 2020-07-01 RX ORDER — HEPARIN SODIUM 1000 [USP'U]/ML
INJECTION, SOLUTION INTRAVENOUS; SUBCUTANEOUS AS NEEDED
Status: DISCONTINUED | OUTPATIENT
Start: 2020-07-01 | End: 2020-07-01 | Stop reason: HOSPADM

## 2020-07-01 RX ORDER — PREDNISONE 50 MG/1
50 TABLET ORAL DAILY
Status: DISCONTINUED | OUTPATIENT
Start: 2020-07-01 | End: 2020-07-02 | Stop reason: HOSPADM

## 2020-07-01 RX ORDER — SODIUM CHLORIDE 0.9 % (FLUSH) 0.9 %
10 SYRINGE (ML) INJECTION AS NEEDED
Status: DISCONTINUED | OUTPATIENT
Start: 2020-07-01 | End: 2020-07-01

## 2020-07-01 RX ORDER — DIPHENHYDRAMINE HCL 50 MG
50 CAPSULE ORAL
COMMUNITY
End: 2020-07-10

## 2020-07-01 RX ORDER — FUROSEMIDE 10 MG/ML
40 INJECTION INTRAMUSCULAR; INTRAVENOUS EVERY 6 HOURS
Status: COMPLETED | OUTPATIENT
Start: 2020-07-01 | End: 2020-07-01

## 2020-07-01 RX ORDER — ASPIRIN 81 MG/1
81 TABLET ORAL DAILY
Status: DISCONTINUED | OUTPATIENT
Start: 2020-07-01 | End: 2020-07-02 | Stop reason: HOSPADM

## 2020-07-01 RX ORDER — NITROGLYCERIN 0.4 MG/1
0.4 TABLET SUBLINGUAL
Status: DISCONTINUED | OUTPATIENT
Start: 2020-07-01 | End: 2020-07-02 | Stop reason: HOSPADM

## 2020-07-01 RX ORDER — MIDAZOLAM HYDROCHLORIDE 1 MG/ML
INJECTION INTRAMUSCULAR; INTRAVENOUS AS NEEDED
Status: DISCONTINUED | OUTPATIENT
Start: 2020-07-01 | End: 2020-07-01 | Stop reason: HOSPADM

## 2020-07-01 RX ORDER — CHOLECALCIFEROL (VITAMIN D3) 125 MCG
1000 CAPSULE ORAL EVERY MORNING
Status: DISCONTINUED | OUTPATIENT
Start: 2020-07-01 | End: 2020-07-02

## 2020-07-01 RX ORDER — HEPARIN SODIUM 10000 [USP'U]/100ML
12 INJECTION, SOLUTION INTRAVENOUS
Status: DISCONTINUED | OUTPATIENT
Start: 2020-07-01 | End: 2020-07-02

## 2020-07-01 RX ORDER — SODIUM CHLORIDE 0.9 % (FLUSH) 0.9 %
3 SYRINGE (ML) INJECTION EVERY 12 HOURS SCHEDULED
Status: DISCONTINUED | OUTPATIENT
Start: 2020-07-01 | End: 2020-07-01

## 2020-07-01 RX ADMIN — ATORVASTATIN CALCIUM 10 MG: 10 TABLET, FILM COATED ORAL at 21:37

## 2020-07-01 RX ADMIN — TOLVAPTAN 15 MG: 15 TABLET ORAL at 18:07

## 2020-07-01 RX ADMIN — ASPIRIN 81 MG: 81 TABLET, COATED ORAL at 21:37

## 2020-07-01 RX ADMIN — TICAGRELOR 90 MG: 90 TABLET ORAL at 21:13

## 2020-07-01 RX ADMIN — SODIUM CHLORIDE 75 ML/HR: 9 INJECTION, SOLUTION INTRAVENOUS at 08:21

## 2020-07-01 RX ADMIN — HEPARIN SODIUM 4000 UNITS: 5000 INJECTION INTRAVENOUS; SUBCUTANEOUS at 21:10

## 2020-07-01 RX ADMIN — METOPROLOL TARTRATE 25 MG: 25 TABLET, FILM COATED ORAL at 13:36

## 2020-07-01 RX ADMIN — Medication 1000 MCG: at 18:08

## 2020-07-01 RX ADMIN — OXYCODONE HYDROCHLORIDE AND ACETAMINOPHEN 500 MG: 500 TABLET ORAL at 18:07

## 2020-07-01 RX ADMIN — METOPROLOL TARTRATE 25 MG: 25 TABLET, FILM COATED ORAL at 21:10

## 2020-07-01 RX ADMIN — FUROSEMIDE 40 MG: 10 INJECTION, SOLUTION INTRAMUSCULAR; INTRAVENOUS at 16:08

## 2020-07-01 RX ADMIN — FUROSEMIDE 40 MG: 10 INJECTION, SOLUTION INTRAMUSCULAR; INTRAVENOUS at 21:10

## 2020-07-01 RX ADMIN — ACETAMINOPHEN 650 MG: 325 TABLET, FILM COATED ORAL at 16:03

## 2020-07-01 RX ADMIN — HEPARIN SODIUM 12 UNITS/KG/HR: 10000 INJECTION, SOLUTION INTRAVENOUS at 21:18

## 2020-07-01 NOTE — CONSULTS
NEPHROLOGY CONSULTATION-----[facility]    [facility]  [phone]  Donato Vaughn MD    Patient Care Team:  Delfino Matt MD as PCP - General  Oral Llanes MD as Consulting Physician (Pulmonary Disease)  Herber Hayes MD as Consulting Physician (Cardiology)  Virgie Castelan MD as Consulting Physician (Gastroenterology)  Maxi Lundy MD as Surgeon (Orthopedic Surgery)  Mahesh Salmon MD as Surgeon (Vascular Surgery)  Donato Vaughn MD as Consulting Physician (Nephrology)    CC/REASON FOR CONSULTATION: Hyponatremia  Requesting Physiciaan: Dr Hayes    History of Present Illness     74 year old female with PMHx HTN, CAD presented for cardiac cath. She has been having chest heaviness. Her sodium was 128-->126 this am. No diarrhea or vomiting. No dysuria, gross hematuria. Previous UA negative from 7/2019. She is not on thiazides. Her LV function ok with previous EF 62%. She does have dyspnea on exertion. CXR from a couple months ago showed vascular congestion.    Review of Systems   As noted above, otherwise 10 systems reviewed and were negative.    Past Medical History:   Diagnosis Date   • AAA (abdominal aortic aneurysm) (CMS/HCC)    • Allergic not sure    CODINE,MORFINE AND DYE SOLUTION   • Anemia    • Aneurysm of abdominal aorta (CMS/HCC)    • Arthritis    • Zepeda esophagus    • CAD (coronary artery disease)    • Chest pain    • Constipation    • COPD (chronic obstructive pulmonary disease) (CMS/HCC)    • Emphysema lung (CMS/HCC)    • Empyema (CMS/HCC)    • Fatigue    • GERD (gastroesophageal reflux disease)    • Headache    • Hiatal hernia    • High risk medication use    • History of cardiac catheterization     CATH STENT PLACEMENT: CIRCUMFLEX BRANCH   • Hyperlipidemia    • Hypertension    • Infectious viral hepatitis Jaundice in 1963   • Jaundice    • Lumbosacral disc disease    • Myocardial infarction (CMS/HCC)    • Myocardial infarction  (CMS/HCC)    • Occult blood in stools    • On home O2    • Peripheral artery disease (CMS/HCC)    • Reflux gastritis    • Scoliosis    • Visual impairment     BLURRY VISION IN RT. EYE       Past Surgical History:   Procedure Laterality Date   • CARDIAC CATHETERIZATION     • CARDIAC CATHETERIZATION N/A 5/12/2017    Procedure: Left Heart Cath;  Surgeon: Herber Hayes MD;  Location: [facility] CATH INVASIVE LOCATION;  Service:    • CAROTID ENDARTERECTOMY Right 3/16/2020    Procedure: RIGHT CAROTID ENDARTERECTOMY;  Surgeon: Mahesh Salmon MD;  Location: [facility] MAIN OR;  Service: Vascular;  Laterality: Right;   • CHOLECYSTECTOMY     • COLONOSCOPY     • COLONOSCOPY N/A 6/10/2016    Procedure: COLONOSCOPY with polypectomy;  Surgeon: Virgie Castelan MD;  Location: [facility] OR;  Service:    • CORONARY ANGIOPLASTY     • CORONARY STENT PLACEMENT      X2   • ENDOSCOPY N/A 6/10/2016    Procedure: ESOPHAGOGASTRODUODENOSCOPY WITH BIOPSY;  Surgeon: iVrgie Castelan MD;  Location: [facility] OR;  Service:    • ENDOSCOPY     • ENDOSCOPY N/A 8/25/2017    Procedure: ESOPHAGOGASTRODUODENOSCOPY w/ biopsies;  Surgeon: Virgie Castelan MD;  Location: [facility] OR;  Service:        Family History   Problem Relation Age of Onset   • Colon cancer Mother    • Breast cancer Mother    • Other Father         cardiac disorder   • Hypertension Father    • Heart disease Father    • Other Sister         cardiac disorder   • Hypertension Sister    • Lung disease Sister    • Thyroid disease Sister    • Other Brother         cardiac disorder   • Thyroid disease Daughter    • Heart disease Sister    • Hypertension Sister    • Pulmonary fibrosis Sister    • Heart disease Brother    • Other Brother    • Thyroid disease Sister    • Other Sister    • Thyroid disease Daughter    • Malig Hyperthermia Neg Hx        Social History     Tobacco Use   • Smoking status: Current Every Day Smoker     Packs/day: 0.50     Years: 50.00     Pack years: 25.00     Types:  Cigarettes     Start date: 8/3/2016   • Smokeless tobacco: Never Used   • Tobacco comment: 66 YEARS   Substance Use Topics   • Alcohol use: No   • Drug use: No       Home Meds:   Medications Prior to Admission   Medication Sig Dispense Refill Last Dose   • ANORO ELLIPTA 62.5-25 MCG/INH aerosol powder  Inhale 1 puff Every Morning.  0 6/30/2020 at Unknown time   • aspirin 81 MG tablet Take 1 tablet by mouth Daily. (Patient taking differently: Take 81 mg by mouth Every Night.) 90 tablet 0 6/30/2020 at Unknown time   • atorvastatin (LIPITOR) 10 MG tablet TAKE 1 TABLET EVERY DAY (Patient taking differently: Take 10 mg by mouth Every Night.) 90 tablet 3 6/30/2020 at Unknown time   • Calcium Carbonate-Vitamin D (CALCIUM 600+D HIGH POTENCY PO) Take  by mouth 2 (Two) Times a Day.   6/30/2020 at Unknown time   • diphenhydrAMINE (BENADRYL) 50 MG capsule Take 50 mg by mouth.   7/1/2020 at Unknown time   • ferrous sulfate 325 (65 FE) MG tablet Take 325 mg by mouth every night at bedtime.   6/30/2020 at 2100   • metoprolol tartrate (LOPRESSOR) 25 MG tablet Take 1 tablet by mouth 2 (Two) Times a Day. 180 tablet 3 7/1/2020 at Unknown time   • O2 (OXYGEN) Inhale 3 L/min Continuous.   7/1/2020 at Unknown time   • omeprazole (priLOSEC) 40 MG capsule Take 1 capsule by mouth Daily. (Patient taking differently: Take 40 mg by mouth Every Morning.) 90 capsule 3 7/1/2020 at Unknown time   • polyethylene glycol (MIRALAX) packet Take 17 g by mouth Daily. (Patient taking differently: Take 17 g by mouth Every Morning.) 850 g 3 6/30/2020 at Unknown time   • predniSONE (DELTASONE) 50 MG tablet Take 50 mg by mouth Daily.   7/1/2020 at Unknown time   • vitamin A 01697 UNIT capsule Take 10,000 Units by mouth Every Morning.   6/30/2020 at Unknown time   • vitamin B-12 (CYANOCOBALAMIN) 1000 MCG tablet Take 1,000 mcg by mouth Every Morning.   6/30/2020 at Unknown time   • vitamin C (ASCORBIC ACID) 500 MG tablet Take 500 mg by mouth Every Morning.    6/30/2020 at Unknown time   • nitroglycerin (NITROSTAT) 0.4 MG SL tablet Place 0.4 mg under the tongue Every 5 (Five) Minutes As Needed for Chest Pain. Take no more than 3 doses in 15 minutes.   More than a month at Unknown time       Scheduled Meds:      aspirin 81 mg Oral Daily   atorvastatin 10 mg Oral Daily   metoprolol tartrate 25 mg Oral BID   predniSONE 50 mg Oral Daily   ticagrelor 90 mg Oral BID   vitamin B-12 1,000 mcg Oral QAM   vitamin C 500 mg Oral QAM       Continuous Infusions:      O2 3 L/min Last Rate: 3 L/min (07/01/20 1339)   sodium chloride 75 mL/hr Last Rate: 75 mL/hr (07/01/20 1000)       PRN Meds:  •  acetaminophen  •  nitroglycerin    Allergies:  Iodinated diagnostic agents; Codeine; and Morphine and related    OBJECTIVE    Vital Signs  Temp:  [98.5 °F (36.9 °C)] 98.5 °F (36.9 °C)  Heart Rate:  [76-84] 77  Resp:  [18-22] 18  BP: (144-165)/(73-87) 165/87    I/O this shift:  In: 560 [P.O.:360; I.V.:200]  Out: -   No intake/output data recorded.    Physical Exam:  General Appearance: alert, appears stated age and cooperative  Head: normocephalic, without obvious abnormality and atraumatic  Eyes: conjunctivae and sclerae normal and no icterus  Neck: supple and no JVD  Lungs: Diminished breath sounds  Heart: regular rhythm & normal rate and normal S1, S2  Chest Wall: no abnormalities observed  Abdomen: normal bowel sounds and soft non-tender  Extremities: moves extremities well, trace edema, no cyanosis and no redness  Skin: no bleeding, bruising or rash  Neurologic: Alert, and oriented. No focal deficits    Results Review:    I reviewed the patient's new clinical results.    WBC WBC   Date Value Ref Range Status   07/01/2020 7.99 3.40 - 10.80 10*3/mm3 Final      HGB Hemoglobin   Date Value Ref Range Status   07/01/2020 11.6 (L) 12.0 - 15.9 g/dL Final      HCT Hematocrit   Date Value Ref Range Status   07/01/2020 34.8 34.0 - 46.6 % Final      Platlets No results found for: LABPLAT   MCV MCV   Date  Value Ref Range Status   07/01/2020 95.6 79.0 - 97.0 fL Final          Sodium No results found for: NA   Potassium No results found for: K   Chloride No results found for: CL   CO2 No results found for: CO2   BUN No results found for: BUN   Creatinine No results found for: CREATININE   Calcium No results found for: CALCIUM   PO4 No results found for: CAPO4   Albumin No results found for: ALBUMIN   Magnesium No results found for: MG   Uric Acid No results found for: URICACID       Imaging Results (Last 72 Hours)     ** No results found for the last 72 hours. **            Results for orders placed during the hospital encounter of 03/16/20   XR Chest PA & Lateral    Narrative TWO-VIEW CHEST     HISTORY: Hypoxia. Shortness of breath.     FINDINGS: There is cardiomegaly with slight vascular congestion and some  minimal interstitial prominence which is new since an earlier chest  x-ray dated 04/11/2015. The findings are concerning for changes of very  mild congestive heart failure.     This report was finalized on 3/18/2020 9:47 AM by Dr. Jeffrey Rogers M.D.            Results for orders placed in visit on 06/16/20   SCANNED VASCULAR STUDIES       ASSESSMENT / PLAN      Chronic coronary artery disease      · Hypoantremia--Probably related to excess volume. There might be a component of excess ADH related to COPD. check UA, urine sodium and os, TSH and cortisol, screen for paraproteins  · HTN  · CAD--s/p cath  · Hyperlipidemia  · COPD--emphysema. On recent CT    Check urine studies  Add a loop diuretic  Fluid restriction 1500 mls/day  Dose samsca      I discussed the patients findings and my recommendations with patient, nursing staff and consulting provider    Will follow along closely. Thank you for allowing us to see this patient in renal consultation.    [facility]  [phone]  MD Donato Dimas MD  07/01/20  14:25

## 2020-07-01 NOTE — NURSING NOTE
Pt from cath lab, R radial CDI, soft. No interventions. Heparin gtt ordered, waiting for new aPTT to be drawn before bolus and start. VSS. A/ox4. C/o headache, prn meds given per MAR. Will continue to monitor.

## 2020-07-01 NOTE — CONSULTS
Internal medicine consult    Referring physician  Dr. CONTE    Chief complaint  Chest heaviness    Reason for consult  Follow medical problems    History of present illness  74-year white female with multiple medical problems including hypertension hyperlipidemia coronary artery disease peripheral vascular disease who underwent right carotid endarterectomy few months ago also have a stress test secondary to chest heaviness which was abnormal admitted for cardiac catheterization and I am asked to follow the patient for medical problem patient has no symptoms denies any fever cough shortness of breath abdominal pain nausea vomiting diarrhea.  Patient does have occasional chest heaviness but not at the time of interview.  Patient also have hyponatremia and nephrology already following.  Patient denies any dizziness lightheadedness either.       Past Medical History:   Diagnosis Date   • AAA (abdominal aortic aneurysm) (CMS/HCC)     • Allergic not sure     CODINE,MORFINE AND DYE SOLUTION   • Anemia     • Aneurysm of abdominal aorta (CMS/HCC)     • Arthritis     • Zepeda esophagus     • CAD (coronary artery disease)     • Chest pain     • Constipation     • COPD (chronic obstructive pulmonary disease) (CMS/HCC)     • Emphysema lung (CMS/HCC)     • Empyema (CMS/HCC)     • Fatigue     • GERD (gastroesophageal reflux disease)     • Headache     • Hiatal hernia     • High risk medication use     • History of cardiac catheterization       CATH STENT PLACEMENT: CIRCUMFLEX BRANCH   • Hyperlipidemia     • Hypertension     • Infectious viral hepatitis                  Past Surgical History:    Laterality Date   • CARDIAC CATHETERIZATION       • CARDIAC CATHETERIZATION N/A 5/12/2017     Procedure: Left Heart Cath;  Surgeon: Herber Hayes MD;  Location: [facility] INVASIVE LOCATION;  Service:    • CAROTID ENDARTERECTOMY Right 3/16/2020     Procedure: RIGHT CAROTID ENDARTERECTOMY;  Surgeon: Mahesh Salmon MD;  Location:   "[facility];  Service: Vascular;  Laterality: Right;   • CHOLECYSTECTOMY       • COLONOSCOPY       • COLONOSCOPY N/A 6/10/2016     Procedure: COLONOSCOPY with polypectomy;  Surgeon: Virgie Castelan MD;  Location: [facility] OR;  Service:    • CORONARY ANGIOPLASTY       • CORONARY STENT PLACEMENT         X2   • ENDOSCOPY N/A 6/10/2016     Procedure: ESOPHAGOGASTRODUODENOSCOPY WITH BIOPSY;  Surgeon: Virgie Castelan MD;  Location:  [facility] OR;  Service:    • ENDOSCOPY       • ENDOSCOPY N/A 8/25/2017     Procedure: ESOPHAGOGASTRODUODENOSCOPY w/ biopsies;  Surgeon: Virgie Castelan MD;  Location:  [facility] OR;  Service:                Family History    Relation Age of Onset   • Colon cancer Mother     • Breast cancer Mother     • Other Father           cardiac disorder   • Hypertension Father     • Heart disease Father     • Other Sister           cardiac disorder   • Hypertension Sister     • Lung disease Sister     • Thyroid disease Sister     • Other Brother           cardiac disorder   • Thyroid disease Daughter     • Heart disease Sister     • Hypertension Sister     • Pulmonary fibrosis Sister     • Heart disease Brother     • Other Brother     • Thyroid disease Sister     • Other Sister     • Thyroid disease Daughter     • Malig Hyperthermia Neg Hx        Social History         • Smoking status: Current Every Day Smoker       Packs/day: 0.50       Years: 50.00       Pack years: 25.00       Types: Cigarettes       Start date: 8/3/2016   • Smokeless tobacco: Never Used   • Tobacco comment: 64 YEARS      • Alcohol use: No   • Drug use: No      Allergies:  Iodinated diagnostic agents; Codeine; and Morphine and related  Home medications reviewed    Physical Exam:  Blood pressure 165/87, pulse 77, temperature 98.5 °F (36.9 °C), temperature source Temporal, resp. rate 18, height 160 cm (63\"), weight 78.5 kg (173 lb), SpO2 96 %.    General Appearance: alert, appears stated age and cooperative  Head: normocephalic, without obvious " abnormality and atraumatic  Eyes: conjunctivae and sclerae normal and no icterus  Neck: supple and no JVD  Lungs: Diminished breath sounds  Heart: regular rhythm & normal rate and normal S1, S2  Chest Wall: no abnormalities observed  Abdomen: normal bowel sounds and soft non-tender  Extremities: moves extremities well, trace edema, no cyanosis and no redness  Skin: no bleeding, bruising or rash  Neurologic: Alert, and oriented. No focal deficits     Results Review:   Lab Results (last 24 hours)     Procedure Component Value Units Date/Time    Sodium, Urine, Random - Urine, Clean Catch [[id number]] Collected:  07/01/20 1457    Specimen:  Urine, Clean Catch Updated:  07/01/20 1519     Sodium, Urine 39 mmol/L     Narrative:       Reference intervals for random urine have not been established.  Clinical usage is dependent upon physician's interpretation in combination with other laboratory tests.       Urinalysis, Microscopic Only - Urine, Clean Catch [[id number]] Collected:  07/01/20 1454    Specimen:  Urine, Clean Catch Updated:  07/01/20 1507     RBC, UA 0-2 /HPF      WBC, UA 0-2 /HPF      Bacteria, UA None Seen /HPF      Squamous Epithelial Cells, UA 0-2 /HPF      Hyaline Casts, UA None Seen /LPF      Methodology Automated Microscopy    Urinalysis With Microscopic If Indicated (No Culture) - [[id number]]  (Abnormal) Collected:  07/01/20 1454    Specimen:  Urine Updated:  07/01/20 1504     Color, UA Yellow     Appearance, UA Clear     pH, UA 5.5     Specific Gravity, UA 1.023     Glucose, UA Negative     Ketones, UA Negative     Bilirubin, UA Negative     Blood, UA Trace     Protein, UA Trace     Leuk Esterase, UA Negative     Nitrite, UA Negative     Urobilinogen, UA 0.2 E.U./dL    Osmolality, Urine - Urine, Clean Catch [[id number]] Collected:  07/01/20 1457    Specimen:  Urine, Clean Catch Updated:  07/01/20 1457     Osmolality, Urine 315 mOsm/kg     Narrative:       Osmo Normal Reference Ranges:    Random:   mOsm/kg H2O, depending on fluid intake.  Random: >850 mOsm/kg H20, after 12 hour fluid restriction.    24 Hour: 300-900 mOsm/kg H2O.    Cortisol [[id number]] Collected:  07/01/20 1213    Specimen:  Blood Updated:  07/01/20 1446     Cortisol 4.91 mcg/dL     Narrative:       Cortisol Reference Ranges:    Cortisol 6AM - 10AM Range: 6.02-18.40 mcg/dl  Cortisol 4PM - 8PM Range: 2.68-10.50 mcg/dl      Results may be falsely increased if patient taking Biotin.      Immunofixation, Urine - Urine, Clean Catch [[id number]] Collected:  07/01/20 1435    Specimen:  Urine, Clean Catch Updated:  07/01/20 1435    TSH [[id number]]  (Normal) Collected:  07/01/20 1213    Specimen:  Blood Updated:  07/01/20 1258     TSH 0.959 uIU/mL     BNP [[id number]]  (Normal) Collected:  07/01/20 1213    Specimen:  Blood Updated:  07/01/20 1258     proBNP 820.0 pg/mL     Narrative:       Among patients with dyspnea, NT-proBNP is highly sensitive for the detection of acute congestive heart failure. In addition NT-proBNP of <300 pg/ml effectively rules out acute congestive heart failure with 99% negative predictive value.    Results may be falsely decreased if patient taking Biotin.      Protime-INR [[id number]]  (Normal) Collected:  07/01/20 1213    Specimen:  Blood Updated:  07/01/20 1232     Protime 14.0 Seconds      INR 1.09    CBC & Differential [[id number]] Collected:  07/01/20 1213    Specimen:  Blood Updated:  07/01/20 1224    Narrative:       The following orders were created for panel order CBC & Differential.  Procedure                               Abnormality         Status                     ---------                               -----------         ------                     CBC Auto Differential[[id number]]        Abnormal            Final result                 Please view results for these tests on the individual orders.    CBC Auto Differential [[id number]]  (Abnormal) Collected:  07/01/20 1213    Specimen:  Blood Updated:  07/01/20 1224      WBC 7.99 10*3/mm3      RBC 3.64 10*6/mm3      Hemoglobin 11.6 g/dL      Hematocrit 34.8 %      MCV 95.6 fL      MCH 31.9 pg      MCHC 33.3 g/dL      RDW 12.7 %      RDW-SD 45.1 fl      MPV 9.6 fL      Platelets 223 10*3/mm3      Neutrophil % 87.8 %      Lymphocyte % 9.6 %      Monocyte % 2.1 %      Eosinophil % 0.0 %      Basophil % 0.1 %      Immature Grans % 0.4 %      Neutrophils, Absolute 7.01 10*3/mm3      Lymphocytes, Absolute 0.77 10*3/mm3      Monocytes, Absolute 0.17 10*3/mm3      Eosinophils, Absolute 0.00 10*3/mm3      Basophils, Absolute 0.01 10*3/mm3      Immature Grans, Absolute 0.03 10*3/mm3      nRBC 0.0 /100 WBC     Immunofixation, Serum [[id number]] Collected:  07/01/20 1213    Specimen:  Blood Updated:  07/01/20 1218    aPTT [[id number]]  (Abnormal) Collected:  07/01/20 1050    Specimen:  Blood Updated:  07/01/20 1210     PTT >200.0 seconds         Imaging Results (Last 24 Hours)     ** No results found for the last 24 hours. **          Current Facility-Administered Medications:   •  acetaminophen (TYLENOL) tablet 650 mg, 650 mg, Oral, Q4H PRN, Herber Hayes MD, 650 mg at 07/01/20 1603  •  aspirin EC tablet 81 mg, 81 mg, Oral, Daily, Herber Hayes MD  •  atorvastatin (LIPITOR) tablet 10 mg, 10 mg, Oral, Daily, Herber Hayes MD  •  furosemide (LASIX) injection 40 mg, 40 mg, Intravenous, Q6H, Donato Vaughn MD, 40 mg at 07/01/20 1608  •  metoprolol tartrate (LOPRESSOR) tablet 25 mg, 25 mg, Oral, BID, Herber Hayes MD, 25 mg at 07/01/20 1336  •  nitroglycerin (NITROSTAT) SL tablet 0.4 mg, 0.4 mg, Sublingual, Q5 Min PRN, Herber Hayes MD  •  O2 (OXYGEN), 3 L/min, Inhalation, Continuous, Herber Hayes MD, Last Rate: 180,000 mL/hr at 07/01/20 1339, 3 L/min at 07/01/20 1339  •  predniSONE (DELTASONE) tablet 50 mg, 50 mg, Oral, Daily, Herber Hayes MD  •  sodium chloride 0.9 % infusion, 75 mL/hr, Intravenous, Continuous, Freddy,  MD Herber, Last Rate: 75 mL/hr at 07/01/20 1000, 75 mL/hr at 07/01/20 1000  •  ticagrelor (BRILINTA) tablet 90 mg, 90 mg, Oral, BID, Herber Hayes MD  •  tolvaptan (SAMSCA) tablet 15 mg, 15 mg, Oral, Once, Donato Vaughn MD  •  vitamin B-12 (CYANOCOBALAMIN) tablet 1,000 mcg, 1,000 mcg, Oral, Freddy SOLER Shanker, MD  •  vitamin C (ASCORBIC ACID) tablet 500 mg, 500 mg, Oral, Freddy SOLER Shanker, MD     ASSESSMENT  Hyponatremia  Occasional chest heaviness with abnormal stress Cardiolite admitted for cardiac catheterization  Coronary artery disease  Peripheral skin disease status post right carotid endarterectomy  Hypertension  Hyperlipidemia  Gastroesophageal reflux disease    PLAN  Agree with current care  IV fluids  SAMSCA per nephrology  Continue home medications  Stress ulcer DVT prophylaxis  Supportive care  Patient is full code  Repeat labs in a.m.  Discussed with nursing staff  Will follow with Dr. CONTE further recommendation current hospital course    DEBBIE CURIEL MD

## 2020-07-02 ENCOUNTER — READMISSION MANAGEMENT (OUTPATIENT)
Dept: CALL CENTER | Facility: HOSPITAL | Age: 75
End: 2020-07-02

## 2020-07-02 ENCOUNTER — NURSE TRIAGE (OUTPATIENT)
Dept: CALL CENTER | Facility: HOSPITAL | Age: 75
End: 2020-07-02

## 2020-07-02 VITALS
HEIGHT: 63 IN | DIASTOLIC BLOOD PRESSURE: 93 MMHG | SYSTOLIC BLOOD PRESSURE: 132 MMHG | RESPIRATION RATE: 16 BRPM | HEART RATE: 67 BPM | BODY MASS INDEX: 30.65 KG/M2 | TEMPERATURE: 97.7 F | OXYGEN SATURATION: 96 % | WEIGHT: 173 LBS

## 2020-07-02 LAB
ALBUMIN SERPL-MCNC: 4.5 G/DL (ref 3.5–5.2)
ALBUMIN/GLOB SERPL: 1.6 G/DL
ALP SERPL-CCNC: 89 U/L (ref 39–117)
ALT SERPL W P-5'-P-CCNC: 8 U/L (ref 1–33)
ANION GAP SERPL CALCULATED.3IONS-SCNC: 13.2 MMOL/L (ref 5–15)
APTT PPP: 117 SECONDS (ref 22.7–35.4)
APTT PPP: 51.6 SECONDS (ref 22.7–35.4)
AST SERPL-CCNC: 16 U/L (ref 1–32)
BASOPHILS # BLD AUTO: 0.02 10*3/MM3 (ref 0–0.2)
BASOPHILS NFR BLD AUTO: 0.2 % (ref 0–1.5)
BILIRUB SERPL-MCNC: 0.4 MG/DL (ref 0.2–1.2)
BUN SERPL-MCNC: 24 MG/DL (ref 8–23)
BUN/CREAT SERPL: 24 (ref 7–25)
CALCIUM SPEC-SCNC: 10.2 MG/DL (ref 8.6–10.5)
CHLORIDE SERPL-SCNC: 93 MMOL/L (ref 98–107)
CHOLEST SERPL-MCNC: 147 MG/DL (ref 0–200)
CO2 SERPL-SCNC: 27.8 MMOL/L (ref 22–29)
CREAT SERPL-MCNC: 1 MG/DL (ref 0.57–1)
DEPRECATED RDW RBC AUTO: 44.2 FL (ref 37–54)
EOSINOPHIL # BLD AUTO: 0.01 10*3/MM3 (ref 0–0.4)
EOSINOPHIL NFR BLD AUTO: 0.1 % (ref 0.3–6.2)
ERYTHROCYTE [DISTWIDTH] IN BLOOD BY AUTOMATED COUNT: 12.8 % (ref 12.3–15.4)
GFR SERPL CREATININE-BSD FRML MDRD: 55 ML/MIN/1.73
GLOBULIN UR ELPH-MCNC: 2.8 GM/DL
GLUCOSE SERPL-MCNC: 97 MG/DL (ref 65–99)
HBA1C MFR BLD: 5.8 % (ref 4.8–5.6)
HCT VFR BLD AUTO: 34.3 % (ref 34–46.6)
HDLC SERPL-MCNC: 73 MG/DL (ref 40–60)
HGB BLD-MCNC: 11.7 G/DL (ref 12–15.9)
IMM GRANULOCYTES # BLD AUTO: 0.04 10*3/MM3 (ref 0–0.05)
IMM GRANULOCYTES NFR BLD AUTO: 0.3 % (ref 0–0.5)
LDLC SERPL CALC-MCNC: 65 MG/DL (ref 0–100)
LDLC/HDLC SERPL: 0.89 {RATIO}
LYMPHOCYTES # BLD AUTO: 1.69 10*3/MM3 (ref 0.7–3.1)
LYMPHOCYTES NFR BLD AUTO: 14.3 % (ref 19.6–45.3)
MCH RBC QN AUTO: 31.8 PG (ref 26.6–33)
MCHC RBC AUTO-ENTMCNC: 34.1 G/DL (ref 31.5–35.7)
MCV RBC AUTO: 93.2 FL (ref 79–97)
MONOCYTES # BLD AUTO: 1.33 10*3/MM3 (ref 0.1–0.9)
MONOCYTES NFR BLD AUTO: 11.2 % (ref 5–12)
NEUTROPHILS NFR BLD AUTO: 73.9 % (ref 42.7–76)
NEUTROPHILS NFR BLD AUTO: 8.75 10*3/MM3 (ref 1.7–7)
NRBC BLD AUTO-RTO: 0 /100 WBC (ref 0–0.2)
NT-PROBNP SERPL-MCNC: 1012 PG/ML (ref 5–900)
PLATELET # BLD AUTO: 255 10*3/MM3 (ref 140–450)
PMV BLD AUTO: 10.4 FL (ref 6–12)
POTASSIUM SERPL-SCNC: 3.5 MMOL/L (ref 3.5–5.2)
PROT SERPL-MCNC: 7.3 G/DL (ref 6–8.5)
RBC # BLD AUTO: 3.68 10*6/MM3 (ref 3.77–5.28)
SODIUM SERPL-SCNC: 134 MMOL/L (ref 136–145)
TRIGL SERPL-MCNC: 46 MG/DL (ref 0–150)
URATE SERPL-MCNC: 6.7 MG/DL (ref 2.4–5.7)
VIT B12 BLD-MCNC: 1516 PG/ML (ref 211–946)
VLDLC SERPL-MCNC: 9.2 MG/DL (ref 5–40)
WBC # BLD AUTO: 11.84 10*3/MM3 (ref 3.4–10.8)

## 2020-07-02 PROCEDURE — A9270 NON-COVERED ITEM OR SERVICE: HCPCS | Performed by: HOSPITALIST

## 2020-07-02 PROCEDURE — 85730 THROMBOPLASTIN TIME PARTIAL: CPT | Performed by: INTERNAL MEDICINE

## 2020-07-02 PROCEDURE — 80061 LIPID PANEL: CPT | Performed by: HOSPITALIST

## 2020-07-02 PROCEDURE — A9270 NON-COVERED ITEM OR SERVICE: HCPCS | Performed by: INTERNAL MEDICINE

## 2020-07-02 PROCEDURE — 25010000002 FUROSEMIDE PER 20 MG: Performed by: INTERNAL MEDICINE

## 2020-07-02 PROCEDURE — 63710000001 PANTOPRAZOLE 40 MG TABLET DELAYED-RELEASE: Performed by: HOSPITALIST

## 2020-07-02 PROCEDURE — 80053 COMPREHEN METABOLIC PANEL: CPT | Performed by: HOSPITALIST

## 2020-07-02 PROCEDURE — 63710000001 METOPROLOL TARTRATE 25 MG TABLET: Performed by: INTERNAL MEDICINE

## 2020-07-02 PROCEDURE — G0378 HOSPITAL OBSERVATION PER HR: HCPCS

## 2020-07-02 PROCEDURE — 82607 VITAMIN B-12: CPT | Performed by: HOSPITALIST

## 2020-07-02 PROCEDURE — 63710000001 VITAMIN B-12 500 MCG TABLET: Performed by: INTERNAL MEDICINE

## 2020-07-02 PROCEDURE — 63710000001 TICAGRELOR 90 MG TABLET: Performed by: INTERNAL MEDICINE

## 2020-07-02 PROCEDURE — 63710000001 POTASSIUM CHLORIDE 10 MEQ CAPSULE CONTROLLED-RELEASE: Performed by: INTERNAL MEDICINE

## 2020-07-02 PROCEDURE — 85025 COMPLETE CBC W/AUTO DIFF WBC: CPT | Performed by: INTERNAL MEDICINE

## 2020-07-02 PROCEDURE — 84550 ASSAY OF BLOOD/URIC ACID: CPT | Performed by: INTERNAL MEDICINE

## 2020-07-02 PROCEDURE — 83036 HEMOGLOBIN GLYCOSYLATED A1C: CPT | Performed by: HOSPITALIST

## 2020-07-02 PROCEDURE — 99217 PR OBSERVATION CARE DISCHARGE MANAGEMENT: CPT | Performed by: INTERNAL MEDICINE

## 2020-07-02 PROCEDURE — 63710000001 VITAMIN C 500 MG TABLET: Performed by: INTERNAL MEDICINE

## 2020-07-02 PROCEDURE — 83880 ASSAY OF NATRIURETIC PEPTIDE: CPT | Performed by: HOSPITALIST

## 2020-07-02 RX ORDER — POTASSIUM CHLORIDE 750 MG/1
40 CAPSULE, EXTENDED RELEASE ORAL ONCE
Status: COMPLETED | OUTPATIENT
Start: 2020-07-02 | End: 2020-07-02

## 2020-07-02 RX ORDER — POTASSIUM CHLORIDE 750 MG/1
20 CAPSULE, EXTENDED RELEASE ORAL DAILY
Qty: 30 CAPSULE | Refills: 5 | Status: SHIPPED | OUTPATIENT
Start: 2020-07-03 | End: 2020-08-28

## 2020-07-02 RX ORDER — FUROSEMIDE 40 MG/1
40 TABLET ORAL DAILY
Qty: 30 TABLET | Refills: 5 | Status: SHIPPED | OUTPATIENT
Start: 2020-07-03 | End: 2020-08-28

## 2020-07-02 RX ORDER — FUROSEMIDE 10 MG/ML
40 INJECTION INTRAMUSCULAR; INTRAVENOUS EVERY 12 HOURS
Status: DISCONTINUED | OUTPATIENT
Start: 2020-07-02 | End: 2020-07-02

## 2020-07-02 RX ORDER — POTASSIUM CHLORIDE 750 MG/1
20 CAPSULE, EXTENDED RELEASE ORAL DAILY
Status: DISCONTINUED | OUTPATIENT
Start: 2020-07-03 | End: 2020-07-02 | Stop reason: HOSPADM

## 2020-07-02 RX ORDER — FUROSEMIDE 40 MG/1
40 TABLET ORAL DAILY
Status: DISCONTINUED | OUTPATIENT
Start: 2020-07-03 | End: 2020-07-02 | Stop reason: HOSPADM

## 2020-07-02 RX ADMIN — FUROSEMIDE 40 MG: 10 INJECTION, SOLUTION INTRAMUSCULAR; INTRAVENOUS at 10:05

## 2020-07-02 RX ADMIN — POTASSIUM CHLORIDE 40 MEQ: 10 CAPSULE, COATED, EXTENDED RELEASE ORAL at 10:05

## 2020-07-02 RX ADMIN — PANTOPRAZOLE SODIUM 40 MG: 40 TABLET, DELAYED RELEASE ORAL at 06:31

## 2020-07-02 RX ADMIN — Medication 1000 MCG: at 06:31

## 2020-07-02 RX ADMIN — OXYCODONE HYDROCHLORIDE AND ACETAMINOPHEN 500 MG: 500 TABLET ORAL at 06:31

## 2020-07-02 RX ADMIN — TICAGRELOR 90 MG: 90 TABLET ORAL at 08:16

## 2020-07-02 RX ADMIN — METOPROLOL TARTRATE 25 MG: 25 TABLET, FILM COATED ORAL at 08:16

## 2020-07-02 NOTE — OUTREACH NOTE
Prep Survey      Responses   [facility] facility patient discharged from?  [facility]   Is LACE score < 7 ?  Yes   Eligibility  [facility]   Date of Admission  07/01/20   Date of Discharge  07/02/20   Discharge Disposition  Home or Self Care   Discharge diagnosis  SOA, CAD, heart cath, bilateral carotid artery disease   COVID-19 Test Status  Negative   Does the patient have one of the following disease processes/diagnoses(primary or secondary)?  Other   Does the patient have Home health ordered?  No   Is there a DME ordered?  No   Prep survey completed?  Yes          Shannon Brooks RN

## 2020-07-02 NOTE — DISCHARGE SUMMARY
[facility]  Physician Discharge Summary    Patient Identification:  Name: Mary Jack  Age: 74 y.o.  Sex: female  :  1945  MRN: [id number]    Admit date: 2020    Discharge date and time:  20  Admitting Physician: Herber Hayes MD     Discharge Physician: dr. 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  He has a history of hyperlipidemia, ischemic heart disease, chronic coronary artery disease Zepeda's esophagus, osteopenia, nicotine dependence, and carotic stenosis bilateral.  She presented to see Dr. Hayes in the office and presented with shortness of breath.  She was a direct admit for a cardiac cath.  Post cardiac cath she was placed on a heparin drip due to Haziness noticed during her cardiac cath.  She was followed by internal medicine and nephrology for low sodium.  She will follow-up with nephrology and 4 weeks.  He added to her Acacian regimen Lasix 40 mg daily and  "potassium 20 mg daily.  She will need to BMP at her primary care physician's office in 1 week.  We will follow-up with your [facility] physician in 1 week for labs and in regards to her A1C of 5.80. She will follow-up with Dr. Chandra  in on July 15 at 3 PM.   Vital signs have been stable throughout stay.  Her right radial wrist no signs or symptoms of infection or hematoma noted.  Lipid panel on 7/2/2020 revealed , HDL 73, LDL 65, and triglycerides 46.  Results of cardiac cath pending.  She will continue BB, aspirin, and statin.  She will take Brilinta for 1 month and then it will be discontinued.    Consults:   IP CONSULT TO NEPHROLOGY  IP CONSULT TO INTERNAL MEDICINE             Physical Exam  /93 (BP Location: Left arm, Patient Position: Sitting)   Pulse 67   Temp 97.7 °F (36.5 °C) (Tympanic)   Resp 16   Ht 160 cm (63\")   Wt 78.5 kg (173 lb)   SpO2 96%   BMI 30.65 kg/m²     General appearance: No acute changes   Eyes: Sclera conjunctiva normal, pupils reactive   HENT: Atraumatic; oropharynx clear with moist mucous membranes and no mucosal ulcerations;  Neck: Trachea midline; NECK, supple, no thyromegaly or lymphadenopathy   Lungs: Normal size and shape, normal breath sounds, equal distribution of air, no rales and rhonchi   CV: S1-S2 regular, no murmurs, no rub, no gallop   Abdomen: Soft, non-tender; no masses , no abnormal abdominal sounds   Extremities: No deformity , normal color , no peripheral edema   Skin: Normal temperature, turgor and texture; no rash, ulcers  Psych: Appropriate affect, alert and oriented to person, place and time             LABS:          Results from last 7 days   Lab Units 07/02/20  0425   SODIUM mmol/L 134*   POTASSIUM mmol/L 3.5   BUN mg/dL 24*   CREATININE mg/dL 1.00   CALCIUM mg/dL 10.2       Results from last 7 days   Lab Units 07/02/20  0425 07/01/20  1911 07/01/20  1213   WBC 10*3/mm3 11.84* 10.90* 7.99   HEMOGLOBIN g/dL 11.7* 11.2* 11.6*   HEMATOCRIT % " 34.3 32.4* 34.8   PLATELETS 10*3/mm3 255 225 223     Results from last 7 days   Lab Units 07/02/20  1406 07/02/20  0425 07/01/20  1911 07/01/20  1213   INR   --   --  1.00 1.09   APTT seconds 51.6* 117.0* 25.7  --      Results from last 7 days   Lab Units 07/02/20  0425   CHOLESTEROL mg/dL 147   TRIGLYCERIDES mg/dL 46   HDL CHOL mg/dL 73*   LDL CHOL mg/dL 65     Disposition:  Home    Discharge Medications:      Discharge Medications      New Medications      Instructions Start Date   furosemide 40 MG tablet  Commonly known as:  LASIX   40 mg, Oral, Daily   Start Date:  July 3, 2020     potassium chloride 10 MEQ CR capsule  Commonly known as:  MICRO-K   20 mEq, Oral, Daily   Start Date:  July 3, 2020     ticagrelor 90 MG tablet tablet  Commonly known as:  BRILINTA   90 mg, Oral, 2 Times Daily         Changes to Medications      Instructions Start Date   aspirin 81 MG tablet  What changed:  when to take this   81 mg, Oral, Daily      atorvastatin 10 MG tablet  Commonly known as:  LIPITOR  What changed:  when to take this   TAKE 1 TABLET EVERY DAY      omeprazole 40 MG capsule  Commonly known as:  priLOSEC  What changed:  when to take this   40 mg, Oral, Daily      polyethylene glycol packet  Commonly known as:  MIRALAX  What changed:  when to take this   17 g, Oral, Daily         Continue These Medications      Instructions Start Date   Anoro Ellipta 62.5-25 MCG/INH aerosol powder  inhaler  Generic drug:  umeclidinium-vilanterol   1 puff, Inhalation, Every Morning      CALCIUM 600+D HIGH POTENCY PO   Oral, 2 Times Daily      diphenhydrAMINE 50 MG capsule  Commonly known as:  BENADRYL   50 mg, Oral      ferrous sulfate 325 (65 FE) MG tablet   325 mg, Oral, Every Night at Bedtime      metoprolol tartrate 25 MG tablet  Commonly known as:  LOPRESSOR   25 mg, Oral, 2 Times Daily      nitroglycerin 0.4 MG SL tablet  Commonly known as:  NITROSTAT   0.4 mg, Sublingual, Every 5 Minutes PRN, Take no more than 3 doses in 15  minutes.      O2  Commonly known as:  OXYGEN   3 L/min, Inhalation, Continuous      predniSONE 50 MG tablet  Commonly known as:  DELTASONE   50 mg, Oral, Daily      vitamin A 83746 UNIT capsule   10,000 Units, Oral, Every Morning      vitamin B-12 1000 MCG tablet  Commonly known as:  CYANOCOBALAMIN   1,000 mcg, Oral, Every Morning      vitamin C 500 MG tablet  Commonly known as:  ASCORBIC ACID   500 mg, Oral, Every Morning           The following medical decision was discussed in detail with Dr. Hayes    Discharge Home Instructions:  1.  Please follow-up with Donato Vaughn MD in 4 weeks.  Please call for an appointment.  2.  Please follow-up with your primary care physician and 2 weeks.  Please call for an appointment.  You will need to get a BMP with them.  3.  Follow-up with Dr. Hayes on July 15 at 3 PM at the [facility] office.  4. Return to ER per EMS for any recurrent symptoms including, but not limited to: chest pain/pressure/tightness, weakness, Shortness of breath, dizziness, palpitations, near syncope or syncope    5. Routine post cardiac catheterization/PCI discharge home care instructions:    1. No submerging procedure site below water for 7-10 days.  2. No lifting objects greater than 1 lbs for 3 days.  3. If groin site used, avoid climbing several flights of stairs or sitting for longer than 2 hours at a time for the next 24 hours.   4. Monitor puncture site for bleeding and/or knots;. If bleeding should occur at the groin site: lie flat, apply pressure and return to the ER. If bleeding should occur at the wrist site, apply pressure and return to the ER.  5.  You may apply a DRY Band-Aid over the puncture site if needed. Do not apply any lotions, salves or ointments to site.  6. No driving for 3 days.  7. Return to ER for recurrent symptoms.  8. No smoking.  9. Take all medications as prescribed.      Signed:  RIO Campuzano  7/2/2020  16:20    Patient personally interviewed and  "above subjective findings personally confirmed during a face to face contact with patient today  All findings of physical examination confirmed  All pertinent and performed labs, cardiac procedures ,  radiographs of the last 24 hours personally reviewed  Impression and plans discussed/elaborated and implemented jointly as described above     Herber Hayes MD            EMR Dragon/Transcription:   \"Dictated utilizing Dragon dictation\".     "

## 2020-07-02 NOTE — PROGRESS NOTES
"Daily progress note    Chief complaint  Doing better  No specific complaints  Denies chest pain shortness of breath palpitation    History of present illness  74-year white female with multiple medical problems including hypertension hyperlipidemia coronary artery disease peripheral vascular disease who underwent right carotid endarterectomy few months ago also have a stress test secondary to chest heaviness which was abnormal admitted for cardiac catheterization and I am asked to follow the patient for medical problem patient has no symptoms denies any fever cough shortness of breath abdominal pain nausea vomiting diarrhea.  Patient does have occasional chest heaviness but not at the time of interview.  Patient also have hyponatremia and nephrology already following.  Patient denies any dizziness lightheadedness either.    Review of system  Essentially unremarkable     Physical Exam:  Blood pressure 124/85, pulse 66, temperature 97.7 °F (36.5 °C), temperature source Oral, resp. rate 16, height 160 cm (63\"), weight 78.5 kg (173 lb), SpO2 97 %.    General Appearance: alert, appears stated age and cooperative  Head: normocephalic, without obvious abnormality and atraumatic  Eyes: conjunctivae and sclerae normal and no icterus  Neck: supple and no JVD  Lungs: Diminished breath sounds  Heart: regular rhythm & normal rate and normal S1, S2  Chest Wall: no abnormalities observed  Abdomen: normal bowel sounds and soft non-tender  Extremities: moves extremities well, trace edema, no cyanosis and no redness  Skin: no bleeding, bruising or rash  Neurologic: Alert, and oriented. No focal deficits     Results Review:   Lab Results (last 24 hours)     Procedure Component Value Units Date/Time    Vitamin B12 [[id number]]  (Abnormal) Collected:  07/02/20 0425    Specimen:  Blood Updated:  07/02/20 0640     Vitamin B-12 1,516 pg/mL     Narrative:       Results may be falsely increased if patient taking Biotin.      Uric Acid " [[id number]]  (Abnormal) Collected:  07/02/20 0425    Specimen:  Blood Updated:  07/02/20 0637     Uric Acid 6.7 mg/dL     Comprehensive Metabolic Panel [[id number]]  (Abnormal) Collected:  07/02/20 0425    Specimen:  Blood Updated:  07/02/20 0637     Glucose 97 mg/dL      BUN 24 mg/dL      Creatinine 1.00 mg/dL      Sodium 134 mmol/L      Potassium 3.5 mmol/L      Chloride 93 mmol/L      CO2 27.8 mmol/L      Calcium 10.2 mg/dL      Total Protein 7.3 g/dL      Albumin 4.50 g/dL      ALT (SGPT) 8 U/L      AST (SGOT) 16 U/L      Alkaline Phosphatase 89 U/L      Total Bilirubin 0.4 mg/dL      eGFR Non African Amer 55 mL/min/1.73      Globulin 2.8 gm/dL      A/G Ratio 1.6 g/dL      BUN/Creatinine Ratio 24.0     Anion Gap 13.2 mmol/L     Narrative:       GFR Normal >60  Chronic Kidney Disease <60  Kidney Failure <15      Lipid Panel [[id number]]  (Abnormal) Collected:  07/02/20 0425    Specimen:  Blood Updated:  07/02/20 0637     Total Cholesterol 147 mg/dL      Triglycerides 46 mg/dL      HDL Cholesterol 73 mg/dL      LDL Cholesterol  65 mg/dL      VLDL Cholesterol 9.2 mg/dL      LDL/HDL Ratio 0.89    Narrative:       Cholesterol Reference Ranges  ([place] Department of Health and Human Services ATP III Classifications)    Desirable          <200 mg/dL  Borderline High    200-239 mg/dL  High Risk          >240 mg/dL      Triglyceride Reference Ranges  ([place] Department of Health and Human Services ATP III Classifications)    Normal           <150 mg/dL  Borderline High  150-199 mg/dL  High             200-499 mg/dL  Very High        >500 mg/dL    HDL Reference Ranges  ([place]S. Department of Health and Human Services ATP III Classifcations)    Low     <40 mg/dl (major risk factor for CHD)  High    >60 mg/dl ('negative' risk factor for CHD)        LDL Reference Ranges  ([place].S. Department of Health and Human Services ATP III Classifcations)    Optimal          <100 mg/dL  Near Optimal     100-129 mg/dL  Borderline High  130-159  mg/dL  High             160-189 mg/dL  Very High        >189 mg/dL    BNP [[id number]]  (Abnormal) Collected:  07/02/20 0425    Specimen:  Blood Updated:  07/02/20 0629     proBNP 1,012.0 pg/mL     Narrative:       Among patients with dyspnea, NT-proBNP is highly sensitive for the detection of acute congestive heart failure. In addition NT-proBNP of <300 pg/ml effectively rules out acute congestive heart failure with 99% negative predictive value.    Results may be falsely decreased if patient taking Biotin.      aPTT [[id number]]  (Abnormal) Collected:  07/02/20 0425    Specimen:  Blood Updated:  07/02/20 0617     .0 seconds     CBC & Differential [[id number]] Collected:  07/02/20 0425    Specimen:  Blood Updated:  07/02/20 0611    Narrative:       The following orders were created for panel order CBC & Differential.  Procedure                               Abnormality         Status                     ---------                               -----------         ------                     CBC Auto Differential[[id number]]        Abnormal            Final result                 Please view results for these tests on the individual orders.    CBC Auto Differential [[id number]]  (Abnormal) Collected:  07/02/20 0425    Specimen:  Blood Updated:  07/02/20 0611     WBC 11.84 10*3/mm3      RBC 3.68 10*6/mm3      Hemoglobin 11.7 g/dL      Hematocrit 34.3 %      MCV 93.2 fL      MCH 31.8 pg      MCHC 34.1 g/dL      RDW 12.8 %      RDW-SD 44.2 fl      MPV 10.4 fL      Platelets 255 10*3/mm3      Neutrophil % 73.9 %      Lymphocyte % 14.3 %      Monocyte % 11.2 %      Eosinophil % 0.1 %      Basophil % 0.2 %      Immature Grans % 0.3 %      Neutrophils, Absolute 8.75 10*3/mm3      Lymphocytes, Absolute 1.69 10*3/mm3      Monocytes, Absolute 1.33 10*3/mm3      Eosinophils, Absolute 0.01 10*3/mm3      Basophils, Absolute 0.02 10*3/mm3      Immature Grans, Absolute 0.04 10*3/mm3      nRBC 0.0 /100 WBC     Hemoglobin A1c  [[id number]]  (Abnormal) Collected:  07/02/20 0425    Specimen:  Blood Updated:  07/02/20 0606     Hemoglobin A1C 5.80 %     Narrative:       Hemoglobin A1C Ranges:    Increased Risk for Diabetes  5.7% to 6.4%  Diabetes                     >= 6.5%  Diabetic Goal                < 7.0%    Basic Metabolic Panel [[id number]]  (Abnormal) Collected:  07/01/20 2001    Specimen:  Blood Updated:  07/01/20 2239     Glucose 115 mg/dL      BUN 18 mg/dL      Creatinine 1.04 mg/dL      Sodium 135 mmol/L      Potassium 4.4 mmol/L      Chloride 95 mmol/L      CO2 22.8 mmol/L      Calcium 10.2 mg/dL      eGFR Non African Amer 52 mL/min/1.73      BUN/Creatinine Ratio 17.3     Anion Gap 17.2 mmol/L     Narrative:       GFR Normal >60  Chronic Kidney Disease <60  Kidney Failure <15      Protime-INR [[id number]]  (Normal) Collected:  07/01/20 1911    Specimen:  Blood Updated:  07/01/20 2040     Protime 13.1 Seconds      INR 1.00    aPTT [[id number]]  (Normal) Collected:  07/01/20 1911    Specimen:  Blood Updated:  07/01/20 2040     PTT 25.7 seconds     CBC & Differential [[id number]] Collected:  07/01/20 1911    Specimen:  Blood Updated:  07/01/20 1939    Narrative:       The following orders were created for panel order CBC & Differential.  Procedure                               Abnormality         Status                     ---------                               -----------         ------                     CBC Auto Differential[[id number]]        Abnormal            Final result                 Please view results for these tests on the individual orders.    CBC Auto Differential [[id number]]  (Abnormal) Collected:  07/01/20 1911    Specimen:  Blood Updated:  07/01/20 1939     WBC 10.90 10*3/mm3      RBC 3.49 10*6/mm3      Hemoglobin 11.2 g/dL      Hematocrit 32.4 %      MCV 92.8 fL      MCH 32.1 pg      MCHC 34.6 g/dL      RDW 12.4 %      RDW-SD 42.2 fl      MPV 10.0 fL      Platelets 225 10*3/mm3      Neutrophil % 80.8 %       Lymphocyte % 8.2 %      Monocyte % 10.6 %      Eosinophil % 0.0 %      Basophil % 0.1 %      Immature Grans % 0.3 %      Neutrophils, Absolute 8.81 10*3/mm3      Lymphocytes, Absolute 0.89 10*3/mm3      Monocytes, Absolute 1.16 10*3/mm3      Eosinophils, Absolute 0.00 10*3/mm3      Basophils, Absolute 0.01 10*3/mm3      Immature Grans, Absolute 0.03 10*3/mm3      nRBC 0.0 /100 WBC     Sodium, Urine, Random - Urine, Clean Catch [[id number]] Collected:  07/01/20 1457    Specimen:  Urine, Clean Catch Updated:  07/01/20 1519     Sodium, Urine 39 mmol/L     Narrative:       Reference intervals for random urine have not been established.  Clinical usage is dependent upon physician's interpretation in combination with other laboratory tests.       Urinalysis, Microscopic Only - Urine, Clean Catch [[id number]] Collected:  07/01/20 1454    Specimen:  Urine, Clean Catch Updated:  07/01/20 1507     RBC, UA 0-2 /HPF      WBC, UA 0-2 /HPF      Bacteria, UA None Seen /HPF      Squamous Epithelial Cells, UA 0-2 /HPF      Hyaline Casts, UA None Seen /LPF      Methodology Automated Microscopy    Urinalysis With Microscopic If Indicated (No Culture) - [[id number]]  (Abnormal) Collected:  07/01/20 1454    Specimen:  Urine Updated:  07/01/20 1504     Color, UA Yellow     Appearance, UA Clear     pH, UA 5.5     Specific Gravity, UA 1.023     Glucose, UA Negative     Ketones, UA Negative     Bilirubin, UA Negative     Blood, UA Trace     Protein, UA Trace     Leuk Esterase, UA Negative     Nitrite, UA Negative     Urobilinogen, UA 0.2 E.U./dL    Osmolality, Urine - Urine, Clean Catch [[id number]] Collected:  07/01/20 1457    Specimen:  Urine, Clean Catch Updated:  07/01/20 1457     Osmolality, Urine 315 mOsm/kg     Narrative:       Osmo Normal Reference Ranges:    Random:  mOsm/kg H2O, depending on fluid intake.  Random: >850 mOsm/kg H20, after 12 hour fluid restriction.    24 Hour: 300-900 mOsm/kg H2O.    Cortisol [[id number]]  Collected:  07/01/20 1213    Specimen:  Blood Updated:  07/01/20 1446     Cortisol 4.91 mcg/dL     Narrative:       Cortisol Reference Ranges:    Cortisol 6AM - 10AM Range: 6.02-18.40 mcg/dl  Cortisol 4PM - 8PM Range: 2.68-10.50 mcg/dl      Results may be falsely increased if patient taking Biotin.      Immunofixation, Urine - Urine, Clean Catch [[id number]] Collected:  07/01/20 1435    Specimen:  Urine, Clean Catch Updated:  07/01/20 1435        Imaging Results (Last 24 Hours)     ** No results found for the last 24 hours. **          Current Facility-Administered Medications:   •  acetaminophen (TYLENOL) tablet 650 mg, 650 mg, Oral, Q4H PRN, Herber Hayes MD, 650 mg at 07/01/20 1603  •  aspirin EC tablet 81 mg, 81 mg, Oral, Daily, Herber Hayes MD, 81 mg at 07/01/20 2137  •  atorvastatin (LIPITOR) tablet 10 mg, 10 mg, Oral, Daily, Herber Hayes MD, 10 mg at 07/01/20 2137  •  furosemide (LASIX) injection 40 mg, 40 mg, Intravenous, Q12H, Donato Vaughn MD, 40 mg at 07/02/20 1005  •  heparin (porcine) 5000 UNIT/ML injection 2,400-4,000 Units, 30-51 Units/kg, Intravenous, Q6H PRN, Herber Hayes MD  •  heparin 88556 units/250 mL (100 units/mL) in 0.45 % NaCl infusion, 12 Units/kg/hr, Intravenous, Titrated, Herber Hayes MD, Last Rate: 7.06 mL/hr at 07/02/20 1240, 9 Units/kg/hr at 07/02/20 1240  •  metoprolol tartrate (LOPRESSOR) tablet 25 mg, 25 mg, Oral, BID, Herber Hayes MD, 25 mg at 07/02/20 0816  •  nitroglycerin (NITROSTAT) SL tablet 0.4 mg, 0.4 mg, Sublingual, Q5 Min PRN, Herber Hayes MD  •  O2 (OXYGEN), 3 L/min, Inhalation, Continuous, Herber Hayes MD, Last Rate: 180,000 mL/hr at 07/02/20 1240, 3 L/min at 07/02/20 1240  •  pantoprazole (PROTONIX) EC tablet 40 mg, 40 mg, Oral, Q AM, Brad Barone MD, 40 mg at 07/02/20 0631  •  predniSONE (DELTASONE) tablet 50 mg, 50 mg, Oral, Daily, Herber Hayes MD  •  ticagrelor (BRILINTA)  tablet 90 mg, 90 mg, Oral, BID, Herber Hayes MD, 90 mg at 07/02/20 0816  •  vitamin B-12 (CYANOCOBALAMIN) tablet 1,000 mcg, 1,000 mcg, Oral, Freddy SOLER Shanker, MD, 1,000 mcg at 07/02/20 0631  •  vitamin C (ASCORBIC ACID) tablet 500 mg, 500 mg, Oral, Freddy SOLER Shanker, MD, 500 mg at 07/02/20 0631     ASSESSMENT  Hyponatremia corrected  Occasional chest heaviness with abnormal stress Cardiolite status post cardiac catheterization  Coronary artery disease  Peripheral vascular disease status post right carotid endarterectomy  Hypertension  Hyperlipidemia  Gastroesophageal reflux disease    PLAN  CPM  IV diuresis  Heparin  SAMSCA per nephrology  Continue home medications  Stress ulcer DVT prophylaxis  Supportive care  Discussed with nursing staff  Will follow with Dr. CONTE further recommendation current hospital course    DEBBIE CURIEL MD

## 2020-07-02 NOTE — NURSING NOTE
PT has many questions about her procedure, she is frustrated that she is on heparin drip. Education was done- States she still doesn't understand why she's on heparin, why she did not have intervention with her cath.  Does not understand why she is on fluid restriction/diruetcis -educated But wants to speak to doctor.     Cont to dione.

## 2020-07-02 NOTE — PROGRESS NOTES
Discharge Planning Assessment  [facility]     Patient Name: Mary Jack  MRN: [id number]  Today's Date: 7/2/2020    Admit Date: 7/1/2020    Discharge Needs Assessment     Row Name 07/02/20 1508       Living Environment    Lives With  alone    Current Living Arrangements  home/apartment/condo    Primary Care Provided by  self    Provides Primary Care For  pet(s) outside pets    Family Caregiver if Needed  child(juliet), adult    Family Caregiver Names  Renee Stoddard, daughter    Quality of Family Relationships  helpful;involved;supportive    Able to Return to Prior Arrangements  yes       Resource/Environmental Concerns    Resource/Environmental Concerns  none       Transition Planning    Patient/Family Anticipates Transition to  home    Patient/Family Anticipated Services at Transition  none    Transportation Anticipated  family or friend will provide       Discharge Needs Assessment    Readmission Within the Last 30 Days  no previous admission in last 30 days    Concerns to be Addressed  no discharge needs identified;denies needs/concerns at this time    Equipment Currently Used at Home  oxygen    Anticipated Changes Related to Illness  none    Equipment Needed After Discharge  none    Provided Post Acute Provider List?  N/A    N/A Provider List Comment  no d/c needs identified        Discharge Plan     Row Name 07/02/20 1511       Plan    Plan  Home;  Denies needs.    Plan Comments  CCP spoke with Pt at bedside.  CCP introduced self and role.  Pt confirmed information on face sheet.  Pt stated she is IADL'S, retired & drives.  Pt lives in an apartment with no entrance stair steps.  Pt reports PCP is Delfino Matt MD.  Pt confirms pharmacy as [facility].  Pt denies issues with affording her medications.  Pt denies past home health and sub-acute rehab.  Pt reports she uses oxygen at home at 3LPM provided by [facility].  Pt reports she plans to return home at discharge.  Pt reports her  daughter, Renee Stoddard ([phone]) will transport her home at d/c.  Pt denies known d/c needs at this time.  CCP will continue to follow…NEHEMIAS DOBSON/CCP        Destination      Coordination has not been started for this encounter.      Durable Medical Equipment      Coordination has not been started for this encounter.      Dialysis/Infusion      Coordination has not been started for this encounter.      Home Medical Care      Coordination has not been started for this encounter.      Therapy      Coordination has not been started for this encounter.      Community Resources      Coordination has not been started for this encounter.          Demographic Summary     Row Name 07/02/20 1508       General Information    Admission Type  observation    Arrived From  home    Required Notices Provided  Observation Status Notice    Referral Source  admission list    Reason for Consult  discharge planning    Preferred Language  English     Used During This Interaction  no        Functional Status     Row Name 07/02/20 1508       Functional Status    Usual Activity Tolerance  good    Current Activity Tolerance  good       Functional Status, IADL    Medications  independent    Meal Preparation  independent    Housekeeping  independent    Laundry  independent    Shopping  independent       Mental Status    General Appearance WDL  WDL       Mental Status Summary    Recent Changes in Mental Status/Cognitive Functioning  no changes       Employment/    Employment Status  retired        Psychosocial    No documentation.       Abuse/Neglect    No documentation.       Legal    No documentation.       Substance Abuse    No documentation.       Patient Forms     Row Name 07/02/20 1508       Patient Forms    Patient Observation Letter  Delivered    Delivered to  Patient    Method of delivery  In person            Nikki Guillaume RN

## 2020-07-02 NOTE — PLAN OF CARE
Problem: Patient Care Overview  Goal: Plan of Care Review  Outcome: Ongoing (interventions implemented as appropriate)  Flowsheets (Taken 7/2/2020 1521)  Progress: improving  Plan of Care Reviewed With: patient  Outcome Summary: Vitals as charted, no c/o pain or SOA, heparin gtt discontinued, A&Ox4, BLE trace edema, NC 3L which pt states she is on at home, plan to discharge today, will continue to monitor.

## 2020-07-02 NOTE — DISCHARGE INSTRUCTIONS
Radial Site Care    This sheet gives you information about how to care for yourself after your procedure. Your health care provider may also give you more specific instructions. If you have problems or questions, contact your health care provider.  What can I expect after the procedure?  After the procedure, it is common to have:  · Bruising and tenderness at the catheter insertion area.  Follow these instructions at home:  Medicines  · Take over-the-counter and prescription medicines only as told by your health care provider.  Insertion site care  · Follow instructions from your health care provider about how to take care of your insertion site. Make sure you:  ? Wash your hands with soap and water before you change your bandage (dressing). If soap and water are not available, use hand .  ? Change your dressing as told by your health care provider.  ? Leave stitches (sutures), skin glue, or adhesive strips in place. These skin closures may need to stay in place for 2 weeks or longer. If adhesive strip edges start to loosen and curl up, you may trim the loose edges. Do not remove adhesive strips completely unless your health care provider tells you to do that.  · Check your insertion site every day for signs of infection. Check for:  ? Redness, swelling, or pain.  ? Fluid or blood.  ? Pus or a bad smell.  ? Warmth.  · Do not take baths, swim, or use a hot tub until your health care provider approves.  · You may shower 24-48 hours after the procedure, or as directed by your health care provider.  ? Remove the dressing and gently wash the site with plain soap and water.  ? Pat the area dry with a clean towel.  ? Do not rub the site. That could cause bleeding.  · Do not apply powder or lotion to the site.  Activity    · For 24 hours after the procedure, or as directed by your health care provider:  ? Do not flex or bend the affected arm.  ? Do not push or pull heavy objects with the affected arm.  ? Do not  drive yourself home from the hospital or clinic. You may drive 24 hours after the procedure unless your health care provider tells you not to.  ? Do not operate machinery or power tools.  · Do not lift anything that is heavier than 10 lb (4.5 kg), or the limit that you are told, until your health care provider says that it is safe.  · Ask your health care provider when it is okay to:  ? Return to work or school.  ? Resume usual physical activities or sports.  ? Resume sexual activity.  General instructions  · If the catheter site starts to bleed, raise your arm and put firm pressure on the site. If the bleeding does not stop, get help right away. This is a medical emergency.  · If you went home on the same day as your procedure, a responsible adult should be with you for the first 24 hours after you arrive home.  · Keep all follow-up visits as told by your health care provider. This is important.  Contact a health care provider if:  · You have a fever.  · You have redness, swelling, or yellow drainage around your insertion site.  Get help right away if:  · You have unusual pain at the radial site.  · The catheter insertion area swells very fast.  · The insertion area is bleeding, and the bleeding does not stop when you hold steady pressure on the area.  · Your arm or hand becomes pale, cool, tingly, or numb.  These symptoms may represent a serious problem that is an emergency. Do not wait to see if the symptoms will go away. Get medical help right away. Call your local emergency services (911 in the [place] Do not drive yourself to the hospital.  Summary  · After the procedure, it is common to have bruising and tenderness at the site.  · Follow instructions from your health care provider about how to take care of your radial site wound. Check the wound every day for signs of infection.  · Do not lift anything that is heavier than 10 lb (4.5 kg), or the limit that you are told, until your health care provider says  that it is safe.  This information is not intended to replace advice given to you by your health care provider. Make sure you discuss any questions you have with your health care provider.  Document Released: 01/20/2012 Document Revised: 01/23/2019 Document Reviewed: 01/23/2019  [employer]vier Patient Education © 2020 [employer]

## 2020-07-02 NOTE — PROGRESS NOTES
"NEPHROLOGY PROGRESS NOTE------[facility]    [facility]  [phone]  Donato Vaughn MD      Patient Care Team:  Delfino Matt MD as PCP - General  Oral Llanes MD as Consulting Physician (Pulmonary Disease)  Herber Hayes MD as Consulting Physician (Cardiology)  Virgie Castelan MD as Consulting Physician (Gastroenterology)  Maxi Lundy MD as Surgeon (Orthopedic Surgery)  Mahesh Salmon MD as Surgeon (Vascular Surgery)  Donato Vaughn MD as Consulting Physician (Nephrology)      Provider:  Donato Vaughn MD  Patient Name: Mary Jack  :  1945    SUBJECTIVE:  F/u Hyponatremia  Breathing much better.  No chest pain.    Medication:    aspirin 81 mg Oral Daily   atorvastatin 10 mg Oral Daily   metoprolol tartrate 25 mg Oral BID   pantoprazole 40 mg Oral Q AM   predniSONE 50 mg Oral Daily   ticagrelor 90 mg Oral BID   vitamin B-12 1,000 mcg Oral QAM   vitamin C 500 mg Oral QAM       heparin (porcine) 12 Units/kg/hr Last Rate: 9 Units/kg/hr (20 0753)   O2 3 L/min Last Rate: 3 L/min (20 0823)       OBJECTIVE    Vital Sign Min/Max for last 24 hours  Temp  Min: 97.7 °F (36.5 °C)  Max: 98.2 °F (36.8 °C)   BP  Min: 120/68  Max: 165/87   Pulse  Min: 69  Max: 83   Resp  Min: 16  Max: 20   SpO2  Min: 91 %  Max: 99 %   No data recorded   No data recorded     Flowsheet Rows      First Filed Value   Admission Height  160 cm (63\") Documented at 2020   Admission Weight  78.5 kg (173 lb) Documented at 2020 08          I/O this shift:  In: 240 [P.O.:240]  Out: -   I/O last 3 completed shifts:  In: 1500 [P.O.:1300; I.V.:200]  Out: 4570 [Urine:4570]    Physical Exam:  General Appearance: alert, appears stated age and cooperative  Head: normocephalic, without obvious abnormality and atraumatic  Eyes: conjunctivae and sclerae normal and no icterus  Neck: supple and no JVD  Lungs: clear to auscultation and respirations " regular  Heart: regular rhythm & normal rate and normal S1, S2  Chest: Wall no abnormalities observed  Abdomen: normal bowel sounds and soft non-tender  Extremities: moves extremities well, no edema, no cyanosis and no redness  Skin: no bleeding, bruising or rash, turgor normal, color normal and no lesions noted  Neurologic: Alert, and oriented. No focal deficits    Labs:    WBC WBC   Date Value Ref Range Status   07/02/2020 11.84 (H) 3.40 - 10.80 10*3/mm3 Final   07/01/2020 10.90 (H) 3.40 - 10.80 10*3/mm3 Final   07/01/2020 7.99 3.40 - 10.80 10*3/mm3 Final      HGB Hemoglobin   Date Value Ref Range Status   07/02/2020 11.7 (L) 12.0 - 15.9 g/dL Final   07/01/2020 11.2 (L) 12.0 - 15.9 g/dL Final   07/01/2020 11.6 (L) 12.0 - 15.9 g/dL Final      HCT Hematocrit   Date Value Ref Range Status   07/02/2020 34.3 34.0 - 46.6 % Final   07/01/2020 32.4 (L) 34.0 - 46.6 % Final   07/01/2020 34.8 34.0 - 46.6 % Final      Platlets No results found for: LABPLAT   MCV MCV   Date Value Ref Range Status   07/02/2020 93.2 79.0 - 97.0 fL Final   07/01/2020 92.8 79.0 - 97.0 fL Final   07/01/2020 95.6 79.0 - 97.0 fL Final          Sodium Sodium   Date Value Ref Range Status   07/02/2020 134 (L) 136 - 145 mmol/L Final   07/01/2020 135 (L) 136 - 145 mmol/L Final      Potassium Potassium   Date Value Ref Range Status   07/02/2020 3.5 3.5 - 5.2 mmol/L Final   07/01/2020 4.4 3.5 - 5.2 mmol/L Final      Chloride Chloride   Date Value Ref Range Status   07/02/2020 93 (L) 98 - 107 mmol/L Final   07/01/2020 95 (L) 98 - 107 mmol/L Final      CO2 CO2   Date Value Ref Range Status   07/02/2020 27.8 22.0 - 29.0 mmol/L Final   07/01/2020 22.8 22.0 - 29.0 mmol/L Final      BUN BUN   Date Value Ref Range Status   07/02/2020 24 (H) 8 - 23 mg/dL Final   07/01/2020 18 8 - 23 mg/dL Final      Creatinine Creatinine   Date Value Ref Range Status   07/02/2020 1.00 0.57 - 1.00 mg/dL Final   07/01/2020 1.04 (H) 0.57 - 1.00 mg/dL Final      Calcium Calcium   Date  Value Ref Range Status   07/02/2020 10.2 8.6 - 10.5 mg/dL Final   07/01/2020 10.2 8.6 - 10.5 mg/dL Final      PO4 No components found for: PO4   Albumin Albumin   Date Value Ref Range Status   07/02/2020 4.50 3.50 - 5.20 g/dL Final      Magnesium No results found for: MG   Uric Acid No components found for: URIC ACID     Imaging Results (Last 72 Hours)     ** No results found for the last 72 hours. **          Results for orders placed during the hospital encounter of 03/16/20   XR Chest PA & Lateral    Narrative TWO-VIEW CHEST     HISTORY: Hypoxia. Shortness of breath.     FINDINGS: There is cardiomegaly with slight vascular congestion and some  minimal interstitial prominence which is new since an earlier chest  x-ray dated 04/11/2015. The findings are concerning for changes of very  mild congestive heart failure.     This report was finalized on 3/18/2020 9:47 AM by Dr. Jeffrey Rogers M.D.          Results for orders placed in visit on 06/16/20   SCANNED VASCULAR STUDIES         ASSESSMENT / PLAN      Chronic coronary artery disease    · Hypoantremia--Probably related to excess volume.  TSH ok. Paraproteins pending.   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 Diastolic HF     Sodium better, brisk diuresis, cr stable  Continue lasix, switch rto 40 mg PO daily with 20 meq KCl    Will need f/u  Fluid restriction        Donato Vaughn MD  [facility]  [phone]  07/02/20  08:25

## 2020-07-03 ENCOUNTER — TRANSITIONAL CARE MANAGEMENT TELEPHONE ENCOUNTER (OUTPATIENT)
Dept: CALL CENTER | Facility: HOSPITAL | Age: 75
End: 2020-07-03

## 2020-07-03 NOTE — OUTREACH NOTE
Call Center TCM Note      Responses   [facility] patient discharged from?  [place]   COVID-19 Test Status  Negative   Does the patient have one of the following disease processes/diagnoses(primary or secondary)?  Other   TCM attempt successful?  No   Unsuccessful attempts  Attempt 1          Candido Cage RN    7/3/2020, 10:09

## 2020-07-03 NOTE — OUTREACH NOTE
Call Center TCM Note      Responses   [facility] patient discharged from?  [place]   COVID-19 Test Status  Negative   Does the patient have one of the following disease processes/diagnoses(primary or secondary)?  Other   TCM attempt successful?  No   Unsuccessful attempts  Attempt 2          Candido Cage RN    7/3/2020, 10:38

## 2020-07-03 NOTE — TELEPHONE ENCOUNTER
"    Reason for Disposition  • Caller has medication question only, adult not sick, and triager answers question    Additional Information  • Negative: Drug overdose and triager unable to answer question  • Negative: Caller requesting information unrelated to medicine  • Negative: Caller requesting a prescription for Strep throat and has a positive culture result  • Negative: Rash while taking a medication or within 3 days of stopping it  • Negative: Immunization reaction suspected  • Negative: [1] Asthma and [2] having symptoms of asthma (cough, wheezing, etc.)  • Negative: [1] Influenza symptoms AND [2] anti-viral med prescription request, such as Tamiflu  • Negative: [1] Symptom of illness (e.g., headache, abdominal pain, earache, vomiting) AND [2] more than mild  • Negative: MORE THAN A DOUBLE DOSE of a prescription or over-the-counter (OTC) drug  • Negative: [1] DOUBLE DOSE (an extra dose or lesser amount) of over-the-counter (OTC) drug AND [2] any symptoms (e.g., dizziness, nausea, pain, sleepiness)  • Negative: [1] DOUBLE DOSE (an extra dose or lesser amount) of prescription drug AND [2] any symptoms (e.g., dizziness, nausea, pain, sleepiness)  • Negative: Took another person's prescription drug  • Negative: [1] DOUBLE DOSE (an extra dose or lesser amount) of prescription drug AND [2] NO symptoms (Exception: a double dose of antibiotics)  • Negative: Diabetes drug error or overdose (e.g., took wrong type of insulin or took extra dose)  • Negative: [1] Request for URGENT new prescription or refill of \"essential\" medication (i.e., likelihood of harm to patient if not taken) AND [2] triager unable to fill per unit policy  • Negative: [1] Prescription not at pharmacy AND [2] was prescribed by PCP recently  • Negative: [1] Pharmacy calling with prescription questions AND [2] triager unable to answer question  • Negative: [1] Caller has URGENT medication question about med that PCP or specialist prescribed AND [2] " "triager unable to answer question  • Negative: [1] Caller has NON-URGENT medication question about med that PCP prescribed AND [2] triager unable to answer question  • Negative: [1] Caller requesting a NON-URGENT new prescription or refill AND [2] triager unable to refill per unit policy  • Negative: [1] Caller has medication question about med not prescribed by PCP AND [2] triager unable to answer question (e.g., compatibility with other med, storage)  • Negative: Caller requesting a CONTROLLED substance prescription refill (e.g., narcotics, ADHD medicines)  • Negative: Caller wants to use a complementary or alternative medicine  • Negative: [1] Prescription prescribed recently is not at pharmacy AND [2] triager has access to patient's EMR AND [3] prescription is recorded in the EMR  • Negative: [1] DOUBLE DOSE (an extra dose or lesser amount) of over-the-counter (OTC) drug AND [2] NO symptoms  • Negative: [1] DOUBLE DOSE (an extra dose or lesser amount) of antibiotic drug AND [2] NO symptoms    Answer Assessment - Initial Assessment Questions  1.   NAME of MEDICATION: \"What medicine are you calling about?\"      Radha was discharged from the hospital this afternoon and is requesting some help with her medication list and times she should take the medication.  I pulled up the AVS and answered her questions successfully.   2.   QUESTION: \"What is your question?\"      She had questions regarding times to take medications.    3.   PRESCRIBING HCP: \"Who prescribed it?\" Reason: if prescribed by specialist, call should be referred to that group.      These were discharge medications.   4. SYMPTOMS: \"Do you have any symptoms?\"      No symptoms   5. SEVERITY: If symptoms are present, ask \"Are they mild, moderate or severe?\"     No sympotms   6.  PREGNANCY:  \"Is there any chance that you are pregnant?\" \"When was your last menstrual period?\"      No.    Protocols used: MEDICATION QUESTION CALL-ADULT-AH      "

## 2020-07-06 ENCOUNTER — TRANSITIONAL CARE MANAGEMENT TELEPHONE ENCOUNTER (OUTPATIENT)
Dept: CALL CENTER | Facility: HOSPITAL | Age: 75
End: 2020-07-06

## 2020-07-06 LAB
IGA SERPL-MCNC: 251 MG/DL (ref 64–422)
IGG SERPL-MCNC: 932 MG/DL (ref 586–1602)
IGM SERPL-MCNC: 22 MG/DL (ref 26–217)
INTERPRETATION UR IFE-IMP: NORMAL
PROT PATTERN SERPL IFE-IMP: ABNORMAL

## 2020-07-06 NOTE — OUTREACH NOTE
Call Center TCM Note      Responses   [facility]   COVID[facility] Test Status  Negative   Does the patient have one of the following disease processes/diagnoses(primary or secondary)?  Other   TCM attempt successful?  Yes   Call start time  1210   Call end time  1213   Discharge diagnosis  SOA, CAD, heart cath, bilateral carotid artery disease   Meds reviewed with patient/caregiver?  Yes   Is the patient having any side effects they believe may be caused by any medication additions or changes?  No   Does the patient have all medications ordered at discharge?  Yes   Is the patient taking all medications as directed (includes completed medication regime)?  Yes   Does the patient have a primary care provider?   Yes   Does the patient have an appointment with their PCP within 7 days of discharge?  Greater than 7 days   Comments regarding PCP  f/u with Dr. Matt scheduled for 7/10   What is preventing the patient from scheduling follow up appointments within 7 days of discharge?  -- [patient just made appt today.]   Nursing Interventions  Verified appointment date/time/provider   Has the patient kept scheduled appointments due by today?  N/A   Psychosocial issues?  No   Did the patient receive a copy of their discharge instructions?  Yes   What is the patient's perception of their health status since discharge?  Improving   Is the patient/caregiver able to teach back the hierarchy of who to call/visit for symptoms/problems? PCP, Specialist, Home health nurse, Urgent Care, ED, 911  Yes   TCM call completed?  Yes   Wrap up additional comments  Patient states she is doing well, having a few issues with her legs aching but she is going to talk to Dr. Matt about that on her appt on 7/10.          Priscilla Urias RN    7/6/2020, 12:13

## 2020-07-10 ENCOUNTER — OFFICE VISIT (OUTPATIENT)
Dept: FAMILY MEDICINE CLINIC | Facility: CLINIC | Age: 75
End: 2020-07-10

## 2020-07-10 VITALS
BODY MASS INDEX: 30.65 KG/M2 | SYSTOLIC BLOOD PRESSURE: 130 MMHG | HEART RATE: 80 BPM | WEIGHT: 173 LBS | OXYGEN SATURATION: 94 % | HEIGHT: 63 IN | DIASTOLIC BLOOD PRESSURE: 70 MMHG

## 2020-07-10 DIAGNOSIS — D64.9 CHRONIC ANEMIA: ICD-10-CM

## 2020-07-10 DIAGNOSIS — I24.0 ISCHEMIC HEART DISEASE DUE TO CORONARY ARTERY OBSTRUCTION (HCC): Primary | ICD-10-CM

## 2020-07-10 DIAGNOSIS — E87.1 HYPONATREMIA: ICD-10-CM

## 2020-07-10 DIAGNOSIS — I50.32 CHRONIC DIASTOLIC CONGESTIVE HEART FAILURE (HCC): ICD-10-CM

## 2020-07-10 DIAGNOSIS — I25.9 ISCHEMIC HEART DISEASE DUE TO CORONARY ARTERY OBSTRUCTION (HCC): Primary | ICD-10-CM

## 2020-07-10 DIAGNOSIS — Z09 HOSPITAL DISCHARGE FOLLOW-UP: ICD-10-CM

## 2020-07-10 PROCEDURE — 99496 TRANSJ CARE MGMT HIGH F2F 7D: CPT | Performed by: FAMILY MEDICINE

## 2020-07-10 NOTE — PROGRESS NOTES
Subjective   Mary Jack is a 74 y.o. female who is here for   Chief Complaint   Patient presents with   • Follow-up     hospital/ heart cath and low sodium    .     History of Present Illness     {Common H&P Review Areas:94958}    Review of Systems    Objective   Physical Exam    Assessment/Plan   {Assess/PlanSmartLinks:15550}  There are no Patient Instructions on file for this visit.    There are no discontinued medications.     No follow-ups on file.    Dr. Delfino Matt  [place], [place]

## 2020-07-10 NOTE — PROGRESS NOTES
Transitional Care Follow Up Visit  Subjective     Mary Jack is a 74 y.o. female who presents for a transitional care management visit.    Within 48 business hours after discharge our office contacted her via telephone to coordinate her care and needs.      I reviewed and discussed the details of that call along with the discharge summary, hospital problems, inpatient lab results, inpatient diagnostic studies, and consultation reports with Mary.     Current outpatient and discharge medications have been reconciled for the patient.  Reviewed by: Delfino Matt MD      Date of TCM Phone Call 7/2/2020   [facility]   Date of Admission 7/1/2020   Date of Discharge 7/2/2020   Discharge Disposition Home or Self Care     Risk for Readmission (LACE) Score: 5 (7/2/2020  6:00 AM)      History of Present Illness   Course During Hospital Stay:  Admitted for SOA/GA anginal equivalent  Had card cath.  Low Na.  Briltilla added.  Still smoking heavy.     The following portions of the patient's history were reviewed and updated as appropriate: allergies, current medications, past family history, past medical history, past social history, past surgical history and problem list.    Review of Systems    Objective   Physical Exam   Constitutional: She appears well-developed and well-nourished.   Cardiovascular: Normal rate.   Pulmonary/Chest: She has wheezes.   Musculoskeletal: She exhibits no edema.   Neurological: She is alert.   Psychiatric: She has a normal mood and affect.   Nursing note and vitals reviewed.      Assessment/Plan   Mary was seen today for follow-up.    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    Had to reduce her lasix to 20 a day, still on K    Still hopelessly addicted to her cigarettes.

## 2020-07-11 LAB
ALBUMIN SERPL-MCNC: 4.5 G/DL (ref 3.5–5.2)
BUN SERPL-MCNC: 16 MG/DL (ref 8–23)
BUN/CREAT SERPL: 16.3 (ref 7–25)
CALCIUM SERPL-MCNC: 9.8 MG/DL (ref 8.6–10.5)
CHLORIDE SERPL-SCNC: 93 MMOL/L (ref 98–107)
CO2 SERPL-SCNC: 29.8 MMOL/L (ref 22–29)
CREAT SERPL-MCNC: 0.98 MG/DL (ref 0.57–1)
FERRITIN SERPL-MCNC: 336 NG/ML (ref 13–150)
GLUCOSE SERPL-MCNC: 93 MG/DL (ref 65–99)
IRON SATN MFR SERPL: 21 % (ref 20–50)
IRON SERPL-MCNC: 78 MCG/DL (ref 37–145)
PHOSPHATE SERPL-MCNC: 3.6 MG/DL (ref 2.5–4.5)
POTASSIUM SERPL-SCNC: 4.6 MMOL/L (ref 3.5–5.2)
SODIUM SERPL-SCNC: 132 MMOL/L (ref 136–145)
TIBC SERPL-MCNC: 377 MCG/DL
UIBC SERPL-MCNC: 299 MCG/DL (ref 112–346)

## 2020-07-13 ENCOUNTER — TELEPHONE (OUTPATIENT)
Dept: FAMILY MEDICINE CLINIC | Facility: CLINIC | Age: 75
End: 2020-07-13

## 2020-07-13 NOTE — TELEPHONE ENCOUNTER
Spoke with pt she wants to know if she should stop the iron pill, and was told to cut back on her fluid intake from the hospital. Please advise

## 2020-07-13 NOTE — TELEPHONE ENCOUNTER
PATIENT CALLED IN ABOUT HER LAB RESULTS AND HAS SOME QUESTIONS ABOUT potassium AND HAS A COUPLE OF QUESTIONS  AND PATIENT STATED SHE IS SWITCHING CABLE COMPANIES AND HER PHONE NUMBER WILL CHANGE . PLEASE CALL PATIENT AND ADVISE AT  [phone]

## 2020-07-13 NOTE — TELEPHONE ENCOUNTER
Probably ok to stop the iron pill. Levels were normal.    Regarding fluid in take, drink water when thirsty, but do not over do it.  Trying to decrease total water in the body to allow sodium concentration to increase

## 2020-07-15 ENCOUNTER — OFFICE VISIT (OUTPATIENT)
Dept: CARDIOLOGY | Age: 75
End: 2020-07-15

## 2020-07-15 VITALS
SYSTOLIC BLOOD PRESSURE: 124 MMHG | WEIGHT: 173 LBS | BODY MASS INDEX: 30.65 KG/M2 | HEART RATE: 72 BPM | HEIGHT: 63 IN | DIASTOLIC BLOOD PRESSURE: 76 MMHG

## 2020-07-15 DIAGNOSIS — E78.2 MIXED HYPERLIPIDEMIA: ICD-10-CM

## 2020-07-15 DIAGNOSIS — I24.0 ISCHEMIC HEART DISEASE DUE TO CORONARY ARTERY OBSTRUCTION (HCC): Primary | ICD-10-CM

## 2020-07-15 DIAGNOSIS — R06.02 SHORTNESS OF BREATH: ICD-10-CM

## 2020-07-15 DIAGNOSIS — I25.9 ISCHEMIC HEART DISEASE DUE TO CORONARY ARTERY OBSTRUCTION (HCC): Primary | ICD-10-CM

## 2020-07-15 PROCEDURE — 99213 OFFICE O/P EST LOW 20 MIN: CPT | Performed by: INTERNAL MEDICINE

## 2020-07-15 NOTE — PROGRESS NOTES
Hospital follow up     Subjective:        Mary Jack is a 74 y.o. 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    The following portions of the patient's history were reviewed and updated as appropriate: allergies, current medications, past family history, past medical history, past social history, past surgical history and problem list.    Past Medical History:   Diagnosis Date   • AAA (abdominal aortic aneurysm) (CMS/HCC)    • Allergic not sure    CODINE,MORFINE AND DYE SOLUTION   • Anemia    • Aneurysm of abdominal aorta (CMS/HCC)    • Arthritis    • Zepeda esophagus    • CAD (coronary artery disease)    • Chest pain    • Constipation    • COPD (chronic obstructive pulmonary disease) (CMS/HCC)    • Emphysema lung (CMS/HCC)    • Empyema (CMS/HCC)    • Fatigue    • GERD (gastroesophageal reflux disease)    • Headache    • Hiatal hernia    • High risk medication use    • History of cardiac catheterization     CATH STENT PLACEMENT: CIRCUMFLEX BRANCH   • Hyperlipidemia    • Hypertension    • Infectious viral hepatitis Jaundice in 1963   • Jaundice    • Lumbosacral disc disease    • Myocardial infarction (CMS/HCC)    • Myocardial infarction (CMS/HCC)    • Occult blood in stools    • On home O2    • Peripheral artery disease (CMS/HCC)    • Reflux gastritis    • Scoliosis    • Visual impairment     BLURRY VISION IN RT. EYE     reports that she has been smoking cigarettes. She started smoking about 3 years ago. She has a 27.50 pack-year smoking history. She has  "never used smokeless tobacco. She reports that she does not drink alcohol or use drugs.   Family History   Problem Relation Age of Onset   • Colon cancer Mother    • Breast cancer Mother    • Other Father         cardiac disorder   • Hypertension Father    • Heart disease Father    • Other Sister         cardiac disorder   • Hypertension Sister    • Lung disease Sister    • Thyroid disease Sister    • Other Brother         cardiac disorder   • Thyroid disease Daughter    • Heart disease Sister    • Hypertension Sister    • Pulmonary fibrosis Sister    • Heart disease Brother    • Other Brother    • Thyroid disease Sister    • Other Sister    • Thyroid disease Daughter    • Malig Hyperthermia Neg Hx        Review of Systems  Constitutional: No wt loss, fever, fatigue  Gastrointestinal: No nausea, abdominal pain  Behavioral/Psych: No insomnia or anxiety   Cardiovascular no chest pains or tightness in the chest  Objective:       Physical Exam  /76 (BP Location: Left arm, Patient Position: Sitting)   Pulse 72   Ht 160 cm (63\")   Wt 78.5 kg (173 lb)   BMI 30.65 kg/m²   General appearance: No acute changes   Neck: Trachea midline; NECK, supple, no thyromegaly or lymphadenopathy   Lungs: Normal size and shape, normal breath sounds, equal distribution of air, no rales and rhonchi   CV: S1-S2 regular, no murmurs, no rub, no gallop   Abdomen: Soft, non-tender; no masses , no abnormal abdominal sounds   Extremities: No deformity , normal color , no peripheral edema   Skin: Normal temperature, turgor and texture; no rash, ulcers          Procedures      Echocardiogram:        Current Outpatient Medications:   •  ANORO ELLIPTA 62.5-25 MCG/INH aerosol powder , Inhale 1 puff Every Morning., Disp: , Rfl: 0  •  aspirin 81 MG tablet, Take 1 tablet by mouth Daily. (Patient taking differently: Take 81 mg by mouth Every Night.), Disp: 90 tablet, Rfl: 0  •  atorvastatin (LIPITOR) 10 MG tablet, TAKE 1 TABLET EVERY DAY (Patient " taking differently: Take 10 mg by mouth Every Night.), Disp: 90 tablet, Rfl: 3  •  Calcium Carbonate-Vitamin D (CALCIUM 600+D HIGH POTENCY PO), Take  by mouth 2 (Two) Times a Day., Disp: , Rfl:   •  ferrous sulfate 325 (65 FE) MG tablet, Take 325 mg by mouth every night at bedtime., Disp: , Rfl:   •  furosemide (LASIX) 40 MG tablet, Take 1 tablet by mouth Daily. (Patient taking differently: Take 40 mg by mouth Daily. Taking 1/2 tablet), Disp: 30 tablet, Rfl: 5  •  metoprolol tartrate (LOPRESSOR) 25 MG tablet, Take 1 tablet by mouth 2 (Two) Times a Day., Disp: 180 tablet, Rfl: 3  •  nitroglycerin (NITROSTAT) 0.4 MG SL tablet, Place 0.4 mg under the tongue Every 5 (Five) Minutes As Needed for Chest Pain. Take no more than 3 doses in 15 minutes., Disp: , Rfl:   •  O2 (OXYGEN), Inhale 3 L/min Continuous., Disp: , Rfl:   •  omeprazole (priLOSEC) 40 MG capsule, Take 1 capsule by mouth Daily. (Patient taking differently: Take 40 mg by mouth Every Morning.), Disp: 90 capsule, Rfl: 3  •  polyethylene glycol (MIRALAX) packet, Take 17 g by mouth Daily. (Patient taking differently: Take 17 g by mouth Every Morning.), Disp: 850 g, Rfl: 3  •  potassium chloride (MICRO-K) 10 MEQ CR capsule, Take 2 capsules by mouth Daily., Disp: 30 capsule, Rfl: 5  •  ticagrelor (BRILINTA) 90 MG tablet tablet, Take 1 tablet by mouth 2 (Two) Times a Day., Disp: 60 tablet, Rfl: 30  •  vitamin A 56204 UNIT capsule, Take 10,000 Units by mouth Every Morning., Disp: , Rfl:   •  vitamin B-12 (CYANOCOBALAMIN) 1000 MCG tablet, Take 1,000 mcg by mouth Every Morning., Disp: , Rfl:   •  vitamin C (ASCORBIC ACID) 500 MG tablet, Take 500 mg by mouth Every Morning., Disp: , Rfl:    Assessment:        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)   • Panlobular emphysema (CMS/HCC)   • Chronic coronary artery disease   • Mixed hyperlipidemia   • Shortness of breath   • Hospital discharge follow-up    • Abdominal aortic aneurysm (AAA) without rupture (CMS/HCC)   • Pulmonary arterial hypertension (CMS/HCC)   • 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   • Hiatal hernia   • Medicare annual wellness visit, subsequent   • Screening for breast cancer   • Menopause   • Bilateral carotid artery disease (CMS/HCC)   • Osteopenia of multiple sites   • Nicotine dependence   • Vaccination reaction   • Preoperative examination, unspecified   • Carotid stenosis, asymptomatic, bilateral   • Carotid stenosis, asymptomatic, right   • Hyponatremia   • Chronic anemia     Units 13d ago  (7/2/20) 11mo ago  (7/31/19) 2yr ago  (7/11/18) 2yr ago  (12/15/17)   Total Cholesterol  0 - 200 mg/dL 147  145  151  160    Triglycerides  0 - 150 mg/dL 46  117  109  118    HDL Cholesterol  40 - 60 mg/dL 73High   60  70High   62High     LDL Cholesterol   0 - 100 mg/dL 65  62  59  74    VLDL Cholesterol  5 - 40 mg/dL 9.2  23.4 R 21.8 R 23.6 R   LDL/HDL Ratio 0.89                  Plan:            ICD-10-CM ICD-9-CM   1. 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 Mixed hyperlipidemia E78.2 272.2   3. Shortness of breath R06.02 786.05     1. 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 Mixed hyperlipidemia  Continue current treatment    3. Shortness of breath  Mary Jack here for the follow-up post heart catheter, being treated medically.Mary Jack has no complications.  Access site has been examined shows no complications.  Patient has been advised to contact us with any further issues and questions related to the heart catheter    stop felipe    See in 1 yr  COUNSELING:    Mary ChiterCounseling was given to patient for the following topics: diagnostic results, risk factor reductions, impressions, risks and benefits of treatment options and importance of treatment compliance .       SMOKING COUNSELING:    [unfilled]    Dictated  using Dragon dictation

## 2020-08-26 DIAGNOSIS — I25.9 ISCHEMIC HEART DISEASE DUE TO CORONARY ARTERY OBSTRUCTION (HCC): ICD-10-CM

## 2020-08-26 DIAGNOSIS — K22.70 BARRETT'S ESOPHAGUS WITHOUT DYSPLASIA: ICD-10-CM

## 2020-08-26 DIAGNOSIS — I24.0 ISCHEMIC HEART DISEASE DUE TO CORONARY ARTERY OBSTRUCTION (HCC): ICD-10-CM

## 2020-08-27 RX ORDER — OMEPRAZOLE 40 MG/1
CAPSULE, DELAYED RELEASE ORAL
Qty: 90 CAPSULE | Refills: 3 | OUTPATIENT
Start: 2020-08-27

## 2020-08-28 ENCOUNTER — OFFICE VISIT (OUTPATIENT)
Dept: FAMILY MEDICINE CLINIC | Facility: CLINIC | Age: 75
End: 2020-08-28

## 2020-08-28 VITALS
DIASTOLIC BLOOD PRESSURE: 70 MMHG | OXYGEN SATURATION: 97 % | HEIGHT: 63 IN | SYSTOLIC BLOOD PRESSURE: 120 MMHG | HEART RATE: 61 BPM | WEIGHT: 172.5 LBS | BODY MASS INDEX: 30.56 KG/M2

## 2020-08-28 DIAGNOSIS — Z12.31 ENCOUNTER FOR SCREENING MAMMOGRAM FOR MALIGNANT NEOPLASM OF BREAST: ICD-10-CM

## 2020-08-28 DIAGNOSIS — Z12.39 SCREENING FOR BREAST CANCER: ICD-10-CM

## 2020-08-28 DIAGNOSIS — I25.9 ISCHEMIC HEART DISEASE DUE TO CORONARY ARTERY OBSTRUCTION (HCC): ICD-10-CM

## 2020-08-28 DIAGNOSIS — Z00.00 MEDICARE ANNUAL WELLNESS VISIT, SUBSEQUENT: Primary | ICD-10-CM

## 2020-08-28 DIAGNOSIS — I24.0 ISCHEMIC HEART DISEASE DUE TO CORONARY ARTERY OBSTRUCTION (HCC): ICD-10-CM

## 2020-08-28 PROBLEM — R06.02 SHORTNESS OF BREATH: Status: RESOLVED | Noted: 2017-03-21 | Resolved: 2020-08-28

## 2020-08-28 PROCEDURE — 96160 PT-FOCUSED HLTH RISK ASSMT: CPT | Performed by: FAMILY MEDICINE

## 2020-08-28 PROCEDURE — G0439 PPPS, SUBSEQ VISIT: HCPCS | Performed by: FAMILY MEDICINE

## 2020-08-28 NOTE — PROGRESS NOTES
The ABCs of the Annual Wellness Visit  Subsequent Medicare Wellness Visit  Answers for HPI/ROS submitted by the patient on 8/26/2020   What is the primary reason for your visit?: Physical    Chief Complaint   Patient presents with   • Medicare Wellness-subsequent       Subjective   History of Present Illness:  aMry Jack is a 74 y.o. female who presents for a Subsequent Medicare Wellness Visit.    HEALTH RISK ASSESSMENT    Recent Hospitalizations:  Recently treated at the following:  [facility]    Current Medical Providers:  Patient Care Team:  Delfino Matt MD as PCP - General  Oral Llanes MD as Consulting Physician (Pulmonary Disease)  Herber Hayes MD as Consulting Physician (Cardiology)  Virgie Castelan MD as Consulting Physician (Gastroenterology)  Maxi Lundy MD as Surgeon (Orthopedic Surgery)  Mahesh Slamon MD as Surgeon (Vascular Surgery)  Donato Vaughn MD as Consulting Physician (Nephrology)    Smoking Status:  Social History     Tobacco Use   Smoking Status Current Every Day Smoker   • Packs/day: 0.50   • Years: 55.00   • Pack years: 27.50   • Types: Cigarettes   • Start date: 8/3/2016   Smokeless Tobacco Never Used   Tobacco Comment    66 YEARS       Alcohol Consumption:  Social History     Substance and Sexual Activity   Alcohol Use No       Depression Screen:   PHQ-2/PHQ-9 Depression Screening 8/28/2020   Little interest or pleasure in doing things 0   Feeling down, depressed, or hopeless 0   Trouble falling or staying asleep, or sleeping too much 0   Feeling tired or having little energy 3   Poor appetite or overeating 0   Feeling bad about yourself - or that you are a failure or have let yourself or your family down 0   Trouble concentrating on things, such as reading the newspaper or watching television 0   Moving or speaking so slowly that other people could have noticed. Or the opposite - being so fidgety or restless that you have been moving  around a lot more than usual 0   Thoughts that you would be better off dead, or of hurting yourself in some way 0   Total Score 3       Fall Risk Screen:  RACQUEL Fall Risk Assessment has not been completed.    Health Habits and Functional and Cognitive Screening:  Functional & Cognitive Status 8/28/2020   Do you have difficulty preparing food and eating? No   Do you have difficulty bathing yourself, getting dressed or grooming yourself? No   Do you have difficulty using the toilet? No   Do you have difficulty moving around from place to place? No   Do you have trouble with steps or getting out of a bed or a chair? No   Current Diet Unhealthy Diet   Dental Exam Not up to date   Eye Exam Not up to date   Exercise (times per week) 0 times per week   Current Exercise Activities Include None   Do you need help using the phone?  No   Are you deaf or do you have serious difficulty hearing?  No   Do you need help with transportation? No   Do you need help shopping? No   Do you need help preparing meals?  No   Do you need help with housework?  No   Do you need help with laundry? No   Do you need help taking your medications? No   Do you need help managing money? No   Do you ever drive or ride in a car without wearing a seat belt? No   Have you felt unusual stress, anger or loneliness in the last month? No   Who do you live with? Alone   If you need help, do you have trouble finding someone available to you? No   Have you been bothered in the last four weeks by sexual problems? No   Do you have difficulty concentrating, remembering or making decisions? No         Does the patient have evidence of cognitive impairment? No    Asprin use counseling:Taking ASA appropriately as indicated    Age-appropriate Screening Schedule:  Refer to the list below for future screening recommendations based on patient's age, sex and/or medical conditions. Orders for these recommended tests are listed in the plan section. The patient has been  provided with a written plan.    Health Maintenance   Topic Date Due   • TDAP/TD VACCINES (1 - Tdap) 09/06/1956   • ZOSTER VACCINE (3 of 3) 10/22/2019   • INFLUENZA VACCINE  08/01/2020   • LIPID PANEL  07/02/2021   • MAMMOGRAM  09/05/2021   • COLONOSCOPY  06/10/2026          The following portions of the patient's history were reviewed and updated as appropriate: allergies, current medications, past family history, past medical history, past social history, past surgical history and problem list.    Outpatient Medications Prior to Visit   Medication Sig Dispense Refill   • ANORO ELLIPTA 62.5-25 MCG/INH aerosol powder  Inhale 1 puff Every Morning.  0   • aspirin 81 MG tablet Take 1 tablet by mouth Daily. (Patient taking differently: Take 81 mg by mouth Every Night.) 90 tablet 0   • atorvastatin (LIPITOR) 10 MG tablet TAKE 1 TABLET EVERY DAY (Patient taking differently: Take 10 mg by mouth Every Night.) 90 tablet 3   • Calcium Carbonate-Vitamin D (CALCIUM 600+D HIGH POTENCY PO) Take  by mouth 2 (Two) Times a Day.     • nitroglycerin (NITROSTAT) 0.4 MG SL tablet Place 0.4 mg under the tongue Every 5 (Five) Minutes As Needed for Chest Pain. Take no more than 3 doses in 15 minutes.     • O2 (OXYGEN) Inhale 3 L/min Continuous.     • omeprazole (priLOSEC) 40 MG capsule Take 1 capsule by mouth Daily. (Patient taking differently: Take 40 mg by mouth Every Morning.) 90 capsule 3   • polyethylene glycol (MIRALAX) packet Take 17 g by mouth Daily. (Patient taking differently: Take 17 g by mouth Every Morning.) 850 g 3   • vitamin A 23307 UNIT capsule Take 10,000 Units by mouth Every Morning.     • vitamin B-12 (CYANOCOBALAMIN) 1000 MCG tablet Take 1,000 mcg by mouth Every Morning.     • vitamin C (ASCORBIC ACID) 500 MG tablet Take 500 mg by mouth Every Morning.     • ferrous sulfate 325 (65 FE) MG tablet Take 325 mg by mouth every night at bedtime.     • furosemide (LASIX) 40 MG tablet Take 1 tablet by mouth Daily. (Patient  "taking differently: Take 40 mg by mouth Daily. Taking 1/2 tablet) 30 tablet 5   • metoprolol tartrate (LOPRESSOR) 25 MG tablet Take 1 tablet by mouth 2 (Two) Times a Day. 180 tablet 3   • potassium chloride (MICRO-K) 10 MEQ CR capsule Take 2 capsules by mouth Daily. 30 capsule 5   • ticagrelor (BRILINTA) 90 MG tablet tablet Take 1 tablet by mouth 2 (Two) Times a Day. 60 tablet 30     No facility-administered medications prior to visit.        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   • Hiatal hernia   • Medicare annual wellness visit, subsequent   • Screening for breast cancer   • Menopause   • Bilateral carotid artery disease (CMS/HCC)   • Osteopenia of multiple sites   • Nicotine dependence   • Vaccination reaction   • Preoperative examination, unspecified   • Carotid stenosis, asymptomatic, bilateral   • Carotid stenosis, asymptomatic, right   • Hyponatremia   • Chronic anemia       Advanced Care Planning:  ACP discussion was held with the patient during this visit. Patient has an advance directive in EMR which is still valid.     Review of Systems    Compared to one year ago, the patient feels her physical health is the same.  Compared to one year ago, the patient feels her mental health is the same.    Reviewed chart for potential of high risk medication in the elderly: yes  Reviewed chart for potential of harmful drug interactions in the elderly:yes    Objective         Vitals:    08/28/20 1335   BP: 120/70   BP Location: Left arm   Patient Position: Sitting   Cuff Size: Adult   Pulse: 61   SpO2: 97%   Weight: 78.2 kg (172 lb 8 oz)   Height: 160 cm (63\")       Body " mass index is 30.56 kg/m².  Discussed the patient's BMI with her. The BMI is above average; BMI management plan is completed.    Physical Exam   Constitutional: She appears well-developed and well-nourished.   Pulmonary/Chest: Effort normal.   Psychiatric: She has a normal mood and affect.   Nursing note and vitals reviewed.      Lab Results   Component Value Date    GLU 93 07/10/2020    TRIG 46 07/02/2020    HDL 73 (H) 07/02/2020    LDL 65 07/02/2020    VLDL 9.2 07/02/2020    HGBA1C 5.80 (H) 07/02/2020        Assessment/Plan   Medicare Risks and Personalized Health Plan  CMS Preventative Services Quick Reference  Breast Cancer/Mammogram Screening  Cardiovascular risk  Colon Cancer Screening  Dementia/Memory   Depression/Dysphoria  Diabetic Lab Screening   Inadequate Social Support, Isolation, Loneliness, Lack of Transportation, Financial Difficulties, or Caregiver Stress     The above risks/problems have been discussed with the patient.  Pertinent information has been shared with the patient in the After Visit Summary.  Follow up plans and orders are seen below in the Assessment/Plan Section.    Diagnoses and all orders for this visit:    1. Medicare annual wellness visit, subsequent (Primary)    2. Screening for breast cancer  -     Mammo screening digital tomosynthesis bilateral w CAD; Future    3. Encounter for screening mammogram for malignant neoplasm of breast   -     Mammo screening digital tomosynthesis bilateral w CAD; Future    4. 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 Indications: High Blood Pressure Disorder  Dispense: 180 tablet; Refill: 3    no recent CP  Still on oxygen  Off Brintila per cardio  Ok to stop lasix , K , and iron.    Had a rough summer ,  back up into her apartment      Follow Up:  Return in about 6 months (around 2/28/2021) for blood pressure.     An After  Visit Summary and PPPS were given to the patient.

## 2020-08-31 DIAGNOSIS — K22.70 BARRETT'S ESOPHAGUS WITHOUT DYSPLASIA: ICD-10-CM

## 2020-08-31 RX ORDER — OMEPRAZOLE 40 MG/1
40 CAPSULE, DELAYED RELEASE ORAL DAILY
Qty: 90 CAPSULE | Refills: 1 | Status: SHIPPED | OUTPATIENT
Start: 2020-08-31 | End: 2020-09-02 | Stop reason: SDUPTHER

## 2020-09-02 ENCOUNTER — TELEPHONE (OUTPATIENT)
Dept: FAMILY MEDICINE CLINIC | Facility: CLINIC | Age: 75
End: 2020-09-02

## 2020-09-02 DIAGNOSIS — K22.70 BARRETT'S ESOPHAGUS WITHOUT DYSPLASIA: ICD-10-CM

## 2020-09-02 RX ORDER — OMEPRAZOLE 40 MG/1
40 CAPSULE, DELAYED RELEASE ORAL DAILY
Qty: 90 CAPSULE | Refills: 3 | Status: SHIPPED | OUTPATIENT
Start: 2020-09-02 | End: 2021-08-31 | Stop reason: SDUPTHER

## 2020-09-02 NOTE — TELEPHONE ENCOUNTER
Patient called in saying that the omeprazole (priLOSEC) 40 MG capsule was sent to the local pharmacy but it should have been sent to [email or web address].    Best call back # [phone]

## 2020-10-01 ENCOUNTER — HOSPITAL ENCOUNTER (OUTPATIENT)
Dept: MAMMOGRAPHY | Facility: HOSPITAL | Age: 75
Discharge: HOME | End: 2020-10-01
Admitting: FAMILY MEDICINE

## 2020-10-01 DIAGNOSIS — Z12.39 SCREENING FOR BREAST CANCER: ICD-10-CM

## 2020-10-01 DIAGNOSIS — Z12.31 ENCOUNTER FOR SCREENING MAMMOGRAM FOR MALIGNANT NEOPLASM OF BREAST: ICD-10-CM

## 2020-10-01 PROCEDURE — 77063 BREAST TOMOSYNTHESIS BI: CPT

## 2020-10-01 PROCEDURE — 77067 SCR MAMMO BI INCL CAD: CPT

## 2020-10-14 ENCOUNTER — TELEPHONE (OUTPATIENT)
Dept: FAMILY MEDICINE CLINIC | Facility: CLINIC | Age: 75
End: 2020-10-14

## 2020-10-14 DIAGNOSIS — J43.1 PANLOBULAR EMPHYSEMA: Primary | ICD-10-CM

## 2020-10-14 RX ORDER — AMOXICILLIN 500 MG/1
500 TABLET, FILM COATED ORAL 3 TIMES DAILY
Qty: 21 TABLET | Refills: 0 | Status: SHIPPED | OUTPATIENT
Start: 2020-10-14 | End: 2020-10-22

## 2020-10-14 RX ORDER — PREDNISONE 10 MG/1
10 TABLET ORAL 3 TIMES DAILY
Qty: 21 TABLET | Refills: 0 | Status: SHIPPED | OUTPATIENT
Start: 2020-10-14 | End: 2021-08-06

## 2020-10-14 NOTE — TELEPHONE ENCOUNTER
PATIENT IS CALLING TO REQUEST FOR DR OROPEZA TO CALL IN A PRESCRIPTION FOR PREDNISONE. PATIENT IS EXPERIENCING CHILLS, STUFFY HEAD, AND LOW OXYGEN LEVELS. PATIENT PREFERS NOT TO COME IN TO THE OFFICE, BUT RATHER JUST HAVE AN ANTIBIOTIC CALLED IN. PATIENT WAS TESTED FOR COVID-19 ON 10/12/20 AT [facility]. RESULTS CAME BACK TODAY 10/14/20 AND THEY WERE NEGATIVE.     [id number] - [place], [place] - [address] [place] [address] [address] AT [facility] [address] - [phone]  - [phone]    CALL BACK NUMBER: [phone]

## 2020-10-22 ENCOUNTER — APPOINTMENT (OUTPATIENT)
Dept: CT IMAGING | Facility: HOSPITAL | Age: 75
End: 2020-10-22

## 2020-10-22 ENCOUNTER — TELEPHONE (OUTPATIENT)
Dept: FAMILY MEDICINE CLINIC | Facility: CLINIC | Age: 75
End: 2020-10-22

## 2020-10-22 ENCOUNTER — HOSPITAL ENCOUNTER (EMERGENCY)
Facility: HOSPITAL | Age: 75
Discharge: HOME | End: 2020-10-22
Attending: EMERGENCY MEDICINE | Admitting: EMERGENCY MEDICINE

## 2020-10-22 ENCOUNTER — APPOINTMENT (OUTPATIENT)
Dept: GENERAL RADIOLOGY | Facility: HOSPITAL | Age: 75
End: 2020-10-22

## 2020-10-22 VITALS
HEART RATE: 87 BPM | DIASTOLIC BLOOD PRESSURE: 78 MMHG | OXYGEN SATURATION: 96 % | SYSTOLIC BLOOD PRESSURE: 124 MMHG | HEIGHT: 60 IN | WEIGHT: 167 LBS | TEMPERATURE: 98 F | RESPIRATION RATE: 18 BRPM | BODY MASS INDEX: 32.79 KG/M2

## 2020-10-22 DIAGNOSIS — J43.9 PULMONARY EMPHYSEMA, UNSPECIFIED EMPHYSEMA TYPE (HCC): ICD-10-CM

## 2020-10-22 DIAGNOSIS — J18.9 PNEUMONIA OF LEFT LOWER LOBE DUE TO INFECTIOUS ORGANISM: Primary | ICD-10-CM

## 2020-10-22 LAB
ALBUMIN SERPL-MCNC: 3.8 G/DL (ref 3.5–5.2)
ALBUMIN/GLOB SERPL: 1.1 G/DL
ALP SERPL-CCNC: 102 U/L (ref 39–117)
ALT SERPL W P-5'-P-CCNC: 18 U/L (ref 1–33)
ANION GAP SERPL CALCULATED.3IONS-SCNC: 11.9 MMOL/L (ref 5–15)
AST SERPL-CCNC: 22 U/L (ref 1–32)
BACTERIA UR QL AUTO: ABNORMAL /HPF
BASOPHILS # BLD AUTO: 0.02 10*3/MM3 (ref 0–0.2)
BASOPHILS NFR BLD AUTO: 0.3 % (ref 0–1.5)
BILIRUB SERPL-MCNC: 0.4 MG/DL (ref 0–1.2)
BILIRUB UR QL STRIP: NEGATIVE
BUN SERPL-MCNC: 17 MG/DL (ref 8–23)
BUN/CREAT SERPL: 17.2 (ref 7–25)
CALCIUM SPEC-SCNC: 9 MG/DL (ref 8.6–10.5)
CHLORIDE SERPL-SCNC: 90 MMOL/L (ref 98–107)
CLARITY UR: CLEAR
CO2 SERPL-SCNC: 26.1 MMOL/L (ref 22–29)
COLOR UR: YELLOW
CREAT SERPL-MCNC: 0.99 MG/DL (ref 0.57–1)
D DIMER PPP FEU-MCNC: 0.99 MCGFEU/ML (ref 0–0.46)
DEPRECATED RDW RBC AUTO: 47.1 FL (ref 37–54)
EOSINOPHIL # BLD AUTO: 0.03 10*3/MM3 (ref 0–0.4)
EOSINOPHIL NFR BLD AUTO: 0.5 % (ref 0.3–6.2)
ERYTHROCYTE [DISTWIDTH] IN BLOOD BY AUTOMATED COUNT: 13.4 % (ref 12.3–15.4)
GFR SERPL CREATININE-BSD FRML MDRD: 55 ML/MIN/1.73
GLOBULIN UR ELPH-MCNC: 3.4 GM/DL
GLUCOSE SERPL-MCNC: 96 MG/DL (ref 65–99)
GLUCOSE UR STRIP-MCNC: NEGATIVE MG/DL
HCT VFR BLD AUTO: 36.2 % (ref 34–46.6)
HGB BLD-MCNC: 11.8 G/DL (ref 12–15.9)
HGB UR QL STRIP.AUTO: ABNORMAL
HOLD SPECIMEN: NORMAL
HOLD SPECIMEN: NORMAL
HYALINE CASTS UR QL AUTO: ABNORMAL /LPF
IMM GRANULOCYTES # BLD AUTO: 0.27 10*3/MM3 (ref 0–0.05)
IMM GRANULOCYTES NFR BLD AUTO: 4.3 % (ref 0–0.5)
KETONES UR QL STRIP: NEGATIVE
LEUKOCYTE ESTERASE UR QL STRIP.AUTO: NEGATIVE
LYMPHOCYTES # BLD AUTO: 1.16 10*3/MM3 (ref 0.7–3.1)
LYMPHOCYTES NFR BLD AUTO: 18.5 % (ref 19.6–45.3)
MCH RBC QN AUTO: 31.1 PG (ref 26.6–33)
MCHC RBC AUTO-ENTMCNC: 32.6 G/DL (ref 31.5–35.7)
MCV RBC AUTO: 95.3 FL (ref 79–97)
MONOCYTES # BLD AUTO: 0.67 10*3/MM3 (ref 0.1–0.9)
MONOCYTES NFR BLD AUTO: 10.7 % (ref 5–12)
NEUTROPHILS NFR BLD AUTO: 4.12 10*3/MM3 (ref 1.7–7)
NEUTROPHILS NFR BLD AUTO: 65.7 % (ref 42.7–76)
NITRITE UR QL STRIP: NEGATIVE
NRBC BLD AUTO-RTO: 0 /100 WBC (ref 0–0.2)
NT-PROBNP SERPL-MCNC: 530.9 PG/ML (ref 0–900)
PH UR STRIP.AUTO: 6.5 [PH] (ref 4.5–8)
PLATELET # BLD AUTO: 254 10*3/MM3 (ref 140–450)
PMV BLD AUTO: 10.2 FL (ref 6–12)
POTASSIUM SERPL-SCNC: 4.3 MMOL/L (ref 3.5–5.2)
PROT SERPL-MCNC: 7.2 G/DL (ref 6–8.5)
PROT UR QL STRIP: ABNORMAL
RBC # BLD AUTO: 3.8 10*6/MM3 (ref 3.77–5.28)
RBC # UR: ABNORMAL /HPF
REF LAB TEST METHOD: ABNORMAL
SARS-COV-2 RNA PNL SPEC NAA+PROBE: NOT DETECTED
SODIUM SERPL-SCNC: 128 MMOL/L (ref 136–145)
SP GR UR STRIP: 1.02 (ref 1–1.03)
SQUAMOUS #/AREA URNS HPF: ABNORMAL /HPF
TROPONIN T SERPL-MCNC: <0.01 NG/ML (ref 0–0.03)
UROBILINOGEN UR QL STRIP: ABNORMAL
WBC # BLD AUTO: 3.27 10*3/MM3 (ref 3.4–10.8)
WBC UR QL AUTO: ABNORMAL /HPF
WHOLE BLOOD HOLD SPECIMEN: NORMAL
WHOLE BLOOD HOLD SPECIMEN: NORMAL

## 2020-10-22 PROCEDURE — 71275 CT ANGIOGRAPHY CHEST: CPT

## 2020-10-22 PROCEDURE — 93005 ELECTROCARDIOGRAM TRACING: CPT

## 2020-10-22 PROCEDURE — 25010000002 DIPHENHYDRAMINE PER 50 MG: Performed by: PHYSICIAN ASSISTANT

## 2020-10-22 PROCEDURE — 87635 SARS-COV-2 COVID-19 AMP PRB: CPT | Performed by: PHYSICIAN ASSISTANT

## 2020-10-22 PROCEDURE — 85379 FIBRIN DEGRADATION QUANT: CPT | Performed by: PHYSICIAN ASSISTANT

## 2020-10-22 PROCEDURE — 93010 ELECTROCARDIOGRAM REPORT: CPT | Performed by: INTERNAL MEDICINE

## 2020-10-22 PROCEDURE — 99284 EMERGENCY DEPT VISIT MOD MDM: CPT

## 2020-10-22 PROCEDURE — 99283 EMERGENCY DEPT VISIT LOW MDM: CPT | Performed by: PHYSICIAN ASSISTANT

## 2020-10-22 PROCEDURE — 84484 ASSAY OF TROPONIN QUANT: CPT

## 2020-10-22 PROCEDURE — 80053 COMPREHEN METABOLIC PANEL: CPT

## 2020-10-22 PROCEDURE — 93005 ELECTROCARDIOGRAM TRACING: CPT | Performed by: EMERGENCY MEDICINE

## 2020-10-22 PROCEDURE — 96374 THER/PROPH/DIAG INJ IV PUSH: CPT

## 2020-10-22 PROCEDURE — 96375 TX/PRO/DX INJ NEW DRUG ADDON: CPT

## 2020-10-22 PROCEDURE — 0 IOPAMIDOL PER 1 ML: Performed by: EMERGENCY MEDICINE

## 2020-10-22 PROCEDURE — 81001 URINALYSIS AUTO W/SCOPE: CPT | Performed by: EMERGENCY MEDICINE

## 2020-10-22 PROCEDURE — 85025 COMPLETE CBC W/AUTO DIFF WBC: CPT

## 2020-10-22 PROCEDURE — 25010000002 METHYLPREDNISOLONE PER 40 MG: Performed by: PHYSICIAN ASSISTANT

## 2020-10-22 PROCEDURE — 83880 ASSAY OF NATRIURETIC PEPTIDE: CPT | Performed by: EMERGENCY MEDICINE

## 2020-10-22 PROCEDURE — 71046 X-RAY EXAM CHEST 2 VIEWS: CPT

## 2020-10-22 RX ORDER — SODIUM CHLORIDE 0.9 % (FLUSH) 0.9 %
10 SYRINGE (ML) INJECTION AS NEEDED
Status: DISCONTINUED | OUTPATIENT
Start: 2020-10-22 | End: 2020-10-22 | Stop reason: HOSPADM

## 2020-10-22 RX ORDER — CEFPODOXIME PROXETIL 200 MG/1
200 TABLET, FILM COATED ORAL EVERY 12 HOURS
Qty: 14 TABLET | Refills: 0 | Status: SHIPPED | OUTPATIENT
Start: 2020-10-22 | End: 2020-10-29

## 2020-10-22 RX ORDER — DOXYCYCLINE 100 MG/1
100 CAPSULE ORAL EVERY 12 HOURS
Qty: 14 CAPSULE | Refills: 0 | Status: SHIPPED | OUTPATIENT
Start: 2020-10-22 | End: 2020-10-29

## 2020-10-22 RX ORDER — DIPHENHYDRAMINE HYDROCHLORIDE 50 MG/ML
50 INJECTION INTRAMUSCULAR; INTRAVENOUS ONCE
Status: COMPLETED | OUTPATIENT
Start: 2020-10-22 | End: 2020-10-22

## 2020-10-22 RX ORDER — METHYLPREDNISOLONE SODIUM SUCCINATE 40 MG/ML
40 INJECTION, POWDER, LYOPHILIZED, FOR SOLUTION INTRAMUSCULAR; INTRAVENOUS EVERY 4 HOURS
Status: DISCONTINUED | OUTPATIENT
Start: 2020-10-22 | End: 2020-10-22 | Stop reason: HOSPADM

## 2020-10-22 RX ADMIN — METHYLPREDNISOLONE SODIUM SUCCINATE 40 MG: 40 INJECTION, POWDER, FOR SOLUTION INTRAMUSCULAR; INTRAVENOUS at 13:35

## 2020-10-22 RX ADMIN — SODIUM CHLORIDE 500 ML: 9 INJECTION, SOLUTION INTRAVENOUS at 14:45

## 2020-10-22 RX ADMIN — DIPHENHYDRAMINE HYDROCHLORIDE 50 MG: 50 INJECTION, SOLUTION INTRAMUSCULAR; INTRAVENOUS at 13:36

## 2020-10-22 RX ADMIN — IOPAMIDOL 100 ML: 755 INJECTION, SOLUTION INTRAVENOUS at 14:26

## 2020-10-22 NOTE — TELEPHONE ENCOUNTER
Pt advised to go to UC for treatment/eval, possible covid test due to feeling worse after antibiotics.

## 2020-10-22 NOTE — ED PROVIDER NOTES
EMERGENCY DEPARTMENT ENCOUNTER      Room Number: 5/05    History is provided by the patient, no translation services needed    HPI:    Chief complaint: Shortness of breath, congestion, urinary frequency.    Location: Patient denies pain    Quality/Severity: Frontal headache, mild to moderate    Timing/Duration: 2 weeks    Modifying Factors: Shortness of breath worsens with lying down, patient is always on 3 L per nasal cannula.    Associated Symptoms: Positive for shortness of breath, nasal congestion, right ear pressure, urinary frequency and intermittent pressure in bladder, cough with dark red sputum.  She denies chest pain, dizziness, syncope, dysuria, hematuria, flank pain.     Narrative: Pt is a 75 y.o. female who presents complaining of shortness of breath and the other above symptoms.  Patient states a couple of weeks ago her daughter came in from [place] to visit and to bring patient some groceries.  That day her daughter began having some symptoms of runny nose and sore throat, her  and one of her children both tested positive for COVID-19 a couple days later.  Patient states within a few days of her daughter visiting she began having chills, cough, congestion, shortness of breath, and nausea.  She denies any known fevers, vomiting, chest pain, diarrhea.  She states she found out 2 days after her urgent care visit that her Covid test was negative, she then called her primary care doctor and asked for antibiotics and steroids, she just finished a course of amoxicillin and prednisone.         PMD: Delfino Matt MD    REVIEW OF SYSTEMS  Review of Systems   Constitutional: Positive for chills and fatigue. Negative for fever.   HENT: Positive for ear pain (Right), postnasal drip and sinus pressure. Negative for sinus pain and sore throat.    Respiratory: Positive for cough and shortness of breath. Negative for wheezing.    Cardiovascular: Negative for chest pain, palpitations and leg swelling.    Gastrointestinal: Positive for nausea. Negative for constipation, diarrhea and vomiting.   Genitourinary: Positive for frequency and urgency. Negative for difficulty urinating and dysuria.   Musculoskeletal: Positive for myalgias. Negative for arthralgias.   Skin: Negative for rash and wound.   Neurological: Positive for headaches. Negative for dizziness and syncope.   Psychiatric/Behavioral: Negative for confusion. The patient is not nervous/anxious.          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 (CMS/[facility]) 02/03/2016   • Panlobular emphysema (CMS/[facility]) 02/03/2016   • Chronic coronary artery disease 02/03/2016   • Mixed hyperlipidemia 02/03/2016   • Hospital discharge follow-up 03/31/2017   • Abdominal aortic aneurysm (AAA) without rupture (CMS/[facility]) 03/31/2017   • Pulmonary arterial hypertension (CMS/[facility]) 03/31/2017   • 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 08/03/2017   • Hiatal hernia 09/06/2017   • Medicare annual wellness visit, subsequent 07/18/2018   • Screening for breast cancer 07/18/2018   • Menopause 07/18/2018   • Bilateral carotid artery disease (CMS/[facility]) 07/18/2018   • Osteopenia of multiple sites 07/27/2018   • Nicotine dependence 07/30/2018   • Vaccination reaction 11/05/2019   • Preoperative examination, unspecified 03/11/2020   • Carotid stenosis, asymptomatic, bilateral 03/16/2020   • Carotid stenosis, asymptomatic, right 02/06/2020   • Hyponatremia 07/10/2020   • Chronic anemia 07/10/2020     Resolved Ambulatory Problems     Diagnosis Date Noted   • Fatigue 02/03/2016   • Iron deficiency anemia due to chronic blood loss 02/03/2016   • Heartburn 02/03/2016   • Tobacco abuse 02/22/2017   • Shortness of breath 03/21/2017   • Right lower quadrant abdominal pain 03/23/2017   • Hypoxia 03/23/2017   • Supplemental oxygen dependent 03/31/2017   • Medication monitoring encounter 12/15/2017   • Bilateral  carotid artery stenosis 11/28/2018     Past Medical History:   Diagnosis Date   • AAA (abdominal aortic aneurysm) (CMS/HCC)    • Allergic not sure   • Anemia    • Aneurysm of abdominal aorta (CMS/HCC)    • Arthritis    • Zepeda esophagus    • CAD (coronary artery disease)    • Chest pain    • Constipation    • COPD (chronic obstructive pulmonary disease) (CMS/HCC)    • Emphysema lung (CMS/HCC)    • Empyema (CMS/HCC)    • GERD (gastroesophageal reflux disease)    • Headache    • High risk medication use    • History of cardiac catheterization    • Hyperlipidemia    • Hypertension    • Infectious viral hepatitis Jaundice in 1963   • Jaundice    • Lumbosacral disc disease    • Myocardial infarction (CMS/HCC)    • Myocardial infarction (CMS/HCC)    • Occult blood in stools    • On home O2    • Peripheral artery disease (CMS/HCC)    • Reflux gastritis    • Scoliosis    • Visual impairment        PAST SURGICAL HISTORY  Past Surgical History:   Procedure Laterality Date   • CARDIAC CATHETERIZATION     • CARDIAC CATHETERIZATION N/A 5/12/2017    Procedure: Left Heart Cath;  Surgeon: Herber Hayes MD;  Location: [facility] CATH INVASIVE LOCATION;  Service:    • CARDIAC CATHETERIZATION N/A 7/1/2020    Procedure: Left Heart Cath;  Surgeon: Herber Hayes MD;  Location: [facility] CATH INVASIVE LOCATION;  Service: Cardiology;  Laterality: N/A;   • CARDIAC CATHETERIZATION N/A 7/1/2020    Procedure: Coronary angiography;  Surgeon: Herber Hayes MD;  Location: [facility] CATH INVASIVE LOCATION;  Service: Cardiology;  Laterality: N/A;   • CARDIAC CATHETERIZATION N/A 7/1/2020    Procedure: Left ventriculography;  Surgeon: Herber Hayes MD;  Location: [facility] CATH INVASIVE LOCATION;  Service: Cardiology;  Laterality: N/A;   • CAROTID ENDARTERECTOMY Right 3/16/2020    Procedure: RIGHT CAROTID ENDARTERECTOMY;  Surgeon: Mahesh Salmon MD;  Location: [facility] MAIN OR;  Service: Vascular;  Laterality: Right;   •  CHOLECYSTECTOMY     • COLONOSCOPY     • COLONOSCOPY N/A 6/10/2016    Procedure: COLONOSCOPY with polypectomy;  Surgeon: Virgie Castelan MD;  Location:  [facility];  Service:    • CORONARY STENT PLACEMENT      X2   • ENDOSCOPY N/A 6/10/2016    Procedure: ESOPHAGOGASTRODUODENOSCOPY WITH BIOPSY;  Surgeon: Virgie Castelan MD;  Location:  [facility];  Service:    • ENDOSCOPY     • ENDOSCOPY N/A 8/25/2017    Procedure: ESOPHAGOGASTRODUODENOSCOPY w/ biopsies;  Surgeon: Virgie Castelan MD;  Location:  [facility];  Service:        FAMILY HISTORY  Family History   Problem Relation Age of Onset   • Colon cancer Mother    • Breast cancer Mother    • Other Father         cardiac disorder   • Hypertension Father    • Heart disease Father    • Other Sister         cardiac disorder   • Hypertension Sister    • Lung disease Sister    • Thyroid disease Sister    • Other Brother         cardiac disorder   • Thyroid disease Daughter    • Heart disease Sister    • Hypertension Sister    • Pulmonary fibrosis Sister    • Heart disease Brother    • Other Brother    • Thyroid disease Sister    • Other Sister    • Thyroid disease Daughter    • Malig Hyperthermia Neg Hx        SOCIAL HISTORY  Social History     Socioeconomic History   • Marital status:      Spouse name: Not on file   • Number of children: Not on file   • Years of education: Not on file   • Highest education level: Not on file   Occupational History   • Occupation: retired     Employer: [employer]   Tobacco Use   • Smoking status: Current Every Day Smoker     Packs/day: 0.50     Years: 55.00     Pack years: 27.50     Types: Cigarettes     Start date: 8/3/2016   • Smokeless tobacco: Never Used   • Tobacco comment: 64 YEARS   Substance and Sexual Activity   • Alcohol use: No   • Drug use: No   • Sexual activity: Not Currently     Partners: Male     Birth control/protection: Post-menopausal       ALLERGIES  Iodinated diagnostic agents, Codeine, and Morphine and  related      Current Facility-Administered Medications:   •  methylPREDNISolone sodium succinate (SOLU-Medrol) injection 40 mg, 40 mg, Intravenous, Q4H, Madai Subramanian PA-C, 40 mg at 10/22/20 1335  •  sodium chloride 0.9 % bolus 500 mL, 500 mL, Intravenous, Once, Madai Subramanian PA-C  •  sodium chloride 0.9 % flush 10 mL, 10 mL, Intravenous, PRN, Delfino Cook MD    Current Outpatient Medications:   •  albuterol sulfate  (90 Base) MCG/ACT inhaler, Inhale 2 puffs Every 4 (Four) Hours As Needed for Wheezing., Disp: 6.7 g, Rfl: 0  •  ANORO ELLIPTA 62.5-25 MCG/INH aerosol powder , Inhale 1 puff Every Morning., Disp: , Rfl: 0  •  aspirin 81 MG tablet, Take 1 tablet by mouth Daily. (Patient taking differently: Take 81 mg by mouth Every Night.), Disp: 90 tablet, Rfl: 0  •  atorvastatin (LIPITOR) 10 MG tablet, TAKE 1 TABLET EVERY DAY (Patient taking differently: Take 10 mg by mouth Every Night.), Disp: 90 tablet, Rfl: 3  •  Calcium Carbonate-Vitamin D (CALCIUM 600+D HIGH POTENCY PO), Take  by mouth 2 (Two) Times a Day., Disp: , Rfl:   •  metoprolol tartrate (LOPRESSOR) 25 MG tablet, Take 1 tablet by mouth 2 (Two) Times a Day. Indications: High Blood Pressure Disorder, Disp: 180 tablet, Rfl: 3  •  O2 (OXYGEN), Inhale 3 L/min Continuous., Disp: , Rfl:   •  omeprazole (priLOSEC) 40 MG capsule, Take 1 capsule by mouth Daily., Disp: 90 capsule, Rfl: 3  •  polyethylene glycol (MIRALAX) packet, Take 17 g by mouth Daily. (Patient taking differently: Take 17 g by mouth Every Morning.), Disp: 850 g, Rfl: 3  •  predniSONE (DELTASONE) 10 MG tablet, Take 1 tablet by mouth 3 (Three) Times a Day., Disp: 21 tablet, Rfl: 0  •  vitamin A 58377 UNIT capsule, Take 10,000 Units by mouth Every Morning., Disp: , Rfl:   •  vitamin B-12 (CYANOCOBALAMIN) 1000 MCG tablet, Take 1,000 mcg by mouth Every Morning., Disp: , Rfl:   •  vitamin C (ASCORBIC ACID) 500 MG tablet, Take 500 mg by mouth Every Morning., Disp: , Rfl:   •   cefpodoxime (VANTIN) 200 MG tablet, Take 1 tablet by mouth Every 12 (Twelve) Hours for 7 days., Disp: 14 tablet, Rfl: 0  •  doxycycline (MONODOX) 100 MG capsule, Take 1 capsule by mouth Every 12 (Twelve) Hours for 7 days., Disp: 14 capsule, Rfl: 0  •  nitroglycerin (NITROSTAT) 0.4 MG SL tablet, Place 0.4 mg under the tongue Every 5 (Five) Minutes As Needed for Chest Pain. Take no more than 3 doses in 15 minutes., Disp: , Rfl:   •  potassium chloride (MICRO-K) 10 MEQ CR capsule, TK 2 CS PO D, Disp: , Rfl:     PHYSICAL EXAM  ED Triage Vitals [10/22/20 1106]   Temp Heart Rate Resp BP SpO2   98.8 °F (37.1 °C) 105 22 145/82 93 %      Temp src Heart Rate Source Patient Position BP Location FiO2 (%)   Oral Monitor Sitting Right arm --       Physical Exam   Constitutional: She is oriented to person, place, and time and well-developed, well-nourished, and in no distress. Vital signs are normal.   Patient is a chronically ill-appearing 75-year-old female.  Her vital signs are completely normal here when she is on her usual 3 L per NC.   HENT:   Head: Normocephalic and atraumatic.   Right Ear: External ear and ear canal normal.   Left Ear: Tympanic membrane, external ear and ear canal normal.   Nose: Nose normal. Right sinus exhibits no maxillary sinus tenderness and no frontal sinus tenderness. Left sinus exhibits no maxillary sinus tenderness and no frontal sinus tenderness.   Mouth/Throat: Uvula is midline, oropharynx is clear and moist and mucous membranes are normal.   Serous effusion appreciated behind right TM, without any erythema or injection.  Left ear appears normal.  Oropharynx shows evidence for postnasal drip, no concerning findings on exam.   Eyes: Pupils are equal, round, and reactive to light. Conjunctivae and EOM are normal.   Neck: Normal range of motion. Neck supple.   Cardiovascular: Normal rate, regular rhythm and intact distal pulses.   Pulmonary/Chest: Effort normal and breath sounds normal. She has no  wheezes.   Abdominal: Soft. Bowel sounds are normal. There is no abdominal tenderness. There is no guarding.   Musculoskeletal: Normal range of motion.         General: No edema.   Lymphadenopathy:     She has no cervical adenopathy (Tenderness in anterior and submandibular cervical regions but no significant adenopathy palpated).   Neurological: She is alert and oriented to person, place, and time. No cranial nerve deficit. Coordination normal. GCS score is 15.   Skin: Skin is warm and dry.   Psychiatric: Mood, memory, affect and judgment normal.   Nursing note and vitals reviewed.        LAB RESULTS  Lab Results (last 24 hours)     Procedure Component Value Units Date/Time    CBC & Differential [[id number]]  (Abnormal) Collected: 10/22/20 1140    Specimen: Blood Updated: 10/22/20 1155    Narrative:      The following orders were created for panel order CBC & Differential.  Procedure                               Abnormality         Status                     ---------                               -----------         ------                     CBC Auto Differential[[id number]]        Abnormal            Final result                 Please view results for these tests on the individual orders.    Comprehensive Metabolic Panel [[id number]]  (Abnormal) Collected: 10/22/20 1140    Specimen: Blood Updated: 10/22/20 1204     Glucose 96 mg/dL      BUN 17 mg/dL      Creatinine 0.99 mg/dL      Sodium 128 mmol/L      Potassium 4.3 mmol/L      Chloride 90 mmol/L      CO2 26.1 mmol/L      Calcium 9.0 mg/dL      Total Protein 7.2 g/dL      Albumin 3.80 g/dL      ALT (SGPT) 18 U/L      AST (SGOT) 22 U/L      Alkaline Phosphatase 102 U/L      Total Bilirubin 0.4 mg/dL      eGFR Non African Amer 55 mL/min/1.73      Globulin 3.4 gm/dL      A/G Ratio 1.1 g/dL      BUN/Creatinine Ratio 17.2     Anion Gap 11.9 mmol/L     Narrative:      GFR Normal >60  Chronic Kidney Disease <60  Kidney Failure <15      BNP [[id number]]  (Normal)  Collected: 10/22/20 1140    Specimen: Blood Updated: 10/22/20 1242     proBNP 530.9 pg/mL     Narrative:      Among patients with dyspnea, NT-proBNP is highly sensitive for the detection of acute congestive heart failure. In addition NT-proBNP of <300 pg/ml effectively rules out acute congestive heart failure with 99% negative predictive value.    Results may be falsely decreased if patient taking Biotin.      Troponin [[id number]]  (Normal) Collected: 10/22/20 1140    Specimen: Blood Updated: 10/22/20 1207     Troponin T <0.010 ng/mL     Narrative:      Troponin T Reference Range:  <= 0.03 ng/mL-   Negative for AMI  >0.03 ng/mL-     Abnormal for myocardial necrosis.  Clinicians would have to utilize clinical acumen, EKG, Troponin and serial changes to determine if it is an Acute Myocardial Infarction or myocardial injury due to an underlying chronic condition.       Results may be falsely decreased if patient taking Biotin.      CBC Auto Differential [[id number]]  (Abnormal) Collected: 10/22/20 1140    Specimen: Blood Updated: 10/22/20 1155     WBC 3.27 10*3/mm3      RBC 3.80 10*6/mm3      Hemoglobin 11.8 g/dL      Hematocrit 36.2 %      MCV 95.3 fL      MCH 31.1 pg      MCHC 32.6 g/dL      RDW 13.4 %      RDW-SD 47.1 fl      MPV 10.2 fL      Platelets 254 10*3/mm3      Neutrophil % 65.7 %      Lymphocyte % 18.5 %      Monocyte % 10.7 %      Eosinophil % 0.5 %      Basophil % 0.3 %      Immature Grans % 4.3 %      Neutrophils, Absolute 4.12 10*3/mm3      Lymphocytes, Absolute 1.16 10*3/mm3      Monocytes, Absolute 0.67 10*3/mm3      Eosinophils, Absolute 0.03 10*3/mm3      Basophils, Absolute 0.02 10*3/mm3      Immature Grans, Absolute 0.27 10*3/mm3      nRBC 0.0 /100 WBC     D-dimer, Quantitative [[id number]]  (Abnormal) Collected: 10/22/20 1140    Specimen: Blood Updated: 10/22/20 1302     D-Dimer, Quantitative 0.99 MCGFEU/mL     Narrative:      Can be elevated in, but is not diagnostic for deep vein thrombosis  (DVT) or pulmonary embolis (PE).  It is also elevated in other medical conditions.  Clinical correlation is required.  The negative cut-off value for the D-Dimer is 0.50 mcg FEU/mL for DVT and PE.      Urinalysis With Culture If Indicated - Urine, Random Void [[id number]]  (Abnormal) Collected: 10/22/20 1150    Specimen: Urine, Random Void Updated: 10/22/20 1229     Color, UA Yellow     Appearance, UA Clear     pH, UA 6.5     Specific Gravity, UA 1.020     Glucose, UA Negative     Ketones, UA Negative     Bilirubin, UA Negative     Blood, UA Trace     Protein, UA 30 mg/dL (1+)     Leuk Esterase, UA Negative     Nitrite, UA Negative     Urobilinogen, UA 0.2 E.U./dL    Urinalysis, Microscopic Only - Urine, Random Void [[id number]]  (Abnormal) Collected: 10/22/20 1150    Specimen: Urine, Random Void Updated: 10/22/20 1239     RBC, UA 0-2 /HPF      WBC, UA None Seen /HPF      Bacteria, UA None Seen /HPF      Squamous Epithelial Cells, UA 0-2 /HPF      Hyaline Casts, UA None Seen /LPF      Methodology Manual Light Microscopy    COVID-19,[facility] IN-HOUSE,Nasal Swab No Transport Media 3-4 HR TAT - Swab, Nasal Cavity [[id number]]  (Normal) Collected: 10/22/20 1255    Specimen: Swab from Nasal Cavity Updated: 10/22/20 1331     COVID19 Not Detected    Narrative:      Fact sheet for providers: https://www.fda.gov/media/560473/download     Fact sheet for patients: https://www.fda.gov/media/501684/download            I ordered the above labs and reviewed the results    RADIOLOGY  Xr Chest 2 View    Result Date: 10/22/2020  CHEST X-RAY, 10/22/2020     HISTORY: 75-year-old female in the ED complaining of 2 day history of worsening shortness of air. Negative COVID-19 testing on 10/11/2020. 60+ pack year smoker.  TECHNIQUE: PA lateral upright chest series.  COMPARISON: *  LDCT lung screening study, 11/06/2019. *  Chest x-ray, 03/24/2017.  FINDINGS: Advanced diffuse pulmonary emphysema. Multifocal fibrotic scarring, greatest at the  left lung base where there is also chronic pleural thickening in the costophrenic angle.  No convincing evidence of superimposed acute pulmonary infiltrate. Heart size and pulmonary vascularity are within normal limits. Calcified benign granulomas in the right lower lung. Scoliosis.      1. No active disease. 2. Advanced pulmonary emphysema. Left basilar pulmonary and pleural scarring.  This report was finalized on 10/22/2020 12:49 PM by Dr. Indra Lemon MD.      Ct Angiogram Chest    Result Date: 10/22/2020  CTA Chest INDICATION: Shortness of air TECHNIQUE: CT angiogram of the chest with IV contrast. 3-D reconstructions were obtained and reviewed.   Radiation dose reduction techniques included automated exposure control or exposure modulation based on body size. Count of known CT and cardiac nuc med studies performed in previous 12 months: 0. COMPARISON: CT of the chest from 11/6/2019 FINDINGS: No large or saddle embolus. Evaluation is somewhat limited for very small subsegmental thrombus. Changes of gross emphysema are again seen and there has been interval development of predominantly peripheral areas of consolidation at the left lung base. Cholecystectomy clips. No evidence of suspicious osseous lesion. If positive, report RV/LV ratio if >.0.9     1. No definite evidence of pulmonary embolism. 2. Changes of gross emphysema are again seen and there has been interval development of predominantly peripheral areas of consolidation at the left lung base. Findings are most concerning for infection/pneumonia. Signer Name: Cherelle Rodrigez MD  Signed: 10/22/2020 2:45 PM  Workstation Name: [id number]  [facility]      I ordered the above radiologic testing and reviewed the results    PROCEDURES  Procedures      PROGRESS AND CONSULTS  ED Course as of Oct 22 1538   Thu Oct 22, 2020   1300 At time of my initial assessment of patient she had had nursing protocol orders placed to include CBC, CMP, UA,  troponin, and BNP as well as a chest x-ray.  There are really no acute findings in her lab work except for hyponatremia of 128, which does seem to be a little on the low end for her.  She states she does have chronic hyponatremia but is usually in the 130s.  Her urine is negative although she has been complaining of some frequency to urinate.  Her main complaint here today is shortness of breath and congestion with right ear pain.  She just completed a course of steroids and antibiotics for suspected pneumonia after calling her PCP.  She tested negative for Covid at urgent care a couple of weeks ago, and this was just a couple of days after being exposed to Covid.  We will go ahead and retest her for Covid here, also adding on a D-dimer at this time.    [KS]   1533 Patient had CT angiogram of the chest to rule out PE since her dimer was elevated at 0.99.  Her study did not show any significant PE, it did show pneumonia in the left lower lobe.  She is just finished a course of amoxicillin which is likely subpar for a pt with emphysema and a lengthy list of comorbidities.  We will treat her with Cefpodoxime and doxycycline dual therapy.  She is to follow-up with Dr. Matt.  Recommended return to ER with any worsening or emergent symptoms.  Patient verbalizes understanding and is agreeable with this plan.    [KS]      ED Course User Index  [KS] Madai Subramanian PA-C           MEDICAL DECISION MAKING  Results were reviewed/discussed with the patient and they were also made aware of online access. Pt also made aware that some labs, such as cultures, will not be resulted during ER visit and follow up with PMD is necessary.     MDM        My differential diagnosis for dyspnea includes but is not limited to:  Asthma, COPD, pneumonia, pulmonary embolus, acute respiratory distress syndrome, pneumothorax, pleural effusion, pulmonary fibrosis, congestive heart failure, myocardial infarction, DKA, uremia, acidosis, sepsis,  anemia, drug related, hyperventilation, CNS disease      DIAGNOSIS  Final diagnoses:   Pneumonia of left lower lobe due to infectious organism   Pulmonary emphysema, unspecified emphysema type (CMS/HCC)       Latest Documented Vital Signs:  As of 15:38 EDT  BP- 122/80 HR- 87 Temp- 98.8 °F (37.1 °C) (Oral) O2 sat- 96%    DISPOSITION  Patient discharged home.    Smoking cessation discussed with patient.    Discussed pertinent labs and imaging findings with the patient/family.  Patient/Family voiced understanding of need to follow-up for recheck, further testing as needed.  Return to the emergency Department warnings were given.         Medication List      New Prescriptions    cefpodoxime 200 MG tablet  Commonly known as: VANTIN  Take 1 tablet by mouth Every 12 (Twelve) Hours for 7 days.     doxycycline 100 MG capsule  Commonly known as: MONODOX  Take 1 capsule by mouth Every 12 (Twelve) Hours for 7 days.        Changed    aspirin 81 MG tablet  Take 1 tablet by mouth Daily.  What changed: when to take this     atorvastatin 10 MG tablet  Commonly known as: LIPITOR  TAKE 1 TABLET EVERY DAY  What changed: when to take this     polyethylene glycol packet  Commonly known as: MIRALAX  Take 17 g by mouth Daily.  What changed: when to take this           Where to Get Your Medications      These medications were sent to [id number] - [facility] [address] [facility] [address] - [phone]  - [phone] [facility] [address], [facility] [address]    Phone: [phone]   · cefpodoxime 200 MG tablet  · doxycycline 100 MG capsule             Follow-up Information     Call  Delfino Matt MD.    Specialty: Family Medicine  Why: As needed, If not improved  Contact information:  [address] [facility] [address] [phone]                     Dictated utilizing Dragon dictation     Madai Subramanian PA-C  10/22/20 9506

## 2020-10-22 NOTE — TELEPHONE ENCOUNTER
PATIENT IS NOT FEELING ANY BETTER AFTER TAKING THE PRESCRIBED MEDICATION.     PATIENT STATES HER OXYGEN LEVEL IS DROPPING WHEN SHE WALKS AROUND, HER HEAD IS CONGESTED AND IS UNABLE TO BREATH THROUGH HER NOSE.PATIENT HAS HAD SOME CHILLS.  PATIENT IS NOT SURE IF SHE SHOULD JUST GO TO URGENT CARE OR WHAT WOULD BE BEST     PLEASE CALL AND ADVISE HER   [phone]

## 2020-10-23 ENCOUNTER — EPISODE CHANGES (OUTPATIENT)
Dept: CASE MANAGEMENT | Facility: OTHER | Age: 75
End: 2020-10-23

## 2020-10-23 ENCOUNTER — PATIENT OUTREACH (OUTPATIENT)
Dept: CASE MANAGEMENT | Facility: OTHER | Age: 75
End: 2020-10-23

## 2020-10-23 NOTE — OUTREACH NOTE
Care Plan Note      Responses   Lifestyle Goals  Avoid respiratory irritants, Fewer ER/urgent care visits, Fewer exacerbations, Medication management, Quit smoking, Reduce blood pressure, Eat a healthy diet   Barriers  Side effects of medication, Other (See Comment) [unable to stop smoking]   Self Management  Breathing techniques, Decrease smoking, Dietary Changes - Eat More Fruits/Vegetables, Medication Adherence, Use oxygen   Annual Wellness Visit:   Patient Has Completed   Care Gaps Addressed  Flu Shot, Pneumonia Vaccine, Mammogram   Flu Shot Status  Up to Date   Flu Shot Completion at [facility] or Other  [facility]   Mammogram Status  Up to Date   Mammogram Completion at [facility] or Other  [facility]   Pneumonia Vaccine Status  Up to Date   Pneumonia Vaccine Completion at [facility] or Other  [facility]   Specific Disease Process Teaching  COPD, Heart Disease, Hypertension   Other Patient Education/Resources   24/7 [facility] Nurse Call Line, Where to Go   24/7 Nurse Call Line Education Method  Verbal   Where To Go Education Method  Verbal   Does patient have depression diagnosis?  No   Ed Visits past 12 months:  3 or more   Hospitalizations past 12 months  2 or 3   Medication Adherence  Medications understood   Goal Progress  Making Progress Toward Goal(s)   Readiness Scale  4   Confidence Scale  6   How often do you have someone help you read hospital materials?  Never   How often do you have problems learning about your medical condition because of difficulty understanding written information?  Never   How often do you have a problem understanding what is told to you about your medical condition?  Occasionally   How confident are you filling out medical forms by yourself?  Quite a bit   Health Literacy  Good        The main concerns and/or symptoms the patient would like to address are: shortness of air, decrease smoking, COVID    Education/instruction provided by Care Coordinator: Incoming call from patient after  message left. Introduced self and role of ACM. Pt states she is doing better after initiation of medication received in ED. Pt verbalized understanding of her medication, use,side effects. Pt understands diagnosis of pneumonia in ED and has had the same in the past. Discussed need to complete all medications prescribed. Encouraged pt to keep well hydrated and coughing and deep breathing exercises. Pt confirms that she is on O2 at home at 3L and that she does have a pulse ox. Pt confirms that she continues to smoke at least a 1/2 pk of cigarettes per day. Discussed cessation. Pt has tried unsuccessfully in the past with the use of patches. She has not used medication in the past to help. Encouraged pt to discuss with her PCP. Advised pt to set a goal of decreasing usage of 1 cigarette per week to lessen her total intake. Pt agreeable and is willing to try.   Discussed health care gaps and pt is up to date on all but her shingles vaccine. Pt did have an adverse reaction in the past. Pt denies any additional questions or concerns. Pt would like to be enrolled in the care advising program.     Follow Up Outreach Due: monthly    Fatuma Brooks RN  Ambulatory     10/23/2020, 11:19 EDT

## 2020-10-23 NOTE — OUTREACH NOTE
Care Coordination Assessment    Documented/Reviewed By: Fatuma Brooks RN Date/time: 10/23/2020 11:09 AM   Assessment completed with: patient  Enrolled in care management program: Yes  Living arrangement: alone  Support system: children, family, friends  Type of residence: apartment  Home care services: No  Equipment used at home: oxygen/respiratory treatment (Comment: [place])  Communication device: No  Bed or wheelchair confined: No  Inadequate nutrition: No  Medication adherence problem: No  Experiencing side effects from current medications: No  History of fall(s) in last 6 months: No  Difficulty keeping appointments: No  Chronic pain: No

## 2020-11-02 RX ORDER — POTASSIUM CHLORIDE 750 MG/1
CAPSULE, EXTENDED RELEASE ORAL
Qty: 30 CAPSULE | Refills: 2 | Status: SHIPPED | OUTPATIENT
Start: 2020-11-02 | End: 2021-05-18

## 2020-12-01 ENCOUNTER — PATIENT OUTREACH (OUTPATIENT)
Dept: CASE MANAGEMENT | Facility: OTHER | Age: 75
End: 2020-12-01

## 2020-12-01 NOTE — OUTREACH NOTE
The main concerns and/or symptoms the patient would like to address are: COVID exposure over the holidays.     Education/instruction provided by Care Coordinator: Call to pt who states she is doing well. Her pneumonia has resolved. She completed her antibiotics and steroids. Pt remains in quarantine as much as possible. Pt is concerned regarding the upcoming Christmas holiday as her family is coming to her house for [place] dinner. She states that even though they had COVID they do not believe it is as bad as the news and Democrats have made it up to be. Pt states she does believe it is a true pandemic but her family will not follow precautions.   Suggested to pt to have a conversation with her family regarding her concerns. She states she has done this several times. She is afraid to cancel Christmas dinner. States they will disown me. Encouragement given. Suggested to pt there are precautions she can take such as wearing her mask when they are there. Washing her hands with  when touching shared surfaces. Reinforced need for pt to address this with her family. Pt denies any other questions or concerns at this time. Pt given ACM contact as well as [facility] nurse and COVID line.     Follow Up Outreach Due: none    Fatuma Brooks RN  Ambulatory     12/1/2020, 10:42 EST

## 2020-12-01 NOTE — OUTREACH NOTE
Care Coordination Assessment    Documented/Reviewed By: Fatuma Brooks RN Date/time: 12/1/2020 10:36 AM   Assessment completed with: patient  Enrolled in care management program: Yes  Living arrangement: alone  Support system: children, family, friends  Type of residence: apartment  Home care services: No  Equipment used at home: oxygen/respiratory treatment (Comment: [place])  Communication device: No  Bed or wheelchair confined: No  Inadequate nutrition: No  Medication adherence problem: No  Experiencing side effects from current medications: No  History of fall(s) in last 6 months: No  Difficulty keeping appointments: No  Chronic pain: No

## 2020-12-16 RX ORDER — ATORVASTATIN CALCIUM 10 MG/1
TABLET, FILM COATED ORAL
Qty: 90 TABLET | Refills: 1 | Status: SHIPPED | OUTPATIENT
Start: 2020-12-16 | End: 2021-06-08

## 2021-01-14 ENCOUNTER — PATIENT OUTREACH (OUTPATIENT)
Dept: CASE MANAGEMENT | Facility: OTHER | Age: 76
End: 2021-01-14

## 2021-01-14 NOTE — OUTREACH NOTE
The main concerns and/or symptoms the patient would like to address are: COVID vaccine availibility    Education/instruction provided by Care Coordinator: Call to pt who states she is doing well. She is still staying home most of the time with her daughter bringing her groceries on Fridays. Pt states she orders them on line and her daughter will pick them up for her. She states she and her family have very different political beliefs and this is causing some difficulties. Both she and her family have posted opposing views on social media and her granddaughter called and threatened to no longer bring her groceries to her. She states she could pick them up on her own but she would not be able to bring them into the house. Discussed several options with her in case this happens such as delivery. She does think [employer] delivers to her area but [place] does not. Discussed use of other on line services. Given information about Family Support services. Pt also has [employer] number that she will call if she needs assistance in setting up services.   She is very eager to get the COVID vaccine as soon as possible. Pt lives in [facility] and per the health dept site they are not giving her age group injections yet. Instructed pt to listen to the Governors daily update, watch health [facility] website as well as local news for availability. No other questions or concerns at this time.     Follow Up Outreach Due: monthly    Fatuma Brooks RN  Ambulatory     1/14/2021, 12:48 EST

## 2021-02-18 ENCOUNTER — PATIENT OUTREACH (OUTPATIENT)
Dept: CASE MANAGEMENT | Facility: OTHER | Age: 76
End: 2021-02-18

## 2021-02-18 NOTE — OUTREACH NOTE
The main concerns and/or symptoms the patient would like to address are: COVID vaccine    Education/instruction provided by Care Coordinator: Call to pt who states she is doing well. She is still staying home for the most part. She states her family is picking up her groceries for her still so that is not a problem. She is eager to get her COVID vaccine but has not found anywhere she can sign up. Offered to keep checking for her and call when appts are available. She would like [facility] to do that. Also agreed to being put on a waiting list at [facility]. [facility] did that and will notify her when appts are available.   Discussed safety during the recent snow and ice. She does feed some cats and has to go out in her backyard. Suggested she make sure to take her phone in case of a fall. No other questions and concerns at this time. Will continue to call monthly or when appts available    Follow Up Outreach Due:monthly    Fatuma Brooks RN  Ambulatory     2/18/2021, 11:28 EST

## 2021-02-22 ENCOUNTER — PATIENT OUTREACH (OUTPATIENT)
Dept: CASE MANAGEMENT | Facility: OTHER | Age: 76
End: 2021-02-22

## 2021-02-23 ENCOUNTER — TELEPHONE (OUTPATIENT)
Dept: SURGERY | Facility: CLINIC | Age: 76
End: 2021-02-23

## 2021-03-01 ENCOUNTER — PREP FOR SURGERY (OUTPATIENT)
Dept: OTHER | Facility: HOSPITAL | Age: 76
End: 2021-03-01

## 2021-03-01 ENCOUNTER — TELEPHONE (OUTPATIENT)
Dept: GASTROENTEROLOGY | Facility: CLINIC | Age: 76
End: 2021-03-01

## 2021-03-01 DIAGNOSIS — Z12.11 ENCOUNTER FOR SCREENING FOR MALIGNANT NEOPLASM OF COLON: Primary | ICD-10-CM

## 2021-03-01 DIAGNOSIS — Z80.0 FAMILY HISTORY OF COLON CANCER IN MOTHER: ICD-10-CM

## 2021-03-01 DIAGNOSIS — Z86.010 PERSONAL HISTORY OF COLONIC POLYPS: ICD-10-CM

## 2021-03-01 DIAGNOSIS — R10.13 DYSPEPSIA: ICD-10-CM

## 2021-03-01 DIAGNOSIS — Z87.19 HISTORY OF BARRETT'S ESOPHAGUS: ICD-10-CM

## 2021-03-05 PROBLEM — Z80.0 FAMILY HISTORY OF COLON CANCER IN MOTHER: Status: ACTIVE | Noted: 2021-03-05

## 2021-03-05 PROBLEM — Z87.19 HISTORY OF BARRETT'S ESOPHAGUS: Status: ACTIVE | Noted: 2021-03-05

## 2021-03-05 PROBLEM — Z86.010 PERSONAL HISTORY OF COLONIC POLYPS: Status: ACTIVE | Noted: 2021-03-05

## 2021-03-05 PROBLEM — Z86.0100 PERSONAL HISTORY OF COLONIC POLYPS: Status: ACTIVE | Noted: 2021-03-05

## 2021-03-05 PROBLEM — R10.13 DYSPEPSIA: Status: ACTIVE | Noted: 2021-03-05

## 2021-03-05 PROBLEM — Z12.11 ENCOUNTER FOR SCREENING FOR MALIGNANT NEOPLASM OF COLON: Status: ACTIVE | Noted: 2021-03-05

## 2021-03-05 NOTE — TELEPHONE ENCOUNTER
RETURN CALL FROM PATIENT.  SCHEDULED AT [place] ON 07/28/2021 AT 2:45PM- ARRIVE 1:30PM.  E-MAIL INSTRUCTIONS [email or web address].    COVID TEST ON 07/26/2021, WILL CALL WITH TIME.  NEED TO SELF QUARANTINE UNTIL AFTER PROCEDURE.  SHE UNDERSTANDS.

## 2021-03-15 ENCOUNTER — PATIENT OUTREACH (OUTPATIENT)
Dept: CASE MANAGEMENT | Facility: OTHER | Age: 76
End: 2021-03-15

## 2021-03-15 ENCOUNTER — OFFICE VISIT (OUTPATIENT)
Dept: FAMILY MEDICINE CLINIC | Facility: CLINIC | Age: 76
End: 2021-03-15

## 2021-03-15 ENCOUNTER — TELEPHONE (OUTPATIENT)
Dept: FAMILY MEDICINE CLINIC | Facility: CLINIC | Age: 76
End: 2021-03-15

## 2021-03-15 DIAGNOSIS — J43.1 PANLOBULAR EMPHYSEMA (HCC): Primary | ICD-10-CM

## 2021-03-15 DIAGNOSIS — E78.2 MIXED HYPERLIPIDEMIA: ICD-10-CM

## 2021-03-15 DIAGNOSIS — R94.6 ABNORMAL RESULTS OF THYROID FUNCTION STUDIES: ICD-10-CM

## 2021-03-15 DIAGNOSIS — D64.9 CHRONIC ANEMIA: ICD-10-CM

## 2021-03-15 DIAGNOSIS — T50.B95A ADVERSE EFFECT OF COVID-19 VACCINE: ICD-10-CM

## 2021-03-15 PROBLEM — Z01.818 PREOPERATIVE EXAMINATION, UNSPECIFIED: Status: RESOLVED | Noted: 2020-03-11 | Resolved: 2021-03-15

## 2021-03-15 PROCEDURE — 99442 PR PHYS/QHP TELEPHONE EVALUATION 11-20 MIN: CPT | Performed by: FAMILY MEDICINE

## 2021-03-15 RX ORDER — FUROSEMIDE 40 MG/1
40 TABLET ORAL AS NEEDED
COMMUNITY
Start: 2020-12-22 | End: 2021-12-26

## 2021-03-15 RX ORDER — METHYLPREDNISOLONE 4 MG/1
1 TABLET ORAL DAILY
COMMUNITY
Start: 2021-03-12 | End: 2021-08-06

## 2021-03-15 RX ORDER — AMOXICILLIN AND CLAVULANATE POTASSIUM 875; 125 MG/1; MG/1
1 TABLET, FILM COATED ORAL 2 TIMES DAILY
COMMUNITY
Start: 2021-03-12 | End: 2021-08-06

## 2021-03-15 NOTE — TELEPHONE ENCOUNTER
Patient states she got her first Covid vaccine on Wednesday (3.10.2021) and had the following symptoms:  - chills  - nausea  - short of breath (at rest at times)  - dry heaves  - hoarse  - fatigue  - body aches    Patient would like to know if she should get the second vaccine. She is worried with how bad her reaction was to the first shot.    Please call patient at [phone].

## 2021-03-15 NOTE — OUTREACH NOTE
Patient Outreach Note    Incoming call from pt who states she has a question. Pt states she had her first vaccine for COVID 3/10. By 4am the following morning she began vomiting. Thursday night she became short of breath at times. By Friday she was so sick and short of air she thought about going to the ED but did not feel up to going. She called her niece who is a nurse practitioner. She did not tell her she had gotten her vaccine only that she was sick and short of air. She called in a zpack and steroids for her. She cancelled her appt with Dr Matt for today on Friday because she was afraid she would not be able to get there. She is calling this ACM to see if she should get her second vaccine. Pt instructed to call Dr Matt making sure to tell him the complete story. She states she felt better almost immediately after taking her antibiotic and steroid on Friday. Pt to call today to see if she should be seen and if Dr Matt feels she should take the second vaccine. Call routed to PCP.      Fatuma Brooks RN  Ambulatory     3/15/2021, 12:47 EDT

## 2021-03-15 NOTE — PROGRESS NOTES
Subjective   Mary Jack is a 75 y.o. female who is here for   Chief Complaint   Patient presents with   • Covid-19 Home Monitoring Phone Call   .   Incoming call from pt who states she has a question. Pt states she had her first vaccine for COVID 3/10. By 4 am the following morning she began vomiting. Thursday night she became short of breath at times. By Friday she was so sick and short of air she thought about going to the ED but did not feel up to going. She called her niece who is a nurse practitioner. She did not tell her she had gotten her vaccine only that she was sick and short of air. She called in a zpak and steroids for her. She cancelled her appt with Dr Matt for today on Friday because she was afraid she would not be able to get there. She is calling this ACM to see if she should get her second vaccine. Pt instructed to call Dr Matt making sure to tell him the complete story. She states she felt better almost immediately after taking her antibiotic and steroid on Friday. Pt to call today to see if she should be seen and if Dr Matt feels she should take the second vaccine. Call routed to PCP.      Patient states she got her first Covid vaccine on Wednesday (3.10.2021) and had the following symptoms:  - chills  - nausea  - short of breath (at rest at times)  - dry heaves  - hoarse  - fatigue  - body aches     Patient would like to know if she should get the second vaccine. She is worried with how bad her reaction was to the first shot.     Please call patient at [phone].      History of Present Illness   Today is telephone medical visit between Dr Delfino Matt and current patient.  Consent is given by patient for this encounter.  This is occurring during the current COVID-19 pandemic of 2020-1; with social isolation mandates per federal and state guidelines. Patient's chart has been reviewed.  Medical history from chart is reviewed. Patient location is per their choice. Provider  location is in my routine medical office in [facility].  Regarding EM coding: history will not be as robust and exam will not be possible. Most EM coding decisions will be based on MDM and time. Time included pre and post charting and time spent speaking with the patient. Time spent for encounter is 15 minutes.    Telephone encounter with Ms. Mary Jack today.  See the above 2 messages.  Mary has advanced COPD.  She received her first Covid 19 vaccine, the [place] brand a few weeks ago.  A day or 2 later she began feeling ill with headache fever cough shortness of breath.  Marisol would not exactly be typical side effects reaction to vaccine.  But there is considerable overlap with this and her usual episodic flares of COPD.  She received antibiotics and steroids from another provider.  And began to feel better  Explained really no way to tell if it was a flare of her COPD or a vaccine reaction.  But she does receive the booster and her recent illness was due to the vaccine, her symptoms could be much worse the next time.  Therefore we will hold off on her booster for now.  We will check antibodies for COVID-19  in a few months.  If she has antibodies she can probably skip the second vaccine booster  There are no antibodies; then she may proceed to receive her Moderna booster sometime this summer, as the vaccine will be much more readily available at that time        The following portions of the patient's history were reviewed and updated as appropriate: allergies, current medications, past family history, past medical history, past social history, past surgical history and problem list.    Review of Systems    Objective   Physical Exam    Assessment/Plan   Diagnoses and all orders for this visit:    1. Panlobular emphysema (CMS/HCC) (Primary)  -     CBC & Differential; Future  -     Comprehensive Metabolic Panel; Future  -     Lipid Panel; Future  -     TSH; Future  -     Urinalysis With Microscopic If  Indicated (No Culture) - Urine, Clean Catch; Future  -     SARS-CoV-2 Antibodies (Roche); Future    2. Adverse effect of COVID-19 vaccine  -     SARS-CoV-2 Antibodies (Roche); Future    3. Mixed hyperlipidemia  -     Lipid Panel; Future    4. Chronic anemia  -     CBC & Differential; Future    5. Abnormal results of thyroid function studies   -     TSH; Future      There are no Patient Instructions on file for this visit.    There are no discontinued medications.     Return in about 4 months (around 7/15/2021) for Medicare Wellness visit.    Dr. Delfino Matt  [place], [place]

## 2021-04-07 ENCOUNTER — TELEPHONE (OUTPATIENT)
Dept: GASTROENTEROLOGY | Facility: CLINIC | Age: 76
End: 2021-04-07

## 2021-04-07 NOTE — TELEPHONE ENCOUNTER
PATIENT CALLED AND LEFT MESSAGE ON MY VOICE MAIN ON 4/06/2021.  STILL HAVE NOT RECEIVED PREP INSTRUCTIONS.  PLEASE CALL.

## 2021-04-07 NOTE — TELEPHONE ENCOUNTER
CALLED AND SPOKE WITH PATIENT.  SHE STATES NEVER RECEIVED INSTRUCTIONS.  EXPLAINED EMAILED ON 03/08/2021.  SHE DID RECEIVE THEM, BUT ONCE PRINTED HALF PAGE.  VERIFY ADDRESS, WHICH IS CORRECT.  MAILED INSTRUCTIONS TODAY.

## 2021-04-15 ENCOUNTER — PATIENT OUTREACH (OUTPATIENT)
Dept: CASE MANAGEMENT | Facility: OTHER | Age: 76
End: 2021-04-15

## 2021-04-15 NOTE — OUTREACH NOTE
The main concerns and/or symptoms the patient would like to address are: upcoming colonoscopy/EGD    Education/instruction provided by Care Coordinator: Call to pt for monthly check in. Pt states she is doing well. Not having any new issues. She did contact Dr Matt regarding her first vaccine reaction and advised to not take the second injection yet. They are going to check her antibodies in a few months to see if she will have to take the second one. She does have her colonoscopy scheduled in July. Long enjoyable conversation with pt. No new issues at this time.     Follow Up Outreach Due: monthly    Fatuma Brooks RN  Ambulatory     4/15/2021, 13:34 EDT

## 2021-05-18 RX ORDER — POTASSIUM CHLORIDE 750 MG/1
CAPSULE, EXTENDED RELEASE ORAL
Qty: 30 CAPSULE | Refills: 0 | Status: SHIPPED | OUTPATIENT
Start: 2021-05-18 | End: 2021-08-06 | Stop reason: SDUPTHER

## 2021-05-25 ENCOUNTER — PATIENT OUTREACH (OUTPATIENT)
Dept: CASE MANAGEMENT | Facility: OTHER | Age: 76
End: 2021-05-25

## 2021-05-25 NOTE — OUTREACH NOTE
The main concerns and/or symptoms the patient would like to address are: no new concerns    Education/instruction provided by Care Coordinator: Call to pt who states she is doing well. She has not had any flare ups. She is schedule for labwork 7/8 to check her antibody levels to see if she will need to take her second vaccine. It is unknown if she had a reaction. She has followed up with her pulmonary MD and not changes made. She is scheduled for her colonoscopy July 28th with Dr Portillo. No new questions or concerns. Long enjoyable conversation with pt.     Follow Up Outreach Due: 1 month    Fatuma Brooks RN  Ambulatory     5/25/2021, 13:19 EDT

## 2021-06-08 RX ORDER — ATORVASTATIN CALCIUM 10 MG/1
TABLET, FILM COATED ORAL
Qty: 90 TABLET | Refills: 0 | Status: SHIPPED | OUTPATIENT
Start: 2021-06-08 | End: 2021-09-13

## 2021-06-15 ENCOUNTER — TRANSCRIBE ORDERS (OUTPATIENT)
Dept: ADMINISTRATIVE | Facility: HOSPITAL | Age: 76
End: 2021-06-15

## 2021-06-15 DIAGNOSIS — U07.1 COVID-19: Primary | ICD-10-CM

## 2021-07-20 ENCOUNTER — HOSPITAL ENCOUNTER (OUTPATIENT)
Dept: GENERAL RADIOLOGY | Facility: HOSPITAL | Age: 76
Discharge: HOME | End: 2021-07-20
Admitting: FAMILY MEDICINE

## 2021-07-20 ENCOUNTER — TELEPHONE (OUTPATIENT)
Dept: FAMILY MEDICINE CLINIC | Facility: CLINIC | Age: 76
End: 2021-07-20

## 2021-07-20 DIAGNOSIS — S49.90XA SHOULDER INJURY, INITIAL ENCOUNTER: Primary | ICD-10-CM

## 2021-07-20 DIAGNOSIS — S49.90XA SHOULDER INJURY, INITIAL ENCOUNTER: ICD-10-CM

## 2021-07-20 PROCEDURE — 73030 X-RAY EXAM OF SHOULDER: CPT

## 2021-07-20 NOTE — TELEPHONE ENCOUNTER
Please advise for x ray orders.     Ok shoulder x rays are ordered  Unsure if left or right?    Delfino Matt MD

## 2021-07-20 NOTE — TELEPHONE ENCOUNTER
Pt fell on right shoulder, I advised her order is In chart and can go ahead and get this done today.

## 2021-07-20 NOTE — TELEPHONE ENCOUNTER
Patient fell and hit her shoulder on concrete. She says she is now in a lot of pain and she cannot move her arm. I have let patient know that we do not have any available appointments left today. She asked if we could advise her on what to do next.

## 2021-07-20 NOTE — TELEPHONE ENCOUNTER
Caller: Mary Jack    Relationship to patient: Self    Best call back number: [phone]    Patient is needing: PATIENT STATES SHE HEARD BACK FROM THE IMAGING DEPARTMENT NEXT DOOR TO THE OFFICE AND WAS ADVISED THEY CAN GET HER IN TODAY IF DR. OROPEZA CAN CALL THE ORDERS IN. CALLBACK REQUESTED TO DETERMINE IF DR. OROPEZA WILL CALL ORDERS IN OR NOT. PATIENT CAN WALK IN ONCE ORDERS ARE RECEIVED.

## 2021-07-21 ENCOUNTER — TELEPHONE (OUTPATIENT)
Dept: GASTROENTEROLOGY | Facility: CLINIC | Age: 76
End: 2021-07-21

## 2021-07-21 NOTE — TELEPHONE ENCOUNTER
CALL FROM PATIENT.  NEEDS TO CANCEL PER EGD & COLONOSCOPY ON 07/28/2021.  SHE FELL LAST NIGHT AND UNABLE TO USE HER RIGHT ARM.  ER TOLD HER COULD BE ROTATOR CUFF, MIGHT NEED SURGERY.  SHE WILL CALL US BACK ONCE CLEARED OR FEELING BETTER.

## 2021-07-23 ENCOUNTER — PATIENT OUTREACH (OUTPATIENT)
Dept: CASE MANAGEMENT | Facility: OTHER | Age: 76
End: 2021-07-23

## 2021-07-23 NOTE — OUTREACH NOTE
Ambulatory Case Management Note    Call to pt who states she is not doing to well right now. She fell last week and hurt her shoulder. She has had an xray which was negative for a break but she continues to have a lot of pain. Discussed outreach to Dr Matt to see if she needs further work up but she would prefer to wait until next week to see if there is an improvement. She can not drive right now or wash her hair. Her granddaughter is coming this afternoon to help her out. Discussed need for life alert and suggested she contact her insurance to see if they cover or have a discount on this. She is agreeable.  Pt's main concern is her inability to drive if she needs an xray or MD appt. Offered to assist but she wants to wait until next week. Pt agreeable to check in next week and if needed will help make arrangements at that time. Will call back Thursday.     There are no recently modified care plans to display for this patient.      Fatuma Brooks RN  Ambulatory Case Management    7/23/2021, 12:48 EDT

## 2021-07-26 ENCOUNTER — LAB (OUTPATIENT)
Dept: LAB | Facility: HOSPITAL | Age: 76
End: 2021-07-26

## 2021-07-26 DIAGNOSIS — U07.1 COVID-19: ICD-10-CM

## 2021-07-29 ENCOUNTER — PATIENT OUTREACH (OUTPATIENT)
Dept: CASE MANAGEMENT | Facility: OTHER | Age: 76
End: 2021-07-29

## 2021-07-29 NOTE — OUTREACH NOTE
Patient Outreach    Ambulatory Case Management Note    Call to pt to check in. Pt states her arm/shulder is better. She has more movement but she did call and schedule an appt with Dr Lundy. She does have transportation. No other questions or concerns. Will call next monthl    Fatuma Brooks RN  Ambulatory Case Management    7/29/2021, 12:12 EDT

## 2021-08-02 ENCOUNTER — OFFICE VISIT (OUTPATIENT)
Dept: ORTHOPEDIC SURGERY | Facility: CLINIC | Age: 76
End: 2021-08-02

## 2021-08-02 VITALS
HEIGHT: 63 IN | DIASTOLIC BLOOD PRESSURE: 80 MMHG | WEIGHT: 171 LBS | BODY MASS INDEX: 30.3 KG/M2 | HEART RATE: 69 BPM | SYSTOLIC BLOOD PRESSURE: 163 MMHG

## 2021-08-02 DIAGNOSIS — M75.51 SUBACROMIAL BURSITIS OF RIGHT SHOULDER JOINT: Primary | ICD-10-CM

## 2021-08-02 DIAGNOSIS — M75.81 ROTATOR CUFF TENDINITIS, RIGHT: ICD-10-CM

## 2021-08-02 PROCEDURE — 99214 OFFICE O/P EST MOD 30 MIN: CPT | Performed by: ORTHOPAEDIC SURGERY

## 2021-08-02 PROCEDURE — 20610 DRAIN/INJ JOINT/BURSA W/O US: CPT | Performed by: ORTHOPAEDIC SURGERY

## 2021-08-02 RX ORDER — TRIAMCINOLONE ACETONIDE 40 MG/ML
40 INJECTION, SUSPENSION INTRA-ARTICULAR; INTRAMUSCULAR
Status: COMPLETED | OUTPATIENT
Start: 2021-08-02 | End: 2021-08-02

## 2021-08-02 RX ORDER — MELOXICAM 7.5 MG/1
7.5 TABLET ORAL DAILY
Qty: 30 TABLET | Refills: 5 | Status: SHIPPED | OUTPATIENT
Start: 2021-08-02 | End: 2021-09-29 | Stop reason: ALTCHOICE

## 2021-08-02 RX ORDER — LIDOCAINE HYDROCHLORIDE 10 MG/ML
4 INJECTION, SOLUTION EPIDURAL; INFILTRATION; INTRACAUDAL; PERINEURAL
Status: COMPLETED | OUTPATIENT
Start: 2021-08-02 | End: 2021-08-02

## 2021-08-02 RX ADMIN — LIDOCAINE HYDROCHLORIDE 4 ML: 10 INJECTION, SOLUTION EPIDURAL; INFILTRATION; INTRACAUDAL; PERINEURAL at 10:55

## 2021-08-02 RX ADMIN — TRIAMCINOLONE ACETONIDE 40 MG: 40 INJECTION, SUSPENSION INTRA-ARTICULAR; INTRAMUSCULAR at 10:55

## 2021-08-02 NOTE — PROGRESS NOTES
Subjective: Right shoulder pain     Patient ID: Mary Jack is a 75 y.o. female.    Chief Complaint:    History of Present Illness 75-year-old female known to me previously treated for adhesive capsulitis of the left shoulder is seen for new problem involving her right shoulder.  Back on 20 July she fell against a concrete wall near her home striking the lateral aspect of the right shoulder developed immediate pain and discomfort.  Initially did not elevate her arm much above 30 to 40 degrees in either extension or abduction.  Developed some bruising along the midportion of the biceps muscle but nothing around the shoulder itself.  Is started taking meloxicam recently that she had when I was treating her for the adhesive capsulitis and has noted decrease in her pain with improving range of motion.  Initially she could not extend or abduct the shoulder above 30 degrees.  No other injuries associated with the fall.       Social History     Occupational History   • Occupation: retired     Employer: [employer]   Tobacco Use   • Smoking status: Current Every Day Smoker     Packs/day: 0.50     Years: 55.00     Pack years: 27.50     Types: Cigarettes     Start date: 8/3/2016   • Smokeless tobacco: Never Used   • Tobacco comment: 64 YEARS   Vaping Use   • Vaping Use: Never used   Substance and Sexual Activity   • Alcohol use: No   • Drug use: No   • Sexual activity: Not Currently     Partners: Male     Birth control/protection: Post-menopausal      Review of Systems   Constitutional: Negative for chills, diaphoresis, fever and unexpected weight change.   HENT: Negative for hearing loss, nosebleeds, sore throat and tinnitus.    Eyes: Negative for pain and visual disturbance.   Respiratory: Negative for cough, shortness of breath and wheezing.    Cardiovascular: Negative for chest pain and palpitations.   Gastrointestinal: Negative for abdominal pain, diarrhea, nausea and vomiting.   Endocrine: Negative  for cold intolerance, heat intolerance and polydipsia.   Genitourinary: Negative for difficulty urinating, dysuria and hematuria.   Musculoskeletal: Positive for arthralgias and myalgias. Negative for joint swelling.   Skin: Negative for rash and wound.   Allergic/Immunologic: Negative for environmental allergies.   Neurological: Negative for dizziness, syncope and numbness.   Hematological: Does not bruise/bleed easily.   Psychiatric/Behavioral: Negative for dysphoric mood and sleep disturbance. The patient is not nervous/anxious.          Past Medical History:   Diagnosis Date   • AAA (abdominal aortic aneurysm) (CMS/HCC)    • Allergic not sure    CODINE,MORFINE AND DYE SOLUTION   • Anemia    • Aneurysm of abdominal aorta (CMS/HCC)    • Arthritis    • Zepeda esophagus    • CAD (coronary artery disease)    • Chest pain    • Constipation    • COPD (chronic obstructive pulmonary disease) (CMS/HCC)    • Emphysema lung (CMS/HCC)    • Empyema (CMS/HCC)    • Fatigue    • GERD (gastroesophageal reflux disease)    • Headache    • Hiatal hernia    • High risk medication use    • History of cardiac catheterization     CATH STENT PLACEMENT: CIRCUMFLEX BRANCH   • Hyperlipidemia    • Hypertension    • Infectious viral hepatitis Jaundice in 1963   • Jaundice    • Lumbosacral disc disease    • Myocardial infarction (CMS/HCC)    • Myocardial infarction (CMS/HCC)    • Occult blood in stools    • On home O2    • Peripheral artery disease (CMS/HCC)    • Reflux gastritis    • Scoliosis    • Visual impairment     BLURRY VISION IN RT. EYE     Past Surgical History:   Procedure Laterality Date   • CARDIAC CATHETERIZATION     • CARDIAC CATHETERIZATION N/A 5/12/2017    Procedure: Left Heart Cath;  Surgeon: Herber Hayes MD;  Location: [facility] INVASIVE LOCATION;  Service:    • CARDIAC CATHETERIZATION N/A 7/1/2020    Procedure: Left Heart Cath;  Surgeon: Herber Hayes MD;  Location: [facility] INVASIVE LOCATION;   Service: Cardiology;  Laterality: N/A;   • CARDIAC CATHETERIZATION N/A 7/1/2020    Procedure: Coronary angiography;  Surgeon: Herber Hayes MD;  Location: [facility] CATH INVASIVE LOCATION;  Service: Cardiology;  Laterality: N/A;   • CARDIAC CATHETERIZATION N/A 7/1/2020    Procedure: Left ventriculography;  Surgeon: Herber Hayes MD;  Location:  [facility] CATH INVASIVE LOCATION;  Service: Cardiology;  Laterality: N/A;   • CAROTID ENDARTERECTOMY Right 3/16/2020    Procedure: RIGHT CAROTID ENDARTERECTOMY;  Surgeon: Mahesh Salmon MD;  Location: [facility] MAIN OR;  Service: Vascular;  Laterality: Right;   • CHOLECYSTECTOMY     • COLONOSCOPY     • COLONOSCOPY N/A 6/10/2016    Procedure: COLONOSCOPY with polypectomy;  Surgeon: Virgie Castelan MD;  Location: [facility] OR;  Service:    • CORONARY STENT PLACEMENT      X2   • ENDOSCOPY N/A 6/10/2016    Procedure: ESOPHAGOGASTRODUODENOSCOPY WITH BIOPSY;  Surgeon: Virgie Castelan MD;  Location: [facility] OR;  Service:    • ENDOSCOPY     • ENDOSCOPY N/A 8/25/2017    Procedure: ESOPHAGOGASTRODUODENOSCOPY w/ biopsies;  Surgeon: Virgie Castelan MD;  Location: [facility] OR;  Service:      Family History   Problem Relation Age of Onset   • Colon cancer Mother    • Breast cancer Mother    • Other Father         cardiac disorder   • Hypertension Father    • Heart disease Father    • Other Sister         cardiac disorder   • Hypertension Sister    • Lung disease Sister    • Thyroid disease Sister    • Other Brother         cardiac disorder   • Thyroid disease Daughter    • Heart disease Sister    • Hypertension Sister    • Pulmonary fibrosis Sister    • Heart disease Brother    • Other Brother    • Thyroid disease Sister    • Other Sister    • Thyroid disease Daughter    • Malig Hyperthermia Neg Hx          Objective:  Vitals:    08/02/21 1044   BP: 163/80   Pulse: 69         08/02/21  1044   Weight: 77.6 kg (171 lb)     Body mass index is 30.29 kg/m².        Ortho Exam   X-rays done at  Dr. Matt's office on 20 July AP and oblique view shows no acute or chronic injuries.  She is alert and oriented x3.  Head is normocephalic and sclerae clear.  Left upper extremity has no motor deficit with right radial ulnar median nerve function.  She does have some numbness she states she she is always had in the index finger but she has good capillary refill there is no swelling.  Full range of motion of the elbow for flexion extension pronation supination but there is some anterior shoulder pain with full flexion.  She can extend about 165-170 degrees and abduct to about 160 degrees with some mild pain.  Deltoid function is 4-1/2 out of 5 with minimal pain.  Positive Montiel sign.  Negative Speed test.  External rotation is 40 degrees and internal rotation is to about L1.  Again she has some ecchymosis or bruising of the skin over the biceps muscle of the distal biceps tendon is intact and nontender.  Skin is cool to touch.    Assessment:  RIGHT SHOULDER, 07/20/2021         HISTORY:  75-year-old female with posterior shoulder/scapular region pain after a  fall today.     TECHNIQUE:  Three-view right shoulder series.     FINDINGS:  No fracture, dislocation or other acute osseous abnormality is  demonstrated. AC joint and right clavicle are also unremarkable.  Surgical clips visible in the right side of the neck.     IMPRESSION:  Negative right shoulder series.     This report was finalized on 7/20/2021 4:45 PM by Dr. Indra Lemon MD.      1. Subacromial bursitis of right shoulder joint    2. Rotator cuff tendinitis, right           Plan: Reviewed the patient's history his x-rays and physical exam with her.  I believe we are dealing with contusion of her right rotator cuff with resultant bursitis and rotator cuff tendinitis.  She is improving on the meloxicam so I refilled that prescription and she can continue with the meloxicam on a daily basis.  After reviewing other treatment options recumbency with a  cortisone injection of lidocaine and Kenalog at a ratio 4:1.  That was given to the posterior portal tolerating well.  Postinjection instructions given to the patient.  Return to see me in a month if still symptomatic we will consider an MRI.  Answered all questions  Large Joint Arthrocentesis: R subacromial bursa  Date/Time: 8/2/2021 10:55 AM  Consent given by: patient  Site marked: site marked  Timeout: Immediately prior to procedure a time out was called to verify the correct patient, procedure, equipment, support staff and site/side marked as required   Supporting Documentation  Indications: pain   Procedure Details  Location: shoulder - R subacromial bursa  Preparation: Patient was prepped and draped in the usual sterile fashion  Needle size: 22 G  Medications administered: 40 mg triamcinolone acetonide 40 MG/ML; 4 mL lidocaine PF 1% 1 %  Patient tolerance: patient tolerated the procedure well with no immediate complications                  Work Status:    LILIAN query complete.    Orders:  Orders Placed This Encounter   Procedures   • Large Joint Arthrocentesis: R subacromial bursa       Medications:  New Medications Ordered This Visit   Medications   • meloxicam (MOBIC) 7.5 MG tablet     Sig: Take 1 tablet by mouth Daily.     Dispense:  30 tablet     Refill:  5       Followup:  Return in about 4 weeks (around 8/30/2021).          Dictated utilizing Dragon dictation   Answers for HPI/ROS submitted by the patient on 7/27/2021  Please describe your symptoms.: pain in rt arm due to a fall on 7/20/2021  Have you had these symptoms before?: No  How long have you been having these symptoms?: 1-2 weeks  Please list any medications you are currently taking for this condition.: Tylenol,  Please describe any probable cause for these symptoms. : A fall hitting mt right shoulder  What is the primary reason for your visit?: Other

## 2021-08-05 ENCOUNTER — PATIENT MESSAGE (OUTPATIENT)
Dept: FAMILY MEDICINE CLINIC | Facility: CLINIC | Age: 76
End: 2021-08-05

## 2021-08-05 DIAGNOSIS — I25.9 ISCHEMIC HEART DISEASE DUE TO CORONARY ARTERY OBSTRUCTION (HCC): Primary | ICD-10-CM

## 2021-08-05 DIAGNOSIS — I24.0 ISCHEMIC HEART DISEASE DUE TO CORONARY ARTERY OBSTRUCTION (HCC): Primary | ICD-10-CM

## 2021-08-06 RX ORDER — POTASSIUM CHLORIDE 750 MG/1
20 CAPSULE, EXTENDED RELEASE ORAL DAILY
Qty: 60 CAPSULE | Refills: 5 | Status: SHIPPED | OUTPATIENT
Start: 2021-08-06 | End: 2022-08-15 | Stop reason: SDUPTHER

## 2021-08-06 NOTE — TELEPHONE ENCOUNTER
From: Mary Jack  To: Delfino Matt MD  Sent: 8/5/2021 6:40 PM EDT  Subject: Prescription Question    I want to know why Dr. Matt hasn't approved a prescription of Potassium ,I need it to take with Lasix ,that I take about every other day ,Dr. Matt and I have discussed this before, he said I could take 1/2 dose a day but am out of Lasix/ thankyou.

## 2021-08-31 ENCOUNTER — OFFICE VISIT (OUTPATIENT)
Dept: FAMILY MEDICINE CLINIC | Facility: CLINIC | Age: 76
End: 2021-08-31

## 2021-08-31 VITALS
DIASTOLIC BLOOD PRESSURE: 84 MMHG | OXYGEN SATURATION: 96 % | BODY MASS INDEX: 30.83 KG/M2 | SYSTOLIC BLOOD PRESSURE: 130 MMHG | HEART RATE: 64 BPM | WEIGHT: 174 LBS | HEIGHT: 63 IN

## 2021-08-31 DIAGNOSIS — I24.0 ISCHEMIC HEART DISEASE DUE TO CORONARY ARTERY OBSTRUCTION (HCC): ICD-10-CM

## 2021-08-31 DIAGNOSIS — K22.70 BARRETT'S ESOPHAGUS WITHOUT DYSPLASIA: ICD-10-CM

## 2021-08-31 DIAGNOSIS — Z00.00 MEDICARE ANNUAL WELLNESS VISIT, SUBSEQUENT: Primary | ICD-10-CM

## 2021-08-31 DIAGNOSIS — Z12.31 ENCOUNTER FOR SCREENING MAMMOGRAM FOR MALIGNANT NEOPLASM OF BREAST: ICD-10-CM

## 2021-08-31 DIAGNOSIS — I25.9 ISCHEMIC HEART DISEASE DUE TO CORONARY ARTERY OBSTRUCTION (HCC): ICD-10-CM

## 2021-08-31 DIAGNOSIS — Z78.0 MENOPAUSE: ICD-10-CM

## 2021-08-31 PROBLEM — Z86.16 HISTORY OF 2019 NOVEL CORONAVIRUS DISEASE (COVID-19): Status: ACTIVE | Noted: 2021-08-31

## 2021-08-31 PROCEDURE — G0439 PPPS, SUBSEQ VISIT: HCPCS | Performed by: FAMILY MEDICINE

## 2021-08-31 PROCEDURE — 1170F FXNL STATUS ASSESSED: CPT | Performed by: FAMILY MEDICINE

## 2021-08-31 PROCEDURE — 96160 PT-FOCUSED HLTH RISK ASSMT: CPT | Performed by: FAMILY MEDICINE

## 2021-08-31 PROCEDURE — 1126F AMNT PAIN NOTED NONE PRSNT: CPT | Performed by: FAMILY MEDICINE

## 2021-08-31 PROCEDURE — 1160F RVW MEDS BY RX/DR IN RCRD: CPT | Performed by: FAMILY MEDICINE

## 2021-08-31 RX ORDER — OMEPRAZOLE 40 MG/1
40 CAPSULE, DELAYED RELEASE ORAL DAILY
Qty: 90 CAPSULE | Refills: 3 | Status: SHIPPED | OUTPATIENT
Start: 2021-08-31 | End: 2022-08-01

## 2021-08-31 NOTE — PROGRESS NOTES
The ABCs of the Annual Wellness Visit  Subsequent Medicare Wellness Visit    Chief Complaint   Patient presents with   • Medicare Wellness-subsequent      Subjective    History of Present Illness:  Mary Jack is a 75 y.o. female who presents for a Subsequent Medicare Wellness Visit.    The following portions of the patient's history were reviewed and   updated as appropriate: allergies, current medications, past family history, past medical history, past social history, past surgical history and problem list.     Compared to one year ago, the patient feels her physical   health is the same.    Compared to one year ago, the patient feels her mental   health is the same.    Recent Hospitalizations:  She was not admitted to the hospital during the last year.       Current Medical Providers:  Patient Care Team:  eDlfino Matt MD as PCP - General  Oral Llanes MD as Consulting Physician (Pulmonary Disease)  Herber Hayes MD as Consulting Physician (Cardiology)  Virgie Castelan MD as Consulting Physician (Gastroenterology)  Maxi Lundy MD as Surgeon (Orthopedic Surgery)  Mahesh Salmon MD as Surgeon (Vascular Surgery)  Donato Vaughn MD as Consulting Physician (Nephrology)  Fatuma Brooks RN as Ambulatory  ([facility])    Outpatient Medications Prior to Visit   Medication Sig Dispense Refill   • albuterol sulfate  (90 Base) MCG/ACT inhaler Inhale 2 puffs Every 4 (Four) Hours As Needed for Wheezing. 6.7 g 0   • aspirin 81 MG tablet Take 1 tablet by mouth Daily. (Patient taking differently: Take 81 mg by mouth Every Night.) 90 tablet 0   • atorvastatin (LIPITOR) 10 MG tablet TAKE 1 TABLET EVERY DAY 90 tablet 0   • Calcium Carbonate-Vitamin D (CALCIUM 600+D HIGH POTENCY PO) Take  by mouth 2 (Two) Times a Day.     • furosemide (LASIX) 40 MG tablet Take 40 mg by mouth Daily.     • meloxicam (MOBIC) 7.5 MG tablet Take 1 tablet by mouth Daily. 30 tablet 5   •  nitroglycerin (NITROSTAT) 0.4 MG SL tablet Place 0.4 mg under the tongue Every 5 (Five) Minutes As Needed for Chest Pain. Take no more than 3 doses in 15 minutes.     • O2 (OXYGEN) Inhale 3 L/min Continuous.     • polyethylene glycol (MIRALAX) packet Take 17 g by mouth Daily. (Patient taking differently: Take 17 g by mouth Every Morning.) 850 g 3   • potassium chloride (MICRO-K) 10 MEQ CR capsule Take 2 capsules by mouth Daily. 60 capsule 5   • Trelegy Ellipta 100-62.5-25 MCG/INH aerosol powder  Inhale 1 puff Every Morning.     • vitamin B-12 (CYANOCOBALAMIN) 1000 MCG tablet Take 1,000 mcg by mouth Every Morning.     • vitamin C (ASCORBIC ACID) 500 MG tablet Take 500 mg by mouth Every Morning.     • metoprolol tartrate (LOPRESSOR) 25 MG tablet Take 1 tablet by mouth 2 (Two) Times a Day. Indications: High Blood Pressure Disorder 180 tablet 3   • omeprazole (priLOSEC) 40 MG capsule Take 1 capsule by mouth Daily. 90 capsule 3   • vitamin A 56543 UNIT capsule Take 10,000 Units by mouth Every Morning.       No facility-administered medications prior to visit.       No opioid medication identified on active medication list. I have reviewed chart for other potential  high risk medication/s and harmful drug interactions in the elderly.          Aspirin is on active medication list. Aspirin use is indicated based on review of current medical condition/s. Pros and cons of this therapy have been discussed today. Benefits of this medication outweigh potential harm.  Patient has been encouraged to continue taking this medication.  .      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   •  "Hiatal hernia   • Medicare annual wellness visit, subsequent   • Encounter for screening mammogram for malignant neoplasm of breast   • Menopause   • Bilateral carotid artery disease (CMS/HCC)   • Osteopenia of multiple sites   • Nicotine dependence   • Vaccination reaction   • Carotid stenosis, asymptomatic, bilateral   • Carotid stenosis, asymptomatic, right   • Hyponatremia   • Chronic anemia   • History of Zepeda's esophagus   • Family history of colon cancer in mother   • Encounter for screening for malignant neoplasm of colon   • Personal history of colonic polyps   • Dyspepsia   • History of 2019 novel coronavirus disease (COVID-19)     Advance Care Planning   Advance Directive is not on file.  ACP discussion was declined by the patient. Patient does not have an advance directive, information provided.          Objective       Vitals:    08/31/21 1422   BP: 130/84   BP Location: Left arm   Patient Position: Sitting   Cuff Size: Adult   Pulse: 64   SpO2: 96%   Weight: 78.9 kg (174 lb)   Height: 160 cm (63\")     BMI Readings from Last 1 Encounters:   08/31/21 30.82 kg/m²   BMI is above normal parameters. Recommendations include: nutrition counseling    Does the patient have evidence of cognitive impairment? No    Physical Exam  Cardiovascular:      Rate and Rhythm: Normal rate.   Pulmonary:      Effort: Pulmonary effort is normal.   Neurological:      Mental Status: She is alert.   Psychiatric:         Mood and Affect: Mood normal.       Lab Results   Component Value Date    GLU 91 07/08/2021    CHLPL 141 07/08/2021    TRIG 93 07/08/2021    HDL 58 07/08/2021    LDL 66 07/08/2021    VLDL 17 07/08/2021            HEALTH RISK ASSESSMENT    Smoking Status:  Social History     Tobacco Use   Smoking Status Current Every Day Smoker   • Packs/day: 0.50   • Years: 55.00   • Pack years: 27.50   • Types: Cigarettes   • Start date: 8/3/2016   Smokeless Tobacco Never Used   Tobacco Comment    66 YEARS     Alcohol " Consumption:  Social History     Substance and Sexual Activity   Alcohol Use No     Fall Risk Screen:    MISSAELADI Fall Risk Assessment was completed, and patient is at HIGH risk for falls. Assessment completed on:8/31/2021    Depression Screening:  PHQ-2/PHQ-9 Depression Screening 8/31/2021   Little interest or pleasure in doing things 0   Feeling down, depressed, or hopeless 0   Trouble falling or staying asleep, or sleeping too much -   Feeling tired or having little energy -   Poor appetite or overeating -   Feeling bad about yourself - or that you are a failure or have let yourself or your family down -   Trouble concentrating on things, such as reading the newspaper or watching television -   Moving or speaking so slowly that other people could have noticed. Or the opposite - being so fidgety or restless that you have been moving around a lot more than usual -   Thoughts that you would be better off dead, or of hurting yourself in some way -   Total Score 0       Health Habits and Functional and Cognitive Screening:  Functional & Cognitive Status 8/31/2021   Do you have difficulty preparing food and eating? No   Do you have difficulty bathing yourself, getting dressed or grooming yourself? No   Do you have difficulty using the toilet? No   Do you have difficulty moving around from place to place? No   Do you have trouble with steps or getting out of a bed or a chair? No   Current Diet Well Balanced Diet   Dental Exam Not up to date   Eye Exam Up to date   Exercise (times per week) 0 times per week   Current Exercises Include No Regular Exercise   Current Exercise Activities Include -   Do you need help using the phone?  No   Are you deaf or do you have serious difficulty hearing?  No   Do you need help with transportation? No   Do you need help shopping? No   Do you need help preparing meals?  No   Do you need help with housework?  No   Do you need help with laundry? No   Do you need help taking your medications?  No   Do you need help managing money? No   Do you ever drive or ride in a car without wearing a seat belt? No   Have you felt unusual stress, anger or loneliness in the last month? No   Who do you live with? Alone   If you need help, do you have trouble finding someone available to you? No   Have you been bothered in the last four weeks by sexual problems? No   Do you have difficulty concentrating, remembering or making decisions? No       Age-appropriate Screening Schedule:  Refer to the list below for future screening recommendations based on patient's age, sex and/or medical conditions. Orders for these recommended tests are listed in the plan section. The patient has been provided with a written plan.    Health Maintenance   Topic Date Due   • TDAP/TD VACCINES (1 - Tdap) Never done   • ZOSTER VACCINE (3 of 3) 10/22/2019   • DXA SCAN  07/25/2020   • INFLUENZA VACCINE  10/01/2021   • LIPID PANEL  07/08/2022   • MAMMOGRAM  10/01/2022              Assessment/Plan     CMS Preventative Services Quick Reference  Risk Factors Identified During Encounter  Cardiovascular Disease  Chronic Pain   Dementia/Memory   Depression/Dysphoria  Fall Risk-High or Moderate  Inadequate Social Support, Isolation, Loneliness, Lack of Transportation, Financial Difficulties, or Caregiver Stress   Inactivity/Sedentary  The above risks/problems have been discussed with the patient.  Follow up actions/plans if indicated are seen below in the Assessment/Plan Section.  Pertinent information has been shared with the patient in the After Visit Summary.    Diagnoses and all orders for this visit:    1. Medicare annual wellness visit, subsequent (Primary)    2. Encounter for screening mammogram for malignant neoplasm of breast  -     Mammo screening digital tomosynthesis bilateral w CAD; Future    3. Menopause  -     DEXA Bone Density Axial    4. 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 Indications: High Blood Pressure Disorder  Dispense: 180 tablet; Refill: 3    5. 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  -     omeprazole (priLOSEC) 40 MG capsule; Take 1 capsule by mouth Daily. Indications: Gastroesophageal Reflux Disease  Dispense: 90 capsule; Refill: 3    labs ok  Chronic anemia      Follow Up:   No follow-ups on file.     An After Visit Summary and PPPS were given to the patient.

## 2021-09-01 ENCOUNTER — PATIENT OUTREACH (OUTPATIENT)
Dept: CASE MANAGEMENT | Facility: OTHER | Age: 76
End: 2021-09-01

## 2021-09-01 NOTE — OUTREACH NOTE
Patient Outreach    Ambulatory Case Management Note    Call placed to pt who states she is doing well. Her arm is much improved. She is scheduled for her AWV and Dexascan. She has cancelled her colonoscopy due to her arm pain. She discussed this with Dr Matt and will wait to reschedule until the COVID numbers have decrease. Denies any questions ro concerns at this time. ACM will continue monthly calls.        Fatuma Brooks RN  Ambulatory Case Management    9/1/2021, 14:32 EDT

## 2021-09-13 RX ORDER — ATORVASTATIN CALCIUM 10 MG/1
TABLET, FILM COATED ORAL
Qty: 90 TABLET | Refills: 0 | Status: SHIPPED | OUTPATIENT
Start: 2021-09-13 | End: 2021-12-09

## 2021-09-29 ENCOUNTER — OFFICE VISIT (OUTPATIENT)
Dept: CARDIOLOGY | Age: 76
End: 2021-09-29

## 2021-09-29 VITALS
BODY MASS INDEX: 30.12 KG/M2 | SYSTOLIC BLOOD PRESSURE: 142 MMHG | DIASTOLIC BLOOD PRESSURE: 85 MMHG | WEIGHT: 170 LBS | HEART RATE: 75 BPM | HEIGHT: 63 IN

## 2021-09-29 DIAGNOSIS — I24.0 ISCHEMIC HEART DISEASE DUE TO CORONARY ARTERY OBSTRUCTION (HCC): Primary | ICD-10-CM

## 2021-09-29 DIAGNOSIS — E78.2 MIXED HYPERLIPIDEMIA: ICD-10-CM

## 2021-09-29 DIAGNOSIS — I25.9 ISCHEMIC HEART DISEASE DUE TO CORONARY ARTERY OBSTRUCTION (HCC): Primary | ICD-10-CM

## 2021-09-29 PROCEDURE — 93000 ELECTROCARDIOGRAM COMPLETE: CPT | Performed by: INTERNAL MEDICINE

## 2021-09-29 PROCEDURE — 99213 OFFICE O/P EST LOW 20 MIN: CPT | Performed by: INTERNAL MEDICINE

## 2021-09-29 NOTE — PROGRESS NOTES
1 YR FOLLOW UP   Subjective:        Mary Jack is a 76 y.o. 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    The following portions of the patient's history were reviewed and updated as appropriate: allergies, current medications, past family history, past medical history, past social history, past surgical history and problem list.    Past Medical History:   Diagnosis Date   • AAA (abdominal aortic aneurysm) (CMS/HCC)    • Allergic not sure    CODINE,MORFINE AND DYE SOLUTION   • Anemia    • Aneurysm of abdominal aorta (CMS/HCC)    • Arthritis    • Zepeda esophagus    • CAD (coronary artery disease)    • Chest pain    • Constipation    • COPD (chronic obstructive pulmonary disease) (CMS/HCC)    • Emphysema lung (CMS/HCC)    • Empyema (CMS/HCC)    • Fatigue    • GERD (gastroesophageal reflux disease)    • Headache    • Hiatal hernia    • High risk medication use    • History of cardiac catheterization     CATH STENT PLACEMENT: CIRCUMFLEX BRANCH   • Hyperlipidemia    • Hypertension    • Infectious viral hepatitis Jaundice in 1963   • Jaundice    • Lumbosacral disc disease    • Myocardial infarction (CMS/HCC)    • Myocardial infarction (CMS/HCC)    • Occult blood in stools    • On home O2    • Peripheral artery disease (CMS/HCC)    • Reflux gastritis    • Scoliosis    • Visual impairment      "BLURRY VISION IN RT. EYE     reports that she has been smoking cigarettes. She started smoking about 5 years ago. She has a 27.50 pack-year smoking history. She has never used smokeless tobacco. She reports that she does not drink alcohol and does not use drugs.   Family History   Problem Relation Age of Onset   • Colon cancer Mother    • Breast cancer Mother    • Other Father         cardiac disorder   • Hypertension Father    • Heart disease Father    • Other Sister         cardiac disorder   • Hypertension Sister    • Lung disease Sister    • Thyroid disease Sister    • Other Brother         cardiac disorder   • Thyroid disease Daughter    • Heart disease Sister    • Hypertension Sister    • Pulmonary fibrosis Sister    • Heart disease Brother    • Other Brother    • Thyroid disease Sister    • Other Sister    • Thyroid disease Daughter    • Malig Hyperthermia Neg Hx        Review of Systems  Constitutional: No wt loss, fever, fatigue  Gastrointestinal: No nausea, abdominal pain  Behavioral/Psych: No insomnia or anxiety   Cardiovascular no chest pains or tightness in the chest  Objective:       Physical Exam  /85   Pulse 75   Ht 160 cm (63\")   Wt 77.1 kg (170 lb)   BMI 30.11 kg/m²   General appearance: No acute changes   Neck: Trachea midline; NECK, supple, no thyromegaly or lymphadenopathy   Lungs: Normal size and shape, normal breath sounds, equal distribution of air, no rales and rhonchi   CV: S1-S2 regular, no murmurs, no rub, no gallop   Abdomen: Soft, non-tender; no masses , no abnormal abdominal sounds   Extremities: No deformity , normal color , no peripheral edema   Skin: Normal temperature, turgor and texture; no rash, ulcers            ECG 12 Lead    Date/Time: 9/29/2021 3:39 PM  Performed by: Herber Hayes MD  Authorized by: Herbre Hayes MD   Comparison: compared with previous ECG   Similar to previous ECG  Rhythm: sinus rhythm  ST Flattening: all    Clinical impression: " non-specific ECG              Echocardiogram:        Current Outpatient Medications:   •  aspirin 81 MG tablet, Take 1 tablet by mouth Daily. (Patient taking differently: Take 81 mg by mouth Every Night.), Disp: 90 tablet, Rfl: 0  •  atorvastatin (LIPITOR) 10 MG tablet, TAKE 1 TABLET EVERY DAY, Disp: 90 tablet, Rfl: 0  •  Calcium Carbonate-Vitamin D (CALCIUM 600+D HIGH POTENCY PO), Take  by mouth 2 (Two) Times a Day., Disp: , Rfl:   •  furosemide (LASIX) 40 MG tablet, Take 40 mg by mouth As Needed., Disp: , Rfl:   •  metoprolol tartrate (LOPRESSOR) 25 MG tablet, Take 1 tablet by mouth 2 (Two) Times a Day. Indications: High Blood Pressure Disorder, Disp: 180 tablet, Rfl: 3  •  O2 (OXYGEN), Inhale 3 L/min Continuous., Disp: , Rfl:   •  omeprazole (priLOSEC) 40 MG capsule, Take 1 capsule by mouth Daily. Indications: Gastroesophageal Reflux Disease, Disp: 90 capsule, Rfl: 3  •  polyethylene glycol (MIRALAX) packet, Take 17 g by mouth Daily. (Patient taking differently: Take 17 g by mouth Every Morning.), Disp: 850 g, Rfl: 3  •  potassium chloride (MICRO-K) 10 MEQ CR capsule, Take 2 capsules by mouth Daily. (Patient taking differently: Take 20 mEq by mouth As Needed.), Disp: 60 capsule, Rfl: 5  •  Trelegy Ellipta 100-62.5-25 MCG/INH aerosol powder , Inhale 1 puff Every Morning., Disp: , Rfl:   •  vitamin B-12 (CYANOCOBALAMIN) 1000 MCG tablet, Take 1,000 mcg by mouth Every Morning., Disp: , Rfl:   •  vitamin C (ASCORBIC ACID) 500 MG tablet, Take 500 mg by mouth Every Morning., Disp: , Rfl:   •  nitroglycerin (NITROSTAT) 0.4 MG SL tablet, Place 0.4 mg under the tongue Every 5 (Five) Minutes As Needed for Chest Pain. 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   • Hiatal hernia   • Medicare annual wellness visit, subsequent   • Encounter for screening mammogram for malignant neoplasm of breast   • Menopause   • Bilateral carotid artery disease (CMS/HCC)   • Osteopenia of multiple sites   • Nicotine dependence   • Vaccination reaction   • Carotid stenosis, asymptomatic, bilateral   • Carotid stenosis, asymptomatic, right   • Hyponatremia   • Chronic anemia   • History of Zepeda's esophagus   • Family history of colon cancer in mother   • Encounter for screening for malignant neoplasm of colon   • Personal history of colonic polyps   • Dyspepsia   • History of 2019 novel coronavirus disease (COVID-19)       1.   Left main has a shelflike lesion, not clear catheter induced or was present but has no significant stenosis  2. Left anterior descending shows early atherosclerotic plaque including the diagonal and  branches, midportion of LAD 50% stenosis  3. Circumflex artery was nondominant with early atherosclerotic plaque  4. Right coronary artery dominant with early atherosclerotic plaque  5. Normal LV gram with EF of 60%     Recommendations:      1. Moderate coronary artery disease to be treated medically        Plan:            ICD-10-CM ICD-9-CM   1. 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 Mixed hyperlipidemia  E78.2 272.2     1. 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 Mixed hyperlipidemia  Continue current treatment       CV STABLE    SEE IN 1 YR  COUNSELING:    Mary Snads was given to patient for the following topics: diagnostic results, risk factor reductions, impressions, risks and benefits of treatment options and importance of treatment compliance .       SMOKING  COUNSELING:    [unfilled]    Dictated using Dragon dictation

## 2021-10-13 ENCOUNTER — PATIENT OUTREACH (OUTPATIENT)
Dept: CASE MANAGEMENT | Facility: OTHER | Age: 76
End: 2021-10-13

## 2021-10-13 NOTE — OUTREACH NOTE
Patient Outreach    Ambulatory Case Management Note     Call placed to pt who states she is doing well. Her arm is much better. She does have her Dexascan and mammogram scheduled and has received her flu shot at [facility]. She plans on getting her COVID booster as soon as it is available. Discussed plans to graduate soon. Would prefer waiting a couple of more months. Will plan on call next month.         Fatuma Brooks RN  Ambulatory Case Management    10/13/2021, 11:00 EDT

## 2021-10-22 ENCOUNTER — HOSPITAL ENCOUNTER (OUTPATIENT)
Dept: BONE DENSITY | Facility: HOSPITAL | Age: 76
Discharge: HOME | End: 2021-10-22

## 2021-10-22 ENCOUNTER — HOSPITAL ENCOUNTER (OUTPATIENT)
Dept: MAMMOGRAPHY | Facility: HOSPITAL | Age: 76
Discharge: HOME | End: 2021-10-22

## 2021-10-22 DIAGNOSIS — Z12.31 ENCOUNTER FOR SCREENING MAMMOGRAM FOR MALIGNANT NEOPLASM OF BREAST: ICD-10-CM

## 2021-10-22 PROCEDURE — 77067 SCR MAMMO BI INCL CAD: CPT

## 2021-10-22 PROCEDURE — 77063 BREAST TOMOSYNTHESIS BI: CPT

## 2021-10-22 PROCEDURE — 77080 DXA BONE DENSITY AXIAL: CPT

## 2021-11-19 ENCOUNTER — TRANSCRIBE ORDERS (OUTPATIENT)
Dept: ADMINISTRATIVE | Facility: HOSPITAL | Age: 76
End: 2021-11-19

## 2021-11-19 DIAGNOSIS — Z12.2 ENCOUNTER FOR SCREENING FOR LUNG CANCER: Primary | ICD-10-CM

## 2021-11-23 ENCOUNTER — PATIENT OUTREACH (OUTPATIENT)
Dept: CASE MANAGEMENT | Facility: OTHER | Age: 76
End: 2021-11-23

## 2021-11-23 NOTE — OUTREACH NOTE
Patient Outreach    Ambulatory Case Management Note    Call to pt who states she is doing well. She denies any new issues, She has been to see Dr Lim and Dr Matt,. No changes in medications.   Pt states she has no plans for Thanksgiving and will not be around her family. She did receive her second COVID shot and plans on getting her booster in Jan. Long enjoyable conversation. No questions or concerns.            Fatuma Brooks RN  Ambulatory Case Management    11/23/2021, 11:34 EST

## 2021-12-09 RX ORDER — ATORVASTATIN CALCIUM 10 MG/1
TABLET, FILM COATED ORAL
Qty: 90 TABLET | Refills: 0 | Status: SHIPPED | OUTPATIENT
Start: 2021-12-09 | End: 2022-03-07

## 2021-12-23 NOTE — TELEPHONE ENCOUNTER
Rx Refill Note  Requested Prescriptions     Pending Prescriptions Disp Refills   • furosemide (LASIX) 40 MG tablet [Pharmacy Med Name: FUROSEMIDE 40MG TABLETS] 30 tablet      Sig: TAKE 1 TABLET BY MOUTH DAILY      Last office visit with prescribing clinician: 8/31/2021      Next office visit with prescribing clinician: 2/21/2022        Comprehensive Metabolic Panel (07/08/2021 10:10)      Madai Maldonado LPN  12/23/21, 16:11 EST

## 2021-12-26 RX ORDER — FUROSEMIDE 40 MG/1
TABLET ORAL
Qty: 90 TABLET | Refills: 0 | Status: SHIPPED | OUTPATIENT
Start: 2021-12-26 | End: 2022-08-15 | Stop reason: SDUPTHER

## 2021-12-30 ENCOUNTER — PATIENT OUTREACH (OUTPATIENT)
Dept: CASE MANAGEMENT | Facility: OTHER | Age: 76
End: 2021-12-30

## 2021-12-30 NOTE — OUTREACH NOTE
Patient Outreach    Ambulatory Case Management Note    Call placed to pt who states she is doing well. She was a little last week with a cold but is much better now. She has an appt next month for her cancer screening. Denies any questions or concerns.         Fatuma Brooks RN  Ambulatory Case Management    12/30/2021, 12:58 EST

## 2022-01-17 ENCOUNTER — HOSPITAL ENCOUNTER (OUTPATIENT)
Dept: CT IMAGING | Facility: HOSPITAL | Age: 77
End: 2022-01-17

## 2022-01-25 ENCOUNTER — TELEPHONE (OUTPATIENT)
Dept: FAMILY MEDICINE CLINIC | Facility: CLINIC | Age: 77
End: 2022-01-25

## 2022-01-25 DIAGNOSIS — J43.1 PANLOBULAR EMPHYSEMA: Primary | ICD-10-CM

## 2022-01-25 RX ORDER — PREDNISONE 10 MG/1
10 TABLET ORAL 3 TIMES DAILY
Qty: 21 TABLET | Refills: 0 | Status: SHIPPED | OUTPATIENT
Start: 2022-01-25 | End: 2022-02-14 | Stop reason: SDUPTHER

## 2022-01-25 RX ORDER — AMOXICILLIN AND CLAVULANATE POTASSIUM 875; 125 MG/1; MG/1
1 TABLET, FILM COATED ORAL 2 TIMES DAILY
Qty: 14 TABLET | Refills: 0 | Status: SHIPPED | OUTPATIENT
Start: 2022-01-25 | End: 2022-02-14 | Stop reason: SDUPTHER

## 2022-01-25 NOTE — TELEPHONE ENCOUNTER
Caller: Mary Jack    Relationship: Self    Best call back number: [phone] (H)    What medication are you requesting: ANTIBIOTIC NOT SURE IF ITS A COPD FLARE UP ON NOT     What are your current symptoms: COUGHING AND WHEN SHE TAKES A DEEP BREATH HER CHEST HURTS     How long have you been experiencing symptoms: 2 DAYS    Have you had these symptoms before:    [] Yes  [] No    Have you been treated for these symptoms before:   [] Yes  [] No    If a prescription is needed, what is your preferred pharmacy and phone number:  [id number] - [address] AT [facility] & [address] - [phone]  - [phone]        Additional notes: NOT FEELING WELL ENOUGH TO COME IN  RIGHT NOW. PATIENT ALSO DOES NOT WANT TO GO TO THE ED RIGHT NOW. PATIENT IS STRUGGLING TO BREATH ON THE PHONE.     THANKS

## 2022-01-25 NOTE — TELEPHONE ENCOUNTER
She said she cannot do a video visit, no exposures to anyone with covid/flu. She said she coughs and is unable to cough anything up, her lymph nodes feel a little swollen on her neck. Not sure if its a URI or her COPD, please advise.     Okay I sent in Augmentin and prednisone tablets to her [facility] in [place]

## 2022-02-04 ENCOUNTER — PATIENT OUTREACH (OUTPATIENT)
Dept: CASE MANAGEMENT | Facility: OTHER | Age: 77
End: 2022-02-04
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
## 2022-02-08 ENCOUNTER — TELEPHONE (OUTPATIENT)
Dept: CARDIOLOGY | Facility: CLINIC | Age: 77
End: 2022-02-08

## 2022-02-08 RX ORDER — NITROGLYCERIN 0.4 MG/1
0.4 TABLET SUBLINGUAL
Qty: 30 TABLET | Refills: 0 | Status: SHIPPED | OUTPATIENT
Start: 2022-02-08 | End: 2022-12-12 | Stop reason: SDUPTHER

## 2022-02-08 NOTE — TELEPHONE ENCOUNTER
----- Message from Mary Jack sent at 2/7/2022  4:15 PM EST -----  Regarding: prescription  I need a prescription for nitroglycerin 0.4mg. to [facility]  Thank You

## 2022-02-10 ENCOUNTER — TELEPHONE (OUTPATIENT)
Dept: FAMILY MEDICINE CLINIC | Facility: CLINIC | Age: 77
End: 2022-02-10

## 2022-02-10 NOTE — TELEPHONE ENCOUNTER
Patient refused to come in today and has rescheduled for 02/14/2022. She has been advised to go to the ER if her symptoms persist or get worse.

## 2022-02-14 ENCOUNTER — OFFICE VISIT (OUTPATIENT)
Dept: FAMILY MEDICINE CLINIC | Facility: CLINIC | Age: 77
End: 2022-02-14

## 2022-02-14 VITALS
OXYGEN SATURATION: 95 % | BODY MASS INDEX: 31.18 KG/M2 | SYSTOLIC BLOOD PRESSURE: 138 MMHG | HEIGHT: 63 IN | WEIGHT: 176 LBS | HEART RATE: 76 BPM | DIASTOLIC BLOOD PRESSURE: 80 MMHG | TEMPERATURE: 97.1 F

## 2022-02-14 DIAGNOSIS — J43.1 PANLOBULAR EMPHYSEMA: ICD-10-CM

## 2022-02-14 PROCEDURE — 99213 OFFICE O/P EST LOW 20 MIN: CPT | Performed by: FAMILY MEDICINE

## 2022-02-14 RX ORDER — AMOXICILLIN AND CLAVULANATE POTASSIUM 875; 125 MG/1; MG/1
1 TABLET, FILM COATED ORAL 2 TIMES DAILY
Qty: 14 TABLET | Refills: 0 | Status: SHIPPED | OUTPATIENT
Start: 2022-02-14 | End: 2022-08-15

## 2022-02-14 RX ORDER — PREDNISONE 10 MG/1
10 TABLET ORAL 3 TIMES DAILY
Qty: 21 TABLET | Refills: 0 | Status: SHIPPED | OUTPATIENT
Start: 2022-02-14 | End: 2022-08-15 | Stop reason: SDUPTHER

## 2022-02-14 RX ORDER — ALBUTEROL SULFATE 90 UG/1
2 AEROSOL, METERED RESPIRATORY (INHALATION) EVERY 4 HOURS PRN
Qty: 18 G | Refills: 5 | Status: SHIPPED | OUTPATIENT
Start: 2022-02-14 | End: 2023-02-19

## 2022-02-14 NOTE — PROGRESS NOTES
"  Subjective   Mary Jack is a 76 y.o. female who is here for   Chief Complaint   Patient presents with   • COPD     soa    .   I am having shortness of breath ,due to COPD flare up, I would like to know if Dr. Matt would write me a prescription for prednisone, it helps my breathing, It helped about 2 weeks ago. Thank you  Patient refused to come in today and has rescheduled for 02/14/2022. She has been advised to go to the ER if her symptoms persist or get worse.     History of Present Illness   COPD: Patient complains of dyspnea, cough, wheezing and fatigue. Symptoms began 1 week ago. Symptoms acute dyspnea does worsen with exertion. Sputum is brown in copious amounts. Fever has been hot and cold spells. Patient uses 4 pillows at night. Patient can walk 10 feet before resting. Patient currently is on oxygen at 2 L/min per nasal cannula.. Respiratory history: COPD      The following portions of the patient's history were reviewed and updated as appropriate: allergies, current medications, past family history, past medical history, past social history, past surgical history and problem list.    Review of Systems    Objective   Vitals:    02/14/22 1316   BP: 138/80   BP Location: Left arm   Patient Position: Sitting   Cuff Size: Adult   Pulse: 76   Temp: 97.1 °F (36.2 °C)   SpO2: 95%   Weight: 79.8 kg (176 lb)   Height: 160 cm (63\")      Physical Exam  Vitals reviewed.   Cardiovascular:      Rate and Rhythm: Normal rate.   Pulmonary:      Breath sounds: Decreased air movement present. Decreased breath sounds present.   Neurological:      Mental Status: She is alert.         Assessment/Plan   Diagnoses and all orders for this visit:    1. Panlobular emphysema (HCC)  -     amoxicillin-clavulanate (Augmentin) 875-125 MG per tablet; Take 1 tablet by mouth 2 (Two) Times a Day.  Dispense: 14 tablet; Refill: 0  -     predniSONE (DELTASONE) 10 MG tablet; Take 1 tablet by mouth 3 (Three) Times a Day.  Dispense: 21 " tablet; Refill: 0  -     albuterol sulfate  (90 Base) MCG/ACT inhaler; Inhale 2 puffs Every 4 (Four) Hours As Needed for Wheezing.  Dispense: 18 g; Refill: 5  -     Spacer/Aero-Holding Chambers device; 1 Units Daily As Needed (use with albuterol inhaler).  Dispense: 1 each; Refill: 0      There are no Patient Instructions on file for this visit.    Medications Discontinued During This Encounter   Medication Reason   • amoxicillin-clavulanate (Augmentin) 875-125 MG per tablet Reorder   • predniSONE (DELTASONE) 10 MG tablet Reorder        No follow-ups on file.    Dr. Delfino Matt  [place], [place]

## 2022-03-07 RX ORDER — ATORVASTATIN CALCIUM 10 MG/1
TABLET, FILM COATED ORAL
Qty: 90 TABLET | Refills: 1 | Status: SHIPPED | OUTPATIENT
Start: 2022-03-07 | End: 2022-08-03

## 2022-03-21 ENCOUNTER — PATIENT OUTREACH (OUTPATIENT)
Dept: CASE MANAGEMENT | Facility: OTHER | Age: 77
End: 2022-03-21

## 2022-03-21 NOTE — OUTREACH NOTE
AMBULATORY CASE MANAGEMENT NOTE    Name and Relationship of Patient/Support Person: Mary Jack F - Self   Patient Outreach    Call placed to pt for monthly check in. Pt states she has not felt to good lately. States she just feel tired. She also is having a hip bother her. She states it hurts quite a bit when she is up. This also goes down her leg at time. Discussed MD appt, possible rehab, use of ice, and pain relief ointments. She states she is just using Tylenol for now. Suggested she think about these options given and to reach out to ACM or PCP if she wants to try any of these options. Verbalized understanding.  Social Work Assessment  Questions/Answers    Flowsheet Row Most Recent Value   Advance Care Planning Reviewed other (see comments)  [pt does not have. Has been given information]   People in Home alone   Current Living Arrangements apartment        Adult Patient Profile  Questions/Answers    Flowsheet Row Most Recent Value   Symptoms/Conditions Managed at Home respiratory   Barriers to Managing Health other (see comments)  [hasn't felt like getting out much. She does still drive if needed. Her family picks up her groceries for her.]   Respiratory Symptoms/Conditions COPD   Respiratory Management Strategies breathing exercise, medication therapy, oxygen therapy   Oxygen Therapy Device nasal cannula  [3L]   Oxygen Therapy Times continuous   Oxygen Flow (L/min) 3L   Respiratory Self-Management Outcome 4 (good)   Source of Information patient   Patient Aware of Diagnosis yes   Admission in Past 90 Days none   Pre-existing AND/MOST/POLST Order No   Advance Directive Status Patient has advance directive, copy in chart   People in Home alone   Current Living Arrangements apartment          Education Documentation  Unresolved/Worsening Symptoms, taught by Fatuma Brooks RN at 3/21/2022 11:16 AM.  Learner: Patient  Readiness: Acceptance  Method: Explanation  Response: Verbalizes Understanding    Activity,  taught by Fatuma Brooks, RN at 3/21/2022 11:16 AM.  Learner: Patient  Readiness: Acceptance  Method: Explanation  Response: Verbalizes Understanding    Environmental Exposure, taught by Fatuma Brooks, RN at 3/21/2022 11:16 AM.  Learner: Patient  Readiness: Acceptance  Method: Explanation  Response: Verbalizes Understanding          FATUMA CONTE  Ambulatory Case Management    3/21/2022, 11:21 EDT

## 2022-04-01 ENCOUNTER — HOSPITAL ENCOUNTER (OUTPATIENT)
Dept: CT IMAGING | Facility: HOSPITAL | Age: 77
Discharge: HOME | End: 2022-04-01
Admitting: INTERNAL MEDICINE

## 2022-04-01 DIAGNOSIS — Z12.2 ENCOUNTER FOR SCREENING FOR LUNG CANCER: ICD-10-CM

## 2022-04-01 PROCEDURE — 71271 CT THORAX LUNG CANCER SCR C-: CPT

## 2022-05-03 ENCOUNTER — PATIENT OUTREACH (OUTPATIENT)
Dept: CASE MANAGEMENT | Facility: OTHER | Age: 77
End: 2022-05-03

## 2022-05-03 NOTE — OUTREACH NOTE
Patient Outreach    AMBULATORY CASE MANAGEMENT NOTE    Name and Relationship of Patient/Support Person: Mary Jack F - Self    Call placed to patient for monthly check in. She states she is having a flair up with her COPD again and remains short of air. She has also been congested which is making it difficult for her to sleep. Patient admits she is still smoking. Reviewed safety precautions which patient is adhering to. Suggested that she call her PCP if the congestion does not get better soon. Denies any questions or concerns.     Education Documentation  Unresolved/Worsening Symptoms, taught by Fatuma Brooks RN at 5/3/2022  2:39 PM.  Learner: Patient  Readiness: Acceptance  Method: Explanation, Teach Back  Response: Verbalizes Understanding    Tobacco Use, Smoke Exposure, taught by Fatuma Brooks RN at 5/3/2022  2:39 PM.  Learner: Patient  Readiness: Acceptance  Method: Explanation, Teach Back  Response: Verbalizes Understanding    Oxygen Safety, taught by Fatuma Brooks RN at 5/3/2022  2:39 PM.  Learner: Patient  Readiness: Acceptance  Method: Explanation, Teach Back  Response: Verbalizes Understanding    Tobacco Smoke, taught by Fatuma Brooks RN at 5/3/2022  2:39 PM.  Learner: Patient  Readiness: Acceptance  Method: Explanation, Teach Back  Response: Verbalizes Understanding    Cause: Tobacco Smoke, taught by Fatuma Brooks RN at 5/3/2022  2:39 PM.  Learner: Patient  Readiness: Acceptance  Method: Explanation, Teach Back  Response: Verbalizes Understanding          FATUMA CONTE  Ambulatory Case Management    5/3/2022, 14:39 EDT

## 2022-06-16 ENCOUNTER — PATIENT OUTREACH (OUTPATIENT)
Dept: CASE MANAGEMENT | Facility: OTHER | Age: 77
End: 2022-06-16

## 2022-06-16 NOTE — OUTREACH NOTE
"Patient Outreach    AMBULATORY CASE MANAGEMENT NOTE    Name and Relationship of Patient/Support Person: Mary Jack F - Self    Call placed to patient who states she is not feeling well today. States she has her good and bad days and today is a bad one. She states she has had congestion for a week now and it does not seem to get better. Encouraged to call PCP for an appointment. States \"If I ran to the Dr every time I felt bad I would be there everyday\". Patient sounds tired and hoarse. No excessive shortness of air. She states she is not wheezing just feels full of congestion. Suggested use of her inhalers. She has not used her trilogy or her albuterol. She states she does not feel like she can get anything down in her lungs. Reinforced how to use and she does verbalize understanding. Encouraged deep breathing and coughing. Offered to send a message to Dr Matt to see if she could get in. She asked if [facility] would send a message that she would like some prednisone. Stressed to patient if she feels worse she may need to go to ED.  Inbasket sent. AC offered to call back early next week to see how she is and she would like that. She does have family that comes by on the weekend to help her and  her groceries.           HENRIK CONTE  Ambulatory Case Management    6/16/2022, 12:33 EDT  "

## 2022-06-20 ENCOUNTER — PATIENT OUTREACH (OUTPATIENT)
Dept: CASE MANAGEMENT | Facility: OTHER | Age: 77
End: 2022-06-20

## 2022-06-20 NOTE — OUTREACH NOTE
Patient Outreach    AMBULATORY CASE MANAGEMENT NOTE    Name and Relationship of Patient/Support Person: Mary Jack F - Self    Call placed to patient to see how she is feeling. She states she is feeling better. Less short of air and congestion. She sounds better over the phone. No coughing, wheezing or shortness of air. She does have ACM number if needed. Will call again next month.         HENRIK CONTE  Ambulatory Case Management    6/20/2022, 11:43 EDT

## 2022-07-30 DIAGNOSIS — I25.9 ISCHEMIC HEART DISEASE DUE TO CORONARY ARTERY OBSTRUCTION: ICD-10-CM

## 2022-07-30 DIAGNOSIS — I24.0 ISCHEMIC HEART DISEASE DUE TO CORONARY ARTERY OBSTRUCTION: ICD-10-CM

## 2022-07-30 DIAGNOSIS — K22.70 BARRETT'S ESOPHAGUS WITHOUT DYSPLASIA: ICD-10-CM

## 2022-08-01 RX ORDER — OMEPRAZOLE 40 MG/1
CAPSULE, DELAYED RELEASE ORAL
Qty: 90 CAPSULE | Refills: 0 | Status: SHIPPED | OUTPATIENT
Start: 2022-08-01 | End: 2023-01-12

## 2022-08-03 ENCOUNTER — PATIENT OUTREACH (OUTPATIENT)
Dept: CASE MANAGEMENT | Facility: OTHER | Age: 77
End: 2022-08-03

## 2022-08-03 RX ORDER — ATORVASTATIN CALCIUM 10 MG/1
TABLET, FILM COATED ORAL
Qty: 90 TABLET | Refills: 1 | Status: SHIPPED | OUTPATIENT
Start: 2022-08-03

## 2022-08-03 NOTE — OUTREACH NOTE
Patient Outreach    AMBULATORY CASE MANAGEMENT NOTE    Name and Relationship of Patient/Support Person: Mary Jack F - Self    Call placed to patient for monthly check in. She states the last few months has really flared up her COPD. She just called Dr Llanes and got some steroids a few days ago. She realizes her breathing continues to get worse. She was able to get out this past weekend and attend her granddaughters baby shower.    She is having problems with her oxygen company. She has had [employer] for years but is was bought out by [employer]. A couple of weeks ago she was delivered a new oxygen tank. She had just received a new one 2 years ago and told  that. She was told the company was switching these over. She has now received a bill for this. She has attempted to contact the company as has her insurance for her. They have not been successful. She would like to switch companies but doesn't know who else there is. Offered to send her a list of DME companies. She would like this sent to her email. No other questions or concerns. Informed to call if there are any issues or needs assistance in calling.    Education Documentation  Provider Follow-Up, taught by Fatuma Brooks, RN at 8/3/2022 12:57 PM.  Learner: Patient  Readiness: Acceptance  Method: Explanation  Response: Verbalizes Understanding    Oxygen Safety, taught by Fatuma Brooks, RN at 8/3/2022 12:57 PM.  Learner: Patient  Readiness: Acceptance  Method: Explanation  Response: Verbalizes Understanding          FATUMA CONTE  Ambulatory Case Management    8/3/2022, 12:57 EDT

## 2022-08-15 ENCOUNTER — OFFICE VISIT (OUTPATIENT)
Dept: FAMILY MEDICINE CLINIC | Facility: CLINIC | Age: 77
End: 2022-08-15

## 2022-08-15 VITALS
OXYGEN SATURATION: 96 % | HEART RATE: 70 BPM | WEIGHT: 172 LBS | SYSTOLIC BLOOD PRESSURE: 134 MMHG | BODY MASS INDEX: 30.48 KG/M2 | HEIGHT: 63 IN | DIASTOLIC BLOOD PRESSURE: 80 MMHG | TEMPERATURE: 97.1 F

## 2022-08-15 DIAGNOSIS — R60.9 DEPENDENT EDEMA: Primary | ICD-10-CM

## 2022-08-15 DIAGNOSIS — J43.1 PANLOBULAR EMPHYSEMA: ICD-10-CM

## 2022-08-15 DIAGNOSIS — I24.0 ISCHEMIC HEART DISEASE DUE TO CORONARY ARTERY OBSTRUCTION: ICD-10-CM

## 2022-08-15 DIAGNOSIS — I25.9 ISCHEMIC HEART DISEASE DUE TO CORONARY ARTERY OBSTRUCTION: ICD-10-CM

## 2022-08-15 PROBLEM — Z09 HOSPITAL DISCHARGE FOLLOW-UP: Status: RESOLVED | Noted: 2017-03-31 | Resolved: 2022-08-15

## 2022-08-15 PROBLEM — Z87.19 HISTORY OF BARRETT'S ESOPHAGUS: Status: RESOLVED | Noted: 2021-03-05 | Resolved: 2022-08-15

## 2022-08-15 PROBLEM — I65.21 CAROTID STENOSIS, ASYMPTOMATIC, RIGHT: Status: RESOLVED | Noted: 2020-02-06 | Resolved: 2022-08-15

## 2022-08-15 PROBLEM — T50.Z95A VACCINATION REACTION: Status: RESOLVED | Noted: 2019-11-05 | Resolved: 2022-08-15

## 2022-08-15 PROBLEM — E87.1 HYPONATREMIA: Status: RESOLVED | Noted: 2020-07-10 | Resolved: 2022-08-15

## 2022-08-15 PROCEDURE — 99214 OFFICE O/P EST MOD 30 MIN: CPT | Performed by: FAMILY MEDICINE

## 2022-08-15 RX ORDER — POTASSIUM CHLORIDE 750 MG/1
10 CAPSULE, EXTENDED RELEASE ORAL EVERY OTHER DAY
Qty: 45 CAPSULE | Refills: 3 | Status: SHIPPED | OUTPATIENT
Start: 2022-08-15 | End: 2022-09-24

## 2022-08-15 RX ORDER — PREDNISONE 10 MG/1
10 TABLET ORAL 3 TIMES DAILY
Qty: 21 TABLET | Refills: 0 | Status: SHIPPED | OUTPATIENT
Start: 2022-08-15 | End: 2022-10-04 | Stop reason: SDUPTHER

## 2022-08-15 RX ORDER — BUDESONIDE, GLYCOPYRROLATE, AND FORMOTEROL FUMARATE 160; 9; 4.8 UG/1; UG/1; UG/1
AEROSOL, METERED RESPIRATORY (INHALATION)
COMMUNITY
Start: 2022-07-25 | End: 2023-02-13

## 2022-08-15 RX ORDER — FUROSEMIDE 20 MG/1
20 TABLET ORAL EVERY OTHER DAY
Qty: 45 TABLET | Refills: 3 | Status: SHIPPED | OUTPATIENT
Start: 2022-08-15

## 2022-08-15 NOTE — PROGRESS NOTES
"  Chief Complaint   Patient presents with   • Hypertension   • Emphysema       Subjective     Patient here for follow-up of elevated blood pressure.    She is not exercising and is not adherent to a low-salt diet.    Blood pressure is well controlled at home.   Cardiac symptoms: claudication, cough, dyspnea, fatigue, lower extremity edema and orthopnea.   Patient denies: chest pain.   Cardiovascular risk factors: advanced age (older than 55 for men, 65 for women), dyslipidemia, family history of premature cardiovascular disease, hypertension, sedentary lifestyle and smoking/ tobacco exposure.   Use of agents associated with hypertension: none.   History of target organ damage: angina/ prior myocardial infarction, heart failure, left ventricular hypertrophy, peripheral artery disease and prior coronary revascularization.  Patient is taking prescribed hypertension medications as prescribed without side effects.    The following portions of the patient's history were reviewed and updated as appropriate: allergies, current medications, past medical history, past social history, past surgical history and problem list.    Review of Systems  Pertinent items are noted in HPI.         Vitals:    08/15/22 1315   BP: 134/80   BP Location: Left arm   Patient Position: Sitting   Cuff Size: Adult   Pulse: 70   Temp: 97.1 °F (36.2 °C)   SpO2: 96%   Weight: 78 kg (172 lb)   Height: 160 cm (63\")     BP Readings from Last 3 Encounters:   08/15/22 134/80   02/14/22 138/80   09/29/21 142/85     Objective      Gen: alert, pleasant.on oxygen  Neck: no bruit, no enlarged thyroid  Lungs: dec breath sounds  Heart: RR, no murmur  Feet: + edema  Pulses: intact       Assessment & Plan   Hypertension, normal blood pressure Evidence of target organ damage: angina/ prior myocardial infarction, heart failure, left ventricular hypertrophy, peripheral artery disease and prior coronary revascularization.    Diagnoses and all orders for this " visit:    1. Dependent edema (Primary)  -     furosemide (LASIX) 20 MG tablet; Take 1 tablet by mouth Every Other Day. Indications: Edema  Dispense: 45 tablet; Refill: 3  -     potassium chloride (MICRO-K) 10 MEQ CR capsule; Take 1 capsule by mouth Every Other Day.  Dispense: 45 capsule; Refill: 3    2. 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 Panlobular emphysema (HCC)  -     predniSONE (DELTASONE) 10 MG tablet; Take 1 tablet by mouth 3 (Three) Times a Day. Indications: Chronic Obstructive Lung Disease  Dispense: 21 tablet; Refill: 0    was on lasix 40 mg prn.    Medication: dosage change lasix to qod.  Follow up: 6 months and as needed.    There are no Patient Instructions on file for this visit.  Medications Discontinued During This Encounter   Medication Reason   • amoxicillin-clavulanate (Augmentin) 875-125 MG per tablet *Therapy completed   • potassium chloride (MICRO-K) 10 MEQ CR capsule Reorder   • furosemide (LASIX) 40 MG tablet Reorder   • predniSONE (DELTASONE) 10 MG tablet Reorder        Return in about 6 months (around 2/15/2023) for Medicare Wellness visit.    Limit salt  Limit alcoholic drinks to 1 a day  Limit caffeine to 1-2 servings a day    Dr. Delfino Matt MD  [place], [place]  [facility].

## 2022-09-12 ENCOUNTER — PATIENT OUTREACH (OUTPATIENT)
Dept: CASE MANAGEMENT | Facility: OTHER | Age: 77
End: 2022-09-12

## 2022-09-12 NOTE — OUTREACH NOTE
Patient Outreach    AMBULATORY CASE MANAGEMENT NOTE    Name and Relationship of Patient/Support Person: Mary Jack F - Self     Call placed to patient who states she is doing about the same. She did see her PCP in August and is scheduled for AWV 2/23. She had an issue with her O2 a week or 2 ago but this has been resolved. She found that her 25ft canulla was clogged and she is to change that monthly which she has not been doing.  Patient continues to smoke and no plans to quit. Long enjoyable conversation. No questions or concerns at this time.                HENRIK CONTE  Ambulatory Case Management    9/12/2022, 13:58 EDT

## 2022-09-24 DIAGNOSIS — R60.9 DEPENDENT EDEMA: ICD-10-CM

## 2022-09-24 RX ORDER — POTASSIUM CHLORIDE 750 MG/1
CAPSULE, EXTENDED RELEASE ORAL
Qty: 60 CAPSULE | Refills: 5 | Status: SHIPPED | OUTPATIENT
Start: 2022-09-24

## 2022-10-04 DIAGNOSIS — J43.1 PANLOBULAR EMPHYSEMA: ICD-10-CM

## 2022-10-04 RX ORDER — PREDNISONE 10 MG/1
10 TABLET ORAL 3 TIMES DAILY
Qty: 21 TABLET | Refills: 0 | Status: SHIPPED | OUTPATIENT
Start: 2022-10-04 | End: 2023-02-13

## 2022-10-16 ENCOUNTER — HOSPITAL ENCOUNTER (EMERGENCY)
Facility: HOSPITAL | Age: 77
Discharge: HOME | End: 2022-10-16
Attending: EMERGENCY MEDICINE | Admitting: EMERGENCY MEDICINE

## 2022-10-16 ENCOUNTER — APPOINTMENT (OUTPATIENT)
Dept: CT IMAGING | Facility: HOSPITAL | Age: 77
End: 2022-10-16

## 2022-10-16 VITALS
OXYGEN SATURATION: 97 % | DIASTOLIC BLOOD PRESSURE: 73 MMHG | TEMPERATURE: 98 F | HEART RATE: 71 BPM | SYSTOLIC BLOOD PRESSURE: 155 MMHG | RESPIRATION RATE: 20 BRPM

## 2022-10-16 DIAGNOSIS — R10.13 EPIGASTRIC PAIN: Primary | ICD-10-CM

## 2022-10-16 LAB
ALBUMIN SERPL-MCNC: 3.9 G/DL (ref 3.5–5.2)
ALBUMIN/GLOB SERPL: 1.6 G/DL
ALP SERPL-CCNC: 87 U/L (ref 39–117)
ALT SERPL W P-5'-P-CCNC: 12 U/L (ref 1–33)
ANION GAP SERPL CALCULATED.3IONS-SCNC: 9.5 MMOL/L (ref 5–15)
AST SERPL-CCNC: 16 U/L (ref 1–32)
BACTERIA UR QL AUTO: ABNORMAL /HPF
BASOPHILS # BLD AUTO: 0.03 10*3/MM3 (ref 0–0.2)
BASOPHILS NFR BLD AUTO: 0.4 % (ref 0–1.5)
BILIRUB SERPL-MCNC: 0.7 MG/DL (ref 0–1.2)
BILIRUB UR QL STRIP: NEGATIVE
BUN SERPL-MCNC: 9 MG/DL (ref 8–23)
BUN/CREAT SERPL: 11.1 (ref 7–25)
CALCIUM SPEC-SCNC: 9.4 MG/DL (ref 8.6–10.5)
CHLORIDE SERPL-SCNC: 93 MMOL/L (ref 98–107)
CLARITY UR: CLEAR
CO2 SERPL-SCNC: 26.5 MMOL/L (ref 22–29)
COLOR UR: YELLOW
CREAT SERPL-MCNC: 0.81 MG/DL (ref 0.57–1)
DEPRECATED RDW RBC AUTO: 50.5 FL (ref 37–54)
EGFRCR SERPLBLD CKD-EPI 2021: 75.8 ML/MIN/1.73
EOSINOPHIL # BLD AUTO: 0.11 10*3/MM3 (ref 0–0.4)
EOSINOPHIL NFR BLD AUTO: 1.5 % (ref 0.3–6.2)
ERYTHROCYTE [DISTWIDTH] IN BLOOD BY AUTOMATED COUNT: 14.4 % (ref 12.3–15.4)
GLOBULIN UR ELPH-MCNC: 2.5 GM/DL
GLUCOSE SERPL-MCNC: 109 MG/DL (ref 65–99)
GLUCOSE UR STRIP-MCNC: NEGATIVE MG/DL
HCT VFR BLD AUTO: 35.7 % (ref 34–46.6)
HGB BLD-MCNC: 11.6 G/DL (ref 12–15.9)
HGB UR QL STRIP.AUTO: NEGATIVE
HYALINE CASTS UR QL AUTO: ABNORMAL /LPF
IMM GRANULOCYTES # BLD AUTO: 0.11 10*3/MM3 (ref 0–0.05)
IMM GRANULOCYTES NFR BLD AUTO: 1.5 % (ref 0–0.5)
KETONES UR QL STRIP: NEGATIVE
LEUKOCYTE ESTERASE UR QL STRIP.AUTO: NEGATIVE
LIPASE SERPL-CCNC: 46 U/L (ref 13–60)
LYMPHOCYTES # BLD AUTO: 1.84 10*3/MM3 (ref 0.7–3.1)
LYMPHOCYTES NFR BLD AUTO: 25.6 % (ref 19.6–45.3)
MCH RBC QN AUTO: 31.3 PG (ref 26.6–33)
MCHC RBC AUTO-ENTMCNC: 32.5 G/DL (ref 31.5–35.7)
MCV RBC AUTO: 96.2 FL (ref 79–97)
MONOCYTES # BLD AUTO: 0.77 10*3/MM3 (ref 0.1–0.9)
MONOCYTES NFR BLD AUTO: 10.7 % (ref 5–12)
NEUTROPHILS NFR BLD AUTO: 4.34 10*3/MM3 (ref 1.7–7)
NEUTROPHILS NFR BLD AUTO: 60.3 % (ref 42.7–76)
NITRITE UR QL STRIP: NEGATIVE
NRBC BLD AUTO-RTO: 0 /100 WBC (ref 0–0.2)
PH UR STRIP.AUTO: 7 [PH] (ref 4.5–8)
PLATELET # BLD AUTO: 197 10*3/MM3 (ref 140–450)
PMV BLD AUTO: 10.7 FL (ref 6–12)
POTASSIUM SERPL-SCNC: 4.3 MMOL/L (ref 3.5–5.2)
PROT SERPL-MCNC: 6.4 G/DL (ref 6–8.5)
PROT UR QL STRIP: ABNORMAL
RBC # BLD AUTO: 3.71 10*6/MM3 (ref 3.77–5.28)
RBC # UR STRIP: ABNORMAL /HPF
REF LAB TEST METHOD: ABNORMAL
SODIUM SERPL-SCNC: 129 MMOL/L (ref 136–145)
SP GR UR STRIP: 1.01 (ref 1–1.03)
SQUAMOUS #/AREA URNS HPF: ABNORMAL /HPF
UROBILINOGEN UR QL STRIP: ABNORMAL
WBC # UR STRIP: ABNORMAL /HPF
WBC NRBC COR # BLD: 7.2 10*3/MM3 (ref 3.4–10.8)

## 2022-10-16 PROCEDURE — 83690 ASSAY OF LIPASE: CPT | Performed by: EMERGENCY MEDICINE

## 2022-10-16 PROCEDURE — 74176 CT ABD & PELVIS W/O CONTRAST: CPT

## 2022-10-16 PROCEDURE — 80053 COMPREHEN METABOLIC PANEL: CPT | Performed by: EMERGENCY MEDICINE

## 2022-10-16 PROCEDURE — 96374 THER/PROPH/DIAG INJ IV PUSH: CPT

## 2022-10-16 PROCEDURE — 85025 COMPLETE CBC W/AUTO DIFF WBC: CPT | Performed by: EMERGENCY MEDICINE

## 2022-10-16 PROCEDURE — 81001 URINALYSIS AUTO W/SCOPE: CPT | Performed by: EMERGENCY MEDICINE

## 2022-10-16 PROCEDURE — 99284 EMERGENCY DEPT VISIT MOD MDM: CPT

## 2022-10-16 RX ORDER — HYDROCODONE BITARTRATE AND ACETAMINOPHEN 5; 325 MG/1; MG/1
1 TABLET ORAL ONCE
Status: COMPLETED | OUTPATIENT
Start: 2022-10-16 | End: 2022-10-16

## 2022-10-16 RX ORDER — METOCLOPRAMIDE 5 MG/1
10 TABLET ORAL 3 TIMES DAILY PRN
Qty: 20 TABLET | Refills: 0 | Status: SHIPPED | OUTPATIENT
Start: 2022-10-16 | End: 2023-02-13

## 2022-10-16 RX ORDER — LABETALOL HYDROCHLORIDE 5 MG/ML
20 INJECTION, SOLUTION INTRAVENOUS ONCE
Status: COMPLETED | OUTPATIENT
Start: 2022-10-16 | End: 2022-10-16

## 2022-10-16 RX ADMIN — LABETALOL HYDROCHLORIDE 20 MG: 5 INJECTION, SOLUTION INTRAVENOUS at 06:40

## 2022-10-16 RX ADMIN — HYDROCODONE BITARTRATE AND ACETAMINOPHEN 1 TABLET: 5; 325 TABLET ORAL at 07:30

## 2022-10-16 NOTE — ED PROVIDER NOTES
Subjective   History of Present Illness  77-year-old female presents with abdominal pain.  Pain started yesterday afternoon.  Pain is to upper abdomen.  She describes it as crampy, intermittent in intensity.  No aggravating or relieving factors.  No nausea, vomiting, diarrhea.  Patient reports she has chronic constipation for which she takes MiraLAX.  Has not had a bowel movement since the onset of her pain but reports she had several small bowel movements prior to the onset of pain.  No urinary symptoms.  No chest pain or shortness of breath.  No fevers or chills.  Patient states she has not attempted to eat or drink anything since the onset of her pain.  Has not taken any medications for her symptoms.  Patient has known AAA which she reports is being watched.  Surgical history of cholecystectomy.        Review of Systems   All other systems reviewed and are negative.      Past Medical History:   Diagnosis Date   • AAA (abdominal aortic aneurysm)    • Allergic not sure    CODINE,MORFINE AND DYE SOLUTION   • Anemia    • Aneurysm of abdominal aorta    • Arthritis    • Zepeda esophagus    • CAD (coronary artery disease)    • Chest pain    • Constipation    • COPD (chronic obstructive pulmonary disease) (HCC)    • Emphysema lung (HCC)    • Empyema (HCC)    • Fatigue    • GERD (gastroesophageal reflux disease)    • Headache    • Hiatal hernia    • High risk medication use    • History of cardiac catheterization     CATH STENT PLACEMENT: CIRCUMFLEX BRANCH   • Hyperlipidemia    • Hypertension    • Infectious viral hepatitis Jaundice in 1963   • Jaundice    • Lumbosacral disc disease    • Myocardial infarction (HCC)    • Myocardial infarction (HCC)    • Occult blood in stools    • On home O2    • Peripheral artery disease (HCC)    • Reflux gastritis    • Scoliosis    • Visual impairment     BLURRY VISION IN RT. EYE       Allergies   Allergen Reactions   • Iodinated Diagnostic Agents Itching   • Codeine Palpitations   •  Morphine And Related Palpitations       Past Surgical History:   Procedure Laterality Date   • CARDIAC CATHETERIZATION     • CARDIAC CATHETERIZATION N/A 5/12/2017    Procedure: Left Heart Cath;  Surgeon: Herber Hayes MD;  [facility];  Service:    • CARDIAC CATHETERIZATION N/A 7/1/2020    Procedure: Left Heart Cath;  Surgeon: Herber Hayes MD;  [facility];  Service: Cardiology;  Laterality: N/A;   • CARDIAC CATHETERIZATION N/A 7/1/2020    Procedure: Coronary angiography;  Surgeon: Herber Hayes MD;  [facility];  Service: Cardiology;  Laterality: N/A;   • CARDIAC CATHETERIZATION N/A 7/1/2020    Procedure: Left ventriculography;  Surgeon: Herber Hayes MD;  [facility];  Service: Cardiology;  Laterality: N/A;   • CAROTID ENDARTERECTOMY Right 3/16/2020    Procedure: RIGHT CAROTID ENDARTERECTOMY;  Surgeon: Mahesh Salmon MD;  Location[facility];  Service: Vascular;  Laterality: Right;   • CHOLECYSTECTOMY     • COLONOSCOPY     • COLONOSCOPY N/A 6/10/2016    Procedure: COLONOSCOPY with polypectomy;  Surgeon: Virgie Castelan MD;  Location: [facility] OR;  Service:    • CORONARY STENT PLACEMENT      X2   • ENDOSCOPY N/A 6/10/2016    Procedure: ESOPHAGOGASTRODUODENOSCOPY WITH BIOPSY;  Surgeon: Virgie Castelan MD;  Location: [facility] OR;  Service:    • ENDOSCOPY     • ENDOSCOPY N/A 8/25/2017    Procedure: ESOPHAGOGASTRODUODENOSCOPY w/ biopsies;  Surgeon: Virgie Castelan MD;  Location: [facility] OR;  Service:        Family History   Problem Relation Age of Onset   • Colon cancer Mother    • Breast cancer Mother    • Other Father         cardiac disorder   • Hypertension Father    • Heart disease Father    • Other Sister         cardiac disorder   • Hypertension Sister    • Lung disease Sister    • Thyroid disease Sister    • Other Brother         cardiac disorder   • Thyroid disease  Daughter    • Heart disease Sister    • Hypertension Sister    • Pulmonary fibrosis Sister    • Heart disease Brother    • Other Brother    • Thyroid disease Sister    • Other Sister    • Thyroid disease Daughter    • Malig Hyperthermia Neg Hx        Social History     Socioeconomic History   • Marital status:    Tobacco Use   • Smoking status: Every Day     Packs/day: 1.00     Years: 55.00     Pack years: 55.00     Types: Cigarettes     Start date: 8/3/2016   • Smokeless tobacco: Never   • Tobacco comments:     66 YEARS   Vaping Use   • Vaping Use: Never used   Substance and Sexual Activity   • Alcohol use: No   • Drug use: No   • Sexual activity: Not Currently     Partners: Male     Birth control/protection: Post-menopausal           Objective   Physical Exam  Constitutional:       General: She is not in acute distress.     Appearance: She is not toxic-appearing.   HENT:      Head: Normocephalic and atraumatic.      Mouth/Throat:      Mouth: Mucous membranes are moist.      Pharynx: Oropharynx is clear.   Eyes:      Extraocular Movements: Extraocular movements intact.      Pupils: Pupils are equal, round, and reactive to light.   Cardiovascular:      Rate and Rhythm: Normal rate and regular rhythm.      Pulses: Normal pulses.      Heart sounds: Normal heart sounds.   Pulmonary:      Effort: Pulmonary effort is normal. No respiratory distress.      Breath sounds: Normal breath sounds.   Abdominal:      General: There is no distension.      Palpations: Abdomen is soft.      Tenderness: There is no right CVA tenderness or left CVA tenderness.      Comments: Tenderness to right upper quadrant and epigastric region.  No rebound or guarding.   Musculoskeletal:         General: No tenderness, deformity or signs of injury. Normal range of motion.   Skin:     General: Skin is warm and dry.   Neurological:      General: No focal deficit present.      Mental Status: She is alert and oriented to person, place, and  time. Mental status is at baseline.   Psychiatric:         Mood and Affect: Mood normal.         Behavior: Behavior normal.         Thought Content: Thought content normal.         Judgment: Judgment normal.         Procedures           ED Course  ED Course as of 10/16/22 0724   Sun Oct 16, 2022   0721 Patient overall nontoxic-appearing.  Labs are reassuring.  CT scan shows enlargement of her AAA from last CT scan in our system but was able to review vascular surgery note from December of last year which had ultrasound readings of 3.9 cm.  CT readings today are 4.1 cm with no evidence of rupture or dissection.  Do not feel that this is at all related to her symptoms today.  She has intermittent, crampy abdominal pain.  CT does show questionable ileus.  Patient is not vomiting, is still tolerating p.o. intake, and I feel that she is stable for discharge.  Will prescribe some Reglan.  Encouraged liquid diet, frequent ambulation.  Discussed expected course and return precautions. [TD]      ED Course User Index  [TD] Mike Perry MD                                           [facility]    Final diagnoses:   Epigastric pain       ED Disposition  ED Disposition     ED Disposition   Discharge    Condition   Stable    Comment   --             Delfino aMtt MD  [address] [facility] [id number] [phone]    In 1 day           Medication List      New Prescriptions    metoclopramide 5 MG tablet  Commonly known as: REGLAN  Take 2 tablets by mouth 3 (Three) Times a Day As Needed (abd cramps).        Changed    aspirin 81 MG tablet  Take 1 tablet by mouth Daily.  What changed: when to take this     polyethylene glycol packet  Commonly known as: MIRALAX  Take 17 g by mouth Daily.  What changed: when to take this           Where to Get Your Medications      These medications were sent to [facility] [id number] - [place] - [address] - [phone]  - [phone] FX  [address] [address]     Phone: [phone]   · metoclopramide 5 MG tablet          Mike Perry MD  10/16/22 0754

## 2022-10-25 ENCOUNTER — PATIENT OUTREACH (OUTPATIENT)
Dept: CASE MANAGEMENT | Facility: OTHER | Age: 77
End: 2022-10-25

## 2022-10-25 NOTE — OUTREACH NOTE
Patient Outreach    AMBULATORY CASE MANAGEMENT NOTE    Name and Relationship of Patient/Support Person: Mary Jack F - Self    Call placed to patient for monthly check in. She recently went to the ED for abdominal pain. This is better but not completely gone. She is currently having a flare up of her COPD. She has been in touch with pulmonary doctor and was started on prednisoen. She feels like she may be going into bronchitis. She has not made an appointment to see her PCP or pulmonologist because she doesn't feel like she could make it to the office. Discussed  and volunteers at the hospital. Suggested using  and asking at the information desk for assistance getting to her physicians office. Suggested she call Dr Matt's office and asking for a video visit or scheduling on a day that her family might be able to take her.   She is having difficulty getting her mail due to illness and shortness of air. Informed that with a Dr's statement she can fill out a form to request door delivery. She is going to check on this with her next appointment. Long enjoyable conversation. Encouraged to stay well hydrated and contact Md for appointment with no improvement. She does have ACM number if needed.       Education Documentation  Provider Follow-Up, taught by Fatuma Brooks RN at 10/25/2022  1:20 PM.  Learner: Patient  Readiness: Acceptance  Method: Explanation  Response: Verbalizes Understanding    Oxygen Safety, taught by Fatuma Brooks, RN at 10/25/2022  1:20 PM.  Learner: Patient  Readiness: Acceptance  Method: Explanation  Response: Verbalizes Understanding    Infection Prevention, taught by Fatuma Brooks, RN at 10/25/2022  1:20 PM.  Learner: Patient  Readiness: Acceptance  Method: Explanation  Response: Verbalizes Understanding    Energy Conservation, taught by Fatuma Brooks, RN at 10/25/2022  1:20 PM.  Learner: Patient  Readiness: Acceptance  Method: Explanation  Response: Verbalizes  Understanding          HENRIK CONTE  Ambulatory Case Management    10/25/2022, 13:20 EDT

## 2022-12-06 ENCOUNTER — PATIENT OUTREACH (OUTPATIENT)
Dept: CASE MANAGEMENT | Facility: OTHER | Age: 77
End: 2022-12-06

## 2022-12-06 NOTE — OUTREACH NOTE
Patient Outreach    AMBULATORY CASE MANAGEMENT NOTE    Name and Relationship of Patient/Support Person: Guero Mary F - Self    Call placed to patient for monthly check in. She is feeling some better but is very hoarse. Discussed door delivery of mail. She has not been able to start the application for this. Offered to assist and she is agreeable. ACM sent request to PCP office for statement. ACM also assisted in patients statement and request both be mailed to patients home. She will send these to the post office.         HENRIK CONTE  Ambulatory Case Management    12/6/2022, 11:49 EST

## 2022-12-13 RX ORDER — NITROGLYCERIN 0.4 MG/1
0.4 TABLET SUBLINGUAL
Qty: 30 TABLET | Refills: 3 | Status: SHIPPED | OUTPATIENT
Start: 2022-12-13

## 2023-01-11 DIAGNOSIS — I25.9 ISCHEMIC HEART DISEASE DUE TO CORONARY ARTERY OBSTRUCTION: ICD-10-CM

## 2023-01-11 DIAGNOSIS — I24.0 ISCHEMIC HEART DISEASE DUE TO CORONARY ARTERY OBSTRUCTION: ICD-10-CM

## 2023-01-11 DIAGNOSIS — K22.70 BARRETT'S ESOPHAGUS WITHOUT DYSPLASIA: ICD-10-CM

## 2023-01-12 RX ORDER — OMEPRAZOLE 40 MG/1
CAPSULE, DELAYED RELEASE ORAL
Qty: 90 CAPSULE | Refills: 0 | Status: SHIPPED | OUTPATIENT
Start: 2023-01-12 | End: 2023-02-13 | Stop reason: SDUPTHER

## 2023-01-16 ENCOUNTER — PATIENT OUTREACH (OUTPATIENT)
Dept: CASE MANAGEMENT | Facility: OTHER | Age: 78
End: 2023-01-16
Payer: MEDICARE

## 2023-01-16 NOTE — OUTREACH NOTE
Patient Outreach    AMBULATORY CASE MANAGEMENT NOTE    Name and Relationship of Patient/Support Person: Mary Jack F - Self    Call placed to patient for monthly check in. She states she has been sick but is feeling slightly better. Her niece who is an APRN was able to call in prednisone and antibiotic for upper respiratory infection. She feels it is slightly better but sounds short of air and congested via phone. Discussed when to go to the ED. Suggested scheduling a follow up appointment with Dr Matt and benefit of in person exam.   She did received her [employer] mail deliver information. Dr Matt also wrote the hardship letter for her. After talking to her neighbor she is afraid of what they would do with her mail if she was not there. In particular her medications. Suggested calling the post office and ask what happens then.  She has decided to wait for the time being. Call shortened due to patient not feeliing well. Reinforced need to see PCP. Suggested calling ACM if she needs assistance with this or has questions.       HENRIK CONTE  Ambulatory Case Management    1/16/2023, 13:25 EST

## 2023-01-26 ENCOUNTER — OFFICE VISIT (OUTPATIENT)
Dept: CARDIOLOGY | Facility: CLINIC | Age: 78
End: 2023-01-26
Payer: MEDICARE

## 2023-01-26 VITALS
BODY MASS INDEX: 30.12 KG/M2 | WEIGHT: 170 LBS | HEIGHT: 63 IN | DIASTOLIC BLOOD PRESSURE: 72 MMHG | SYSTOLIC BLOOD PRESSURE: 124 MMHG | HEART RATE: 74 BPM

## 2023-01-26 DIAGNOSIS — I10 PRIMARY HYPERTENSION: ICD-10-CM

## 2023-01-26 DIAGNOSIS — I25.10 CHRONIC CORONARY ARTERY DISEASE: ICD-10-CM

## 2023-01-26 DIAGNOSIS — E78.2 MIXED HYPERLIPIDEMIA: ICD-10-CM

## 2023-01-26 DIAGNOSIS — R94.31 ABNORMAL EKG: Primary | ICD-10-CM

## 2023-01-26 PROCEDURE — 93000 ELECTROCARDIOGRAM COMPLETE: CPT | Performed by: INTERNAL MEDICINE

## 2023-01-26 PROCEDURE — 99214 OFFICE O/P EST MOD 30 MIN: CPT | Performed by: INTERNAL MEDICINE

## 2023-01-26 RX ORDER — BISOPROLOL FUMARATE AND HYDROCHLOROTHIAZIDE 5; 6.25 MG/1; MG/1
1 TABLET ORAL DAILY
Qty: 90 TABLET | Refills: 3 | Status: SHIPPED | OUTPATIENT
Start: 2023-01-26

## 2023-02-03 ENCOUNTER — TELEPHONE (OUTPATIENT)
Dept: FAMILY MEDICINE CLINIC | Facility: CLINIC | Age: 78
End: 2023-02-03
Payer: MEDICARE

## 2023-02-03 DIAGNOSIS — I24.0 ISCHEMIC HEART DISEASE DUE TO CORONARY ARTERY OBSTRUCTION: Primary | ICD-10-CM

## 2023-02-03 DIAGNOSIS — K22.70 BARRETT'S ESOPHAGUS WITHOUT DYSPLASIA: ICD-10-CM

## 2023-02-03 DIAGNOSIS — I25.9 ISCHEMIC HEART DISEASE DUE TO CORONARY ARTERY OBSTRUCTION: Primary | ICD-10-CM

## 2023-02-03 DIAGNOSIS — E78.2 MIXED HYPERLIPIDEMIA: ICD-10-CM

## 2023-02-03 DIAGNOSIS — R94.6 ABNORMAL RESULTS OF THYROID FUNCTION STUDIES: ICD-10-CM

## 2023-02-03 DIAGNOSIS — I24.0 ISCHEMIC HEART DISEASE DUE TO CORONARY ARTERY OBSTRUCTION: ICD-10-CM

## 2023-02-03 DIAGNOSIS — I25.9 ISCHEMIC HEART DISEASE DUE TO CORONARY ARTERY OBSTRUCTION: ICD-10-CM

## 2023-02-07 LAB
ALBUMIN SERPL-MCNC: 4.4 G/DL (ref 3.5–5.2)
ALBUMIN/GLOB SERPL: 2 G/DL
ALP SERPL-CCNC: 84 U/L (ref 39–117)
ALT SERPL-CCNC: 10 U/L (ref 1–33)
AST SERPL-CCNC: 11 U/L (ref 1–32)
BILIRUB SERPL-MCNC: 0.4 MG/DL (ref 0–1.2)
BUN SERPL-MCNC: 27 MG/DL (ref 8–23)
BUN/CREAT SERPL: 29.3 (ref 7–25)
CALCIUM SERPL-MCNC: 10.2 MG/DL (ref 8.6–10.5)
CHLORIDE SERPL-SCNC: 93 MMOL/L (ref 98–107)
CHOLEST SERPL-MCNC: 190 MG/DL (ref 0–200)
CO2 SERPL-SCNC: 32.4 MMOL/L (ref 22–29)
CREAT SERPL-MCNC: 0.92 MG/DL (ref 0.57–1)
EGFRCR SERPLBLD CKD-EPI 2021: 65.1 ML/MIN/1.73
ERYTHROCYTE [DISTWIDTH] IN BLOOD BY AUTOMATED COUNT: 12.1 % (ref 12.3–15.4)
GLOBULIN SER CALC-MCNC: 2.2 GM/DL
GLUCOSE SERPL-MCNC: 86 MG/DL (ref 65–99)
HCT VFR BLD AUTO: 35.2 % (ref 34–46.6)
HDLC SERPL-MCNC: 93 MG/DL (ref 40–60)
HGB BLD-MCNC: 11.7 G/DL (ref 12–15.9)
LDLC SERPL CALC-MCNC: 83 MG/DL (ref 0–100)
MCH RBC QN AUTO: 30.9 PG (ref 26.6–33)
MCHC RBC AUTO-ENTMCNC: 33.2 G/DL (ref 31.5–35.7)
MCV RBC AUTO: 92.9 FL (ref 79–97)
PLATELET # BLD AUTO: 247 10*3/MM3 (ref 140–450)
POTASSIUM SERPL-SCNC: 4.5 MMOL/L (ref 3.5–5.2)
PROT SERPL-MCNC: 6.6 G/DL (ref 6–8.5)
RBC # BLD AUTO: 3.79 10*6/MM3 (ref 3.77–5.28)
SODIUM SERPL-SCNC: 136 MMOL/L (ref 136–145)
TRIGL SERPL-MCNC: 80 MG/DL (ref 0–150)
TSH SERPL DL<=0.005 MIU/L-ACNC: 5.18 UIU/ML (ref 0.27–4.2)
UNABLE TO VOID: NORMAL
VLDLC SERPL CALC-MCNC: 14 MG/DL (ref 5–40)
WBC # BLD AUTO: 7.31 10*3/MM3 (ref 3.4–10.8)

## 2023-02-13 ENCOUNTER — OFFICE VISIT (OUTPATIENT)
Dept: FAMILY MEDICINE CLINIC | Facility: CLINIC | Age: 78
End: 2023-02-13
Payer: MEDICARE

## 2023-02-13 VITALS
BODY MASS INDEX: 31.18 KG/M2 | HEIGHT: 63 IN | DIASTOLIC BLOOD PRESSURE: 60 MMHG | OXYGEN SATURATION: 96 % | TEMPERATURE: 97.5 F | WEIGHT: 176 LBS | HEART RATE: 71 BPM | SYSTOLIC BLOOD PRESSURE: 104 MMHG

## 2023-02-13 DIAGNOSIS — K22.70 BARRETT'S ESOPHAGUS WITHOUT DYSPLASIA: ICD-10-CM

## 2023-02-13 DIAGNOSIS — Z00.00 MEDICARE ANNUAL WELLNESS VISIT, SUBSEQUENT: Primary | ICD-10-CM

## 2023-02-13 DIAGNOSIS — J43.1 PANLOBULAR EMPHYSEMA: ICD-10-CM

## 2023-02-13 PROCEDURE — 1170F FXNL STATUS ASSESSED: CPT | Performed by: FAMILY MEDICINE

## 2023-02-13 PROCEDURE — 1126F AMNT PAIN NOTED NONE PRSNT: CPT | Performed by: FAMILY MEDICINE

## 2023-02-13 PROCEDURE — G0439 PPPS, SUBSEQ VISIT: HCPCS | Performed by: FAMILY MEDICINE

## 2023-02-13 PROCEDURE — 96160 PT-FOCUSED HLTH RISK ASSMT: CPT | Performed by: FAMILY MEDICINE

## 2023-02-13 PROCEDURE — 1159F MED LIST DOCD IN RCRD: CPT | Performed by: FAMILY MEDICINE

## 2023-02-13 RX ORDER — OMEPRAZOLE 40 MG/1
40 CAPSULE, DELAYED RELEASE ORAL DAILY
Qty: 90 CAPSULE | Refills: 3 | Status: SHIPPED | OUTPATIENT
Start: 2023-02-13

## 2023-02-13 NOTE — PROGRESS NOTES
The ABCs of the Annual Wellness Visit  Subsequent Medicare Wellness Visit    Subjective      Mary Jack is a 77 y.o. female who presents for a Subsequent Medicare Wellness Visit.    The following portions of the patient's history were reviewed and   updated as appropriate: allergies, current medications, past family history, past medical history, past social history, past surgical history and problem list.    Compared to one year ago, the patient feels her physical   health is worse.    Compared to one year ago, the patient feels her mental   health is worse.    Recent Hospitalizations:  She was not admitted to the hospital during the last year.       Current Medical Providers:  Patient Care Team:  Delfino Matt MD as PCP - General  Oral Llanes MD as Consulting Physician (Pulmonary Disease)  Herber Hayes MD as Consulting Physician (Cardiology)  Virgie Castelan MD as Consulting Physician (Gastroenterology)  Maxi Lundy MD as Surgeon (Orthopedic Surgery)  Mahesh Salmon MD as Surgeon (Vascular Surgery)  Donato Vaughn MD as Consulting Physician (Nephrology)  Fatuma Brooks RN as Ambulatory  ([facility])    Outpatient Medications Prior to Visit   Medication Sig Dispense Refill   • albuterol sulfate  (90 Base) MCG/ACT inhaler Inhale 2 puffs Every 4 (Four) Hours As Needed for Wheezing. 18 g 5   • aspirin 81 MG tablet Take 1 tablet by mouth Daily. (Patient taking differently: Take 81 mg by mouth Every Night.) 90 tablet 0   • atorvastatin (LIPITOR) 10 MG tablet TAKE 1 TABLET EVERY DAY 90 tablet 1   • bisoprolol-hydrochlorothiazide (Ziac) 5-6.25 MG per tablet Take 1 tablet by mouth Daily. 90 tablet 3   • Calcium Carbonate-Vitamin D (CALCIUM 600+D HIGH POTENCY PO) Take  by mouth 2 (Two) Times a Day.     • nitroglycerin (NITROSTAT) 0.4 MG SL tablet Place 1 tablet under the tongue Every 5 (Five) Minutes As Needed for Chest Pain. Take no more than 3 doses in 15  minutes. 30 tablet 3   • O2 (OXYGEN) Inhale 3 L/min Continuous. Travel oxygen is 4-5 units     • polyethylene glycol (MIRALAX) packet Take 17 g by mouth Daily. (Patient taking differently: Take 17 g by mouth Every Morning.) 850 g 3   • Spacer/Aero-Holding Chambers device 1 Units Daily As Needed (use with albuterol inhaler). 1 each 0   • Trelegy Ellipta 100-62.5-25 MCG/INH aerosol powder  Inhale 1 puff Every Morning.     • vitamin B-12 (CYANOCOBALAMIN) 1000 MCG tablet Take 1,000 mcg by mouth Every Morning.     • vitamin C (ASCORBIC ACID) 500 MG tablet Take 500 mg by mouth Every Morning.     • omeprazole (priLOSEC) 40 MG capsule TAKE 1 CAPSULE EVERY DAY FOR GASTROESOPHAGEAL REFLUX DISEASE 90 capsule 0   • predniSONE (DELTASONE) 10 MG tablet Take 1 tablet by mouth 3 (Three) Times a Day. Indications: Chronic Obstructive Lung Disease 21 tablet 0   • furosemide (LASIX) 20 MG tablet Take 1 tablet by mouth Every Other Day. Indications: Edema 45 tablet 3   • potassium chloride (MICRO-K) 10 MEQ CR capsule TAKE 2 CAPSULES BY MOUTH DAILY 60 capsule 5   • Breztri Aerosphere 160-9-4.8 MCG/ACT aerosol inhaler      • metoclopramide (REGLAN) 5 MG tablet Take 2 tablets by mouth 3 (Three) Times a Day As Needed (abd cramps). 20 tablet 0     No facility-administered medications prior to visit.       No opioid medication identified on active medication list. I have reviewed chart for other potential  high risk medication/s and harmful drug interactions in the elderly.          Aspirin is on active medication list. Aspirin use is indicated based on review of current medical condition/s. Pros and cons of this therapy have been discussed today. Benefits of this medication outweigh potential harm.  Patient has been encouraged to continue taking this medication.  .      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 (HCC)   • Panlobular emphysema (HCC)   • Chronic coronary artery disease  "  • Mixed hyperlipidemia   • Abdominal aortic aneurysm (AAA) without rupture   • Pulmonary arterial hypertension (HCC)   • Supplemental oxygen dependent   • 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   • Hiatal hernia   • Medicare annual wellness visit, subsequent   • Encounter for screening mammogram for malignant neoplasm of breast   • Menopause   • Bilateral carotid artery disease (HCC)   • Osteopenia of multiple sites   • Nicotine dependence   • Carotid stenosis, asymptomatic, bilateral   • Chronic anemia   • Family history of colon cancer in mother   • Encounter for screening for malignant neoplasm of colon   • Personal history of colonic polyps   • Dyspepsia   • History of 2019 novel coronavirus disease (COVID-19)     Advance Care Planning  Advance Directive is not on file.  ACP discussion was held with the patient during this visit. Patient has an advance directive (not in EMR), copy requested.     Objective    Vitals:    02/13/23 1055   BP: 104/60   Pulse: 71   Temp: 97.5 °F (36.4 °C)   SpO2: 96%   Weight: 79.8 kg (176 lb)   Height: 160 cm (63\")   PainSc: 0-No pain     Estimated body mass index is 31.18 kg/m² as calculated from the following:    Height as of this encounter: 160 cm (63\").    Weight as of this encounter: 79.8 kg (176 lb).    BMI is >= 30 and <35. (Class 1 Obesity). The following options were offered after discussion;: nutrition counseling/recommendations      Does the patient have evidence of cognitive impairment?   No    Lab Results   Component Value Date    CHLPL 190 02/06/2023    TRIG 80 02/06/2023    HDL 93 (H) 02/06/2023    LDL 83 02/06/2023    VLDL 14 02/06/2023          HEALTH RISK ASSESSMENT    Smoking Status:  Social History     Tobacco Use   Smoking Status Every Day   • Packs/day: 0.50   • Years: 55.00   • Pack years: 27.50   • Types: Cigarettes   • Start date: 8/3/2016   Smokeless Tobacco Never   Tobacco Comments    66 YEARS     Alcohol Consumption:  Social History     Substance and " Sexual Activity   Alcohol Use No     Fall Risk Screen:    MISSAELADI Fall Risk Assessment was completed, and patient is at LOW risk for falls.Assessment completed on:2/13/2023    Depression Screening:  PHQ-2/PHQ-9 Depression Screening 2/13/2023   Little Interest or Pleasure in Doing Things 0-->not at all   Feeling Down, Depressed or Hopeless 0-->not at all   PHQ-9: Brief Depression Severity Measure Score 0       Health Habits and Functional and Cognitive Screening:  Functional & Cognitive Status 2/13/2023   Do you have difficulty preparing food and eating? No   Do you have difficulty bathing yourself, getting dressed or grooming yourself? No   Do you have difficulty using the toilet? No   Do you have difficulty moving around from place to place? No   Do you have trouble with steps or getting out of a bed or a chair? No   Current Diet Unhealthy Diet   Dental Exam Not up to date   Eye Exam Not up to date   Exercise (times per week) 0 times per week   Current Exercises Include No Regular Exercise   Current Exercise Activities Include -   Do you need help using the phone?  No   Are you deaf or do you have serious difficulty hearing?  No   Do you need help with transportation? No   Do you need help shopping? No   Do you need help preparing meals?  No   Do you need help with housework?  No   Do you need help with laundry? No   Do you need help taking your medications? No   Do you need help managing money? No   Do you ever drive or ride in a car without wearing a seat belt? No   Have you felt unusual stress, anger or loneliness in the last month? No   Who do you live with? Alone   If you need help, do you have trouble finding someone available to you? No   Have you been bothered in the last four weeks by sexual problems? No   Do you have difficulty concentrating, remembering or making decisions? Yes       Age-appropriate Screening Schedule:  Refer to the list below for future screening recommendations based on patient's age,  sex and/or medical conditions. Orders for these recommended tests are listed in the plan section. The patient has been provided with a written plan.    Health Maintenance   Topic Date Due   • TDAP/TD VACCINES (1 - Tdap) Never done   • ZOSTER VACCINE (3 of 3) 10/22/2019   • MAMMOGRAM  10/22/2023   • DXA SCAN  10/22/2023   • LIPID PANEL  02/06/2024   • INFLUENZA VACCINE  Completed                CMS Preventative Services Quick Reference  Risk Factors Identified During Encounter:    Tobacco Use/Dependance Risk (use dotphrase .tobaccocessation for documentation)    The above risks/problems have been discussed with the patient.  Pertinent information has been shared with the patient in the After Visit Summary.    Diagnoses and all orders for this visit:    1. Medicare annual wellness visit, subsequent (Primary)    2. 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  -     omeprazole (priLOSEC) 40 MG capsule; Take 1 capsule by mouth Daily. Indications: Heartburn  Dispense: 90 capsule; Refill: 3    3. Panlobular emphysema (HCC)    Mary is doing about the same.  She is a oxygen dependent COPD patient  Blood pressure medicine has been adjusted by her cardiologist  Her COPD medications have also been adjusted by her pulmonologist  We reviewed her blood work  She is up-to-date on vaccines  Does not want the COVID-vaccine    Wheelchair validation documentation:   The patient has a mobility limitation that significantly impairs his/her ability to participate in one or more mobility-related activities of daily living (MRADLs) such as toileting, feeding, dressing, grooming, and bathing in customary locations in the home.     In addition to the above statement, 1, 2, OR 3 MUST be added into chart notes AND 4 and 5 MUST be in notes.   1. Prevents the patient from accomplishing an MRADL entirely   2. Patient is at risk of morbidity or mortality when attempting to perform MRADL   3. Prevents the   patient from completing an MRADL within a  reasonable time frame   4. Patient's mobility cannot be resolved by the use of a cane or walker.   5. Patient is unable to propel a standard wheelchair, but has a caregiver who is available to assist with the the transport chair.     Follow Up:   Next Medicare Wellness visit to be scheduled in 1 year.      An After Visit Summary and PPPS were made available to the patient.

## 2023-02-14 PROBLEM — R94.31 ABNORMAL EKG: Status: ACTIVE | Noted: 2023-02-14

## 2023-02-14 PROBLEM — I10 PRIMARY HYPERTENSION: Status: ACTIVE | Noted: 2023-02-14

## 2023-02-18 DIAGNOSIS — J43.1 PANLOBULAR EMPHYSEMA: ICD-10-CM

## 2023-02-19 RX ORDER — ALBUTEROL SULFATE 90 UG/1
AEROSOL, METERED RESPIRATORY (INHALATION)
Qty: 18 G | Refills: 5 | Status: SHIPPED | OUTPATIENT
Start: 2023-02-19

## 2023-02-21 ENCOUNTER — PATIENT OUTREACH (OUTPATIENT)
Dept: CASE MANAGEMENT | Facility: OTHER | Age: 78
End: 2023-02-21
Payer: MEDICARE

## 2023-02-21 NOTE — OUTREACH NOTE
Patient Outreach    AMBULATORY CASE MANAGEMENT NOTE    Name and Relationship of Patient/Support Person:  -     Call placed to patient for monthly check in. She recently saw PCP for her AWV and annual blood draws. She also saw Cardiology a few weeks ago. Cardiology changed her blood pressure medication and she feels her blood pressure may be to low. She has been taking this and keeping a log. Lowest has been 100/60. She reports this makes her feel dizzy and shaky. Discussed possibility of her body needing time to adjust. Discussed fall precautions. Suggested calling or messaging cardiology if she continued to feel weak weak and dizzy at times.  She will do so. Patient congested and coughing difficult for her to talk. Discussed using her albuterol as directed. Denies questions and she will call ACM if needed.         Fatuma CONTE  Ambulatory Case Management    2/21/2023, 11:52 EST

## 2023-03-15 ENCOUNTER — HOSPITAL ENCOUNTER (OUTPATIENT)
Dept: CARDIOLOGY | Facility: HOSPITAL | Age: 78
Discharge: HOME | End: 2023-03-15
Payer: MEDICARE

## 2023-03-15 ENCOUNTER — HOSPITAL ENCOUNTER (OUTPATIENT)
Dept: CARDIOLOGY | Facility: HOSPITAL | Age: 78
Discharge: HOME | End: 2023-03-15
Admitting: INTERNAL MEDICINE
Payer: MEDICARE

## 2023-03-15 VITALS
HEIGHT: 63 IN | DIASTOLIC BLOOD PRESSURE: 73 MMHG | RESPIRATION RATE: 20 BRPM | OXYGEN SATURATION: 98 % | HEART RATE: 61 BPM | WEIGHT: 168 LBS | SYSTOLIC BLOOD PRESSURE: 137 MMHG | BODY MASS INDEX: 29.77 KG/M2

## 2023-03-15 VITALS
BODY MASS INDEX: 31.18 KG/M2 | HEIGHT: 63 IN | DIASTOLIC BLOOD PRESSURE: 66 MMHG | WEIGHT: 176 LBS | HEART RATE: 64 BPM | SYSTOLIC BLOOD PRESSURE: 140 MMHG

## 2023-03-15 PROCEDURE — 78452 HT MUSCLE IMAGE SPECT MULT: CPT

## 2023-03-15 PROCEDURE — 78452 HT MUSCLE IMAGE SPECT MULT: CPT | Performed by: INTERNAL MEDICINE

## 2023-03-15 PROCEDURE — 25010000002 REGADENOSON 0.4 MG/5ML SOLUTION: Performed by: INTERNAL MEDICINE

## 2023-03-15 PROCEDURE — 93018 CV STRESS TEST I&R ONLY: CPT | Performed by: INTERNAL MEDICINE

## 2023-03-15 PROCEDURE — 93306 TTE W/DOPPLER COMPLETE: CPT | Performed by: INTERNAL MEDICINE

## 2023-03-15 PROCEDURE — 93306 TTE W/DOPPLER COMPLETE: CPT

## 2023-03-15 PROCEDURE — A9500 TC99M SESTAMIBI: HCPCS | Performed by: INTERNAL MEDICINE

## 2023-03-15 PROCEDURE — 93017 CV STRESS TEST TRACING ONLY: CPT

## 2023-03-15 PROCEDURE — 0 TECHNETIUM SESTAMIBI: Performed by: INTERNAL MEDICINE

## 2023-03-15 RX ADMIN — TECHNETIUM TC 99M SESTAMIBI 1 DOSE: 1 INJECTION INTRAVENOUS at 08:30

## 2023-03-15 RX ADMIN — REGADENOSON 0.4 MG: 0.08 INJECTION, SOLUTION INTRAVENOUS at 11:06

## 2023-03-15 RX ADMIN — TECHNETIUM TC 99M SESTAMIBI 1 DOSE: 1 INJECTION INTRAVENOUS at 11:06

## 2023-03-16 LAB
AORTIC DIMENSIONLESS INDEX: 0.7 (DI)
BH CV ECHO MEAS - ACS: 1.99 CM
BH CV ECHO MEAS - AO MAX PG: 9.1 MMHG
BH CV ECHO MEAS - AO MEAN PG: 4.8 MMHG
BH CV ECHO MEAS - AO ROOT DIAM: 2.48 CM
BH CV ECHO MEAS - AO V2 MAX: 150.8 CM/SEC
BH CV ECHO MEAS - AO V2 VTI: 36.4 CM
BH CV ECHO MEAS - AVA(I,D): 2.41 CM2
BH CV ECHO MEAS - EDV(CUBED): 122.3 ML
BH CV ECHO MEAS - EDV(MOD-SP2): 52 ML
BH CV ECHO MEAS - EDV(MOD-SP4): 60 ML
BH CV ECHO MEAS - EF(MOD-BP): 62.1 %
BH CV ECHO MEAS - EF(MOD-SP2): 61.5 %
BH CV ECHO MEAS - EF(MOD-SP4): 65 %
BH CV ECHO MEAS - ESV(CUBED): 45.9 ML
BH CV ECHO MEAS - ESV(MOD-SP2): 20 ML
BH CV ECHO MEAS - ESV(MOD-SP4): 21 ML
BH CV ECHO MEAS - FS: 27.9 %
BH CV ECHO MEAS - IVS/LVPW: 0.93 CM
BH CV ECHO MEAS - IVSD: 1.1 CM
BH CV ECHO MEAS - LA DIMENSION: 3.2 CM
BH CV ECHO MEAS - LAT PEAK E' VEL: 10.3 CM/SEC
BH CV ECHO MEAS - LV DIASTOLIC VOL/BSA (35-75): 32.8 CM2
BH CV ECHO MEAS - LV MASS(C)D: 214.8 GRAMS
BH CV ECHO MEAS - LV MAX PG: 4.7 MMHG
BH CV ECHO MEAS - LV MEAN PG: 2.03 MMHG
BH CV ECHO MEAS - LV SYSTOLIC VOL/BSA (12-30): 11.5 CM2
BH CV ECHO MEAS - LV V1 MAX: 108.2 CM/SEC
BH CV ECHO MEAS - LV V1 VTI: 25.6 CM
BH CV ECHO MEAS - LVIDD: 5 CM
BH CV ECHO MEAS - LVIDS: 3.6 CM
BH CV ECHO MEAS - LVOT AREA: 3.4 CM2
BH CV ECHO MEAS - LVOT DIAM: 2.09 CM
BH CV ECHO MEAS - LVPWD: 1.18 CM
BH CV ECHO MEAS - MED PEAK E' VEL: 5 CM/SEC
BH CV ECHO MEAS - MR MAX PG: 128.8 MMHG
BH CV ECHO MEAS - MR MAX VEL: 567.4 CM/SEC
BH CV ECHO MEAS - MV A DUR: 0.08 SEC
BH CV ECHO MEAS - MV A MAX VEL: 109.6 CM/SEC
BH CV ECHO MEAS - MV DEC SLOPE: 256 CM/SEC2
BH CV ECHO MEAS - MV DEC TIME: 127 MSEC
BH CV ECHO MEAS - MV E MAX VEL: 77.1 CM/SEC
BH CV ECHO MEAS - MV E/A: 0.7
BH CV ECHO MEAS - MV MAX PG: 6 MMHG
BH CV ECHO MEAS - MV MEAN PG: 2.32 MMHG
BH CV ECHO MEAS - MV P1/2T: 115.6 MSEC
BH CV ECHO MEAS - MV V2 VTI: 35.8 CM
BH CV ECHO MEAS - MVA(P1/2T): 1.9 CM2
BH CV ECHO MEAS - MVA(VTI): 2.45 CM2
BH CV ECHO MEAS - PA ACC TIME: 0.19 SEC
BH CV ECHO MEAS - PA PR(ACCEL): -6.6 MMHG
BH CV ECHO MEAS - PA V2 MAX: 88.2 CM/SEC
BH CV ECHO MEAS - PI END-D VEL: 103.2 CM/SEC
BH CV ECHO MEAS - PULM A REVS DUR: 0.13 SEC
BH CV ECHO MEAS - PULM A REVS VEL: 31 CM/SEC
BH CV ECHO MEAS - PULM DIAS VEL: 60.1 CM/SEC
BH CV ECHO MEAS - PULM S/D: 1.1
BH CV ECHO MEAS - PULM SYS VEL: 66 CM/SEC
BH CV ECHO MEAS - QP/QS: 0.82
BH CV ECHO MEAS - RAP SYSTOLE: 3 MMHG
BH CV ECHO MEAS - RV MAX PG: 1.42 MMHG
BH CV ECHO MEAS - RV V1 MAX: 59.7 CM/SEC
BH CV ECHO MEAS - RV V1 VTI: 16 CM
BH CV ECHO MEAS - RVDD: 2.45 CM
BH CV ECHO MEAS - RVOT DIAM: 2.39 CM
BH CV ECHO MEAS - RVSP: 30.9 MMHG
BH CV ECHO MEAS - SI(MOD-SP2): 17.5 ML/M2
BH CV ECHO MEAS - SI(MOD-SP4): 21.3 ML/M2
BH CV ECHO MEAS - SV(LVOT): 87.9 ML
BH CV ECHO MEAS - SV(MOD-SP2): 32 ML
BH CV ECHO MEAS - SV(MOD-SP4): 39 ML
BH CV ECHO MEAS - SV(RVOT): 71.8 ML
BH CV ECHO MEAS - TAPSE (>1.6): 2.36 CM
BH CV ECHO MEAS - TR MAX PG: 27.9 MMHG
BH CV ECHO MEAS - TR MAX VEL: 263.9 CM/SEC
BH CV ECHO MEASUREMENTS AVERAGE E/E' RATIO: 10.08
BH CV ECHO SHUNT ASSESSMENT PERFORMED (HIDDEN SCRIPTING): 1
BH CV IMMEDIATE POST RECOVERY TECH DATA SYMPTOMS: NORMAL
BH CV IMMEDIATE POST TECH DATA BLOOD PRESSURE: NORMAL MMHG
BH CV IMMEDIATE POST TECH DATA HEART RATE: 81 BPM
BH CV REST NUCLEAR ISOTOPE DOSE: 10.5 MCI
BH CV STRESS BP STAGE 1: NORMAL
BH CV STRESS COMMENTS STAGE 1: NORMAL
BH CV STRESS DOSE REGADENOSON STAGE 1: 0.4
BH CV STRESS DURATION MIN STAGE 1: 1
BH CV STRESS DURATION SEC STAGE 1: 0
BH CV STRESS HR STAGE 1: 84
BH CV STRESS NUCLEAR ISOTOPE DOSE: 31.2 MCI
BH CV STRESS PROTOCOL 1: NORMAL
BH CV STRESS RECOVERY BP: NORMAL MMHG
BH CV STRESS RECOVERY HR: 77 BPM
BH CV STRESS RECOVERY O2: 97 %
BH CV STRESS STAGE 1: 1
BH CV THREE MINUTE POST TECH DATA BLOOD PRESSURE: NORMAL MMHG
BH CV THREE MINUTE POST TECH DATA HEART RATE: 81 BPM
BH CV THREE MINUTE POST TECH DATA OXYGEN SATURATION: 98 %
BH CV THREE MINUTE RECOVERY TECH DATA SYMPTOM: NORMAL
BH CV XLRA - RV BASE: 3.5 CM
BH CV XLRA - RV LENGTH: 6.8 CM
BH CV XLRA - RV MID: 2.5 CM
BH CV XLRA - TDI S': 13.3 CM/SEC
LEFT ATRIUM VOLUME INDEX: 22 ML/M2
LV EF NUC BP: 71 %
MAXIMAL PREDICTED HEART RATE: 145 BPM
MAXIMAL PREDICTED HEART RATE: 145 BPM
PERCENT MAX PREDICTED HR: 57.93 %
SINUS: 2.6 CM
STJ: 2.5 CM
STRESS BASELINE BP: NORMAL MMHG
STRESS BASELINE HR: 62 BPM
STRESS O2 SAT REST: 98 %
STRESS PERCENT HR: 68 %
STRESS POST ESTIMATED WORKLOAD: 1 METS
STRESS POST EXERCISE DUR MIN: 1 MIN
STRESS POST EXERCISE DUR SEC: 0 SEC
STRESS POST PEAK BP: NORMAL MMHG
STRESS POST PEAK HR: 84 BPM
STRESS TARGET HR: 123 BPM
STRESS TARGET HR: 123 BPM

## 2023-03-23 ENCOUNTER — OFFICE VISIT (OUTPATIENT)
Dept: CARDIOLOGY | Facility: CLINIC | Age: 78
End: 2023-03-23
Payer: MEDICARE

## 2023-03-23 VITALS
BODY MASS INDEX: 30.3 KG/M2 | DIASTOLIC BLOOD PRESSURE: 77 MMHG | SYSTOLIC BLOOD PRESSURE: 134 MMHG | WEIGHT: 171 LBS | HEART RATE: 76 BPM | HEIGHT: 63 IN

## 2023-03-23 DIAGNOSIS — I24.0 ISCHEMIC HEART DISEASE DUE TO CORONARY ARTERY OBSTRUCTION: ICD-10-CM

## 2023-03-23 DIAGNOSIS — I25.9 ISCHEMIC HEART DISEASE DUE TO CORONARY ARTERY OBSTRUCTION: ICD-10-CM

## 2023-03-23 DIAGNOSIS — I25.10 CHRONIC CORONARY ARTERY DISEASE: ICD-10-CM

## 2023-03-23 DIAGNOSIS — E78.2 MIXED HYPERLIPIDEMIA: ICD-10-CM

## 2023-03-23 DIAGNOSIS — R94.31 ABNORMAL EKG: Primary | ICD-10-CM

## 2023-03-23 DIAGNOSIS — I10 PRIMARY HYPERTENSION: ICD-10-CM

## 2023-03-23 PROCEDURE — 3075F SYST BP GE 130 - 139MM HG: CPT | Performed by: INTERNAL MEDICINE

## 2023-03-23 PROCEDURE — 99214 OFFICE O/P EST MOD 30 MIN: CPT | Performed by: INTERNAL MEDICINE

## 2023-03-23 PROCEDURE — 3078F DIAST BP <80 MM HG: CPT | Performed by: INTERNAL MEDICINE

## 2023-03-23 NOTE — PROGRESS NOTES
TEST RESULTS   Subjective:        Mary Jack is a 77 y.o. female who here for follow up    CC  Follow-up coronary artery disease  HPI  77-year-old female with ischemic heart disease hyperlipidemia primary hypertension here for the follow-up with no complaints of chest pains tightness heaviness or the pressure sensation     Problems Addressed this Visit        Cardiac and Vasculature    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 (HCC)    Chronic coronary artery disease    Mixed hyperlipidemia    Abnormal EKG - Primary    Primary hypertension   Diagnoses       Codes Comments    Abnormal EKG    -  Primary ICD-10-CM: R94.31  ICD-9-CM: 794.31     Chronic coronary artery disease     ICD-10-CM: I25.10  ICD-9-CM: 414.00     Mixed hyperlipidemia     ICD-10-CM: E78.2  ICD-9-CM: 272.2     Primary hypertension     ICD-10-CM: I10  ICD-9-CM: 401.9     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 (HCC)     ICD-10-CM: I24.0, I25.9  ICD-9-CM: 414.00, 414.9         .    The following portions of the patient's history were reviewed and updated as appropriate: allergies, current medications, past family history, past medical history, past social history, past surgical history and problem list.    Past Medical History:   Diagnosis Date   • AAA (abdominal aortic aneurysm)    • Allergic not sure    CODINE,MORFINE AND DYE SOLUTION   • Anemia    • Aneurysm of abdominal aorta    • Arthritis    • Zepeda esophagus    • CAD (coronary artery disease)    • Chest pain    • Constipation    • COPD (chronic obstructive pulmonary disease) (HCC)    • Emphysema lung (HCC)    • Empyema (HCC)    • Fatigue    • GERD (gastroesophageal reflux disease)    • Headache    • Hiatal hernia    • High risk medication use    • History of cardiac catheterization     CATH STENT PLACEMENT: CIRCUMFLEX BRANCH   • Hyperlipidemia    • Hypertension    • Infectious viral hepatitis Jaundice in 1963   • Jaundice    • Lumbosacral disc disease   "  • Myocardial infarction (HCC)    • Myocardial infarction (HCC)    • Occult blood in stools    • On home O2    • Peripheral artery disease (HCC)    • Reflux gastritis    • Scoliosis    • Visual impairment     BLURRY VISION IN RT. EYE     reports that she has been smoking cigarettes. She started smoking about 6 years ago. She has a 30.00 pack-year smoking history. She has never used smokeless tobacco. She reports that she does not drink alcohol and does not use drugs.   Family History   Problem Relation Age of Onset   • Colon cancer Mother    • Breast cancer Mother    • Other Father         cardiac disorder   • Hypertension Father    • Heart disease Father    • Heart disease Sister    • Hypertension Sister    • Pulmonary fibrosis Sister    • Thyroid disease Sister    • Other Sister    • Other Brother         cardiac disorder   • Heart disease Brother    • Other Brother    • Thyroid disease Daughter    • Thyroid disease Daughter    • Malig Hyperthermia Neg Hx        Review of Systems  Constitutional: No wt loss, fever, fatigue  Gastrointestinal: No nausea, abdominal pain  Behavioral/Psych: No insomnia or anxiety   Cardiovascular no chest pains or tightness in the chest  Objective:       Physical Exam           Physical Exam  /77   Pulse 76   Ht 160 cm (63\")   Wt 77.6 kg (171 lb)   BMI 30.29 kg/m²     General appearance: No acute changes   Eyes: Sclerae conjunctivae normal, pupils reactive   HENT: Atraumatic; oropharynx clear with moist mucous membranes and no mucosal ulcerations;  Neck: Trachea midline; NECK, supple, no thyromegaly or lymphadenopathy   Lungs: Normal size and shape, normal breath sounds, equal distribution of air, no rales and rhonchi   CV: S1-S2 regular, no murmurs, no rub, no gallop   Abdomen: Soft, nontender; no masses , no abnormal abdominal sounds   Extremities: No deformity , normal color , no peripheral edema   Skin: Normal temperature, turgor and texture; no rash, ulcers  Psych: " Appropriate affect, alert and oriented to person, place and time           Procedures  Interpretation Summary       •  Findings consistent with a normal ECG stress test.  •  Left ventricular ejection fraction is hyperdynamic (Calculated EF > 70%).  •  Myocardial perfusion imaging indicates a normal myocardial perfusion study with no evidence of ischemia.  •  Impressions are consistent with a low risk study.  Interpretation Summary       •  Left ventricular ejection fraction appears to be 61 - 65%.  •  Left ventricular diastolic function was normal.  •  Estimated right ventricular systolic pressure from tricuspid regurgitation is normal (<35 mmHg).      Echocardiogram:        Current Outpatient Medications:   •  albuterol sulfate  (90 Base) MCG/ACT inhaler, INHALE TWO PUFFS BY MOUTH EVERY 4 HOURS AS NEEDED FOR WHEEZING, Disp: 18 g, Rfl: 5  •  aspirin 81 MG tablet, Take 1 tablet by mouth Daily. (Patient taking differently: Take 1 tablet by mouth Every Night.), Disp: 90 tablet, Rfl: 0  •  atorvastatin (LIPITOR) 10 MG tablet, TAKE 1 TABLET EVERY DAY, Disp: 90 tablet, Rfl: 1  •  bisoprolol-hydrochlorothiazide (Ziac) 5-6.25 MG per tablet, Take 1 tablet by mouth Daily., Disp: 90 tablet, Rfl: 3  •  Calcium Carbonate-Vitamin D (CALCIUM 600+D HIGH POTENCY PO), Take  by mouth 2 (Two) Times a Day., Disp: , Rfl:   •  furosemide (LASIX) 20 MG tablet, Take 1 tablet by mouth Every Other Day. Indications: Edema, Disp: 45 tablet, Rfl: 3  •  nitroglycerin (NITROSTAT) 0.4 MG SL tablet, Place 1 tablet under the tongue Every 5 (Five) Minutes As Needed for Chest Pain. Take no more than 3 doses in 15 minutes., Disp: 30 tablet, Rfl: 3  •  O2 (OXYGEN), Inhale 3 L/min Continuous. Travel oxygen is 4-5 units, Disp: , Rfl:   •  omeprazole (priLOSEC) 40 MG capsule, Take 1 capsule by mouth Daily. Indications: Heartburn, Disp: 90 capsule, Rfl: 3  •  polyethylene glycol (MIRALAX) packet, Take 17 g by mouth Daily. 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)   • Panlobular emphysema (HCC)   • Chronic coronary artery disease   • Mixed hyperlipidemia   • Abdominal aortic aneurysm (AAA) without rupture   • Pulmonary arterial hypertension (HCC)   • Supplemental oxygen dependent   • 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   • Hiatal hernia   • Medicare annual wellness visit, subsequent   • Encounter for screening mammogram for malignant neoplasm of breast   • Menopause   • Bilateral carotid artery disease (HCC)   • Osteopenia of multiple sites   • Nicotine dependence   • Carotid stenosis, asymptomatic, bilateral   • Chronic anemia   • Family history of colon cancer in mother   • Encounter for screening for malignant neoplasm of colon   • Personal history of colonic polyps   • Dyspepsia   • History of 2019 novel coronavirus disease (COVID-19)   • Abnormal EKG   • Primary hypertension               Plan:            ICD-10-CM ICD-9-CM   1. Abnormal EKG  R94.31 794.31   2. Chronic coronary artery disease  I25.10 414.00   3. Mixed hyperlipidemia  E78.2 272.2   4. Primary hypertension  I10 401.9   5. 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 (HCC)  I24.0 414.00    I25.9 414.9     1. Abnormal EKG  Work-up is negative    2. Chronic coronary artery disease  No angina pectoris    3. Mixed hyperlipidemia  Continue current treatment    4. Primary hypertension  Blood  pressure under control    5. 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 (HCC)  Continue current treatment       Specificity and sensitivity of the stress test/ cardiac workup has been explained. Pt has been explained if  Symptoms continue please go to ER, and further w/p will be required.    Also explained this does not rule out coronary artery disease or the future events, continue to emphasize on risk reductions for coronary artery disease    Pt also advised to contact PCP for other causes of symptoms    1 yr  COUNSELING:    Mary Sands was given to patient for the following topics: diagnostic results, risk factor reductions, impressions, risks and benefits of treatment options and importance of treatment compliance .       SMOKING COUNSELING:        Dictated using Dragon dictation

## 2023-03-29 ENCOUNTER — PATIENT OUTREACH (OUTPATIENT)
Dept: CASE MANAGEMENT | Facility: OTHER | Age: 78
End: 2023-03-29
Payer: MEDICARE

## 2023-03-29 NOTE — OUTREACH NOTE
Patient Outreach    AMBULATORY CASE MANAGEMENT NOTE    Name and Relationship of Patient/Support Person: Mary Jack F - Self    Call placed to patient for monthly follow up.  Discussed patient's home medications and importance of compliance as patient stated she stopped taking the Trelogy and started taking prednisone which she had gotten prescribed by her APRN niece.  We encouraged her to report this and discuss with her pulmonologist, and to make sure that her medication list is up to date with all of her physicians.  Patient is requesting a transport wheelchair for her doctor appointments but wants to know if there will be a co-pay.  Discussed different options of wheelchairs, will gather information at patient's request and follow up with her at a later date.    Reached out to Raoul with [employer] about transport wheelchair.      Fatuma CONTE  Ambulatory Case Management    3/29/2023, 15:15 EDT

## 2023-03-30 ENCOUNTER — PATIENT OUTREACH (OUTPATIENT)
Dept: CASE MANAGEMENT | Facility: OTHER | Age: 78
End: 2023-03-30
Payer: MEDICARE

## 2023-03-30 DIAGNOSIS — J43.1 PANLOBULAR EMPHYSEMA: Primary | ICD-10-CM

## 2023-03-30 NOTE — OUTREACH NOTE
Patient Outreach    Care Coordination    AMBULATORY CASE MANAGEMENT NOTE    Name and Relationship of Patient/Support Person: Delfino Matt - Other    [facility] wheelchair and required documentation.         Fatuma CONTE  Ambulatory Case Management    3/30/2023, 09:26 EDT

## 2023-03-30 NOTE — OUTREACH NOTE
Patient Outreach  Care Coordination    AMBULATORY CASE MANAGEMENT NOTE    Name and Relationship of Patient/Support Person: Mary Jack F - Self    Returned call to Mrs. Jack.  She reported that [facility] had reached out to her about her wheelchair and she wanted to make sure [facility] was aware.  Further discussed the type of wheelchair she would like, transport vs lightweight wheelchair, and she decided she would like the lightweight wheelchair.  Patient expressed her approval and excitement for new equipment and independence that would bring.       Education Documentation  No documentation found.        Fatuma CONTE  Ambulatory Case Management    3/30/2023, 14:04 EDT

## 2023-04-06 ENCOUNTER — PATIENT OUTREACH (OUTPATIENT)
Dept: CASE MANAGEMENT | Facility: OTHER | Age: 78
End: 2023-04-06
Payer: MEDICARE

## 2023-04-06 NOTE — OUTREACH NOTE
Patient Outreach    AMBULATORY CASE MANAGEMENT NOTE    Name and Relationship of Patient/Support Person: Mary Jack F - Self    Call placed to Mrs Jack to verify delivery of wheelchair. She has not received. Call placed to [facility] they are awaiting additional documentation from [facility]. Attempted to clarify and they report that this has to be in the office note. Last office note from 2/13 does have documentation. Call to Raoul foley from [employer] which will determine if this will work. If update needed will send to PCP office.   Per Raoul what is in the office note will not work. Inbasket sent with example documentation to Dr Matt.       Fatuma CONTE  Ambulatory Case Management    4/6/2023, 10:54 EDT

## 2023-04-11 ENCOUNTER — PATIENT OUTREACH (OUTPATIENT)
Dept: CASE MANAGEMENT | Facility: OTHER | Age: 78
End: 2023-04-11
Payer: MEDICARE

## 2023-04-11 NOTE — OUTREACH NOTE
Patient Outreach    AMBULATORY CASE MANAGEMENT NOTE    Name and Relationship of Patient/Support Person: Mary Pagan F - Self    Inbasket received from PCP office today declining further documentation regarding chair. Call to Mrs pagan to notify. She actually had a chair delivered 2 weeks ago but it is the wrong chair. She was told by the delivery jaida that the chair she orderd was out ot stock so the sent this one. He was going to request the correct chair be delivered. Message sent to torito Rich to check if this had been ordered.         Fatuma CONTE  Ambulatory Case Management    4/11/2023, 12:31 EDT

## 2023-04-14 ENCOUNTER — TELEPHONE (OUTPATIENT)
Dept: CARDIOLOGY | Facility: CLINIC | Age: 78
End: 2023-04-14
Payer: MEDICARE

## 2023-04-14 ENCOUNTER — DOCUMENTATION (OUTPATIENT)
Dept: CARDIOLOGY | Facility: CLINIC | Age: 78
End: 2023-04-14
Payer: MEDICARE

## 2023-04-14 RX ORDER — AMLODIPINE BESYLATE 5 MG/1
5 TABLET ORAL DAILY
Qty: 14 TABLET | Refills: 0 | Status: SHIPPED | OUTPATIENT
Start: 2023-04-14 | End: 2023-04-28

## 2023-04-14 NOTE — TELEPHONE ENCOUNTER
Patient contacted the answering service with complaints of side effects from Ziac. She stated that her blood pressure was staying around 130-140 systolic but she was having severe symptoms. Instructed to stop Ziac and sent prescription for a 2 week supply of amlodipine to her pharmacy. Will have office contact her on Monday to see how she is doing.

## 2023-04-14 NOTE — TELEPHONE ENCOUNTER
Caller: Mary Jack    Relationship to patient: Self    Best call back number: [phone]    Patient is needing: PATIENT STATED THAT SHE IS HAVING SIDE EFFECTS FROM HER ZIAC. SHE STATED THAT IT GIVES HER SHORTNESS OF BREATH AND DIZZINESS AND THAT HER BP IS ACTUALLY GOING UP WITH IT.

## 2023-04-17 NOTE — TELEPHONE ENCOUNTER
PATIENT SPOKE TO W[place] MEZA AT [facility] Friday EVENING AND SHE HAD HER STOP THE MEDICATION AND SENT IN A SCRIPT FOR AMLODPINE BUT PATIENT HAS NOT TAKEN IT. HER BP THE LAST 3 DAYS 121/70 , SHE DOESN'T THINK SHE NEEDS BP MEDICATIONS.

## 2023-04-18 NOTE — TELEPHONE ENCOUNTER
PATIENT STATES SHE FEELING BETTER B/P RUNNING 118/68 -135/74 SHE DOESN'T FEEL SHE NEEDS THIS NEW MEDICATION -IF CALL US IF IT STATS GOING UP

## 2023-05-16 ENCOUNTER — TELEPHONE (OUTPATIENT)
Dept: CARDIOLOGY | Facility: CLINIC | Age: 78
End: 2023-05-16
Payer: MEDICARE

## 2023-05-16 NOTE — TELEPHONE ENCOUNTER
Caller: Mary Jack    Relationship: Self    Best call back number: [phone]    What is the best time to reach you:ANY    Who are you requesting to speak with (clinical staff, provider,  specific staff member): CLINICAL     Do you know the name of the person who called: THE PATIENT    What was the call regarding: THE PATIENT IS TAKEN bisoprolol-hydrochlorothiazide (Ziac) 5-6.25 MG, SHE IS USING THE RESTROOM TO OFTEN. PATIENT IS FEELING REALY DIZZY AFTER TAKEN THIS MEDICATION AND WOULD LIKE TO KNOW IF SHE CAN BE PRESCRIBED ANOTHER MEDICATION.  THE PATIENT IS WANTING TO HAVE A MEDICATION THAT AGREES WITH HER.    BLOOD PRESSURE IS RUNNING LOW TODAY READING;  11:00 /62  11:10 /64  11:20 /68    Do you require a callback: YES

## 2023-05-17 NOTE — TELEPHONE ENCOUNTER
PER RIO LOCKE  PT TO TAKE 1/2 TABLET OF ZIAC 5/6.25  CONTINUE TO MONITOR BP 1 1/2 HOURS AFTER TAKING ZIAC.   CALL THE OFFICE WITH UPDATE EARLY NEXT WEEK.  PT IS AWARE AND VERBALIZED UNDERSTANDING.

## 2023-05-25 ENCOUNTER — PATIENT OUTREACH (OUTPATIENT)
Dept: CASE MANAGEMENT | Facility: OTHER | Age: 78
End: 2023-05-25
Payer: MEDICARE

## 2023-05-25 NOTE — OUTREACH NOTE
Patient Outreach    AMBULATORY CASE MANAGEMENT NOTE    Name and Relationship of Patient/Support Person: Guero Mary F - Self    Call placed to patient for monthly check in. She states she is doing well. She is monitoring her blood pressure. She is calling this in to cardiology. So far her decreased does of ziac is working well. Confirmed she financially received the correct wheel chair. Discussed need to decrease smoking. Denies any questions or concerns at this time but does have AC number if needled.         Fatuma CONTE  Ambulatory Case Management    5/25/2023, 11:56 EDT

## 2023-05-30 ENCOUNTER — PATIENT MESSAGE (OUTPATIENT)
Dept: FAMILY MEDICINE CLINIC | Facility: CLINIC | Age: 78
End: 2023-05-30

## 2023-05-30 DIAGNOSIS — J43.1 PANLOBULAR EMPHYSEMA: ICD-10-CM

## 2023-05-30 RX ORDER — ALBUTEROL SULFATE 90 UG/1
2 AEROSOL, METERED RESPIRATORY (INHALATION) EVERY 4 HOURS PRN
Qty: 18 G | Refills: 5 | Status: SHIPPED | OUTPATIENT
Start: 2023-05-30 | End: 2023-05-31 | Stop reason: SDUPTHER

## 2023-05-30 NOTE — TELEPHONE ENCOUNTER
From: Mary Jack  To: Delfino Matt  Sent: 5/30/2023 11:53 AM EDT  Subject: Albuterol    My prescription for albuterol needs renewing ,could you please call that into [facility] phone # [phone], Thankyo. Mary Jack

## 2023-05-31 DIAGNOSIS — J43.1 PANLOBULAR EMPHYSEMA: ICD-10-CM

## 2023-05-31 RX ORDER — ALBUTEROL SULFATE 90 UG/1
2 AEROSOL, METERED RESPIRATORY (INHALATION) EVERY 4 HOURS PRN
Qty: 54 G | Refills: 3 | Status: SHIPPED | OUTPATIENT
Start: 2023-05-31 | End: 2023-06-05 | Stop reason: SDUPTHER

## 2023-05-31 NOTE — TELEPHONE ENCOUNTER
Caller: Mary Jack    Relationship: Self    Best call back number: [phone]    What medication are you requesting: albuterol sulfate  (90 Base) MCG/ACT inhaler    Have you had these symptoms before:    [x] Yes  [] No    Have you been treated for these symptoms before:   [x] Yes  [] No    If a prescription is needed, what is your preferred pharmacy and phone number: [id number] [place] [facility] [address] - [phone]  - [phone]     Additional notes: PATIENT STATES THAT INSURANCE NEEDS PRESCRIPTION TO BE FOR 90 DAY SUPPLIES. REQUESTS TO HAVE IT UPDATE AND RESENT TO PHARMACY

## 2023-06-01 NOTE — TELEPHONE ENCOUNTER
Caller: Mary Jack    Relationship: Self    Best call back number: [phone]    Requested Prescriptions:   Requested Prescriptions     Signed Prescriptions Disp Refills   • albuterol sulfate  (90 Base) MCG/ACT inhaler 54 g 3     Sig: Inhale 2 puffs Every 4 (Four) Hours As Needed for Wheezing.     Authorizing Provider: JEANETTE OROPEZA        Pharmacy where request should be sent: [facility] - [place] [id number] - [phone] [facility] [phone]      Last office visit with prescribing clinician: 2/13/2023   Last telemedicine visit with prescribing clinician: Visit date not found   Next office visit with prescribing clinician: 8/14/2023     Additional details provided by patient:PATIENT STATES PRICE IS TOO HIGH AT [facility]. PLEASE SEND PRESCRIPTION TO [facility] MAIL ORDER      Does the patient have less than a 3 day supply:  [] Yes  [] No    Would you like a call back once the refill request has been completed: [] Yes [] No    If the office needs to give you a call back, can they leave a voicemail: [] Yes [] No    Haleigh Castano Rep   06/01/23 10:54 EDT

## 2023-06-03 ENCOUNTER — PATIENT MESSAGE (OUTPATIENT)
Dept: FAMILY MEDICINE CLINIC | Facility: CLINIC | Age: 78
End: 2023-06-03
Payer: MEDICARE

## 2023-06-03 DIAGNOSIS — J43.1 PANLOBULAR EMPHYSEMA: ICD-10-CM

## 2023-06-05 RX ORDER — ALBUTEROL SULFATE 90 UG/1
2 AEROSOL, METERED RESPIRATORY (INHALATION) EVERY 4 HOURS PRN
Qty: 54 G | Refills: 3 | Status: SHIPPED | OUTPATIENT
Start: 2023-06-05

## 2023-06-05 RX ORDER — ATORVASTATIN CALCIUM 10 MG/1
TABLET, FILM COATED ORAL
Qty: 90 TABLET | Refills: 1 | Status: SHIPPED | OUTPATIENT
Start: 2023-06-05

## 2023-06-05 NOTE — TELEPHONE ENCOUNTER
From: Mary Jack  To: Delfino Matt  Sent: 6/3/2023 1:07 PM EDT  Subject: ABUTEROL SULFATE     Dr. Ocampo ,sorry I am asking you to again about my prescription I need that new prescription sent to [facility] pharmacy for 90 day supply, I am saving money by doing so, but they need the presription from you before they will approve it and mail it , I left a message about this with someone in your office, about this Thursday ,but [facility] said they haven't got it ,so if you could be please send it [facility] would apprecpate. martha Trujillo

## 2023-06-14 DIAGNOSIS — J43.1 PANLOBULAR EMPHYSEMA: ICD-10-CM

## 2023-06-14 RX ORDER — ALBUTEROL SULFATE 90 UG/1
2 AEROSOL, METERED RESPIRATORY (INHALATION) EVERY 4 HOURS PRN
Qty: 54 G | Refills: 3 | Status: SHIPPED | OUTPATIENT
Start: 2023-06-14

## 2023-06-14 NOTE — TELEPHONE ENCOUNTER
This had been sent on 6/5 for 90 day supply with a refill to local pharmacy.  She is now requesting this be sent for 90 day supply to [facility].  Please advise.

## 2023-08-07 ENCOUNTER — PATIENT OUTREACH (OUTPATIENT)
Dept: CASE MANAGEMENT | Facility: OTHER | Age: 78
End: 2023-08-07
Payer: MEDICARE

## 2023-08-07 NOTE — OUTREACH NOTE
Patient Outreach    AMBULATORY CASE MANAGEMENT NOTE    Name and Relationship of Patient/Support Person: Mary Jack F - Self    Call placed for monthly check in. Mrs Jack states she is doing well. She denies any new complaints. She is currently taking antibiotics and steroids. Discussed gut health use of yogurt and  probiotics. States she has had a lot of congestion. Discussed fluid intake and she is drinking as she should. Denies any questions at this time. Reminded of upcoming appointment with Dr Matt. Long enjoyable conversation.         Fatuma CONTE  Ambulatory Case Management    8/7/2023, 11:40 EDT

## 2023-08-14 ENCOUNTER — OFFICE VISIT (OUTPATIENT)
Dept: FAMILY MEDICINE CLINIC | Facility: CLINIC | Age: 78
End: 2023-08-14
Payer: MEDICARE

## 2023-08-14 ENCOUNTER — TELEPHONE (OUTPATIENT)
Dept: FAMILY MEDICINE CLINIC | Facility: CLINIC | Age: 78
End: 2023-08-14

## 2023-08-14 VITALS
DIASTOLIC BLOOD PRESSURE: 60 MMHG | BODY MASS INDEX: 30.3 KG/M2 | TEMPERATURE: 98 F | SYSTOLIC BLOOD PRESSURE: 110 MMHG | OXYGEN SATURATION: 91 % | HEIGHT: 63 IN | HEART RATE: 88 BPM

## 2023-08-14 DIAGNOSIS — Z79.2 ANTIBIOTIC LONG-TERM USE: ICD-10-CM

## 2023-08-14 DIAGNOSIS — D64.9 LOW HEMOGLOBIN: ICD-10-CM

## 2023-08-14 DIAGNOSIS — R79.89 ELEVATED TSH: ICD-10-CM

## 2023-08-14 DIAGNOSIS — I10 PRIMARY HYPERTENSION: Primary | ICD-10-CM

## 2023-08-14 DIAGNOSIS — R94.6 ABNORMAL RESULTS OF THYROID FUNCTION STUDIES: ICD-10-CM

## 2023-08-14 PROCEDURE — 1160F RVW MEDS BY RX/DR IN RCRD: CPT | Performed by: FAMILY MEDICINE

## 2023-08-14 PROCEDURE — 3078F DIAST BP <80 MM HG: CPT | Performed by: FAMILY MEDICINE

## 2023-08-14 PROCEDURE — 3074F SYST BP LT 130 MM HG: CPT | Performed by: FAMILY MEDICINE

## 2023-08-14 PROCEDURE — 1159F MED LIST DOCD IN RCRD: CPT | Performed by: FAMILY MEDICINE

## 2023-08-14 PROCEDURE — 99214 OFFICE O/P EST MOD 30 MIN: CPT | Performed by: FAMILY MEDICINE

## 2023-08-14 RX ORDER — ZINC OXIDE 13 %
1 CREAM (GRAM) TOPICAL DAILY
Qty: 90 CAPSULE | Refills: 3 | Status: SHIPPED | OUTPATIENT
Start: 2023-08-14

## 2023-08-14 RX ORDER — PREDNISONE 10 MG/1
10 TABLET ORAL DAILY
COMMUNITY
Start: 2023-07-26

## 2023-08-14 RX ORDER — ATENOLOL 25 MG/1
25 TABLET ORAL DAILY
Qty: 30 TABLET | Refills: 0 | Status: SHIPPED | OUTPATIENT
Start: 2023-08-14 | End: 2023-08-14 | Stop reason: SDUPTHER

## 2023-08-14 RX ORDER — FERROUS SULFATE 325(65) MG
325 TABLET ORAL
COMMUNITY

## 2023-08-14 RX ORDER — ATENOLOL 25 MG/1
25 TABLET ORAL DAILY
Qty: 30 TABLET | Refills: 0 | Status: SHIPPED | OUTPATIENT
Start: 2023-08-14

## 2023-08-14 NOTE — PROGRESS NOTES
Chief Complaint   Patient presents with    Hypertension    COPD     Answers submitted by the patient for this visit:  Other (Submitted on 8/13/2023)  Please describe your symptoms.: follow up  Have you had these symptoms before?: Yes  How long have you been having these symptoms?: Greater than 2 weeks  Primary Reason for Visit (Submitted on 8/13/2023)  What is the primary reason for your visit?: Other    Subjective     Patient here for follow-up of elevated blood pressure.    She is not exercising and is adherent to a low-salt diet.    Blood pressure is not well controlled at home.   Cardiac symptoms: cough, dyspnea, fatigue, and tachypnea.   Patient denies: chest pain.   Cardiovascular risk factors: advanced age (older than 55 for men, 65 for women).   Use of agents associated with hypertension: none.   History of target organ damage: none.  Patient is taking prescribed hypertension medications as prescribed without side effects.    The following portions of the patient's history were reviewed and updated as appropriate: allergies, current medications, past medical history, past social history, past surgical history, and problem list.    Review of Systems  A comprehensive review of systems was negative except for: Respiratory: positive for chronic bronchitis, cough, dyspnea on exertion, emphysema, sputum, and wheezing    Results for orders placed or performed in visit on 02/03/23   TSH    Specimen: Blood   Result Value Ref Range    TSH 5.180 (H) 0.270 - 4.200 uIU/mL   Lipid Panel    Specimen: Blood   Result Value Ref Range    Total Cholesterol 190 0 - 200 mg/dL    Triglycerides 80 0 - 150 mg/dL    HDL Cholesterol 93 (H) 40 - 60 mg/dL    VLDL Cholesterol Paco 14 5 - 40 mg/dL    LDL Chol Calc (NIH) 83 0 - 100 mg/dL   Comprehensive Metabolic Panel    Specimen: Blood   Result Value Ref Range    Glucose 86 65 - 99 mg/dL    BUN 27 (H) 8 - 23 mg/dL    Creatinine 0.92 0.57 - 1.00 mg/dL    EGFR Result 65.1 >60.0  "mL/min/1.73    BUN/Creatinine Ratio 29.3 (H) 7.0 - 25.0    Sodium 136 136 - 145 mmol/L    Potassium 4.5 3.5 - 5.2 mmol/L    Chloride 93 (L) 98 - 107 mmol/L    Total CO2 32.4 (H) 22.0 - 29.0 mmol/L    Calcium 10.2 8.6 - 10.5 mg/dL    Total Protein 6.6 6.0 - 8.5 g/dL    Albumin 4.4 3.5 - 5.2 g/dL    Globulin 2.2 gm/dL    A/G Ratio 2.0 g/dL    Total Bilirubin 0.4 0.0 - 1.2 mg/dL    Alkaline Phosphatase 84 39 - 117 U/L    AST (SGOT) 11 1 - 32 U/L    ALT (SGPT) 10 1 - 33 U/L   CBC (No Diff)    Specimen: Blood   Result Value Ref Range    WBC 7.31 3.40 - 10.80 10*3/mm3    RBC 3.79 3.77 - 5.28 10*6/mm3    Hemoglobin 11.7 (L) 12.0 - 15.9 g/dL    Hematocrit 35.2 34.0 - 46.6 %    MCV 92.9 79.0 - 97.0 fL    MCH 30.9 26.6 - 33.0 pg    MCHC 33.2 31.5 - 35.7 g/dL    RDW 12.1 (L) 12.3 - 15.4 %    Platelets 247 140 - 450 10*3/mm3   Unable To Void   Result Value Ref Range    Unable to Void Comment         Vitals:    08/14/23 1051   BP: 110/60   Pulse: 88   Temp: 98 øF (36.7 øC)   SpO2: 91%   Height: 160 cm (62.99\")     BP Readings from Last 3 Encounters:   08/14/23 110/60   03/23/23 134/77   03/15/23 140/66     Objective      Gen: alert, pleasant.  Chronically ill, in a wheelchair, wearing oxygen  Neck: no bruit, no enlarged thyroid  Lungs: CTA  Heart: RR, no murmur  Feet: no edema  Pulses: intact    Assessment & Plan   Hypertension, normal blood pressure Evidence of target organ damage: none.    Diagnoses and all orders for this visit:    1. Primary hypertension (Primary)  -     Discontinue: atenolol (Tenormin) 25 MG tablet; Take 1 tablet by mouth Daily.  Dispense: 30 tablet; Refill: 0  -     atenolol (Tenormin) 25 MG tablet; Take 1 tablet by mouth Daily.  Dispense: 30 tablet; Refill: 0    2. Elevated TSH  -     TSH Rfx On Abnormal To Free T4    3. Low hemoglobin  -     Hemoglobin & Hematocrit, Blood    4. Abnormal results of thyroid function studies  -     TSH Rfx On Abnormal To Free T4    5. Antibiotic long-term use  -     " Probiotic Product (Probiotic Daily) capsule; Take 1 capsule by mouth Daily.  Dispense: 90 capsule; Refill: 3    Patient has advanced COPD  disease.  Is on oxygen 24 hours a day  Having low blood pressure at home.  Is currently on the combo Ziac  She has cut the dose in one half.  And still having low blood pressures.  But at same time is having tachycardia  We can try to stop Ziac, replace with atenolol, hoping it is more alpha selective for her tachycardia  And will not interfere with her albuterol medications for COPD    Need to follow-up on her abnormal TSH and low hemoglobin    She is continued on prednisone daily  She was placed on a daily antibiotic by her pulmonologist.  It was causing loose stools.  Asked her to take the antibiotic every other day  And lets add on a probiotic to help with the antibiotic associated loose stools      Medication: discontinue Ziac and begin atenolol.  Follow up: 4 weeks and as needed.    There are no Patient Instructions on file for this visit.  Medications Discontinued During This Encounter   Medication Reason    bisoprolol-hydrochlorothiazide (Ziac) 5-6.25 MG per tablet Side effects    Calcium Carbonate-Vitamin D (CALCIUM 600+D HIGH POTENCY PO) *Therapy completed    vitamin C (ASCORBIC ACID) 500 MG tablet *Therapy completed    atenolol (Tenormin) 25 MG tablet Reorder        Return in about 1 month (around 9/14/2023) for new medication follow up.    Limit salt  Limit alcoholic drinks to 1 a day  Limit caffeine to 1-2 servings a day    Dr. Delfino Matt MD  [place], [place].  [facility].

## 2023-08-14 NOTE — TELEPHONE ENCOUNTER
Pt called concerning the medication you prescribed her today. She states that you discussed that it would not be a Beta blocker, but that the medication you sent to the pharmacy is. Please advise.    atenolol (Tenormin) 25 MG tablet

## 2023-08-15 LAB
HCT VFR BLD AUTO: 35.8 % (ref 34–46.6)
HGB BLD-MCNC: 12 G/DL (ref 12–15.9)
TSH SERPL DL<=0.005 MIU/L-ACNC: 3.77 UIU/ML (ref 0.27–4.2)

## 2023-08-21 ENCOUNTER — TELEPHONE (OUTPATIENT)
Dept: FAMILY MEDICINE CLINIC | Facility: CLINIC | Age: 78
End: 2023-08-21
Payer: MEDICARE

## 2023-08-21 NOTE — TELEPHONE ENCOUNTER
"Pt called regarding the   atenolol (Tenormin) 25 MG tablet she recently started taking this week. She states that she feels like when she takes it she is \"suffocating\". Not that she cannot breath, but that it makes her feel like her airway is not \"open\" like it is after taking her albuterol treatments. She states she was also prescribed Amlodipine 5mg by a NP in Dr. Hayes's office, she wants to know if she should take that medication and stop the Atenolol? She has not taken either medication today. Please call pt to advise.      "

## 2023-09-06 ENCOUNTER — TELEPHONE (OUTPATIENT)
Dept: FAMILY MEDICINE CLINIC | Facility: CLINIC | Age: 78
End: 2023-09-06

## 2023-09-06 DIAGNOSIS — M54.50 ACUTE LOW BACK PAIN, UNSPECIFIED BACK PAIN LATERALITY, UNSPECIFIED WHETHER SCIATICA PRESENT: Primary | ICD-10-CM

## 2023-09-06 RX ORDER — TRAMADOL HYDROCHLORIDE 50 MG/1
50 TABLET ORAL EVERY 6 HOURS PRN
Qty: 10 TABLET | Refills: 0 | Status: SHIPPED | OUTPATIENT
Start: 2023-09-06 | End: 2023-09-15 | Stop reason: SDUPTHER

## 2023-09-06 NOTE — TELEPHONE ENCOUNTER
Caller: Mary Jack    Relationship: Self    Best call back number: [phone]     What is the best time to reach you: ANYTIME    Who are you requesting to speak with (clinical staff, provider,  specific staff member): CLINICAL    What was the call regarding: PATIENT IS CALLING IN REGARD LOWER BACK PAIN, SHE IS HAVING TROUBLE GETTING UP FROM SEATED OR LYING POSITIONS, CANNOT WALK STANDING STRAIGHT UP, THE PAIN IS ALSO IN HER HIP    Is it okay if the provider responds through [employer]: WOULD RATHER HAVE A PHONE CALL.       I will send in tramadol for the low back pain

## 2023-09-06 NOTE — TELEPHONE ENCOUNTER
UNABLE TO WARM TRANSFER   Hub staff attempted to follow warm transfer process and was unsuccessful     Caller: Mary Jack    Relationship to patient: Self    Best call back number: [phone]    Patient is needing: TO KNOW IF SHE CAN TAKE THIS MEDICATION    PATIENT STATES SHE HAS COPD AND SHORTNESS OF BREATHE AND WAS INFORMED THAT IT MAY MAKE THESE SYMPTOMS WORSE

## 2023-09-09 DIAGNOSIS — I10 PRIMARY HYPERTENSION: ICD-10-CM

## 2023-09-11 RX ORDER — ATENOLOL 25 MG/1
25 TABLET ORAL DAILY
Qty: 30 TABLET | Refills: 0 | Status: SHIPPED | OUTPATIENT
Start: 2023-09-11 | End: 2023-09-15

## 2023-09-13 ENCOUNTER — PATIENT OUTREACH (OUTPATIENT)
Dept: CASE MANAGEMENT | Facility: OTHER | Age: 78
End: 2023-09-13
Payer: MEDICARE

## 2023-09-13 NOTE — OUTREACH NOTE
Patient Outreach    AMBULATORY CASE MANAGEMENT NOTE    Name and Relationship of Patient/Support Person: Mary Jack F - Self    Call placed to patient for monthly check in. Mrs Jack states she is having some back issues. She has a video visit with Dr Matt to follow up on her hypertension. It has been difficult to find the right medication and dosage. She is finding it increasingly difficult to get to appointments. We discussed long term plans and eventual need for care at home. Long enjoyable conversation. Discussed graduation and she would like continued calls.         Fatuma CONTE  Ambulatory Case Management    9/13/2023, 13:23 EDT

## 2023-09-15 ENCOUNTER — TELEMEDICINE (OUTPATIENT)
Dept: FAMILY MEDICINE CLINIC | Facility: CLINIC | Age: 78
End: 2023-09-15
Payer: MEDICARE

## 2023-09-15 DIAGNOSIS — R00.0 SINUS TACHYCARDIA: Primary | ICD-10-CM

## 2023-09-15 DIAGNOSIS — M54.50 ACUTE LOW BACK PAIN, UNSPECIFIED BACK PAIN LATERALITY, UNSPECIFIED WHETHER SCIATICA PRESENT: ICD-10-CM

## 2023-09-15 PROCEDURE — 99213 OFFICE O/P EST LOW 20 MIN: CPT | Performed by: FAMILY MEDICINE

## 2023-09-15 PROCEDURE — 1159F MED LIST DOCD IN RCRD: CPT | Performed by: FAMILY MEDICINE

## 2023-09-15 PROCEDURE — 1160F RVW MEDS BY RX/DR IN RCRD: CPT | Performed by: FAMILY MEDICINE

## 2023-09-15 RX ORDER — DILTIAZEM HYDROCHLORIDE 120 MG/1
120 CAPSULE, COATED, EXTENDED RELEASE ORAL DAILY
Qty: 30 CAPSULE | Refills: 0 | Status: SHIPPED | OUTPATIENT
Start: 2023-09-15

## 2023-09-15 RX ORDER — TRAMADOL HYDROCHLORIDE 50 MG/1
50 TABLET ORAL EVERY 6 HOURS PRN
Qty: 10 TABLET | Refills: 0 | Status: SHIPPED | OUTPATIENT
Start: 2023-09-15

## 2023-09-15 NOTE — PROGRESS NOTES
"  Chief Complaint   Patient presents with    Hypertension    COPD     Pt called concerning the medication you prescribed her today. She states that you discussed that it would not be a Beta blocker, but that the medication you sent to the pharmacy is. Please advise.     atenolol (Tenormin) 25 MG tablet   #2  Dr. Matt, I don't think I should take Atenolol, due to it being a beta blocker and it effects the way my albuterol works , Albuterol helps widen the airways and beta blockers narrow the airways and it makes my shortness of breath worse when I use beta blockers , so is there a bp med that isn't a beta blocker, so far today my bp is fine and heart rate also, good, and I haven't taken any bp meds today, thanksCassandra   #3  Pt called regarding the   atenolol (Tenormin) 25 MG tablet she recently started taking this week. She states that she feels like when she takes it she is \"suffocating\". Not that she cannot breath, but that it makes her feel like her airway is not \"open\" like it is after taking her albuterol treatments. She states she was also prescribed Amlodipine 5mg by a NP in Dr. Hayes's office, she wants to know if she should take that medication and stop the Atenolol? She has not taken either medication today. Please call pt to advise.   Subjective     Patient here for follow-up of elevated blood pressure.    She is not exercising and is adherent to a low-salt diet.    Blood pressure is well controlled at home.   Cardiac symptoms: cough and dyspnea.   Patient denies: chest pressure/discomfort.   Cardiovascular risk factors: advanced age (older than 55 for men, 65 for women).   Use of agents associated with hypertension: steroids.   History of target organ damage: prior coronary revascularization.  Patient is taking prescribed hypertension medications as prescribed without side effects.  BP at home,   On daily prednisone for COPD.        This is a Mychart Video medical encounter.  Video visit is " being chosen because either patient has an acute contagious infection or is unable to physically come to the office due to medical issues or transportation issues.  Medical history from chart is reviewed. Audio visual encounter provided through a secure link via [employer]. Patient location is patient's home. Provider location is in my routine medical office in [facility]. Patient has given prior consent for this encounter, via check in process. Regarding EM coding: history will not be as robust and exam will be limited. Most EM coding decisions will be based on MDM and time.  Total face video time, 15 minutes . Total chart time pre and post chartin-20 minutes.    As above patient could not tolerate the atenolol made her suffocate.  She has significant COPD  Already using Norvasc from her cardiologist office.  That did not help her elevated heart rate    Secondly, her low back pain continues with some sciatica.  Would like another refill of a small amount of tramadol      The following portions of the patient's history were reviewed and updated as appropriate: allergies, current medications, past medical history, past social history, past surgical history, and problem list.    Review of Systems  Pertinent items are noted in HPI.       There were no vitals filed for this visit.  BP Readings from Last 3 Encounters:   23 110/60   23 134/77   03/15/23 140/66     Objective      Gen: alert, pleasant.  Assessment & Plan   Hypertension, normal blood pressure Evidence of target organ damage: prior coronary revascularization.    Diagnoses and all orders for this visit:    1. Sinus tachycardia (Primary)  -     dilTIAZem CD (Cardizem CD) 120 MG 24 hr capsule; Take 1 capsule by mouth Daily. Indications: High Blood Pressure Disorder, Supraventricular Tachycardia  Dispense: 30 capsule; Refill: 0    2. Acute low back pain, unspecified back pain laterality, unspecified whether sciatica present  -     traMADol  (ULTRAM) 50 MG tablet; Take 1 tablet by mouth Every 6 (Six) Hours As Needed for Severe Pain.  Dispense: 10 tablet; Refill: 0    Lets try a Cardizem for blood pressure and tachycardia    Medication: discontinue Norvasc and atenolol and begin Cardizem.  Follow up: 4 weeks and as needed.    There are no Patient Instructions on file for this visit.  Medications Discontinued During This Encounter   Medication Reason    atenolol (TENORMIN) 25 MG tablet *Therapy completed    traMADol (ULTRAM) 50 MG tablet Reorder        Return in about 1 month (around 10/15/2023) for blood pressure, new medication follow up.    Limit salt  Limit alcoholic drinks to 1 a day  Limit caffeine to 1-2 servings a day    Dr. Delfino Matt MD  [place], [place]  [facility].

## 2023-10-11 DIAGNOSIS — R00.0 SINUS TACHYCARDIA: ICD-10-CM

## 2023-10-11 RX ORDER — DILTIAZEM HYDROCHLORIDE 120 MG/1
120 CAPSULE, COATED, EXTENDED RELEASE ORAL DAILY
Qty: 30 CAPSULE | Refills: 0 | Status: SHIPPED | OUTPATIENT
Start: 2023-10-11 | End: 2023-10-16

## 2023-10-16 ENCOUNTER — TELEMEDICINE (OUTPATIENT)
Dept: FAMILY MEDICINE CLINIC | Facility: CLINIC | Age: 78
End: 2023-10-16
Payer: MEDICARE

## 2023-10-16 DIAGNOSIS — R60.9 DEPENDENT EDEMA: ICD-10-CM

## 2023-10-16 DIAGNOSIS — I25.9 ISCHEMIC HEART DISEASE DUE TO CORONARY ARTERY OBSTRUCTION: Primary | ICD-10-CM

## 2023-10-16 DIAGNOSIS — I24.0 ISCHEMIC HEART DISEASE DUE TO CORONARY ARTERY OBSTRUCTION: Primary | ICD-10-CM

## 2023-10-16 PROCEDURE — 99213 OFFICE O/P EST LOW 20 MIN: CPT | Performed by: FAMILY MEDICINE

## 2023-10-16 RX ORDER — POTASSIUM CHLORIDE 750 MG/1
20 CAPSULE, EXTENDED RELEASE ORAL DAILY
Qty: 90 CAPSULE | Refills: 3 | Status: SHIPPED | OUTPATIENT
Start: 2023-10-16

## 2023-10-16 RX ORDER — NITROGLYCERIN 0.4 MG/1
0.4 TABLET SUBLINGUAL
Qty: 90 TABLET | Refills: 0 | Status: SHIPPED | OUTPATIENT
Start: 2023-10-16

## 2023-10-16 RX ORDER — FUROSEMIDE 20 MG/1
20 TABLET ORAL EVERY OTHER DAY
Qty: 45 TABLET | Refills: 3 | Status: SHIPPED | OUTPATIENT
Start: 2023-10-16

## 2023-10-16 RX ORDER — FLUTICASONE FUROATE, UMECLIDINIUM BROMIDE AND VILANTEROL TRIFENATATE 100; 62.5; 25 UG/1; UG/1; UG/1
1 POWDER RESPIRATORY (INHALATION)
COMMUNITY
Start: 2023-08-16

## 2023-10-16 NOTE — PROGRESS NOTES
Subjective   Mary Jack is a 78 y.o. female who is here for   Chief Complaint   Patient presents with    Hypertension   .     Hypertension         This is a Mychart Video medical encounter.  Video visit is being chosen because either patient has an acute contagious infection or is unable to physically come to the office due to medical issues or transportation issues.  Medical history from chart is reviewed. Audio visual encounter provided through a secure link via [employer]. Patient location is patient's home. Provider location is in my routine medical office in [facility]. Patient has given prior consent for this encounter, via check in process. Regarding EM coding: history will not be as robust and exam will be limited. Most EM coding decisions will be based on MDM and time.  Total face video time, 15 minutes . Total chart time pre and post chartin-20 minutes.    Last month we began Cardizem for both blood pressure and tachycardia.  But once starting the medication she felt worse    Today's 3 blood pressure and heart rate readings are  1# BP is  112/64 HR 86    #2 ; 110/65 HR 85    3# 107/63 HR 86    So looks like her blood pressure and heart rate are improving on their own.    She like to have a refill of her Lasix and potassium.  As her feet continue to swell.  She also has pain in her feet    Needs a renewed bottle of her sublingual nitro    Her low back pain has improved        The following portions of the patient's history were reviewed and updated as appropriate: allergies, current medications, past family history, past medical history, past social history, past surgical history, and problem list.    Review of Systems    Objective   There were no vitals filed for this visit.   Physical Exam  Vitals reviewed.   Neurological:      Mental Status: She is alert.         Assessment & Plan   Diagnoses and all orders for this visit:    1. 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 Take no more than 3 doses in 15 minutes.  Dispense: 90 tablet; Refill: 0    2. Dependent edema  -     furosemide (LASIX) 20 MG tablet; Take 1 tablet by mouth Every Other Day. Indications: Edema  Dispense: 45 tablet; Refill: 3  -     potassium chloride (MICRO-K) 10 MEQ CR capsule; Take 2 capsules by mouth Daily.  Dispense: 90 capsule; Refill: 3      There are no Patient Instructions on file for this visit.    Medications Discontinued During This Encounter   Medication Reason    Probiotic Product (Probiotic Daily) capsule *Therapy completed    dilTIAZem CD (CARDIZEM CD) 120 MG 24 hr capsule *Therapy completed    furosemide (LASIX) 20 MG tablet Reorder    potassium chloride (MICRO-K) 10 MEQ CR capsule Reorder    nitroglycerin (NITROSTAT) 0.4 MG SL tablet Reorder        No follow-ups on file.    Dr. Delfino Matt  [place], [place]

## 2024-03-06 DIAGNOSIS — I65.23 BILATERAL CAROTID ARTERY STENOSIS: ICD-10-CM

## 2024-03-06 DIAGNOSIS — I71.40 ABDOMINAL AORTIC ANEURYSM (AAA) WITHOUT RUPTURE, UNSPECIFIED PART: Primary | ICD-10-CM

## 2024-03-06 DIAGNOSIS — I72.4 ANEURYSM OF ARTERY OF LOWER EXTREMITY: ICD-10-CM

## 2024-03-07 DIAGNOSIS — K22.70 BARRETT'S ESOPHAGUS WITHOUT DYSPLASIA: ICD-10-CM

## 2024-03-07 RX ORDER — OMEPRAZOLE 40 MG/1
CAPSULE, DELAYED RELEASE ORAL
Qty: 90 CAPSULE | Refills: 0 | Status: SHIPPED | OUTPATIENT
Start: 2024-03-07

## 2024-03-08 RX ORDER — ATORVASTATIN CALCIUM 10 MG/1
TABLET, FILM COATED ORAL
Qty: 90 TABLET | Refills: 0 | Status: SHIPPED | OUTPATIENT
Start: 2024-03-08

## 2024-04-04 DIAGNOSIS — M54.50 ACUTE LOW BACK PAIN, UNSPECIFIED BACK PAIN LATERALITY, UNSPECIFIED WHETHER SCIATICA PRESENT: ICD-10-CM

## 2024-04-04 RX ORDER — TRAMADOL HYDROCHLORIDE 50 MG/1
50 TABLET ORAL EVERY 6 HOURS PRN
Qty: 10 TABLET | Refills: 0 | Status: SHIPPED | OUTPATIENT
Start: 2024-04-04

## 2024-04-22 ENCOUNTER — APPOINTMENT (OUTPATIENT)
Dept: GENERAL RADIOLOGY | Facility: HOSPITAL | Age: 79
End: 2024-04-22
Payer: MEDICARE

## 2024-04-22 ENCOUNTER — HOSPITAL ENCOUNTER (INPATIENT)
Facility: HOSPITAL | Age: 79
LOS: 3 days | Discharge: HOME | End: 2024-04-25
Attending: STUDENT IN AN ORGANIZED HEALTH CARE EDUCATION/TRAINING PROGRAM | Admitting: INTERNAL MEDICINE
Payer: MEDICARE

## 2024-04-22 ENCOUNTER — APPOINTMENT (OUTPATIENT)
Dept: CT IMAGING | Facility: HOSPITAL | Age: 79
End: 2024-04-22
Payer: MEDICARE

## 2024-04-22 DIAGNOSIS — R41.0 CONFUSION: ICD-10-CM

## 2024-04-22 DIAGNOSIS — E87.1 HYPONATREMIA: Primary | ICD-10-CM

## 2024-04-22 DIAGNOSIS — I10 HYPERTENSION, UNSPECIFIED TYPE: ICD-10-CM

## 2024-04-22 DIAGNOSIS — J41.8 MIXED SIMPLE AND MUCOPURULENT CHRONIC BRONCHITIS: ICD-10-CM

## 2024-04-22 DIAGNOSIS — J44.1 CHRONIC OBSTRUCTIVE PULMONARY DISEASE WITH ACUTE EXACERBATION: ICD-10-CM

## 2024-04-22 DIAGNOSIS — J96.11 CHRONIC RESPIRATORY FAILURE WITH HYPOXIA: ICD-10-CM

## 2024-04-22 LAB
ALBUMIN SERPL-MCNC: 4.4 G/DL (ref 3.5–5.2)
ALBUMIN/GLOB SERPL: 1.5 G/DL
ALP SERPL-CCNC: 102 U/L (ref 39–117)
ALT SERPL W P-5'-P-CCNC: 14 U/L (ref 1–33)
AMPHET+METHAMPHET UR QL: NEGATIVE
AMPHETAMINES UR QL: NEGATIVE
ANION GAP SERPL CALCULATED.3IONS-SCNC: 12.2 MMOL/L (ref 5–15)
ANION GAP SERPL CALCULATED.3IONS-SCNC: 12.9 MMOL/L (ref 5–15)
APAP SERPL-MCNC: <5 MCG/ML (ref 0–30)
AST SERPL-CCNC: 18 U/L (ref 1–32)
ATMOSPHERIC PRESS: 741 MMHG
BACTERIA UR QL AUTO: NORMAL /HPF
BARBITURATES UR QL SCN: NEGATIVE
BASE EXCESS BLDV CALC-SCNC: 4.6 MMOL/L (ref 0–2)
BASOPHILS # BLD AUTO: 0.03 10*3/MM3 (ref 0–0.2)
BASOPHILS NFR BLD AUTO: 0.4 % (ref 0–1.5)
BDY SITE: ABNORMAL
BENZODIAZ UR QL SCN: NEGATIVE
BILIRUB SERPL-MCNC: 0.5 MG/DL (ref 0–1.2)
BILIRUB UR QL STRIP: NEGATIVE
BODY TEMPERATURE: 37
BUN SERPL-MCNC: 11 MG/DL (ref 8–23)
BUN SERPL-MCNC: 13 MG/DL (ref 8–23)
BUN/CREAT SERPL: 17.7 (ref 7–25)
BUN/CREAT SERPL: 18.1 (ref 7–25)
BUPRENORPHINE SERPL-MCNC: NEGATIVE NG/ML
CALCIUM SPEC-SCNC: 10 MG/DL (ref 8.6–10.5)
CALCIUM SPEC-SCNC: 9.2 MG/DL (ref 8.6–10.5)
CANNABINOIDS SERPL QL: NEGATIVE
CHLORIDE SERPL-SCNC: 85 MMOL/L (ref 98–107)
CHLORIDE SERPL-SCNC: 85 MMOL/L (ref 98–107)
CLARITY UR: CLEAR
CO2 SERPL-SCNC: 24.8 MMOL/L (ref 22–29)
CO2 SERPL-SCNC: 25.1 MMOL/L (ref 22–29)
COCAINE UR QL: NEGATIVE
COLOR UR: YELLOW
CREAT SERPL-MCNC: 0.62 MG/DL (ref 0.57–1)
CREAT SERPL-MCNC: 0.72 MG/DL (ref 0.57–1)
D DIMER PPP FEU-MCNC: 0.73 MCGFEU/ML (ref 0–0.76)
D-LACTATE SERPL-SCNC: 1 MMOL/L (ref 0.5–2)
DEPRECATED RDW RBC AUTO: 48.3 FL (ref 37–54)
EGFRCR SERPLBLD CKD-EPI 2021: 86.8 ML/MIN/1.73
EGFRCR SERPLBLD CKD-EPI 2021: 92.4 ML/MIN/1.73
EOSINOPHIL # BLD AUTO: 0.02 10*3/MM3 (ref 0–0.4)
EOSINOPHIL NFR BLD AUTO: 0.3 % (ref 0.3–6.2)
ERYTHROCYTE [DISTWIDTH] IN BLOOD BY AUTOMATED COUNT: 14.3 % (ref 12.3–15.4)
ETHANOL BLD-MCNC: <10 MG/DL (ref 0–10)
ETHANOL UR QL: <0.01 %
GAS FLOW AIRWAY: 4 LPM
GEN 5 2HR TROPONIN T REFLEX: 20 NG/L
GLOBULIN UR ELPH-MCNC: 3 GM/DL
GLUCOSE BLDC GLUCOMTR-MCNC: 109 MG/DL (ref 70–130)
GLUCOSE SERPL-MCNC: 108 MG/DL (ref 65–99)
GLUCOSE SERPL-MCNC: 126 MG/DL (ref 65–99)
GLUCOSE UR STRIP-MCNC: NEGATIVE MG/DL
HBA1C MFR BLD: 5.7 % (ref 4.8–5.6)
HCO3 BLDV-SCNC: 29.6 MMOL/L (ref 22–28)
HCT VFR BLD AUTO: 35 % (ref 34–46.6)
HGB BLD-MCNC: 12 G/DL (ref 12–15.9)
HGB BLDA-MCNC: 12.1 G/DL (ref 13.5–17.5)
HGB UR QL STRIP.AUTO: ABNORMAL
HYALINE CASTS UR QL AUTO: NORMAL /LPF
IMM GRANULOCYTES # BLD AUTO: 0.06 10*3/MM3 (ref 0–0.05)
IMM GRANULOCYTES NFR BLD AUTO: 0.8 % (ref 0–0.5)
INR PPP: 1.01 (ref 0.9–1.1)
KETONES UR QL STRIP: NEGATIVE
LEUKOCYTE ESTERASE UR QL STRIP.AUTO: NEGATIVE
LYMPHOCYTES # BLD AUTO: 2.12 10*3/MM3 (ref 0.7–3.1)
LYMPHOCYTES NFR BLD AUTO: 28.8 % (ref 19.6–45.3)
Lab: ABNORMAL
MCH RBC QN AUTO: 31.6 PG (ref 26.6–33)
MCHC RBC AUTO-ENTMCNC: 34.3 G/DL (ref 31.5–35.7)
MCV RBC AUTO: 92.1 FL (ref 79–97)
METHADONE UR QL SCN: NEGATIVE
MODALITY: ABNORMAL
MONOCYTES # BLD AUTO: 0.79 10*3/MM3 (ref 0.1–0.9)
MONOCYTES NFR BLD AUTO: 10.7 % (ref 5–12)
NEUTROPHILS NFR BLD AUTO: 4.33 10*3/MM3 (ref 1.7–7)
NEUTROPHILS NFR BLD AUTO: 59 % (ref 42.7–76)
NITRITE UR QL STRIP: NEGATIVE
NRBC BLD AUTO-RTO: 0 /100 WBC (ref 0–0.2)
OPIATES UR QL: NEGATIVE
OXYCODONE UR QL SCN: NEGATIVE
PCO2 BLDV: 44.4 MM HG (ref 41–51)
PCP UR QL SCN: NEGATIVE
PH BLDV: 7.43 PH UNITS (ref 7.32–7.42)
PH UR STRIP.AUTO: 8.5 [PH] (ref 4.5–8)
PLATELET # BLD AUTO: 242 10*3/MM3 (ref 140–450)
PMV BLD AUTO: 9.8 FL (ref 6–12)
PO2 BLDV: 39.3 MM HG (ref 27–53)
POTASSIUM SERPL-SCNC: 4.4 MMOL/L (ref 3.5–5.2)
POTASSIUM SERPL-SCNC: 4.4 MMOL/L (ref 3.5–5.2)
PROCALCITONIN SERPL-MCNC: 0.06 NG/ML (ref 0–0.25)
PROT SERPL-MCNC: 7.4 G/DL (ref 6–8.5)
PROT UR QL STRIP: ABNORMAL
PROTHROMBIN TIME: 13.3 SECONDS (ref 12.1–15)
RBC # BLD AUTO: 3.8 10*6/MM3 (ref 3.77–5.28)
RBC # UR STRIP: NORMAL /HPF
REF LAB TEST METHOD: NORMAL
SALICYLATES SERPL-MCNC: <0.3 MG/DL
SAO2 % BLDCOV: 78.8 % (ref 45–75)
SODIUM SERPL-SCNC: 122 MMOL/L (ref 136–145)
SODIUM SERPL-SCNC: 123 MMOL/L (ref 136–145)
SP GR UR STRIP: 1.02 (ref 1–1.03)
SQUAMOUS #/AREA URNS HPF: NORMAL /HPF
TRICYCLICS UR QL SCN: NEGATIVE
TROPONIN T DELTA: 1 NG/L
TROPONIN T SERPL HS-MCNC: 19 NG/L
TSH SERPL DL<=0.05 MIU/L-ACNC: 1.56 UIU/ML (ref 0.27–4.2)
UROBILINOGEN UR QL STRIP: ABNORMAL
WBC # UR STRIP: NORMAL /HPF
WBC NRBC COR # BLD AUTO: 7.35 10*3/MM3 (ref 3.4–10.8)

## 2024-04-22 PROCEDURE — 25010000002 CEFTRIAXONE PER 250 MG: Performed by: STUDENT IN AN ORGANIZED HEALTH CARE EDUCATION/TRAINING PROGRAM

## 2024-04-22 PROCEDURE — 83935 ASSAY OF URINE OSMOLALITY: CPT | Performed by: NURSE PRACTITIONER

## 2024-04-22 PROCEDURE — 84443 ASSAY THYROID STIM HORMONE: CPT | Performed by: NURSE PRACTITIONER

## 2024-04-22 PROCEDURE — 36415 COLL VENOUS BLD VENIPUNCTURE: CPT

## 2024-04-22 PROCEDURE — 84133 ASSAY OF URINE POTASSIUM: CPT | Performed by: NURSE PRACTITIONER

## 2024-04-22 PROCEDURE — 85379 FIBRIN DEGRADATION QUANT: CPT | Performed by: NURSE PRACTITIONER

## 2024-04-22 PROCEDURE — 80306 DRUG TEST PRSMV INSTRMNT: CPT | Performed by: STUDENT IN AN ORGANIZED HEALTH CARE EDUCATION/TRAINING PROGRAM

## 2024-04-22 PROCEDURE — 82570 ASSAY OF URINE CREATININE: CPT | Performed by: NURSE PRACTITIONER

## 2024-04-22 PROCEDURE — 94799 UNLISTED PULMONARY SVC/PX: CPT

## 2024-04-22 PROCEDURE — 84300 ASSAY OF URINE SODIUM: CPT | Performed by: NURSE PRACTITIONER

## 2024-04-22 PROCEDURE — 93010 ELECTROCARDIOGRAM REPORT: CPT | Performed by: INTERNAL MEDICINE

## 2024-04-22 PROCEDURE — 82948 REAGENT STRIP/BLOOD GLUCOSE: CPT

## 2024-04-22 PROCEDURE — 25810000003 SODIUM CHLORIDE 0.9 % SOLUTION: Performed by: NURSE PRACTITIONER

## 2024-04-22 PROCEDURE — 82077 ASSAY SPEC XCP UR&BREATH IA: CPT | Performed by: STUDENT IN AN ORGANIZED HEALTH CARE EDUCATION/TRAINING PROGRAM

## 2024-04-22 PROCEDURE — 70450 CT HEAD/BRAIN W/O DYE: CPT

## 2024-04-22 PROCEDURE — 94761 N-INVAS EAR/PLS OXIMETRY MLT: CPT

## 2024-04-22 PROCEDURE — 83930 ASSAY OF BLOOD OSMOLALITY: CPT | Performed by: NURSE PRACTITIONER

## 2024-04-22 PROCEDURE — 82436 ASSAY OF URINE CHLORIDE: CPT | Performed by: NURSE PRACTITIONER

## 2024-04-22 PROCEDURE — 80179 DRUG ASSAY SALICYLATE: CPT | Performed by: STUDENT IN AN ORGANIZED HEALTH CARE EDUCATION/TRAINING PROGRAM

## 2024-04-22 PROCEDURE — 83036 HEMOGLOBIN GLYCOSYLATED A1C: CPT | Performed by: NURSE PRACTITIONER

## 2024-04-22 PROCEDURE — 25010000002 KETOROLAC TROMETHAMINE PER 15 MG: Performed by: STUDENT IN AN ORGANIZED HEALTH CARE EDUCATION/TRAINING PROGRAM

## 2024-04-22 PROCEDURE — 84145 PROCALCITONIN (PCT): CPT | Performed by: NURSE PRACTITIONER

## 2024-04-22 PROCEDURE — 93005 ELECTROCARDIOGRAM TRACING: CPT | Performed by: STUDENT IN AN ORGANIZED HEALTH CARE EDUCATION/TRAINING PROGRAM

## 2024-04-22 PROCEDURE — 99222 1ST HOSP IP/OBS MODERATE 55: CPT | Performed by: PSYCHIATRY & NEUROLOGY

## 2024-04-22 PROCEDURE — 0202U NFCT DS 22 TRGT SARS-COV-2: CPT | Performed by: STUDENT IN AN ORGANIZED HEALTH CARE EDUCATION/TRAINING PROGRAM

## 2024-04-22 PROCEDURE — 80143 DRUG ASSAY ACETAMINOPHEN: CPT | Performed by: STUDENT IN AN ORGANIZED HEALTH CARE EDUCATION/TRAINING PROGRAM

## 2024-04-22 PROCEDURE — 85610 PROTHROMBIN TIME: CPT | Performed by: NURSE PRACTITIONER

## 2024-04-22 PROCEDURE — 82803 BLOOD GASES ANY COMBINATION: CPT

## 2024-04-22 PROCEDURE — 25010000002 LABETALOL 5 MG/ML SOLUTION: Performed by: STUDENT IN AN ORGANIZED HEALTH CARE EDUCATION/TRAINING PROGRAM

## 2024-04-22 PROCEDURE — 99223 1ST HOSP IP/OBS HIGH 75: CPT | Performed by: NURSE PRACTITIONER

## 2024-04-22 PROCEDURE — 25810000003 LACTATED RINGERS SOLUTION: Performed by: STUDENT IN AN ORGANIZED HEALTH CARE EDUCATION/TRAINING PROGRAM

## 2024-04-22 PROCEDURE — 80053 COMPREHEN METABOLIC PANEL: CPT | Performed by: STUDENT IN AN ORGANIZED HEALTH CARE EDUCATION/TRAINING PROGRAM

## 2024-04-22 PROCEDURE — 81001 URINALYSIS AUTO W/SCOPE: CPT | Performed by: STUDENT IN AN ORGANIZED HEALTH CARE EDUCATION/TRAINING PROGRAM

## 2024-04-22 PROCEDURE — 99285 EMERGENCY DEPT VISIT HI MDM: CPT

## 2024-04-22 PROCEDURE — 85025 COMPLETE CBC W/AUTO DIFF WBC: CPT | Performed by: STUDENT IN AN ORGANIZED HEALTH CARE EDUCATION/TRAINING PROGRAM

## 2024-04-22 PROCEDURE — 25010000002 ENOXAPARIN PER 10 MG: Performed by: NURSE PRACTITIONER

## 2024-04-22 PROCEDURE — 84484 ASSAY OF TROPONIN QUANT: CPT | Performed by: STUDENT IN AN ORGANIZED HEALTH CARE EDUCATION/TRAINING PROGRAM

## 2024-04-22 PROCEDURE — 94640 AIRWAY INHALATION TREATMENT: CPT

## 2024-04-22 PROCEDURE — 83605 ASSAY OF LACTIC ACID: CPT | Performed by: STUDENT IN AN ORGANIZED HEALTH CARE EDUCATION/TRAINING PROGRAM

## 2024-04-22 PROCEDURE — 87040 BLOOD CULTURE FOR BACTERIA: CPT | Performed by: STUDENT IN AN ORGANIZED HEALTH CARE EDUCATION/TRAINING PROGRAM

## 2024-04-22 PROCEDURE — 71045 X-RAY EXAM CHEST 1 VIEW: CPT

## 2024-04-22 RX ORDER — ONDANSETRON 4 MG/1
4 TABLET, ORALLY DISINTEGRATING ORAL EVERY 6 HOURS PRN
Status: DISCONTINUED | OUTPATIENT
Start: 2024-04-22 | End: 2024-04-25 | Stop reason: HOSPADM

## 2024-04-22 RX ORDER — IPRATROPIUM BROMIDE AND ALBUTEROL SULFATE 2.5; .5 MG/3ML; MG/3ML
3 SOLUTION RESPIRATORY (INHALATION) ONCE
Status: DISCONTINUED | OUTPATIENT
Start: 2024-04-22 | End: 2024-04-22 | Stop reason: SDUPTHER

## 2024-04-22 RX ORDER — SODIUM CHLORIDE 9 MG/ML
100 INJECTION, SOLUTION INTRAVENOUS CONTINUOUS
Status: DISCONTINUED | OUTPATIENT
Start: 2024-04-23 | End: 2024-04-23

## 2024-04-22 RX ORDER — MIDAZOLAM HYDROCHLORIDE 1 MG/ML
0.5 INJECTION INTRAMUSCULAR; INTRAVENOUS ONCE
Status: COMPLETED | OUTPATIENT
Start: 2024-04-23 | End: 2024-04-23

## 2024-04-22 RX ORDER — AMOXICILLIN 250 MG
2 CAPSULE ORAL 2 TIMES DAILY PRN
Status: DISCONTINUED | OUTPATIENT
Start: 2024-04-22 | End: 2024-04-25 | Stop reason: HOSPADM

## 2024-04-22 RX ORDER — SODIUM CHLORIDE FOR INHALATION 7 %
4 VIAL, NEBULIZER (ML) INHALATION
Status: DISCONTINUED | OUTPATIENT
Start: 2024-04-23 | End: 2024-04-24

## 2024-04-22 RX ORDER — ENOXAPARIN SODIUM 100 MG/ML
40 INJECTION SUBCUTANEOUS EVERY 24 HOURS
Status: DISCONTINUED | OUTPATIENT
Start: 2024-04-23 | End: 2024-04-25 | Stop reason: HOSPADM

## 2024-04-22 RX ORDER — MORPHINE SULFATE 2 MG/ML
1 INJECTION, SOLUTION INTRAMUSCULAR; INTRAVENOUS
Status: DISCONTINUED | OUTPATIENT
Start: 2024-04-22 | End: 2024-04-25 | Stop reason: HOSPADM

## 2024-04-22 RX ORDER — KETOROLAC TROMETHAMINE 30 MG/ML
15 INJECTION, SOLUTION INTRAMUSCULAR; INTRAVENOUS EVERY 6 HOURS PRN
Status: DISCONTINUED | OUTPATIENT
Start: 2024-04-22 | End: 2024-04-25 | Stop reason: HOSPADM

## 2024-04-22 RX ORDER — METHYLPREDNISOLONE SODIUM SUCCINATE 40 MG/ML
40 INJECTION, POWDER, LYOPHILIZED, FOR SOLUTION INTRAMUSCULAR; INTRAVENOUS EVERY 12 HOURS
Status: DISCONTINUED | OUTPATIENT
Start: 2024-04-23 | End: 2024-04-23

## 2024-04-22 RX ORDER — SODIUM CHLORIDE 0.9 % (FLUSH) 0.9 %
10 SYRINGE (ML) INJECTION AS NEEDED
Status: DISCONTINUED | OUTPATIENT
Start: 2024-04-22 | End: 2024-04-25 | Stop reason: HOSPADM

## 2024-04-22 RX ORDER — IPRATROPIUM BROMIDE AND ALBUTEROL SULFATE 2.5; .5 MG/3ML; MG/3ML
3 SOLUTION RESPIRATORY (INHALATION) ONCE
Status: DISCONTINUED | OUTPATIENT
Start: 2024-04-22 | End: 2024-04-22

## 2024-04-22 RX ORDER — SODIUM CHLORIDE 9 MG/ML
40 INJECTION, SOLUTION INTRAVENOUS AS NEEDED
Status: DISCONTINUED | OUTPATIENT
Start: 2024-04-22 | End: 2024-04-25 | Stop reason: HOSPADM

## 2024-04-22 RX ORDER — ONDANSETRON 2 MG/ML
4 INJECTION INTRAMUSCULAR; INTRAVENOUS EVERY 6 HOURS PRN
Status: DISCONTINUED | OUTPATIENT
Start: 2024-04-22 | End: 2024-04-25 | Stop reason: HOSPADM

## 2024-04-22 RX ORDER — ACETAMINOPHEN 325 MG/1
650 TABLET ORAL EVERY 4 HOURS PRN
Status: DISCONTINUED | OUTPATIENT
Start: 2024-04-22 | End: 2024-04-25 | Stop reason: HOSPADM

## 2024-04-22 RX ORDER — POLYETHYLENE GLYCOL 3350 17 G/17G
17 POWDER, FOR SOLUTION ORAL DAILY PRN
Status: DISCONTINUED | OUTPATIENT
Start: 2024-04-22 | End: 2024-04-25 | Stop reason: HOSPADM

## 2024-04-22 RX ORDER — BISACODYL 10 MG
10 SUPPOSITORY, RECTAL RECTAL DAILY PRN
Status: DISCONTINUED | OUTPATIENT
Start: 2024-04-22 | End: 2024-04-25 | Stop reason: HOSPADM

## 2024-04-22 RX ORDER — SODIUM CHLORIDE 0.9 % (FLUSH) 0.9 %
10 SYRINGE (ML) INJECTION EVERY 12 HOURS SCHEDULED
Status: DISCONTINUED | OUTPATIENT
Start: 2024-04-23 | End: 2024-04-25 | Stop reason: HOSPADM

## 2024-04-22 RX ORDER — ACETAMINOPHEN 650 MG/1
650 SUPPOSITORY RECTAL EVERY 4 HOURS PRN
Status: DISCONTINUED | OUTPATIENT
Start: 2024-04-22 | End: 2024-04-25 | Stop reason: HOSPADM

## 2024-04-22 RX ORDER — BISACODYL 5 MG/1
5 TABLET, DELAYED RELEASE ORAL DAILY PRN
Status: DISCONTINUED | OUTPATIENT
Start: 2024-04-22 | End: 2024-04-25 | Stop reason: HOSPADM

## 2024-04-22 RX ORDER — IPRATROPIUM BROMIDE AND ALBUTEROL SULFATE 2.5; .5 MG/3ML; MG/3ML
3 SOLUTION RESPIRATORY (INHALATION)
Status: DISCONTINUED | OUTPATIENT
Start: 2024-04-23 | End: 2024-04-23

## 2024-04-22 RX ORDER — IPRATROPIUM BROMIDE AND ALBUTEROL SULFATE 2.5; .5 MG/3ML; MG/3ML
3 SOLUTION RESPIRATORY (INHALATION) EVERY 6 HOURS PRN
Status: DISCONTINUED | OUTPATIENT
Start: 2024-04-22 | End: 2024-04-25

## 2024-04-22 RX ORDER — HYDRALAZINE HYDROCHLORIDE 20 MG/ML
20 INJECTION INTRAMUSCULAR; INTRAVENOUS EVERY 4 HOURS PRN
Status: DISCONTINUED | OUTPATIENT
Start: 2024-04-22 | End: 2024-04-25 | Stop reason: HOSPADM

## 2024-04-22 RX ORDER — LABETALOL HYDROCHLORIDE 5 MG/ML
20 INJECTION, SOLUTION INTRAVENOUS ONCE
Status: COMPLETED | OUTPATIENT
Start: 2024-04-22 | End: 2024-04-22

## 2024-04-22 RX ORDER — NITROGLYCERIN 0.4 MG/1
0.4 TABLET SUBLINGUAL
Status: DISCONTINUED | OUTPATIENT
Start: 2024-04-22 | End: 2024-04-25 | Stop reason: HOSPADM

## 2024-04-22 RX ORDER — KETOROLAC TROMETHAMINE 30 MG/ML
15 INJECTION, SOLUTION INTRAMUSCULAR; INTRAVENOUS ONCE
Status: COMPLETED | OUTPATIENT
Start: 2024-04-22 | End: 2024-04-22

## 2024-04-22 RX ADMIN — Medication 4 ML: at 23:50

## 2024-04-22 RX ADMIN — SODIUM CHLORIDE, POTASSIUM CHLORIDE, SODIUM LACTATE AND CALCIUM CHLORIDE 1000 ML: 600; 310; 30; 20 INJECTION, SOLUTION INTRAVENOUS at 22:25

## 2024-04-22 RX ADMIN — KETOROLAC TROMETHAMINE 15 MG: 30 INJECTION, SOLUTION INTRAMUSCULAR; INTRAVENOUS at 21:47

## 2024-04-22 RX ADMIN — Medication 10 ML: at 23:25

## 2024-04-22 RX ADMIN — SODIUM CHLORIDE 100 ML/HR: 9 INJECTION, SOLUTION INTRAVENOUS at 23:25

## 2024-04-22 RX ADMIN — LABETALOL HYDROCHLORIDE 20 MG: 5 INJECTION, SOLUTION INTRAVENOUS at 22:25

## 2024-04-22 RX ADMIN — SODIUM CHLORIDE 1000 MG: 9 INJECTION, SOLUTION INTRAVENOUS at 21:18

## 2024-04-22 RX ADMIN — IPRATROPIUM BROMIDE AND ALBUTEROL SULFATE 3 ML: .5; 3 SOLUTION RESPIRATORY (INHALATION) at 20:20

## 2024-04-22 RX ADMIN — IPRATROPIUM BROMIDE AND ALBUTEROL SULFATE 3 ML: .5; 3 SOLUTION RESPIRATORY (INHALATION) at 23:57

## 2024-04-22 RX ADMIN — ENOXAPARIN SODIUM 40 MG: 40 INJECTION SUBCUTANEOUS at 23:25

## 2024-04-23 ENCOUNTER — APPOINTMENT (OUTPATIENT)
Dept: CARDIOLOGY | Facility: HOSPITAL | Age: 79
End: 2024-04-23
Payer: MEDICARE

## 2024-04-23 PROBLEM — G93.41 ACUTE METABOLIC ENCEPHALOPATHY: Status: ACTIVE | Noted: 2024-04-23

## 2024-04-23 LAB
ANION GAP SERPL CALCULATED.3IONS-SCNC: 8.9 MMOL/L (ref 5–15)
AORTIC DIMENSIONLESS INDEX: 0.9 (DI)
ASCENDING AORTA: 3.1 CM
B PARAPERT DNA SPEC QL NAA+PROBE: NOT DETECTED
B PERT DNA SPEC QL NAA+PROBE: NOT DETECTED
BH CV ECHO MEAS - ACS: 1.74 CM
BH CV ECHO MEAS - AO MAX PG: 8.2 MMHG
BH CV ECHO MEAS - AO MEAN PG: 4.4 MMHG
BH CV ECHO MEAS - AO ROOT DIAM: 2.9 CM
BH CV ECHO MEAS - AO V2 MAX: 142.8 CM/SEC
BH CV ECHO MEAS - AO V2 VTI: 28.5 CM
BH CV ECHO MEAS - AVA(I,D): 2.43 CM2
BH CV ECHO MEAS - EDV(CUBED): 80.4 ML
BH CV ECHO MEAS - EDV(MOD-SP2): 71 ML
BH CV ECHO MEAS - EDV(MOD-SP4): 74 ML
BH CV ECHO MEAS - EF(MOD-BP): 65.9 %
BH CV ECHO MEAS - EF(MOD-SP2): 67.6 %
BH CV ECHO MEAS - EF(MOD-SP4): 64.9 %
BH CV ECHO MEAS - ESV(CUBED): 22.8 ML
BH CV ECHO MEAS - ESV(MOD-SP2): 23 ML
BH CV ECHO MEAS - ESV(MOD-SP4): 26 ML
BH CV ECHO MEAS - FS: 34.2 %
BH CV ECHO MEAS - IVS/LVPW: 1.13 CM
BH CV ECHO MEAS - IVSD: 1.2 CM
BH CV ECHO MEAS - LA DIMENSION: 3.1 CM
BH CV ECHO MEAS - LAT PEAK E' VEL: 9.9 CM/SEC
BH CV ECHO MEAS - LV DIASTOLIC VOL/BSA (35-75): 41.1 CM2
BH CV ECHO MEAS - LV MASS(C)D: 170.4 GRAMS
BH CV ECHO MEAS - LV MAX PG: 5 MMHG
BH CV ECHO MEAS - LV MEAN PG: 1.95 MMHG
BH CV ECHO MEAS - LV SYSTOLIC VOL/BSA (12-30): 14.4 CM2
BH CV ECHO MEAS - LV V1 MAX: 111.6 CM/SEC
BH CV ECHO MEAS - LV V1 VTI: 22 CM
BH CV ECHO MEAS - LVIDD: 4.3 CM
BH CV ECHO MEAS - LVIDS: 2.8 CM
BH CV ECHO MEAS - LVOT AREA: 3.1 CM2
BH CV ECHO MEAS - LVOT DIAM: 2 CM
BH CV ECHO MEAS - LVPWD: 1.06 CM
BH CV ECHO MEAS - MED PEAK E' VEL: 6.4 CM/SEC
BH CV ECHO MEAS - MV A DUR: 0.12 SEC
BH CV ECHO MEAS - MV A MAX VEL: 152.7 CM/SEC
BH CV ECHO MEAS - MV DEC SLOPE: 321.5 CM/SEC2
BH CV ECHO MEAS - MV DEC TIME: 0.26 SEC
BH CV ECHO MEAS - MV E MAX VEL: 76.4 CM/SEC
BH CV ECHO MEAS - MV E/A: 0.5
BH CV ECHO MEAS - MV MAX PG: 11.8 MMHG
BH CV ECHO MEAS - MV MEAN PG: 3.9 MMHG
BH CV ECHO MEAS - MV P1/2T: 71.6 MSEC
BH CV ECHO MEAS - MV V2 VTI: 20.6 CM
BH CV ECHO MEAS - MVA(P1/2T): 3.1 CM2
BH CV ECHO MEAS - MVA(VTI): 3.4 CM2
BH CV ECHO MEAS - PA ACC TIME: 0.14 SEC
BH CV ECHO MEAS - PA V2 MAX: 166.8 CM/SEC
BH CV ECHO MEAS - PULM A REVS DUR: 0.17 SEC
BH CV ECHO MEAS - PULM A REVS VEL: 56.8 CM/SEC
BH CV ECHO MEAS - PULM DIAS VEL: 63.3 CM/SEC
BH CV ECHO MEAS - PULM S/D: 1.1
BH CV ECHO MEAS - PULM SYS VEL: 69.9 CM/SEC
BH CV ECHO MEAS - QP/QS: 1.21
BH CV ECHO MEAS - RV MAX PG: 3.7 MMHG
BH CV ECHO MEAS - RV V1 MAX: 96.1 CM/SEC
BH CV ECHO MEAS - RV V1 VTI: 19.1 CM
BH CV ECHO MEAS - RVOT DIAM: 2.36 CM
BH CV ECHO MEAS - SUP REN AO DIAM: 1.7 CM
BH CV ECHO MEAS - SV(LVOT): 69.1 ML
BH CV ECHO MEAS - SV(MOD-SP2): 48 ML
BH CV ECHO MEAS - SV(MOD-SP4): 48 ML
BH CV ECHO MEAS - SV(RVOT): 83.6 ML
BH CV ECHO MEAS - SVI(LVOT): 38.3 ML/M2
BH CV ECHO MEAS - SVI(MOD-SP2): 26.6 ML/M2
BH CV ECHO MEAS - SVI(MOD-SP4): 26.6 ML/M2
BH CV ECHO MEAS - TAPSE (>1.6): 2.11 CM
BH CV ECHO MEASUREMENTS AVERAGE E/E' RATIO: 9.37
BH CV XLRA - RV BASE: 3.3 CM
BH CV XLRA - RV LENGTH: 4.9 CM
BH CV XLRA - RV MID: 2.5 CM
BH CV XLRA - TDI S': 14.5 CM/SEC
BUN SERPL-MCNC: 11 MG/DL (ref 8–23)
BUN/CREAT SERPL: 16.9 (ref 7–25)
C PNEUM DNA NPH QL NAA+NON-PROBE: NOT DETECTED
CALCIUM SPEC-SCNC: 9.1 MG/DL (ref 8.6–10.5)
CHLORIDE SERPL-SCNC: 88 MMOL/L (ref 98–107)
CHLORIDE UR-SCNC: 91 MMOL/L
CO2 SERPL-SCNC: 24.1 MMOL/L (ref 22–29)
CREAT SERPL-MCNC: 0.65 MG/DL (ref 0.57–1)
CREAT UR-MCNC: 37.5 MG/DL
EGFRCR SERPLBLD CKD-EPI 2021: 91.4 ML/MIN/1.73
FLUAV SUBTYP SPEC NAA+PROBE: NOT DETECTED
FLUBV RNA ISLT QL NAA+PROBE: NOT DETECTED
GLUCOSE SERPL-MCNC: 146 MG/DL (ref 65–99)
HADV DNA SPEC NAA+PROBE: NOT DETECTED
HCOV 229E RNA SPEC QL NAA+PROBE: NOT DETECTED
HCOV HKU1 RNA SPEC QL NAA+PROBE: NOT DETECTED
HCOV NL63 RNA SPEC QL NAA+PROBE: NOT DETECTED
HCOV OC43 RNA SPEC QL NAA+PROBE: NOT DETECTED
HMPV RNA NPH QL NAA+NON-PROBE: NOT DETECTED
HPIV1 RNA ISLT QL NAA+PROBE: NOT DETECTED
HPIV2 RNA SPEC QL NAA+PROBE: NOT DETECTED
HPIV3 RNA NPH QL NAA+PROBE: NOT DETECTED
HPIV4 P GENE NPH QL NAA+PROBE: NOT DETECTED
LEFT ATRIUM VOLUME INDEX: 15.6 ML/M2
M PNEUMO IGG SER IA-ACNC: NOT DETECTED
OSMOLALITY SERPL: 264 MOSM/KG (ref 280–301)
OSMOLALITY UR: 399 MOSM/KG
PHOSPHATE SERPL-MCNC: 2.6 MG/DL (ref 2.5–4.5)
POTASSIUM SERPL-SCNC: 3.4 MMOL/L (ref 3.5–5.2)
POTASSIUM SERPL-SCNC: 3.9 MMOL/L (ref 3.5–5.2)
POTASSIUM SERPL-SCNC: 4 MMOL/L (ref 3.5–5.2)
POTASSIUM SERPL-SCNC: 4.1 MMOL/L (ref 3.5–5.2)
POTASSIUM SERPL-SCNC: 4.1 MMOL/L (ref 3.5–5.2)
POTASSIUM UR-SCNC: 47.3 MMOL/L
QT INTERVAL: 378 MS
QTC INTERVAL: 439 MS
RHINOVIRUS RNA SPEC NAA+PROBE: NOT DETECTED
RSV RNA NPH QL NAA+NON-PROBE: NOT DETECTED
SARS-COV-2 RNA NPH QL NAA+NON-PROBE: NOT DETECTED
SINUS: 2.5 CM
SODIUM SERPL-SCNC: 121 MMOL/L (ref 136–145)
SODIUM SERPL-SCNC: 121 MMOL/L (ref 136–145)
SODIUM SERPL-SCNC: 122 MMOL/L (ref 136–145)
SODIUM SERPL-SCNC: 122 MMOL/L (ref 136–145)
SODIUM SERPL-SCNC: 123 MMOL/L (ref 136–145)
SODIUM UR-SCNC: 101 MMOL/L
STJ: 2.4 CM

## 2024-04-23 PROCEDURE — 84100 ASSAY OF PHOSPHORUS: CPT | Performed by: INTERNAL MEDICINE

## 2024-04-23 PROCEDURE — 94667 MNPJ CHEST WALL 1ST: CPT

## 2024-04-23 PROCEDURE — 25810000003 SODIUM CHLORIDE 3 % SOLUTION: Performed by: NURSE PRACTITIONER

## 2024-04-23 PROCEDURE — 25810000003 SODIUM CHLORIDE 0.9 % SOLUTION 250 ML FLEX CONT: Performed by: NURSE PRACTITIONER

## 2024-04-23 PROCEDURE — 94664 DEMO&/EVAL PT USE INHALER: CPT

## 2024-04-23 PROCEDURE — 94799 UNLISTED PULMONARY SVC/PX: CPT

## 2024-04-23 PROCEDURE — 84132 ASSAY OF SERUM POTASSIUM: CPT | Performed by: NURSE PRACTITIONER

## 2024-04-23 PROCEDURE — 25810000003 SODIUM CHLORIDE 0.9 % SOLUTION 250 ML FLEX CONT: Performed by: INTERNAL MEDICINE

## 2024-04-23 PROCEDURE — 80048 BASIC METABOLIC PNL TOTAL CA: CPT | Performed by: NURSE PRACTITIONER

## 2024-04-23 PROCEDURE — 84295 ASSAY OF SERUM SODIUM: CPT | Performed by: NURSE PRACTITIONER

## 2024-04-23 PROCEDURE — 25810000003 SODIUM CHLORIDE 0.9 % SOLUTION: Performed by: NURSE PRACTITIONER

## 2024-04-23 PROCEDURE — 94668 MNPJ CHEST WALL SBSQ: CPT

## 2024-04-23 PROCEDURE — 25010000002 METHYLPREDNISOLONE PER 40 MG: Performed by: NURSE PRACTITIONER

## 2024-04-23 PROCEDURE — 63710000001 PREDNISONE PER 5 MG: Performed by: INTERNAL MEDICINE

## 2024-04-23 PROCEDURE — 93306 TTE W/DOPPLER COMPLETE: CPT

## 2024-04-23 PROCEDURE — 25010000002 AZITHROMYCIN PER 500 MG: Performed by: NURSE PRACTITIONER

## 2024-04-23 PROCEDURE — 99232 SBSQ HOSP IP/OBS MODERATE 35: CPT | Performed by: INTERNAL MEDICINE

## 2024-04-23 PROCEDURE — 25510000001 PERFLUTREN (DEFINITY) 8.476 MG IN SODIUM CHLORIDE (PF) 0.9 % 10 ML INJECTION: Performed by: INTERNAL MEDICINE

## 2024-04-23 PROCEDURE — 94761 N-INVAS EAR/PLS OXIMETRY MLT: CPT

## 2024-04-23 PROCEDURE — 93306 TTE W/DOPPLER COMPLETE: CPT | Performed by: INTERNAL MEDICINE

## 2024-04-23 PROCEDURE — 25010000002 MIDAZOLAM PER 1 MG: Performed by: NURSE PRACTITIONER

## 2024-04-23 PROCEDURE — 25010000002 FUROSEMIDE PER 20 MG: Performed by: INTERNAL MEDICINE

## 2024-04-23 RX ORDER — IPRATROPIUM BROMIDE AND ALBUTEROL SULFATE 2.5; .5 MG/3ML; MG/3ML
3 SOLUTION RESPIRATORY (INHALATION)
Status: DISCONTINUED | OUTPATIENT
Start: 2024-04-23 | End: 2024-04-25

## 2024-04-23 RX ORDER — BUSPIRONE HYDROCHLORIDE 5 MG/1
5 TABLET ORAL EVERY 8 HOURS SCHEDULED
Status: DISCONTINUED | OUTPATIENT
Start: 2024-04-23 | End: 2024-04-25 | Stop reason: HOSPADM

## 2024-04-23 RX ORDER — ATORVASTATIN CALCIUM 10 MG/1
10 TABLET, FILM COATED ORAL DAILY
Status: DISCONTINUED | OUTPATIENT
Start: 2024-04-23 | End: 2024-04-25 | Stop reason: HOSPADM

## 2024-04-23 RX ORDER — FUROSEMIDE 10 MG/ML
40 INJECTION INTRAMUSCULAR; INTRAVENOUS ONCE
Status: COMPLETED | OUTPATIENT
Start: 2024-04-23 | End: 2024-04-23

## 2024-04-23 RX ORDER — FLUTICASONE PROPIONATE 50 MCG
2 SPRAY, SUSPENSION (ML) NASAL DAILY
Status: DISCONTINUED | OUTPATIENT
Start: 2024-04-23 | End: 2024-04-25 | Stop reason: HOSPADM

## 2024-04-23 RX ORDER — POTASSIUM CHLORIDE 20 MEQ/1
TABLET, EXTENDED RELEASE ORAL
Status: DISPENSED
Start: 2024-04-23 | End: 2024-04-24

## 2024-04-23 RX ORDER — FAMOTIDINE 10 MG/ML
20 INJECTION, SOLUTION INTRAVENOUS EVERY 12 HOURS SCHEDULED
Status: DISCONTINUED | OUTPATIENT
Start: 2024-04-23 | End: 2024-04-25 | Stop reason: HOSPADM

## 2024-04-23 RX ORDER — 3% SODIUM CHLORIDE 3 G/100ML
25 INJECTION, SOLUTION INTRAVENOUS CONTINUOUS
Status: DISCONTINUED | OUTPATIENT
Start: 2024-04-23 | End: 2024-04-23

## 2024-04-23 RX ORDER — SODIUM CHLORIDE 9 MG/ML
INJECTION, SOLUTION INTRAVENOUS
Status: DISPENSED
Start: 2024-04-23 | End: 2024-04-24

## 2024-04-23 RX ORDER — POTASSIUM CHLORIDE 20 MEQ/1
20 TABLET, EXTENDED RELEASE ORAL ONCE
Status: COMPLETED | OUTPATIENT
Start: 2024-04-23 | End: 2024-04-23

## 2024-04-23 RX ORDER — SODIUM CHLORIDE 3 G/100ML
100 INJECTION, SOLUTION INTRAVENOUS ONCE
Status: COMPLETED | OUTPATIENT
Start: 2024-04-23 | End: 2024-04-23

## 2024-04-23 RX ORDER — PREDNISONE 20 MG/1
20 TABLET ORAL
Status: DISCONTINUED | OUTPATIENT
Start: 2024-04-23 | End: 2024-04-25

## 2024-04-23 RX ORDER — TRAMADOL HYDROCHLORIDE 50 MG/1
50 TABLET ORAL EVERY 6 HOURS PRN
Status: DISCONTINUED | OUTPATIENT
Start: 2024-04-23 | End: 2024-04-25 | Stop reason: HOSPADM

## 2024-04-23 RX ORDER — NICOTINE 21 MG/24HR
1 PATCH, TRANSDERMAL 24 HOURS TRANSDERMAL DAILY PRN
Status: DISCONTINUED | OUTPATIENT
Start: 2024-04-23 | End: 2024-04-25 | Stop reason: HOSPADM

## 2024-04-23 RX ORDER — BUSPIRONE HYDROCHLORIDE 5 MG/1
5 TABLET ORAL EVERY 8 HOURS SCHEDULED
Status: DISCONTINUED | OUTPATIENT
Start: 2024-04-23 | End: 2024-04-23

## 2024-04-23 RX ORDER — FAMOTIDINE 20 MG/1
20 TABLET, FILM COATED ORAL EVERY 12 HOURS SCHEDULED
Status: DISCONTINUED | OUTPATIENT
Start: 2024-04-23 | End: 2024-04-25 | Stop reason: HOSPADM

## 2024-04-23 RX ORDER — AZITHROMYCIN 500 MG/1
INJECTION, POWDER, LYOPHILIZED, FOR SOLUTION INTRAVENOUS
Status: DISPENSED
Start: 2024-04-23 | End: 2024-04-24

## 2024-04-23 RX ORDER — DEXTROSE MONOHYDRATE 50 MG/ML
6 INJECTION, SOLUTION INTRAVENOUS CONTINUOUS PRN
Status: DISCONTINUED | OUTPATIENT
Start: 2024-04-23 | End: 2024-04-25 | Stop reason: HOSPADM

## 2024-04-23 RX ADMIN — IPRATROPIUM BROMIDE AND ALBUTEROL SULFATE 3 ML: .5; 3 SOLUTION RESPIRATORY (INHALATION) at 20:06

## 2024-04-23 RX ADMIN — BUSPIRONE HYDROCHLORIDE 5 MG: 5 TABLET ORAL at 00:39

## 2024-04-23 RX ADMIN — SODIUM CHLORIDE 25 ML/HR: 3 INJECTION, SOLUTION INTRAVENOUS at 05:46

## 2024-04-23 RX ADMIN — POTASSIUM CHLORIDE 20 MEQ: 1500 TABLET, EXTENDED RELEASE ORAL at 20:42

## 2024-04-23 RX ADMIN — NICOTINE 1 PATCH: 21 PATCH, EXTENDED RELEASE TRANSDERMAL at 01:31

## 2024-04-23 RX ADMIN — METHYLPREDNISOLONE SODIUM SUCCINATE 40 MG: 40 INJECTION, POWDER, FOR SOLUTION INTRAMUSCULAR; INTRAVENOUS at 12:38

## 2024-04-23 RX ADMIN — SODIUM PHOSPHATE, MONOBASIC, MONOHYDRATE AND SODIUM PHOSPHATE, DIBASIC, ANHYDROUS 10 MMOL: 142; 276 INJECTION, SOLUTION INTRAVENOUS at 12:36

## 2024-04-23 RX ADMIN — FAMOTIDINE 20 MG: 20 TABLET, FILM COATED ORAL at 00:39

## 2024-04-23 RX ADMIN — Medication 10 ML: at 10:15

## 2024-04-23 RX ADMIN — PREDNISONE 20 MG: 10 TABLET ORAL at 20:51

## 2024-04-23 RX ADMIN — MIDAZOLAM HYDROCHLORIDE 0.5 MG: 1 INJECTION, SOLUTION INTRAMUSCULAR; INTRAVENOUS at 00:04

## 2024-04-23 RX ADMIN — SODIUM CHLORIDE 100 ML: 3 INJECTION, SOLUTION INTRAVENOUS at 05:32

## 2024-04-23 RX ADMIN — IPRATROPIUM BROMIDE AND ALBUTEROL SULFATE 3 ML: .5; 3 SOLUTION RESPIRATORY (INHALATION) at 08:14

## 2024-04-23 RX ADMIN — Medication 10 ML: at 08:28

## 2024-04-23 RX ADMIN — ATORVASTATIN CALCIUM 10 MG: 10 TABLET, FILM COATED ORAL at 08:27

## 2024-04-23 RX ADMIN — AZITHROMYCIN MONOHYDRATE 500 MG: 500 INJECTION, POWDER, LYOPHILIZED, FOR SOLUTION INTRAVENOUS at 23:52

## 2024-04-23 RX ADMIN — SODIUM CHLORIDE 500 ML: 9 INJECTION, SOLUTION INTRAVENOUS at 00:45

## 2024-04-23 RX ADMIN — AZITHROMYCIN MONOHYDRATE 500 MG: 500 INJECTION, POWDER, LYOPHILIZED, FOR SOLUTION INTRAVENOUS at 00:07

## 2024-04-23 RX ADMIN — ACETAMINOPHEN 650 MG: 325 TABLET ORAL at 20:41

## 2024-04-23 RX ADMIN — FUROSEMIDE 40 MG: 10 INJECTION, SOLUTION INTRAMUSCULAR; INTRAVENOUS at 10:15

## 2024-04-23 RX ADMIN — METHYLPREDNISOLONE SODIUM SUCCINATE 40 MG: 40 INJECTION, POWDER, FOR SOLUTION INTRAMUSCULAR; INTRAVENOUS at 00:07

## 2024-04-23 RX ADMIN — IPRATROPIUM BROMIDE AND ALBUTEROL SULFATE 3 ML: .5; 3 SOLUTION RESPIRATORY (INHALATION) at 03:32

## 2024-04-23 RX ADMIN — BUSPIRONE HYDROCHLORIDE 5 MG: 5 TABLET ORAL at 20:51

## 2024-04-23 RX ADMIN — SODIUM CHLORIDE 500 ML: 9 INJECTION, SOLUTION INTRAVENOUS at 00:06

## 2024-04-23 RX ADMIN — FAMOTIDINE 20 MG: 20 TABLET, FILM COATED ORAL at 20:42

## 2024-04-23 RX ADMIN — FAMOTIDINE 20 MG: 20 TABLET, FILM COATED ORAL at 08:27

## 2024-04-23 RX ADMIN — FLUTICASONE PROPIONATE 2 SPRAY: 50 SPRAY, METERED NASAL at 08:38

## 2024-04-23 RX ADMIN — SODIUM CHLORIDE 2 ML: 9 INJECTION INTRAMUSCULAR; INTRAVENOUS; SUBCUTANEOUS at 08:23

## 2024-04-23 RX ADMIN — BUSPIRONE HYDROCHLORIDE 5 MG: 5 TABLET ORAL at 14:47

## 2024-04-23 NOTE — PROGRESS NOTES
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  Giving 100 ml hypertonic saline now then infusion with frequent labs  Check urine electrolytes/osm/serum osm now  Will allow diet if able, fluid restrict, may need salt tablets, lasix  Will consult nephrology to assist in case of need for vaptan  Await urine studies

## 2024-04-23 NOTE — H&P
[facility] HOSPITALIST     Delfino Matt MD    CHIEF COMPLAINT: encephalopathy, SOA    HISTORY OF PRESENT ILLNESS:  The patient is a 78-year-old female who presented to the emergency department via EMS secondary to acute onset shortness of air.  The patient is unable to speak in full sentences and hesitant to answer questions and therefore all information is taken from the chart, the ER provider and patient's daughter who was contacted by myself with patient's permission.  Daughter notes that she called EMS for her mother who has been having intermittent shortness of air, mucous plugging episodes over the past 1 week.  Daughter and patient note that pulmonary has told her not to allow invasive ventilation and she agrees to be DNI.  Daughter notes she has frequent mucous plugging episodes causing excessive coughing and eventually if she does cough mucus up she gets better.  At her baseline she is on 3 L of oxygen at all times.  Review of pulmonary notes and daughters history shows patient is severe end-stage COPD and has been offered hospice in the past.  The patient admits that she does still smoke but is unable to tell how much.  Patient notes that she has been so short of air she has not been able to eat or drink or smoke and ha had single episode of diarrhea this morning.     Initial contact with ER provider was regarding shortness of air and altered mental status.  Reportedly patient is A&O x 3 at her baseline and was noted as confused today.  ER provider contacted neurostroke who did not recommend any further evaluation after CT head was negative.    Respiratory staff notes that patient would not allow breathing treatments in ER despite the fact that she entered on nonrebreather mask.    Significant diagnostics in ER include VB.432/44.4/39.3/29.6  Sodium 122, HS troponin 19> 20    She has a history of severe end-stage COPD, ischemic heart disease/MI/CAD/HLD s/p stents x 2,  hypertension, pulmonary arterial hypertension, AAA, GERD/Zepeda's esophagus, PAD, tobacco abuse    She and daughter deny recent f/c/headache/rhinorrhea/nasal congestion/lightheadedness/syncopal sensation/n/v/chest pain/abdominal pain/change in bladder habits/no weight change/bloody emesis or bloody stools/change in medications or any other new concerns.    Past Medical History:   Diagnosis Date    AAA (abdominal aortic aneurysm)     Allergic not sure    CODINE,MORFINE AND DYE SOLUTION    Anemia     Aneurysm of abdominal aorta     Arthritis     Zepeda esophagus     CAD (coronary artery disease)     Chest pain     Constipation     COPD (chronic obstructive pulmonary disease)     Emphysema lung     Empyema     Fatigue     GERD (gastroesophageal reflux disease)     Headache     Hiatal hernia     High risk medication use     History of cardiac catheterization     CATH STENT PLACEMENT: CIRCUMFLEX BRANCH    Hyperlipidemia     Hypertension     Infectious viral hepatitis Jaundice in 1963    Jaundice     Lumbosacral disc disease     Myocardial infarction     Myocardial infarction     Occult blood in stools     On home O2     Peripheral artery disease     Reflux gastritis     Scoliosis     Visual impairment     BLURRY VISION IN RT. EYE     Past Surgical History:   Procedure Laterality Date    CARDIAC CATHETERIZATION      CARDIAC CATHETERIZATION N/A 5/12/2017    Procedure: Left Heart Cath;  Surgeon: Herber Hayes MD;  Location:  [facility];  Service:     CARDIAC CATHETERIZATION N/A 7/1/2020    Procedure: Left Heart Cath;  Surgeon: Herber Hayes MD;  Location:  [facility];  Service: Cardiology;  Laterality: N/A;    CARDIAC CATHETERIZATION N/A 7/1/2020    Procedure: Coronary angiography;  Surgeon: Herber Hayes MD;  Location:  [facility];  Service: Cardiology;  Laterality: N/A;    CARDIAC CATHETERIZATION N/A 7/1/2020    Procedure: Left  ventriculography;  Surgeon: Herber Hayes MD;  Location: [facility] CATH INVASIVE LOCATION;  Service: Cardiology;  Laterality: N/A;    CAROTID ENDARTERECTOMY Right 3/16/2020    Procedure: RIGHT CAROTID ENDARTERECTOMY;  Surgeon: Mahesh Salmon MD;  Location: [facility] MAIN OR;  Service: Vascular;  Laterality: Right;    CHOLECYSTECTOMY      COLONOSCOPY      COLONOSCOPY N/A 6/10/2016    Procedure: COLONOSCOPY with polypectomy;  Surgeon: Virgie Castelan MD;  Location:  [facility] OR;  Service:     CORONARY STENT PLACEMENT      X2    ENDOSCOPY N/A 6/10/2016    Procedure: ESOPHAGOGASTRODUODENOSCOPY WITH BIOPSY;  Surgeon: Virgie Castelan MD;  Location:  [facility] OR;  Service:     ENDOSCOPY      ENDOSCOPY N/A 8/25/2017    Procedure: ESOPHAGOGASTRODUODENOSCOPY w/ biopsies;  Surgeon: Virgie Castelan MD;  Location: [facility];  Service:      Family History   Problem Relation Age of Onset    Colon cancer Mother     Breast cancer Mother     Other Father         cardiac disorder    Hypertension Father     Heart disease Father     Heart disease Sister     Hypertension Sister     Pulmonary fibrosis Sister     Thyroid disease Sister     Other Sister     Other Brother         cardiac disorder    Heart disease Brother     Other Brother     Thyroid disease Daughter     Thyroid disease Daughter     Malig Hyperthermia Neg Hx      Social History     Tobacco Use    Smoking status: Every Day     Current packs/day: 1.00     Average packs/day: 1 pack/day for 60.0 years (60.0 ttl pk-yrs)     Types: Cigarettes     Start date: 8/3/2016    Smokeless tobacco: Never    Tobacco comments:     66 YEARS   Vaping Use    Vaping status: Never Used   Substance Use Topics    Alcohol use: No    Drug use: No     Medications Prior to Admission   Medication Sig Dispense Refill Last Dose    albuterol sulfate  (90 Base) MCG/ACT inhaler Inhale 2 puffs Every 4 (Four) Hours As Needed for Wheezing. 54 g 3 4/22/2024    aspirin 81 MG tablet Take 1 tablet by mouth  Daily. (Patient taking differently: Take 1 tablet by mouth Every Night.) 90 tablet 0     atorvastatin (LIPITOR) 10 MG tablet TAKE 1 TABLET EVERY DAY 90 tablet 0     ferrous sulfate 325 (65 FE) MG tablet Take 1 tablet by mouth Daily With Breakfast. Indications: Iron Deficiency       furosemide (LASIX) 20 MG tablet Take 1 tablet by mouth Every Other Day. Indications: Edema 45 tablet 3     nitroglycerin (NITROSTAT) 0.4 MG SL tablet Place 1 tablet under the tongue Every 5 (Five) Minutes As Needed for Chest Pain. Take no more than 3 doses in 15 minutes. 90 tablet 0     O2 (OXYGEN) Inhale 3 L/min Continuous. Travel oxygen is 4-5 units       omeprazole (priLOSEC) 40 MG capsule TAKE 1 CAPSULE EVERY DAY FOR HEARTBURN 90 capsule 0     polyethylene glycol (MIRALAX) packet Take 17 g by mouth Daily. (Patient taking differently: Take 17 g by mouth Every Morning.) 850 g 3     potassium chloride (MICRO-K) 10 MEQ CR capsule Take 2 capsules by mouth Daily. 90 capsule 3     predniSONE (DELTASONE) 10 MG tablet Take 1 tablet by mouth Daily. Indications: COPD       Spacer/Aero-Holding Chambers device 1 Units Daily As Needed (use with albuterol inhaler). 1 each 0     traMADol (ULTRAM) 50 MG tablet Take 1 tablet by mouth Every 6 (Six) Hours As Needed for Severe Pain. 10 tablet 0     Trelegy Ellipta 100-62.5-25 MCG/ACT inhaler Inhale 1 puff Daily.       vitamin B-12 (CYANOCOBALAMIN) 1000 MCG tablet Take 1 tablet by mouth Every Morning.        Allergies:  Metoprolol, Iodinated contrast media, Codeine, and Morphine and related    Immunization History   Administered Date(s) Administered    COVID-19 (MODERNA) 1st,2nd,3rd Dose Monovalent 03/10/2021, 07/09/2021    FLUAD TRI 65YR+ 08/27/2019    FluMist 2-49yrs 09/15/2014, 09/07/2016    Fluad Quad 65+ 08/28/2020    Fluzone (or Fluarix & Flulaval for VFC) >6mos 09/29/2018    Fluzone High Dose =>65 Years ([employer] ONLY) 09/07/2016, 08/29/2017, 08/27/2019    Fluzone High-Dose 65+yrs 10/04/2021,  10/03/2022    Hepatitis B Adult/Adolescent IM 2003    Pneumococcal Conjugate 13-Valent (PCV13) 2015    Pneumococcal Polysaccharide (PPSV23) 2014    Shingrix 2019    Zostavax 2014       REVIEW OF SYSTEMS:  Please see the above history of present illness for pertinent positives and negatives.  The remainder of the patient's systems have been reviewed and are negative.     Vital Signs  Temp:  [97.4 °F (36.3 °C)-98.2 °F (36.8 °C)] 97.4 °F (36.3 °C)  Heart Rate:  [80-90] 90  Resp:  [19-58] 30  BP: (190-225)/() 198/121  Body mass index is 30.98 kg/m².         Physical Exam  Vitals reviewed.   Constitutional:       General: She is in acute distress.      Comments: Elderly, chronically ill appearance   HENT:      Head: Normocephalic and atraumatic.      Mouth/Throat:      Mouth: Mucous membranes are dry.   Eyes:      Extraocular Movements: Extraocular movements intact.      Pupils: Pupils are equal, round, and reactive to light.   Cardiovascular:      Rate and Rhythm: Normal rate and regular rhythm.   Pulmonary:      Comments: Tachypneic, unable to speak full sentences, no wheezing anteriorly, scattered expiratory wheezes posteriorly with poor air movement throughout  Abdominal:      General: Abdomen is flat. Bowel sounds are normal. There is no distension.      Palpations: Abdomen is soft.      Tenderness: There is no abdominal tenderness. There is no guarding.   Skin:     Capillary Refill: Capillary refill takes less than 2 seconds.      Coloration: Skin is pale.   Neurological:      General: No focal deficit present.      Mental Status: She is alert.      Comments: Oriented to self, general place, , month/year   Psychiatric:      Comments: Agitated       Emotional Behavior:    Judgment and Insight: poor   Mental Status:  Alertness  alert   Memory:  poor   Mood and Affect:         Depression  none obvious                Anxiety  mild    Debilities:   Physical Weakness  unable to  determine   Handicaps  unknown   Disabilities  unknown   Agitation  mild-mod     Results Review:    I reviewed the patient's new clinical results.  Lab Results (most recent)       Procedure Component Value Units Date/Time    Urinalysis, Microscopic Only - Urine, Clean Catch [[id number]] Collected: 04/22/24 2053    Specimen: Urine, Clean Catch Updated: 04/22/24 2117     RBC, UA 0-2 /HPF      WBC, UA 0-2 /HPF      Bacteria, UA None Seen /HPF      Squamous Epithelial Cells, UA 0-2 /HPF      Hyaline Casts, UA None Seen /LPF      Methodology Manual Light Microscopy    Salicylate Level [[id number]]  (Normal) Collected: 04/22/24 2022    Specimen: Blood Updated: 04/22/24 2115     Salicylate <0.3 mg/dL     Ethanol [[id number]] Collected: 04/22/24 2022    Specimen: Blood Updated: 04/22/24 2115     Ethanol <10 mg/dL      Ethanol % <0.010 %     Acetaminophen Level [[id number]]  (Normal) Collected: 04/22/24 2022    Specimen: Blood Updated: 04/22/24 2115     Acetaminophen <5.0 mcg/mL     Urine Drug Screen - Urine, Clean Catch [[id number]]  (Normal) Collected: 04/22/24 2053    Specimen: Urine, Clean Catch Updated: 04/22/24 2112     THC, Screen, Urine Negative     Phencyclidine (PCP), Urine Negative     Cocaine Screen, Urine Negative     Methamphetamine, Ur Negative     Opiate Screen Negative     Amphetamine Screen, Urine Negative     Benzodiazepine Screen, Urine Negative     Tricyclic Antidepressants Screen Negative     Methadone Screen, Urine Negative     Barbiturates Screen, Urine Negative     Oxycodone Screen, Urine Negative     Buprenorphine, Screen, Urine Negative    Narrative:      Cutoff For Drugs Screened:    Amphetamines               500 ng/ml  Barbiturates               200 ng/ml  Benzodiazepines            150 ng/ml  Cocaine                    150 ng/ml  Methadone                  200 ng/ml  Opiates                    100 ng/ml  Phencyclidine               25 ng/ml  THC                         50 ng/ml  Methamphetamine             500 ng/ml  Tricyclic Antidepressants  300 ng/ml  Oxycodone                  100 ng/ml  Buprenorphine               10 ng/ml    The normal value for all drugs tested is negative. This report includes unconfirmed screening results, with the cutoff values listed, to be used for medical treatment purposes only.  Unconfirmed results must not be used for non-medical purposes such as employment or legal testing.  Clinical consideration should be applied to any drug of abuse test, particularly when unconfirmed results are used.      Urinalysis With Microscopic If Indicated (No Culture) - Urine, Clean Catch [[id number]]  (Abnormal) Collected: 04/22/24 2053    Specimen: Urine, Clean Catch Updated: 04/22/24 2107     Color, UA Yellow     Appearance, UA Clear     pH, UA 8.5     Specific Gravity, UA 1.020     Glucose, UA Negative     Ketones, UA Negative     Bilirubin, UA Negative     Blood, UA Trace     Protein,  mg/dL (2+)     Leuk Esterase, UA Negative     Nitrite, UA Negative     Urobilinogen, UA 0.2 E.U./dL    Blood Culture - Blood, Arm, Right [[id number]] Collected: 04/22/24 2101    Specimen: Blood from Arm, Right Updated: 04/22/24 2104    High Sensitivity Troponin T [[id number]]  (Abnormal) Collected: 04/22/24 2022    Specimen: Blood Updated: 04/22/24 2102     HS Troponin T 19 ng/L     Narrative:      High Sensitive Troponin T Reference Range:  <14.0 ng/L- Negative Female for AMI  <22.0 ng/L- Negative Male for AMI  >=14 - Abnormal Female indicating possible myocardial injury.  >=22 - Abnormal Male indicating possible myocardial injury.   Clinicians would have to utilize clinical acumen, EKG, Troponin, and serial changes to determine if it is an Acute Myocardial Infarction or myocardial injury due to an underlying chronic condition.         Comprehensive Metabolic Panel [[id number]]  (Abnormal) Collected: 04/22/24 2022    Specimen: Blood Updated: 04/22/24 2100     Glucose 108 mg/dL      BUN 13 mg/dL      Creatinine  0.72 mg/dL      Sodium 122 mmol/L      Potassium 4.4 mmol/L      Chloride 85 mmol/L      CO2 24.8 mmol/L      Calcium 10.0 mg/dL      Total Protein 7.4 g/dL      Albumin 4.4 g/dL      ALT (SGPT) 14 U/L      AST (SGOT) 18 U/L      Alkaline Phosphatase 102 U/L      Total Bilirubin 0.5 mg/dL      Globulin 3.0 gm/dL      A/G Ratio 1.5 g/dL      BUN/Creatinine Ratio 18.1     Anion Gap 12.2 mmol/L      eGFR 86.8 mL/min/1.73     Narrative:      GFR Normal >60  Chronic Kidney Disease <60  Kidney Failure <15    The GFR formula is only valid for adults with stable renal function between ages 18 and 70.    Lactic Acid, Plasma [[id number]]  (Normal) Collected: 04/22/24 2042    Specimen: Blood Updated: 04/22/24 2100     Lactate 1.0 mmol/L     Respiratory Panel PCR w/COVID-19(SARS-CoV-2) [facility] In-House, NP Swab in UTM/VTM, 2 HR TAT - Swab, Nasopharynx [[id number]] Collected: 04/22/24 2053    Specimen: Swab from Nasopharynx Updated: 04/22/24 2059    Blood Culture - Blood, Arm, Left [[id number]] Collected: 04/22/24 2042    Specimen: Blood from Arm, Left Updated: 04/22/24 2048    CBC & Differential [[id number]]  (Abnormal) Collected: 04/22/24 2022    Specimen: Blood Updated: 04/22/24 2029    Narrative:      The following orders were created for panel order CBC & Differential.  Procedure                               Abnormality         Status                     ---------                               -----------         ------                     CBC Auto Differential[[id number]]        Abnormal            Final result                 Please view results for these tests on the individual orders.    CBC Auto Differential [[id number]]  (Abnormal) Collected: 04/22/24 2022    Specimen: Blood Updated: 04/22/24 2029     WBC 7.35 10*3/mm3      RBC 3.80 10*6/mm3      Hemoglobin 12.0 g/dL      Hematocrit 35.0 %      MCV 92.1 fL      MCH 31.6 pg      MCHC 34.3 g/dL      RDW 14.3 %      RDW-SD 48.3 fl      MPV 9.8 fL       Platelets 242 10*3/mm3      Neutrophil % 59.0 %      Lymphocyte % 28.8 %      Monocyte % 10.7 %      Eosinophil % 0.3 %      Basophil % 0.4 %      Immature Grans % 0.8 %      Neutrophils, Absolute 4.33 10*3/mm3      Lymphocytes, Absolute 2.12 10*3/mm3      Monocytes, Absolute 0.79 10*3/mm3      Eosinophils, Absolute 0.02 10*3/mm3      Basophils, Absolute 0.03 10*3/mm3      Immature Grans, Absolute 0.06 10*3/mm3      nRBC 0.0 /100 WBC     Blood Gas, Venous - [[id number]]  (Abnormal) Collected: 04/22/24 2022    Specimen: Venous Blood Updated: 04/22/24 2024     Site Nurse/Dr Draw     pH, Venous 7.432 pH Units      Comment: 83 Value above reference range        pCO2, Venous 44.4 mm Hg      pO2, Venous 39.3 mm Hg      HCO3, Venous 29.6 mmol/L      Comment: 83 Value above reference range        Base Excess, Venous 4.6 mmol/L      Comment: 83 Value above reference range        O2 Saturation, Venous 78.8 %      Comment: 83 Value above reference range        Hemoglobin, Blood Gas 12.1 g/dL      Temperature 37.0     Barometric Pressure for Blood Gas 741 mmHg      Modality Nasal Cannula     Flow Rate 4.0 lpm      Collected by [id number]     Comment: Meter: [id number]     :  [id number]       POC Glucose Once [[id number]]  (Normal) Collected: 04/22/24 2013    Specimen: Blood Updated: 04/22/24 2020     Glucose 109 mg/dL             Imaging Results (Most Recent)       Procedure Component Value Units Date/Time    CT Head Without Contrast [[id number]] Collected: 04/22/24 2109     Updated: 04/22/24 2114    Narrative:      CT HEAD WO CONTRAST    Date of Exam: 4/22/2024 8:39 PM EDT    Indication: Altered mental status, nontraumatic (Ped 0-17y).    Comparison: None available.    Technique: Axial CT images were obtained of the head without contrast administration.  Coronal reconstructions were performed.  Automated exposure control and iterative reconstruction methods were used.      Findings:  No intra or extra-axial fluid  collections, masses, or areas of hemorrhage. No midline shift or mass effect is identified. Ventricles and sulci are age-appropriate. Orbits paranasal sinuses and mastoid air cells are unremarkable.      Impression:      Impression:    1. No acute intracranial pathology.      Electronically Signed: Oral Barker MD    4/22/2024 9:11 PM EDT    Workstation ID: [id number]    XR Chest 1 View [[id number]] Collected: 04/22/24 2037     Updated: 04/22/24 2042    Narrative:      XR CHEST 1 VW    Date of Exam: 4/22/2024 8:18 PM EDT    Indication: sob    Comparison: None available.    Findings:  Moderate changes of emphysema. Lungs are clear. Cardiac and mediastinal contours are within normal limits. Regional skeleton is unremarkable.      Impression:      Impression:    1. No acute cardiopulmonary disease.      Electronically Signed: Oral Barker MD    4/22/2024 8:38 PM EDT    Workstation ID: [id number]          reviewed    ECG/EMG Results (most recent)       Procedure Component Value Units Date/Time    ECG 12 Lead Dyspnea [[id number]] Collected: 04/22/24 2012     Updated: 04/22/24 2014     QT Interval 378 ms      QTC Interval 439 ms     Narrative:      HEART RATE= 81  bpm  RR Interval= 740  ms  WV Interval= 151  ms  P Horizontal Axis= -35  deg  P Front Axis= 44  deg  QRSD Interval= 98  ms  QT Interval= 378  ms  QTcB= 439  ms  QRS Axis= 74  deg  T Wave Axis= 89  deg  - ABNORMAL ECG -  Sinus rhythm  Abnrm T, consider ischemia, anterolateral lds  Electronically Signed By:   Date and Time of Study: 2024-04-22 20:12:46          reviewed    Assessment & Plan   AE severe end-stage steroid and oxygen dependent COPD:  PH: Consult pulmonary  According to notes by pulmonary, discussion with daughter and patient, patient is hospice level COPD.  She has been offered hospice in the past and declined however at the time of this admission, seems appropriate to offer again.  Refusing multiple therapies  Will allow DuoNebs only, continue every 4  "hours and as needed  Added hypertonic saline nebs and CPT, patient refuses  Check RVP, sputum culture  Add Solu-Medrol 40 mg twice daily  Add azithromycin  Keep oxygen at 3 to 4 L only to maintain sats 88 to 92%  Monitor in ICU, patient is DNI, refuses BiPAP    Acute hyponatremia:  Acute metabolic encephalopathy secondary to all above:  Neuro stroke contacted in ER, no need for further evaluation, CT head negative  Received 500 ml LR, sodium 122>123  Bolus now with 500 mL normal saline, recheck BMP 1 hour after, likely will need hypertonic saline  N.p.o.  Monitor in ICU    Ischemic heart disease/MI/CAD/HLD s/p stents x 2:  HTN:  Blood pressure severely elevated, likely secondary to distress  Will give tiny dose of Versed and if needed morphine then treat blood pressure if remains elevated as patient is agitated  Hydralazine as needed IV    PAD:  Chronic LE edema, patient notes is unchanged    GERD/Zepeda's esophagus: add pepcid IV     H/O AAA: nothing acute currently    Tobacco abuse: offer nicotine patch    Chronic severe neck pain: Add morphine 1 mg IV every 2 hours as needed    Agitation/anxiety: add buspar 5 mg tid, monitor for effect    I discussed the patient's findings and my recommendations with patient, patient's daughter Renee Stoddard ([phone]) with patient's direction and permission and staff.     Nhung Clifford, APRN  04/23/24  00:11 EDT     \"Dictated utilizing Dragon dictation\"    Note disclaimer: At [facility], we believe that sharing information builds trust and better relationships. You are receiving this note because you recently visited [facility]. It is possible you will see health information before a provider has talked with you about it. This kind of information can be easy to misunderstand. To help you fully understand what it means for your health, we urge you to discuss this note with your provider.        "

## 2024-04-23 NOTE — PROGRESS NOTES
[facility] Clinical Pharmacy Services: Enoxaparin Consult    Mary Jack has a pharmacy consult to dose prophylactic enoxaparin per Nhung PATE's request.     Indication: VTE Prophylaxis  Home Anticoagulation: none     Relevant clinical data and objective history reviewed:  78 y.o. female       There is no height or weight on file to calculate BMI.   Results from last 7 days   Lab Units 04/22/24 2022   PLATELETS 10*3/mm3 242     CrCl cannot be calculated (Unknown ideal weight.).    Assessment/Plan    Will start patient on 40mg subcutaneous every 24 hours, adjusted for renal function. Consult order will be discontinued but pharmacy will continue to follow.     Nayely Rodriguez, PharmD  Clinical Pharmacist

## 2024-04-23 NOTE — CONSULTS
Pulmonary Consultation     Patient Name: Mary Jack  Age/Sex: 78 y.o. female  : 1945  MRN: [id number]    Date of Admission: 2024  Date of Encounter Visit: 24  Encounter Provider: Jeff Perez MD  Referring Provider: No ref. provider found  Place of Service: [facility]  Patient Care Team:  Delfino Matt MD as PCP - General  Oral Llanes MD as Consulting Physician (Pulmonary Disease)  Herber Hayes MD as Consulting Physician (Cardiology)  Virgie Castelan MD as Consulting Physician (Gastroenterology)  Maxi Lundy MD as Surgeon (Orthopedic Surgery)  Mahesh Salmon MD as Surgeon (Vascular Surgery)  Donato Vaughn MD as Consulting Physician (Nephrology)      Subjective:     Consulted for: Acute COPD exacerbation    Chief Complaint: Shortness of breath hypoxemia and altered mental status    History of Present Illness:  Mary Jack is a 78 y.o. female who presented to the emergency department via EMS for the chief complaint of worsening shortness of breath.  This was also associated with confusion, and abnormal sodium level on her labs.  Patient has chronic hypoxemia related oxygen 8 L/min at all time due to her severe end-stage COPD.  Patient is a DNR and DNI.  Patient has history of tobacco abuse and was unable to smoke recently because of the worsening dyspnea, other solutions including diarrhea x 1 but no nausea or vomiting.  There was significant decrease in p.o. intake because of the dyspnea.  CVA was ruled out as a possible cause of the altered mental status because of the metabolic encephalopathy, head imaging was negative for any acute stroke or bleed.  Patient is currently on Zithromax, Rocephin, and Solu-Medrol 40 every 12 after the initial bolus, is also on diuretics, on DVT prophylaxis, and on nebulized DuoNeb.As discussed your sodium level was 122 on initial presentation and did not improve with IV fluid bolus with normal saline, is currently being  "evaluated by nephrology.  Nephrology note reviewed, patient will be given Lasix while having serial sodium level in addition to other electrolyte.  Suspicion is SIADH, urine osmolality was low as low as expected at 399, urine sodium was 101.The chest x-ray was reviewed, it showed no evidence of any acute process patient has prior low-dose CT scan of the chest for lung cancer screening that was positive for significant emphysema with no noticeable nodules or masses.  White count was normal at 7.35, D-dimer was negative, procalcitonin was negative lactic acid was normal.  By the time of my assessment the patient was already doing much better, she is awake and responsive, she is oriented x 4 but she is not very good when it comes to details regarding the history.  She does report that she has been having some worsening cough and having difficulty expectorating but she cannot recall for how long were the symptoms going on before she presented to the emergency room  No sick exposure  No fever or chills or night sweats  No nausea or vomiting    Pulmonary Functions Testing Results:    No results found for: \"FEV1\", \"FVC\", \"QRF7WBR\", \"TLC\", \"DLCO\"    Review of Systems:   Review of Systems  As per above otherwise limited because of the altered mental status    Past Medical History:  Past Medical History:   Diagnosis Date    AAA (abdominal aortic aneurysm)     Allergic not sure    CODINE,MORFINE AND DYE SOLUTION    Anemia     Aneurysm of abdominal aorta     Arthritis     Zepeda esophagus     CAD (coronary artery disease)     Chest pain     Constipation     COPD (chronic obstructive pulmonary disease)     Emphysema lung     Empyema     Fatigue     GERD (gastroesophageal reflux disease)     Headache     Hiatal hernia     High risk medication use     History of cardiac catheterization     CATH STENT PLACEMENT: CIRCUMFLEX BRANCH    Hyperlipidemia     Hypertension     Infectious viral hepatitis Jaundice in 1963    Jaundice     " Lumbosacral disc disease     Myocardial infarction     Myocardial infarction     Occult blood in stools     On home O2     Peripheral artery disease     Reflux gastritis     Scoliosis     Visual impairment     BLURRY VISION IN RT. EYE       Past Surgical History:   Procedure Laterality Date    CARDIAC CATHETERIZATION      CARDIAC CATHETERIZATION N/A 5/12/2017    Procedure: Left Heart Cath;  Surgeon: Herber Hayes MD;  [facility];  Service:     CARDIAC CATHETERIZATION N/A 7/1/2020    Procedure: Left Heart Cath;  Surgeon: Herber Hayes MD;  [facility];  Service: Cardiology;  Laterality: N/A;    CARDIAC CATHETERIZATION N/A 7/1/2020    Procedure: Coronary angiography;  Surgeon: Herber Hayes MD;  Location:  [facility] CATH INVASIVE LOCATION;  Service: Cardiology;  Laterality: N/A;    CARDIAC CATHETERIZATION N/A 7/1/2020    Procedure: Left ventriculography;  Surgeon: Herber Hayes MD;  Location: [facility] CATH INVASIVE LOCATION;  Service: Cardiology;  Laterality: N/A;    CAROTID ENDARTERECTOMY Right 3/16/2020    Procedure: RIGHT CAROTID ENDARTERECTOMY;  Surgeon: Mahesh Salmon MD;  Location: [facility] MAIN OR;  Service: Vascular;  Laterality: Right;    CHOLECYSTECTOMY      COLONOSCOPY      COLONOSCOPY N/A 6/10/2016    Procedure: COLONOSCOPY with polypectomy;  Surgeon: Virgie Castelan MD;  Location: [facility] OR;  Service:     CORONARY STENT PLACEMENT      X2    ENDOSCOPY N/A 6/10/2016    Procedure: ESOPHAGOGASTRODUODENOSCOPY WITH BIOPSY;  Surgeon: Virgie Castelan MD;  Location: [facility] OR;  Service:     ENDOSCOPY      ENDOSCOPY N/A 8/25/2017    Procedure: ESOPHAGOGASTRODUODENOSCOPY w/ biopsies;  Surgeon: Virgie Castelan MD;  Location: [facility] OR;  Service:        Home Medications:   Medications Prior to Admission   Medication Sig Dispense Refill Last Dose    albuterol sulfate  (90 Base) MCG/ACT inhaler Inhale 2 puffs Every 4 (Four) Hours  As Needed for Wheezing. 54 g 3 4/22/2024    aspirin 81 MG tablet Take 1 tablet by mouth Daily. (Patient taking differently: Take 1 tablet by mouth Every Night.) 90 tablet 0     atorvastatin (LIPITOR) 10 MG tablet TAKE 1 TABLET EVERY DAY 90 tablet 0     ferrous sulfate 325 (65 FE) MG tablet Take 1 tablet by mouth Daily With Breakfast. Indications: Iron Deficiency       furosemide (LASIX) 20 MG tablet Take 1 tablet by mouth Every Other Day. Indications: Edema 45 tablet 3     nitroglycerin (NITROSTAT) 0.4 MG SL tablet Place 1 tablet under the tongue Every 5 (Five) Minutes As Needed for Chest Pain. Take no more than 3 doses in 15 minutes. 90 tablet 0     O2 (OXYGEN) Inhale 3 L/min Continuous. Travel oxygen is 4-5 units       omeprazole (priLOSEC) 40 MG capsule TAKE 1 CAPSULE EVERY DAY FOR HEARTBURN 90 capsule 0     polyethylene glycol (MIRALAX) packet Take 17 g by mouth Daily. (Patient taking differently: Take 17 g by mouth Every Morning.) 850 g 3     potassium chloride (MICRO-K) 10 MEQ CR capsule Take 2 capsules by mouth Daily. 90 capsule 3     predniSONE (DELTASONE) 10 MG tablet Take 1 tablet by mouth Daily. Indications: COPD       Spacer/Aero-Holding Chambers device 1 Units Daily As Needed (use with albuterol inhaler). 1 each 0     traMADol (ULTRAM) 50 MG tablet Take 1 tablet by mouth Every 6 (Six) Hours As Needed for Severe Pain. 10 tablet 0     Trelegy Ellipta 100-62.5-25 MCG/ACT inhaler Inhale 1 puff Daily.       vitamin B-12 (CYANOCOBALAMIN) 1000 MCG tablet Take 1 tablet by mouth Every Morning.          Inpatient Medications:  Scheduled Meds:atorvastatin, 10 mg, Oral, Daily  azithromycin, 500 mg, Intravenous, Q24H  busPIRone, 5 mg, Oral, Q8H  enoxaparin, 40 mg, Subcutaneous, Q24H  famotidine, 20 mg, Intravenous, Q12H   Or  famotidine, 20 mg, Oral, Q12H  fluticasone, 2 spray, Each Nare, Daily  ipratropium-albuterol, 3 mL, Nebulization, 4x Daily - RT  methylPREDNISolone sodium succinate, 40 mg, Intravenous,  "Q12H  sodium chloride, 10 mL, Intravenous, Q12H  sodium chloride, 4 mL, Nebulization, BID - RT  sodium phosphates 10 mmol in sodium chloride 0.9 % 250 mL IVPB, 10 mmol, Intravenous, Once      Continuous Infusions:dextrose, 6 mL/kg (Ideal)  Pharmacy to Dose enoxaparin (LOVENOX),       PRN Meds:.  acetaminophen **OR** acetaminophen    senna-docusate sodium **AND** polyethylene glycol **AND** bisacodyl **AND** bisacodyl    Calcium Replacement - Follow Nurse / BPA Driven Protocol    dextrose    hydrALAZINE    ipratropium-albuterol    ketorolac    Magnesium Standard Dose Replacement - Follow Nurse / BPA Driven Protocol    Morphine    nicotine    nitroglycerin    ondansetron ODT **OR** ondansetron    Pharmacy to Dose enoxaparin (LOVENOX)    Phosphorus Replacement - Follow Nurse / BPA Driven Protocol    Potassium Replacement - Follow Nurse / BPA Driven Protocol    sodium chloride    sodium chloride    traMADol    Allergies:  Allergies   Allergen Reactions    Metoprolol Cough     \"terriable side effects\"   \" doing ok on the Ziac    Iodinated Contrast Media Itching    Codeine Palpitations    Morphine And Related Palpitations       Past Social History:  Social History     Socioeconomic History    Marital status:    Tobacco Use    Smoking status: Every Day     Current packs/day: 1.00     Average packs/day: 1 pack/day for 60.0 years (60.0 ttl pk-yrs)     Types: Cigarettes     Start date: 8/3/2016    Smokeless tobacco: Never    Tobacco comments:     66 YEARS   Vaping Use    Vaping status: Never Used   Substance and Sexual Activity    Alcohol use: No    Drug use: No    Sexual activity: Not Currently     Partners: Male     Birth control/protection: Post-menopausal       Past Family History:  Family History   Problem Relation Age of Onset    Colon cancer Mother     Breast cancer Mother     Other Father         cardiac disorder    Hypertension Father     Heart disease Father     Heart disease Sister     Hypertension Sister  "    Pulmonary fibrosis Sister     Thyroid disease Sister     Other Sister     Other Brother         cardiac disorder    Heart disease Brother     Other Brother     Thyroid disease Daughter     Thyroid disease Daughter     Malig Hyperthermia Neg Hx            Objective:   Temp:  [97.1 °F (36.2 °C)-98.2 °F (36.8 °C)] 97.1 °F (36.2 °C)  Heart Rate:  [] 99  Resp:  [17-58] 22  BP: (127-225)/() 158/80  SpO2:  [87 %-99 %] 91 %  on  Flow (L/min):  [4-12] 4 Device (Oxygen Therapy): nasal cannula     Intake/Output Summary (Last 24 hours) at 4/23/2024 1351  Last data filed at 4/23/2024 0914  Gross per 24 hour   Intake 1556.67 ml   Output 2755 ml   Net -1198.33 ml     Body mass index is 31.83 kg/m².      04/22/24  2303 04/23/24  0735   Weight: 79.3 kg (174 lb 13.2 oz) 78.9 kg (174 lb)     Weight change:     Physical Exam:   Physical Exam   General:  Sickly looking but in no acute distress, alert and oriented x4, pleasant                   Head:    Normocephalic, atraumatic. External ears and nose are normal   Eyes:          Conjunctivae and sclerae normal, no icterus, PERRLA, no  discharge   Throat:   No oral lesions, no thrush, oral mucosa moist.    Neck:   Supple, trachea midline. No JVD, no cervical or supraclavicular lymphadenopathy    Lungs:     Normal chest on inspection, diminished breath sounds bilaterally no use of any accessory muscles, no expiratory wheeze, on nasal cannula oxygen at 3.5 L/min .      Heart:    Regular rhythm and normal rate.  No murmurs, gallops, or rubs noted.   Abdomen:     Soft, nontender, nondistended, positive bowel sounds. No hepatosplenomegaly    Extremities:   No clubbing, cyanosis, 1+ pitting edema.     Pulses:   Pulses palpable and equal bilaterally.    Skin:   No bleeding or rash. No bumps, good turgor pressure    Neuro:   Nonfocal.  Moves all extremities well. Strength 5/5 and symmetrical, no sensory deficit    Psychiatric:   Normal mood and affect.     Lab Review:   Results  "from last 7 days   Lab Units 04/23/24  1013 04/23/24  0615 04/23/24  0443 04/23/24  0308 04/22/24 2329 04/22/24 2022   SODIUM mmol/L 121* 122* 122* 121* 123* 122*   POTASSIUM mmol/L 4.0 3.9 4.1 4.1 4.4 4.4   CHLORIDE mmol/L  --   --   --  88* 85* 85*   CO2 mmol/L  --   --   --  24.1 25.1 24.8   BUN mg/dL  --   --   --  11 11 13   CREATININE mg/dL  --   --   --  0.65 0.62 0.72   GLUCOSE mg/dL  --   --   --  146* 126* 108*   CALCIUM mg/dL  --   --   --  9.1 9.2 10.0   AST (SGOT) U/L  --   --   --   --   --  18   ALT (SGPT) U/L  --   --   --   --   --  14   ALBUMIN g/dL  --   --   --   --   --  4.4     Results from last 7 days   Lab Units 04/22/24 2249 04/22/24 2022   HSTROP T ng/L 20* 19*     Results from last 7 days   Lab Units 04/22/24 2022   WBC 10*3/mm3 7.35   HEMOGLOBIN g/dL 12.0   HEMATOCRIT % 35.0   PLATELETS 10*3/mm3 242   MCV fL 92.1   MCH pg 31.6   MCHC g/dL 34.3   RDW % 14.3   RDW-SD fl 48.3   MPV fL 9.8   NEUTROPHIL % % 59.0   LYMPHOCYTE % % 28.8   MONOCYTES % % 10.7   EOSINOPHIL % % 0.3   BASOPHIL % % 0.4   IMM GRAN % % 0.8*   NEUTROS ABS 10*3/mm3 4.33   LYMPHS ABS 10*3/mm3 2.12   MONOS ABS 10*3/mm3 0.79   EOS ABS 10*3/mm3 0.02   BASOS ABS 10*3/mm3 0.03   IMMATURE GRANS (ABS) 10*3/mm3 0.06*   NRBC /100 WBC 0.0     Results from last 7 days   Lab Units 04/22/24 2329   INR  1.01               Invalid input(s): \"LDLCALC\"  Results from last 7 days   Lab Units 04/22/24  2329   D DIMER QUANT MCGFEU/mL 0.73     Results from last 7 days   Lab Units 04/22/24  2249   TSH uIU/mL 1.560         Results from last 7 days   Lab Units 04/22/24 2249 04/22/24  2042   PROCALCITONIN ng/mL 0.06  --    LACTATE mmol/L  --  1.0                 Results from last 7 days   Lab Units 04/22/24  2053   NITRITE UA  Negative   WBC UA /HPF 0-2   BACTERIA UA /HPF None Seen   SQUAM EPITHEL UA /HPF 0-2     Results from last 7 days   Lab Units 04/22/24  2053   COVID19  Not Detected   ADENOVIRUS DETECTION BY PCR  Not Detected "   CORONAVIRUS 229E  Not Detected   CORONAVIRUS HKU1  Not Detected   CORONAVIRUS NL63  Not Detected   CORONAVIRUS OC43  Not Detected   HUMAN METAPNEUMOVIRUS  Not Detected   HUMAN RHINOVIRUS/ENTEROVIRUS  Not Detected   INFLUENZA B PCR  Not Detected   PARAINFLUENZA 1  Not Detected   PARAINFLUENZA VIRUS 2  Not Detected   PARAINFLUENZA VIRUS 3  Not Detected   PARAINFLUENZA VIRUS 4  Not Detected   BORDETELLA PERTUSSIS PCR  Not Detected   BORDETELLA PARAPERTUSSIS PCR  Not Detected   CHLAMYDOPHILA PNEUMONIAE PCR  Not Detected   MYCOPLAMA PNEUMO PCR  Not Detected   RSV, PCR  Not Detected     Results from last 7 days   Lab Units 04/22/24 2053   SODIUM UR mmol/L 101   CHLORIDE UR mmol/L 91   OSMOLALITY UR mOsm/kg 399         Imaging:  Imaging Results (Most Recent)       Procedure Component Value Units Date/Time    CT Head Without Contrast [[id number]] Collected: 04/22/24 2109     Updated: 04/22/24 2114    Narrative:      CT HEAD WO CONTRAST    Date of Exam: 4/22/2024 8:39 PM EDT    Indication: Altered mental status, nontraumatic (Ped 0-17y).    Comparison: None available.    Technique: Axial CT images were obtained of the head without contrast administration.  Coronal reconstructions were performed.  Automated exposure control and iterative reconstruction methods were used.      Findings:  No intra or extra-axial fluid collections, masses, or areas of hemorrhage. No midline shift or mass effect is identified. Ventricles and sulci are age-appropriate. Orbits paranasal sinuses and mastoid air cells are unremarkable.      Impression:      Impression:    1. No acute intracranial pathology.      Electronically Signed: Oral Barker MD    4/22/2024 9:11 PM EDT    Workstation ID: [id number]    XR Chest 1 View [[id number]] Collected: 04/22/24 2037     Updated: 04/22/24 2042    Narrative:      XR CHEST 1 VW    Date of Exam: 4/22/2024 8:18 PM EDT    Indication: sob    Comparison: None available.    Findings:  Moderate changes of emphysema.  Lungs are clear. Cardiac and mediastinal contours are within normal limits. Regional skeleton is unremarkable.      Impression:      Impression:    1. No acute cardiopulmonary disease.      Electronically Signed: Oral Barker MD    4/22/2024 8:38 PM EDT    Workstation ID: [id number]            I personally viewed and interpreted the patient's imaging studies.    Assessment:     COPD with acute exacerbation  Altered mental status, metabolic encephalopathy  Hyponatremia, of clinical significance with sodium level of 121  Acute on chronic hypoxemic respiratory failure with minor increase in her oxygen requirements compared to baseline  Coronary artery disease status post angioplasty  Abdominal aortic aneurysm  Pulmonary hypertension  Essential hypertension      Plan:     Lung exam is not that impressively abnormal, she has no significant wheezing just diminished breath sounds  She does have however some cough was difficult expectoration and she does not like the Mucomyst  Will try to use 7% saline instead and if no better on it, may discontinue and use only albuterol, this was already ordered by the primary team this morning  From the hyponatremia standpoint she is doing much better from the encephalopathy standpoint, and she is already managed by renal, will defer further management to their expertise  Hemodynamically she is compensated  Continue with the oxygen and avoid any hyperoxemia to minimize the risk for worsening hypercapnia  The patient is DNR/DNI and we will try to manage within the patient's wishes  Okay for BiPAP if needed but I do not see the need for it at this point  Patient has normal procalcitonin, normal white blood cell count, no pneumonia or active infection is suspected and she is afebrile.  D-dimer is negative ruling out the chance of any possible venous thromboembolism.  Will follow and reassess in a.m.  Will taper the steroids  Discussed with the patient and with the family at the  bedside        Thank you for allowing me to participate in the care of Mary Jack. Feel free to contact me directly with any further questions or concerns.    Jeff Perez MD  [facility]   04/23/24  13:51 EDT    Dictated utilizing Dragon dictation

## 2024-04-23 NOTE — CASE MANAGEMENT/SOCIAL WORK
Discharge Planning Assessment  [facility]     Patient Name: Mary Jack  MRN: [id number]  Today's Date: 4/23/2024    Admit Date: 4/22/2024    Plan: plan home, lives alone   Discharge Needs Assessment       Row Name 04/23/24 1125       Living Environment    People in Home alone    Current Living Arrangements apartment    Potentially Unsafe Housing Conditions none    In the past 12 months has the electric, gas, oil, or water company threatened to shut off services in your home? No    Primary Care Provided by self    Provides Primary Care For no one    Family Caregiver if Needed child(juliet), adult    Quality of Family Relationships unable to assess    Able to Return to Prior Arrangements yes       Resource/Environmental Concerns    Resource/Environmental Concerns none    Transportation Concerns none       Transportation Needs    In the past 12 months, has lack of transportation kept you from medical appointments or from getting medications? no    In the past 12 months, has lack of transportation kept you from meetings, work, or from getting things needed for daily living? No       Food Insecurity    Within the past 12 months, you worried that your food would run out before you got the money to buy more. Never true    Within the past 12 months, the food you bought just didn't last and you didn't have money to get more. Never true       Transition Planning    Patient/Family Anticipates Transition to home    Patient/Family Anticipated Services at Transition none    Transportation Anticipated family or friend will provide       Discharge Needs Assessment    Readmission Within the Last 30 Days no previous admission in last 30 days    Equipment Currently Used at Home oxygen    Concerns to be Addressed denies needs/concerns at this time    Anticipated Changes Related to Illness none    Equipment Needed After Discharge none    Provided Post Acute Provider List? N/A    Provided Post Acute Provider Quality & Resource List?  N/A                   Discharge Plan       Row Name 04/23/24 1127       Plan    Plan plan home, lives alone    Patient/Family in Agreement with Plan yes    Provided Post Acute Provider List? N/A    Provided Post Acute Provider Quality & Resource List? N/A    Plan Comments Spoke with patient at bedside. Face sheet verified. IMM explained, signed and copy provided. Patient lives alone in a ground floor apartment. She is independent of ADLs including driving. She uses oxygen continuously, provided by [employer]. She has not used HH or inpatient rehab previously. She does not have a living will. SHe sees Dr Matt as PCP. SHe uses [facility] [place] and denies issues obtaining medications. There are no issues r/t fod, housing, utilities or transportation. Patient indicates she may be interested in HH at [facility]. CM # placed on white board, will follow for dc needs.                  Continued Care and Services - Admitted Since 4/22/2024    No active coordination exists for this encounter.          Demographic Summary    No documentation.                  Functional Status    No documentation.                  Psychosocial    No documentation.                  Abuse/Neglect    No documentation.                  Legal    No documentation.                  Substance Abuse    No documentation.                  Patient Forms    No documentation.                     Herson Cuevas RN

## 2024-04-23 NOTE — ED PROVIDER NOTES
"Subjective   History of Present Illness  This is a 78-year-old female who presents via EMS in respiratory distress.  Patient uses 3 L oxygen nasal cannula at baseline at home secondary to COPD/dependent asthma.  She does take Lasix at home and does have a history of ischemic heart disease secondary to coronary artery disease.     Patient initially was seen and evaluated and found to be fairly confused.  She is alert and oriented x 0 but does state intermittently that she is in the hospital.  The patient states that she has feels like she cannot breathe.  EMS gave the patient a DuoNeb and 125 mg of Solu-Medrol prior to arrival.  Blood glucose was within normal limits.      Review of Systems   Unable to perform ROS: Mental status change       Past Medical History:   Diagnosis Date    AAA (abdominal aortic aneurysm)     Allergic not sure    CODINE,MORFINE AND DYE SOLUTION    Anemia     Aneurysm of abdominal aorta     Arthritis     Zepeda esophagus     CAD (coronary artery disease)     Chest pain     Constipation     COPD (chronic obstructive pulmonary disease)     Emphysema lung     Empyema     Fatigue     GERD (gastroesophageal reflux disease)     Headache     Hiatal hernia     High risk medication use     History of cardiac catheterization     CATH STENT PLACEMENT: CIRCUMFLEX BRANCH    Hyperlipidemia     Hypertension     Infectious viral hepatitis Jaundice in 1963    Jaundice     Lumbosacral disc disease     Myocardial infarction     Myocardial infarction     Occult blood in stools     On home O2     Peripheral artery disease     Reflux gastritis     Scoliosis     Visual impairment     BLURRY VISION IN RT. EYE       Allergies   Allergen Reactions    Metoprolol Cough     \"terriable side effects\"   \" doing ok on the Ziac    Iodinated Contrast Media Itching    Codeine Palpitations    Morphine And Related Palpitations       Past Surgical History:   Procedure Laterality Date    CARDIAC CATHETERIZATION      CARDIAC " CATHETERIZATION N/A 5/12/2017    Procedure: Left Heart Cath;  Surgeon: Herber Hayes MD;  Location:  [facility];  Service:     CARDIAC CATHETERIZATION N/A 7/1/2020    Procedure: Left Heart Cath;  Surgeon: Herber Hayes MD;  Location:  [facility];  Service: Cardiology;  Laterality: N/A;    CARDIAC CATHETERIZATION N/A 7/1/2020    Procedure: Coronary angiography;  Surgeon: Herber Hayes MD;  Location:  [facility];  Service: Cardiology;  Laterality: N/A;    CARDIAC CATHETERIZATION N/A 7/1/2020    Procedure: Left ventriculography;  Surgeon: Herber Hayes MD;  Location:  [facility];  Service: Cardiology;  Laterality: N/A;    CAROTID ENDARTERECTOMY Right 3/16/2020    Procedure: RIGHT CAROTID ENDARTERECTOMY;  Surgeon: Mahesh Salmon MD;  [facility];  Service: Vascular;  Laterality: Right;    CHOLECYSTECTOMY      COLONOSCOPY      COLONOSCOPY N/A 6/10/2016    Procedure: COLONOSCOPY with polypectomy;  Surgeon: Virgie Castelan MD;  Location: [facility] OR;  Service:     CORONARY STENT PLACEMENT      X2    ENDOSCOPY N/A 6/10/2016    Procedure: ESOPHAGOGASTRODUODENOSCOPY WITH BIOPSY;  Surgeon: Virgie Castelan MD;  Location: [facility] OR;  Service:     ENDOSCOPY      ENDOSCOPY N/A 8/25/2017    Procedure: ESOPHAGOGASTRODUODENOSCOPY w/ biopsies;  Surgeon: Virgie Castelan MD;  Location:  [facility] OR;  Service:        Family History   Problem Relation Age of Onset    Colon cancer Mother     Breast cancer Mother     Other Father         cardiac disorder    Hypertension Father     Heart disease Father     Heart disease Sister     Hypertension Sister     Pulmonary fibrosis Sister     Thyroid disease Sister     Other Sister     Other Brother         cardiac disorder    Heart disease Brother     Other Brother     Thyroid disease Daughter     Thyroid disease Daughter     Malig Hyperthermia Neg Hx        Social History      Socioeconomic History    Marital status:    Tobacco Use    Smoking status: Every Day     Current packs/day: 1.00     Average packs/day: 1 pack/day for 60.0 years (60.0 ttl pk-yrs)     Types: Cigarettes     Start date: 8/3/2016    Smokeless tobacco: Never    Tobacco comments:     66 YEARS   Vaping Use    Vaping status: Never Used   Substance and Sexual Activity    Alcohol use: No    Drug use: No    Sexual activity: Not Currently     Partners: Male     Birth control/protection: Post-menopausal           Objective   Physical Exam  Vitals and nursing note reviewed.   Constitutional:       General: She is not in acute distress.     Appearance: Normal appearance. She is not ill-appearing, toxic-appearing or diaphoretic.      Comments: confused   HENT:      Head: Normocephalic and atraumatic.      Right Ear: Tympanic membrane and external ear normal.      Left Ear: Tympanic membrane and external ear normal.      Nose: Nose normal. No congestion or rhinorrhea.      Mouth/Throat:      Mouth: Mucous membranes are moist.      Pharynx: No oropharyngeal exudate or posterior oropharyngeal erythema.   Eyes:      Conjunctiva/sclera: Conjunctivae normal.      Pupils: Pupils are equal, round, and reactive to light.   Cardiovascular:      Rate and Rhythm: Normal rate and regular rhythm.      Pulses: Normal pulses.   Pulmonary:      Effort: Accessory muscle usage present. No respiratory distress.      Breath sounds: No stridor. Decreased breath sounds (diffuse) present. No wheezing, rhonchi or rales.   Chest:      Chest wall: No tenderness.   Abdominal:      General: There is no distension.      Palpations: Abdomen is soft. There is no mass.      Tenderness: There is no guarding or rebound.   Musculoskeletal:         General: No swelling or deformity. Normal range of motion.      Cervical back: Normal range of motion and neck supple. No rigidity or tenderness.      Right lower leg: Edema present.      Left lower leg: Edema  present.   Skin:     General: Skin is warm.      Capillary Refill: Capillary refill takes less than 2 seconds.      Coloration: Skin is not cyanotic or pale.      Findings: No erythema or rash.   Neurological:      General: No focal deficit present.      Mental Status: Mental status is at baseline. She is disoriented.      Cranial Nerves: No cranial nerve deficit.      Motor: No weakness.   Psychiatric:         Thought Content: Thought content normal.         Procedures           ED Course  ED Course as of 04/23/24 0017   Mon Apr 22, 2024   2145 Sodium(!): 122 [JN]      ED Course User Index  [JN] Rod Fabian, DO                                             Medical Decision Making    MDM:    Escalation of care including admission/observation considered    - Discussions of management with other providers:  Hospitalist and neurology    - Discussed/reviewed with Radiology regarding test interpretation    - Independent interpretation: Labs, XR, CT, and EKG    - Additional patient history obtained from: [facility] and EMS    - Review of external non-ED record (if available):  Prior Inpt record, Office record, Outpt record, Prior Outpt labs, PCP record, Outside ED record, Other    - Chronic conditions affecting care: See HPI and medical Hx.    - Social Determinants of health significantly affecting care:  None        Medical Decision Making Discussion:    This is a 78-year-old female who presents via EMS with concern for respiratory distress.  The patient on arrival is in some slight respiratory distress but has received a DuoNeb and some Solu-Medrol prior to arrival, she is moving air much better and she does not appear to be cyanotic or in extremis.  The patient is speaking in 1-2 word sentences and she has started on a nonrebreather and a DuoNeb was again ordered.  Blood gas ordered, the patient's CO2 is stable, she has about a 92 to 94% SpO2 on 5 to 6 L nasal cannula.  Normally she is on 3 L at baseline.    Despite the  patient's oxygenation and moderate improvement with her oxygenation, the patient has obvious confusion.  There is no focal deficit, visual change, gaze palsy, speech deficit or dysarthria however, the patient still remains slightly confused with an alert and oriented status between 1 and 2, normally, family states that she is up to 3-4.    The patient does have some slight hyponatremia at 122, again no focal deficits.  It is possible that the hyponatremia is playing a part in the patient's condition.  I do have some suspicion the patient has an underlying early dementia based off of her actions.  The patient does seem to try to hide some of her confusion from time to time.  I do not suspect intracranial abnormality however a TIA is not outside of the question.  The patient's altered mental status workup is fairly benign with the exception of her hyponatremia.  CT head is negative, alcohol negative, drug screen negative, no evidence of significant infection or leukocytosis.  Chest x-ray is within normal limits.  Her urinalysis is stable.  I have spoken with the hospitalist, neurology also evaluated the patient on telestroke basis, does not feel that this is neurologically based.  I believe the patient will do better on an inpatient basis for further evaluation of her oxygen status as well as her sodium status.  She is given a liter of lactated Ringer's here in the emergency department it does appear to be hypovolemic secondary to some dry mucous membranes.  I believe she has some hypovolemic hyponatremia but will defer this to the inpatient team for further evaluation.  The patient's blood pressure was mildly elevated and I have given labetalol here, she also received Toradol for headache.  Patient's family was made aware of her condition and the patient herself has refused several interventions including breathing treatments, facemask and discussion about BiPAP.  She is stable at this time and I believe we will  continue with her stability pending her oxygen status on the floor, we will place her in the ICU per the hospitalist and she will be admitted at this time.    The patient was counseled regarding diagnostic results and treatment plan and patient has indicated understanding of these instructions.      Problems Addressed:  Chronic respiratory failure with hypoxia: complicated acute illness or injury  Hyponatremia: complicated acute illness or injury    Amount and/or Complexity of Data Reviewed  Labs: ordered. Decision-making details documented in ED Course.  Radiology: ordered.  ECG/medicine tests: ordered.    Risk  Prescription drug management.  Decision regarding hospitalization.        Final diagnoses:   Hyponatremia   Chronic respiratory failure with hypoxia   Hypertension, unspecified type   Confusion   Mixed simple and mucopurulent chronic bronchitis       ED Disposition  ED Disposition       ED Disposition   Decision to Admit    Condition   --    Comment   Level of Care: Critical Care [6]   Admitting Physician: DANIEL SCHULTZ [[id number]]   Attending Physician: DANIEL SCHULTZ [[id number]]                 No follow-up provider specified.       Medication List        Changed      aspirin 81 MG tablet  Take 1 tablet by mouth Daily.  What changed: when to take this     polyethylene glycol packet  Commonly known as: MIRALAX  Take 17 g by mouth Daily.  What changed: when to take this                 Rod Fabian DO  04/23/24 0017

## 2024-04-23 NOTE — CONSULTS
[facility] Consult Note      Patient Name: Mary Jack  : 1945  MRN: [id number]  Primary Care Physician:  Delifno Matt MD  Referring Physician: No ref. provider found  Date of admission: 2024    Subjective     Reason for Consult:  hypoNa    HPI:   Mary Jack is a 78 y.o. female with chronic hyponatremia (usually high-122 low-130), end-stage COPD, active tobacco abuse, continuous O2 3 L/min at home, and hypertension, admitted yesterday for shortness of breath and altered mental status.  Serum sodium 122 on arrival, with same value this morning.  Full PMH outlined below.  Hypertonic saline 25 mL/h infusing. TSH normal    She is unsure whether her weight has changed over the last few months or not  Describes chronic leg swelling; has had worsening orthopnea  States that she drinks water all day long; has at least 100-120 ounces per day  Has shortness of breath with any activity  Complains of chronic neck pain  Eats poorly at home, usually a sandwich for lunch and a sandwich for dinner; often relies on peanut butter for protein.  She skips breakfast  No urinary complaints  Has chronic constipation  No fever or chills        Review of Systems:   ROS difficult to obtain; she knows the month but cannot tell me the year.  She states that she is having trouble thinking and complains that I am asking too many questions    Personal History     Past Medical History:   Diagnosis Date    AAA (abdominal aortic aneurysm)     Allergic not sure    CODINE,MORFINE AND DYE SOLUTION    Anemia     Aneurysm of abdominal aorta     Arthritis     Zepeda esophagus     CAD (coronary artery disease)     Chest pain     Constipation     COPD (chronic obstructive pulmonary disease)     Emphysema lung     Empyema     Fatigue     GERD (gastroesophageal reflux disease)     Headache     Hiatal hernia     High risk medication use     History of cardiac catheterization     CATH STENT PLACEMENT:  CIRCUMFLEX BRANCH    Hyperlipidemia     Hypertension     Infectious viral hepatitis Jaundice in 1963    Jaundice     Lumbosacral disc disease     Myocardial infarction     Myocardial infarction     Occult blood in stools     On home O2     Peripheral artery disease     Reflux gastritis     Scoliosis     Visual impairment     BLURRY VISION IN RT. EYE       Past Surgical History:   Procedure Laterality Date    CARDIAC CATHETERIZATION      CARDIAC CATHETERIZATION N/A 5/12/2017    Procedure: Left Heart Cath;  Surgeon: Herber Hayes MD;  [facility];  Service:     CARDIAC CATHETERIZATION N/A 7/1/2020    Procedure: Left Heart Cath;  Surgeon: Herber Hayes MD;  Location:  [facility];  Service: Cardiology;  Laterality: N/A;    CARDIAC CATHETERIZATION N/A 7/1/2020    Procedure: Coronary angiography;  Surgeon: Herber Hayes MD;  Location:  [facility];  Service: Cardiology;  Laterality: N/A;    CARDIAC CATHETERIZATION N/A 7/1/2020    Procedure: Left ventriculography;  Surgeon: Herber Hayes MD;  [facility];  Service: Cardiology;  Laterality: N/A;    CAROTID ENDARTERECTOMY Right 3/16/2020    Procedure: RIGHT CAROTID ENDARTERECTOMY;  Surgeon: Mahesh Salmon MD;  Location: [facility] MAIN OR;  Service: Vascular;  Laterality: Right;    CHOLECYSTECTOMY      COLONOSCOPY      COLONOSCOPY N/A 6/10/2016    Procedure: COLONOSCOPY with polypectomy;  Surgeon: Virgie Castelan MD;  Location: [facility] OR;  Service:     CORONARY STENT PLACEMENT      X2    ENDOSCOPY N/A 6/10/2016    Procedure: ESOPHAGOGASTRODUODENOSCOPY WITH BIOPSY;  Surgeon: Virgie Castelan MD;  Location: [facility] OR;  Service:     ENDOSCOPY      ENDOSCOPY N/A 8/25/2017    Procedure: ESOPHAGOGASTRODUODENOSCOPY w/ biopsies;  Surgeon: Virgie Castelan MD;  Location: [facility] OR;  Service:        Family History: family history includes Breast cancer in her mother;  Colon cancer in her mother; Heart disease in her brother, father, and sister; Hypertension in her father and sister; Other in her brother, brother, father, and sister; Pulmonary fibrosis in her sister; Thyroid disease in her daughter, daughter, and sister.    Social History:  reports that she has been smoking cigarettes. She started smoking about 7 years ago. She has a 60 pack-year smoking history. She has never used smokeless tobacco. She reports that she does not drink alcohol and does not use drugs.    Home Medications:  Prior to Admission medications    Medication Sig Start Date End Date Taking? Authorizing Provider   albuterol sulfate  (90 Base) MCG/ACT inhaler Inhale 2 puffs Every 4 (Four) Hours As Needed for Wheezing. 6/14/23  Yes Delfino Matt MD   aspirin 81 MG tablet Take 1 tablet by mouth Daily.  Patient taking differently: Take 1 tablet by mouth Every Night. 9/3/19   Herber Hayes MD   atorvastatin (LIPITOR) 10 MG tablet TAKE 1 TABLET EVERY DAY 3/8/24   Herber Hayes MD   ferrous sulfate 325 (65 FE) MG tablet Take 1 tablet by mouth Daily With Breakfast. Indications: Iron Deficiency    ProviderRocky MD   furosemide (LASIX) 20 MG tablet Take 1 tablet by mouth Every Other Day. Indications: Edema 10/16/23   Delfino Matt MD   nitroglycerin (NITROSTAT) 0.4 MG SL tablet Place 1 tablet under the tongue Every 5 (Five) Minutes As Needed for Chest Pain. Take no more than 3 doses in 15 minutes. 10/16/23   Delfino Matt MD   O2 (OXYGEN) Inhale 3 L/min Continuous. Travel oxygen is 4-5 units    ProviderRocky MD   omeprazole (priLOSEC) 40 MG capsule TAKE 1 CAPSULE EVERY DAY FOR HEARTBURN 3/7/24   Sharon Quesada PA-C   polyethylene glycol (MIRALAX) packet Take 17 g by mouth Daily.  Patient taking differently: Take 17 g by mouth Every Morning. 10/31/18   Virgie Castelan MD   potassium chloride (MICRO-K) 10 MEQ CR capsule Take 2 capsules by mouth Daily.  "10/16/23   Delfino Matt MD   predniSONE (DELTASONE) 10 MG tablet Take 1 tablet by mouth Daily. Indications: COPD 7/26/23   ProviderRocky MD   Spacer/Aero-Holding Chambers device 1 Units Daily As Needed (use with albuterol inhaler). 2/14/22   Delfino Matt MD   traMADol (ULTRAM) 50 MG tablet Take 1 tablet by mouth Every 6 (Six) Hours As Needed for Severe Pain. 4/4/24   Sharon Quesada PA-C   Trelegy Ellipta 100-62.5-25 MCG/ACT inhaler Inhale 1 puff Daily. 8/16/23   Rocky Galloway MD   vitamin B-12 (CYANOCOBALAMIN) 1000 MCG tablet Take 1 tablet by mouth Every Morning.    ProviderRocky MD       Allergies:  Allergies   Allergen Reactions    Metoprolol Cough     \"terriable side effects\"   \" doing ok on the Ziac    Iodinated Contrast Media Itching    Codeine Palpitations    Morphine And Related Palpitations       Objective     Vitals:   Temp:  [97.4 °F (36.3 °C)-98.2 °F (36.8 °C)] 97.5 °F (36.4 °C)  Heart Rate:  [] 100  Resp:  [17-58] 20  BP: (127-225)/() 154/70  Flow (L/min):  [4-12] 4    Intake/Output Summary (Last 24 hours) at 4/23/2024 0913  Last data filed at 4/23/2024 0646  Gross per 24 hour   Intake 1375 ml   Output 2430 ml   Net -1055 ml       Physical Exam:   Constitutional: Awake but blunted, easily confused, NAD, cannot ID the year  HEENT: Sclera anicteric, no conjunctival injection  Neck: Supple, carotid bruits, trachea at midline, no JVD  Respiratory: Diffuse crackles, nonlabored on 4 L/min  Cardiovascular: RR, tachycardic, no rub  Gastrointestinal: BS +, soft, nontender and nondistended  : No palpable bladder  Musculoskeletal: No edema, no clubbing or cyanosis  Psychiatric: Flat affect, cooperative, slightly irritable, partially oriented  Neurologic: moving all extremities, normal speech and mental status  Skin: Warm and dry       Scheduled Meds:     atorvastatin, 10 mg, Oral, Daily  azithromycin, 500 mg, Intravenous, Q24H  busPIRone, 5 mg, Oral, " Q8H  enoxaparin, 40 mg, Subcutaneous, Q24H  famotidine, 20 mg, Intravenous, Q12H   Or  famotidine, 20 mg, Oral, Q12H  fluticasone, 2 spray, Each Nare, Daily  ipratropium-albuterol, 3 mL, Nebulization, 4x Daily - RT  methylPREDNISolone sodium succinate, 40 mg, Intravenous, Q12H  sodium chloride, 10 mL, Intravenous, Q12H  sodium chloride, 4 mL, Nebulization, BID - RT      IV Meds:   dextrose, 6 mL/kg (Ideal)  Pharmacy to Dose enoxaparin (LOVENOX),   sodium chloride, 25 mL/hr, Last Rate: 25 mL/hr (04/23/24 0646)        Results Reviewed:   I have personally reviewed the results from the time of this admission to 4/23/2024 09:13 EDT     Lab Results   Component Value Date    GLUCOSE 146 (H) 04/23/2024    CALCIUM 9.1 04/23/2024     (L) 04/23/2024    K 3.9 04/23/2024    CO2 24.1 04/23/2024    CL 88 (L) 04/23/2024    BUN 11 04/23/2024    CREATININE 0.65 04/23/2024    EGFRIFAFRI 84 07/08/2021    EGFRIFNONA 69 07/08/2021    BCR 16.9 04/23/2024    ANIONGAP 8.9 04/23/2024      Lab Results   Component Value Date    MG 1.9 04/14/2015    ALBUMIN 4.4 04/22/2024           Assessment / Plan       Acute metabolic encephalopathy      ASSESSMENT:  1.  Hyponatremia, acute on chronic, with volume overload (significant edema; crackles) possibly explaining the acute component.  Chronic component likely from severe lung disease, which will increase ADH secretion, as does her chronic severe pain.  Added insults include poor solute intake and excessive water intake.  Urine osmolality still pending; urine sodium 101  2.  End-stage COPD with active tobacco abuse   3.  Ischemic heart disease  4.  Hypertension, exacerbated by volume excess      PLAN:  1.  Stop hypertonic saline  2.  IV Lasix 40 mg x 1  3.  Serial sodium levels  4.  Check phosphorus as marker for nutrition; if low, will give sodium phosphate    Thank you for involving us in the care of Mary Jack.  Please feel free to call with any questions.    Farhan Rebollar,  MD  04/23/24  09:13 EDT    [facility]  [phone]      Please note that portions of this note were completed with a voice recognition program.

## 2024-04-23 NOTE — CONSULTS
Adult Nutrition  Assessment/PES    Patient Name:  Mary Jack  YOB: 1945  MRN: [id number]  Admit Date:  4/22/2024    Assessment Date:  4/23/2024    Comments:  Agree with diet as indicated.  Encourage po and voice food prefs.    Pt reports she still smokes at home and keeps O2 on when asked. Notified staff and advised the pt this was very dangerous due to fire concerns.  She stated she knows all that.    Recommend: consider liberalize fluid restriction currently 800 ml/day to increase 7702-1907 ml?  Will cont to follow and monitor. Current fluid restriction limits ability for supplements.         Reason for Assessment       Row Name 04/23/24 1231          Reason for Assessment    Reason For Assessment identified at risk by screening criteria     Diagnosis neurologic conditions;pulmonary disease  Acute Metabolic Enceph, Acute Exacerbation of End stage COPD, Hyponatremia     Identified At Risk by Screening Criteria MST SCORE 2+                    Nutrition/Diet History       Row Name 04/23/24 1235          Nutrition/Diet History    Typical Intake (Food/Fluid/EN/PN) Spoke w pt & sister & niece w permission at bedside. NKFA. Difficulty swallowing/chewing somewhat new related to breathing per pt. Reports She orders [employer] online and daughter picks up. She goes from room to room at home w breaks in between.     Meal/Snack Patterns 3 meals-small, every am mini cruz muffins, cottage cheese, PBJ examples of things she eats                    Labs/Tests/Procedures/Meds       Row Name 04/23/24 1238          Labs/Procedures/Meds    Lab Results Reviewed reviewed     Lab Results Comments Na 122 L HgA1c 5.7 H        Diagnostic Tests/Procedures    Diagnostic Test/Procedure Reviewed reviewed        Medications    Pertinent Medications Reviewed reviewed     Pertinent Medications Comments solumedrol                    Physical Findings       Row Name 04/23/24 1239          Physical Findings    Overall Physical  "Appearance nutrition exam complete-legs swollen per pt, calf soft doesn't walk very much, no significant loss noted on exam                    Estimated/Assessed Needs - Anthropometrics       Row Name 04/23/24 1239 04/23/24 0735       Anthropometrics    Height -- 157.5 cm (62\")    Weight -- 78.9 kg (174 lb)    Weight for Calculation 78.9 kg (173 lb 15.1 oz) --    Additional Documentation --  170# in Jan 2024 --       Estimated/Assessed Needs    Additional Documentation Estimated Calorie Needs (Group);Fluid Requirements (Group);Protein Requirements (Group) --       Estimated Calorie Needs    Estimated Calorie Requirement (kcal/day) 6200-2895 kcal ( mifflin 1.2-1.3) --    Estimated Calorie Need Method Walton-St Jeor --       Protein Requirements    Est Protein Requirement Amount (gms/kg) 1.0 gm protein  79 gm pro --       Fluid Requirements    Estimated Fluid Requirement Method RDA Method  0285-2850 ml --                   Nutrition Prescription Ordered       Row Name 04/23/24 1243          Nutrition Prescription PO    Current PO Diet Soft Texture     Texture Chopped     Common Modifiers Fluid Restriction     Fluid Restriction mL per Day --  800 ml/day                    Evaluation of Received Nutrient/Fluid Intake       Row Name 04/23/24 1243          Fluid Intake Evaluation    Oral Fluid (mL) 120  insufficient data        PO Evaluation    Number of Meals 1     % PO Intake 25                       Problem/Interventions:   Problem 1       Row Name 04/23/24 1243          Nutrition Diagnoses Problem 1    Problem 1 Other (comment)  Inadequate energy intake related to COPD, Acute Metabolic Enceph as evidenced by decreased appetite and intake                          Intervention Goal       Row Name 04/23/24 1244          Intervention Goal    General Meet nutritional needs for age/condition     PO Tolerate PO;PO intake (%)     PO Intake % 50 %  or greater                    Nutrition Intervention       Row Name 04/23/24 " 1244          Nutrition Intervention    RD/Tech Action Follow Tx progress;Encourage intake;Advise alternate selection                      Education/Evaluation       Row Name 04/23/24 1244          Education    Education Provided education regarding  Edu on diet order choppped meat. Edu on nutrition exam for losses. Edu on easy pro sources. Advised ask for alt items as needed.        Monitor/Evaluation    Monitor Per protocol;PO intake;I&O;Pertinent labs;Weight;Symptoms     Education Follow-up Other (comment)  pt verbalized understanding                     Electronically signed by:  Marielena Velasquez RD  04/23/24 12:45 EDT

## 2024-04-23 NOTE — PROGRESS NOTES
"Pharmacy Consult - Lovenox    Mary Jack has been consulted for pharmacy to dose enoxaparin for VTE prophylaxis per Darrin PATE's request.       Relevant clinical data and objective history reviewed:  78 y.o. female 157.5 cm (62\") 78.9 kg (174 lb)      Past Medical History:   Diagnosis Date    AAA (abdominal aortic aneurysm)     Allergic not sure    CODINE,MORFINE AND DYE SOLUTION    Anemia     Aneurysm of abdominal aorta     Arthritis     Zepeda esophagus     CAD (coronary artery disease)     Chest pain     Constipation     COPD (chronic obstructive pulmonary disease)     Emphysema lung     Empyema     Fatigue     GERD (gastroesophageal reflux disease)     Headache     Hiatal hernia     High risk medication use     History of cardiac catheterization     CATH STENT PLACEMENT: CIRCUMFLEX BRANCH    Hyperlipidemia     Hypertension     Infectious viral hepatitis Jaundice in 1963    Jaundice     Lumbosacral disc disease     Myocardial infarction     Myocardial infarction     Occult blood in stools     On home O2     Peripheral artery disease     Reflux gastritis     Scoliosis     Visual impairment     BLURRY VISION IN RT. EYE     is allergic to metoprolol, iodinated contrast media, codeine, and morphine and related.    Lab Results   Component Value Date    WBC 7.35 04/22/2024    HGB 12.0 04/22/2024    HCT 35.0 04/22/2024    MCV 92.1 04/22/2024     04/22/2024     Lab Results   Component Value Date    GLUCOSE 146 (H) 04/23/2024    CALCIUM 9.1 04/23/2024     (L) 04/23/2024    K 4.0 04/23/2024    CO2 24.1 04/23/2024    CL 88 (L) 04/23/2024    BUN 11 04/23/2024    CREATININE 0.65 04/23/2024    EGFRIFAFRI 84 07/08/2021    EGFRIFNONA 69 07/08/2021    BCR 16.9 04/23/2024    ANIONGAP 8.9 04/23/2024       Estimated Creatinine Clearance: 71.6 mL/min (by C-G formula based on SCr of 0.65 mg/dL).      Assessment/Plan    1) Will start patient on Lovenox 40 mg subcutaneous every 24 hours.     2) Will monitor patient's " renal function every 24 hours, platelets at least every 3 days, and adjust as needed.      Ross Reeder, PharmD

## 2024-04-23 NOTE — PLAN OF CARE
Goal Outcome Evaluation:  Plan of Care Reviewed With: patient        Progress: improving  Outcome Evaluation: Pt VS improving, on Hypertonic infusion, sodium and potassium labs q6. RT titrating o2, currently at 4L humidified nc; duonebs q4. Pt remains alert but confused, see flowsheets and MD notes. Shearer with clear urine. IV antibiotics; IV solumedrol, blood cultures pending. Pulmonology and Nephrology consult in am. Pt resting at this time.                                Sonos:  20w: AGA,  grams, anterior placenta, 3vc, MVP 4.3   23w: AGA 52%, anterior placenta, AFV nl, CL 3 cm, anatomy wnl

## 2024-04-23 NOTE — CONSULTS
DOS: 2024  NAME: Mary Jack   : 1945  PCP: Delfino Matt MD  CC: Questionable confusion.  Referring MD: Rod Fabian DO    Neurological Problem and Interval History:  78 y.o. right-handed white female with a Hx of severe emphysema with 60+ years of 1 pack/day smoking history and still continues to smoke came in with acute exacerbation of her emphysema.  It was then noted by the ER physician that she could not tell her name properly and did not know where she was or the month of the year.  However after getting some breathing treatment she is able to tell me her name and her date of birth and where she is and the month and the year.  She was confused about doing the one-step and two-step commands but after some cueing she was able to touch her left ear with her right hand and show me her tongue.  She is in a semiupright almost 90 degree angle position sitting up as she is so short of breath with accessory muscles of the neck being hyperactive.  She barely had a chance to get a CT of the brain done as she could barely lay down flat.  There is no facial asymmetry and her speech is clear and her mentation is normal and no visual changes noted and she has corrective lenses.  There is no drift noticeable in the right or left upper or lower extremities and no sensory loss noted anywhere and the finger-to-nose coordination and the heel-to-shin testing are normal.  The NIH stroke scale is 0.    Past Medical/Surgical Hx:  Past Medical History:   Diagnosis Date    AAA (abdominal aortic aneurysm)     Allergic not sure    CODINE,MORFINE AND DYE SOLUTION    Anemia     Aneurysm of abdominal aorta     Arthritis     Zepeda esophagus     CAD (coronary artery disease)     Chest pain     Constipation     COPD (chronic obstructive pulmonary disease)     Emphysema lung     Empyema     Fatigue     GERD (gastroesophageal reflux disease)     Headache     Hiatal hernia     High risk medication use     History of  cardiac catheterization     CATH STENT PLACEMENT: CIRCUMFLEX BRANCH    Hyperlipidemia     Hypertension     Infectious viral hepatitis Jaundice in 1963    Jaundice     Lumbosacral disc disease     Myocardial infarction     Myocardial infarction     Occult blood in stools     On home O2     Peripheral artery disease     Reflux gastritis     Scoliosis     Visual impairment     BLURRY VISION IN RT. EYE     Past Surgical History:   Procedure Laterality Date    CARDIAC CATHETERIZATION      CARDIAC CATHETERIZATION N/A 5/12/2017    Procedure: Left Heart Cath;  Surgeon: Herber Hayes MD;  [facility];  Service:     CARDIAC CATHETERIZATION N/A 7/1/2020    Procedure: Left Heart Cath;  Surgeon: Herber Hayes MD;  Location:  [facility] CATH INVASIVE LOCATION;  Service: Cardiology;  Laterality: N/A;    CARDIAC CATHETERIZATION N/A 7/1/2020    Procedure: Coronary angiography;  Surgeon: Herber Hayes MD;  Location:  [facility] CATH INVASIVE LOCATION;  Service: Cardiology;  Laterality: N/A;    CARDIAC CATHETERIZATION N/A 7/1/2020    Procedure: Left ventriculography;  Surgeon: Herber Hayes MD;  [facility];  Service: Cardiology;  Laterality: N/A;    CAROTID ENDARTERECTOMY Right 3/16/2020    Procedure: RIGHT CAROTID ENDARTERECTOMY;  Surgeon: Mahesh Salmon MD;  Location: [facility] MAIN OR;  Service: Vascular;  Laterality: Right;    CHOLECYSTECTOMY      COLONOSCOPY      COLONOSCOPY N/A 6/10/2016    Procedure: COLONOSCOPY with polypectomy;  Surgeon: Virgie Castelan MD;  Location: [facility] OR;  Service:     CORONARY STENT PLACEMENT      X2    ENDOSCOPY N/A 6/10/2016    Procedure: ESOPHAGOGASTRODUODENOSCOPY WITH BIOPSY;  Surgeon: Virgie Castelan MD;  Location: [facility] OR;  Service:     ENDOSCOPY      ENDOSCOPY N/A 8/25/2017    Procedure: ESOPHAGOGASTRODUODENOSCOPY w/ biopsies;  Surgeon: Virgie Castelan MD;  Location: [facility] OR;  Service:        Review of Systems:     Constitutional: Extremely short of breath lady very orthopneic sitting almost at a 90 degree angle in the [facility]  Cardiovascular: Denies any chest pain or palpitations.  Respiratory: Extremely short of breath as discussed above with the hyperactive accessory muscles of the neck.  Gastrointestinal: No nausea and vomiting noted.  Genitourinary: No bladder incontinence noted.  Musculoskeletal: No aches and pains in the muscles or joints noted.  Dermatological: No skin breakdown noted.  Neurological: The NIH stroke scale is 0.  Psychiatric: Stably anxious as she is so short of breath.  Ophthalmological: No vision changes and has corrective lenses.          Medications On Admission  (Not in a hospital admission)      Prior to Admission medications    Medication Sig Start Date End Date Taking? Authorizing Provider   albuterol sulfate  (90 Base) MCG/ACT inhaler Inhale 2 puffs Every 4 (Four) Hours As Needed for Wheezing. 6/14/23   Delfino Matt MD   aspirin 81 MG tablet Take 1 tablet by mouth Daily.  Patient taking differently: Take 1 tablet by mouth Every Night. 9/3/19   Herber Hayes MD   atorvastatin (LIPITOR) 10 MG tablet TAKE 1 TABLET EVERY DAY 3/8/24   Herber Hayes MD   ferrous sulfate 325 (65 FE) MG tablet Take 1 tablet by mouth Daily With Breakfast. Indications: Iron Deficiency    ProviderRocky MD   furosemide (LASIX) 20 MG tablet Take 1 tablet by mouth Every Other Day. Indications: Edema 10/16/23   Delfino Matt MD   nitroglycerin (NITROSTAT) 0.4 MG SL tablet Place 1 tablet under the tongue Every 5 (Five) Minutes As Needed for Chest Pain. Take no more than 3 doses in 15 minutes. 10/16/23   Delfino Matt MD   O2 (OXYGEN) Inhale 3 L/min Continuous. Travel oxygen is 4-5 units    ProviderRocky MD   omeprazole (priLOSEC) 40 MG capsule TAKE 1 CAPSULE EVERY DAY FOR HEARTBURN 3/7/24   Sharon Quesada PA-C   polyethylene glycol (MIRALAX) packet Take 17 g by  "mouth Daily.  Patient taking differently: Take 17 g by mouth Every Morning. 10/31/18   Virgie Castelan MD   potassium chloride (MICRO-K) 10 MEQ CR capsule Take 2 capsules by mouth Daily. 10/16/23   Delfino Matt MD   predniSONE (DELTASONE) 10 MG tablet Take 1 tablet by mouth Daily. Indications: COPD 7/26/23   Rocky Galloway MD   Spacer/Aero-Holding Chambers device 1 Units Daily As Needed (use with albuterol inhaler). 2/14/22   Delfino Matt MD   traMADol (ULTRAM) 50 MG tablet Take 1 tablet by mouth Every 6 (Six) Hours As Needed for Severe Pain. 4/4/24   Sharon Quesada PA-C   Trelegy Ellipta 100-62.5-25 MCG/ACT inhaler Inhale 1 puff Daily. 8/16/23   Rocky Galloway MD   vitamin B-12 (CYANOCOBALAMIN) 1000 MCG tablet Take 1 tablet by mouth Every Morning.    ProviderRocky MD        Allergies:  Allergies   Allergen Reactions    Metoprolol Cough     \"terriable side effects\"   \" doing ok on the Ziac    Iodinated Contrast Media Itching    Codeine Palpitations    Morphine And Related Palpitations       Social Hx:  Social History     Socioeconomic History    Marital status:    Tobacco Use    Smoking status: Every Day     Current packs/day: 1.00     Average packs/day: 1 pack/day for 60.0 years (60.0 ttl pk-yrs)     Types: Cigarettes     Start date: 8/3/2016    Smokeless tobacco: Never    Tobacco comments:     66 YEARS   Vaping Use    Vaping status: Never Used   Substance and Sexual Activity    Alcohol use: No    Drug use: No    Sexual activity: Not Currently     Partners: Male     Birth control/protection: Post-menopausal       Family Hx:  Family History   Problem Relation Age of Onset    Colon cancer Mother     Breast cancer Mother     Other Father         cardiac disorder    Hypertension Father     Heart disease Father     Heart disease Sister     Hypertension Sister     Pulmonary fibrosis Sister     Thyroid disease Sister     Other Sister     Other Brother         cardiac " disorder    Heart disease Brother     Other Brother     Thyroid disease Daughter     Thyroid disease Daughter     Malig Hyperthermia Neg Hx        Review of Imaging (Interpretation of images not reports): The CT angiogram of the head and neck with contrast performed on January 20, 2020 shows the following:    mpression   Parameter soft plaque is seen across the right carotid bifurcation with 56% stenosis, not significant changed from the previous exam. Minimal disease on the left without stenosis. No acute findings intracranially. 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       CT Head Without Contrast    Result Date: 4/22/2024  CT HEAD WO CONTRAST Date of Exam: 4/22/2024 8:39 PM EDT Indication: Altered mental status, nontraumatic (Ped 0-17y). Comparison: None available. Technique: Axial CT images were obtained of the head without contrast administration.  Coronal reconstructions were performed.  Automated exposure control and iterative reconstruction methods were used. Findings: No intra or extra-axial fluid collections, masses, or areas of hemorrhage. No midline shift or mass effect is identified. Ventricles and sulci are age-appropriate. Orbits paranasal sinuses and mastoid air cells are unremarkable.     Impression: Impression: 1. No acute intracranial pathology. Electronically Signed: Oral Barker MD  4/22/2024 9:11 PM EDT  Workstation ID: [id number]                 Additional Tests Performed: An adult transthoracic echocardiogram performed on March 15, 2023 showed the following:    Interpretation Summary         Left ventricular ejection fraction appears to be 61 - 65%.    Left ventricular diastolic function was normal.    Estimated right ventricular systolic pressure from tricuspid regurgitation is normal (<35 mmHg).    Results for orders placed in visit on 01/26/23    Adult Transthoracic Echo Complete W/ Cont if Necessary Per Protocol    Interpretation Summary    Left ventricular ejection  fraction appears to be 61 - 65%.    Left ventricular diastolic function was normal.    Estimated right ventricular systolic pressure from tricuspid regurgitation is normal (<35 mmHg).       Results for orders placed in visit on 06/16/20    SCANNED VASCULAR STUDIES       Laboratory Results:   Lab Results   Component Value Date    GLUCOSE 108 (H) 04/22/2024    CALCIUM 10.0 04/22/2024     (L) 04/22/2024    K 4.4 04/22/2024    CO2 24.8 04/22/2024    CL 85 (L) 04/22/2024    BUN 13 04/22/2024    CREATININE 0.72 04/22/2024    EGFRIFAFRI 84 07/08/2021    EGFRIFNONA 69 07/08/2021    BCR 18.1 04/22/2024    ANIONGAP 12.2 04/22/2024     Lab Results   Component Value Date    WBC 7.35 04/22/2024    HGB 12.0 04/22/2024    HCT 35.0 04/22/2024    MCV 92.1 04/22/2024     04/22/2024     Lab Results   Component Value Date    CHOL 147 07/02/2020    CHOL 162 03/06/2017     Lab Results   Component Value Date    HDL 93 (H) 02/06/2023    HDL 58 07/08/2021    HDL 73 (H) 07/02/2020     Lab Results   Component Value Date    LDL 83 02/06/2023    LDL 66 07/08/2021    LDL 65 07/02/2020     Lab Results   Component Value Date    TRIG 80 02/06/2023    TRIG 93 07/08/2021    TRIG 46 07/02/2020     Lab Results   Component Value Date    HGBA1C 5.80 (H) 07/02/2020     Lab Results   Component Value Date    INR 1.00 07/01/2020    INR 1.09 07/01/2020    INR 1.02 06/25/2020    PROTIME 13.1 07/01/2020    PROTIME 14.0 07/01/2020    PROTIME 13.1 06/25/2020     Lab Results   Component Value Date    MQFDEZIZ36 1,516 (H) 07/02/2020     Lab Results   Component Value Date    FOLATE >20.0 02/23/2016     TSH          8/14/2023    11:24   TSH   TSH 3.770           Physical Examination:  BP (!) 225/97 (BP Location: Right arm, Patient Position: Sitting)   Pulse 88   Temp 98.2 °F (36.8 °C) (Axillary)   Resp (!) 58   SpO2 92%   General Appearance:   Well developed, well nourished, well groomed, alert, and cooperative.  HEENT: Normocephalic.  The accessory  muscles of the neck are hyperactive.  Neck and Spine: Normal range of motion.  Normal alignment. No mass or tenderness. No bruits.    Extremities:    No edema or deformities. Normal joint ROM.   Skin:    No rashes or birth marks.    Neurological examination:  Higher Integrative  Function: Oriented to time, place and person. Normal registration, recall, attention span and concentration. Normal language including comprehension, spontaneous speech, repetition, reading, writing, naming and vocabulary. No neglect with normal visual-spatial function and construction. Normal fund of knowledge and higher integrative function.  CN II:  Pupils are equal, round, and reactive to light. Normal visual acuity and visual fields.    CN III IV VI: Extraocular movements are full without nystagmus.   CN V:  Normal facial sensation and strength of muscles of mastication.  CN VII:  Facial movements are symmetric. No weakness.  CN VIII: Auditory acuity is normal.  CN IX & X: Symmetric palatal movement.  CN XI:  Sternocleidomastoid and trapezius are normal.  No weakness.  CN XII:  The tongue is midline.  No atrophy or fasciculations.  Motor:  Normal muscle strength, bulk and tone in upper and lower extremities.  No fasciculations, rigidity, spasticity, or abnormal movements.  Sensation: Normal to light touch, pinprick, vibration, temperature, and proprioception in arms and legs. Normal graphesthesia and no extinction on DSS.  Station and Gait: Could not be tested at this time as she is so dyspneic.    Coordination:  Finger to nose test shows no dysmetria.  Heel to shin normal.    NIHSS:    Baseline  0-->Alert: keenly responsive  0-->Answers both questions correctly  0-->Performs both tasks correctly  0=normal  0=No visual loss  0=Normal symmetric movement  0-->No drift: limb holds 90 (or 45) degrees for full 10 secs  0-->No drift: limb holds 90 (or 45) degrees for full 10 secs  0-->No drift: limb holds 90 (or 45) degrees for full 10  secs  0-->No drift: limb holds 90 (or 45) degrees for full 10 secs  0=Absent  0=Normal; no sensory loss  0=No aphasia, normal  0=Normal  0=No abnormality    Total score: 0    Diagnoses / Discussion:  78 y.o. who presents with Sx of hypoxic encephalopathy which seems to have improved after breathing treatments.  I reassured the patient there is no evidence of any acute CVA.    Plan:  Discussed with the patient that she needs to be in the center where an intensivist or a pulmonologist can evaluate her for her severe shortness of breath as if she continues to hyperventilate or have further dyspneic spells she might need to be intubated.  I also reassured her that she did not have any stroke and she will not be able to undergo any MRI of the brain at this point as she cannot lay flat for the next 30 minutes.  I do not have any further neurological suggestions at this point and if any new condition arises please reconsult neurology service..     I have discussed the above with the patient and the ER nurse.  Time spent with patient: 70 minutes in face-to-face evaluation and management of the patient using the dedicated telemedicine device without any interruption with the help of the ER nurse with the patient located at the [facility] and myself at a remote location.    Electronically signed by Rayna Malloy MD, 04/22/24, 10:33 PM EDT.    Dictated using Dragon dictation.

## 2024-04-23 NOTE — PROGRESS NOTES
"SERVICE: [facility] HOSPITALIST    CONSULTANTS: Pulmonary nephrology    CHIEF COMPLAINT: Shortness of breath, confusion    SUBJECTIVE: Patient seen and examined at bedside. Patient reports she is short of breath mainly because of nasal congestion. She informs me of her end stage COPD. She asks what todays plan is, and I informed her of Pulmonary and Nephrology consultation. She is agreeable. She is more alert compared to reports from ED.     OBJECTIVE:    Physical exam is largely unchanged from previous exam, except where documented below, examination is accurate as of 4/23/2024    Physical Exam:  General: Patient is awake and alert, conversant with confusion intermittently and difficulty finding words. Well developed and well nourished. No acute distress noted.   HENT: Head is atraumatic, normocephalic. Hearing is grossly intact. Nose is without obvious congestion and appears patent. Neck is supple and trachea is midline.   Eyes: Vision is grossly intact. Pupils appear equal and round.   Cardiovascular: Heart has regular rate and rhythm with no murmurs, rubs or gallops noted.   Respiratory: Diffusely diminished with poor air movement otherwise no wheezing, rhonchi or rales.   Abdominal/GI: Soft, nontender, bowel sounds present. No rebound or guarding present.   Extremities: No peripheral edema noted.   Musculoskeletal: Spontaneous movement of bilateral upper and lower extremities against gravity noted. No signs of injury or deformity noted.   Skin: Warm and dry.   Psych: Mood and affect are appropriate. Cooperative with exam.   Neuro: No facial asymmetry noted. No focal deficits noted, hearing and vision are grossly intact.     /70   Pulse 93   Temp 97.5 °F (36.4 °C) (Temporal)   Resp 21   Ht 160 cm (62.99\")   Wt 79.3 kg (174 lb 13.2 oz)   SpO2 92%   BMI 30.98 kg/m²     MEDS/LABS REVIEWED AND ORDERED    atorvastatin, 10 mg, Oral, Daily  azithromycin, 500 mg, Intravenous, " Q24H  busPIRone, 5 mg, Oral, Q8H  enoxaparin, 40 mg, Subcutaneous, Q24H  famotidine, 20 mg, Intravenous, Q12H   Or  famotidine, 20 mg, Oral, Q12H  ipratropium-albuterol, 3 mL, Nebulization, 4x Daily - RT  methylPREDNISolone sodium succinate, 40 mg, Intravenous, Q12H  sodium chloride, 10 mL, Intravenous, Q12H  sodium chloride, 4 mL, Nebulization, BID - RT          LAB/DIAGNOSTICS:    Lab Results (last 24 hours)       Procedure Component Value Units Date/Time    Sodium [[id number]]  (Abnormal) Collected: 04/23/24 0615    Specimen: Blood Updated: 04/23/24 0636     Sodium 122 mmol/L     Potassium [[id number]]  (Normal) Collected: 04/23/24 0615    Specimen: Blood Updated: 04/23/24 0636     Potassium 3.9 mmol/L     Urine Electrolytes - Urine, Clean Catch [[id number]] Collected: 04/22/24 2053    Specimen: Urine, Clean Catch Updated: 04/23/24 0517     Potassium, Urine 47.3 mmol/L      Sodium, Urine 101 mmol/L      Chloride, Urine 91 mmol/L     Narrative:      Reference intervals for random urine have not been established.  Clinical usage is dependent upon physician's interpretation in combination with other laboratory tests.       Sodium [[id number]]  (Abnormal) Collected: 04/23/24 0443    Specimen: Blood Updated: 04/23/24 0509     Sodium 122 mmol/L     Potassium [[id number]]  (Normal) Collected: 04/23/24 0443    Specimen: Blood Updated: 04/23/24 0509     Potassium 4.1 mmol/L     Osmolality, Serum [[id number]] Collected: 04/22/24 2022    Specimen: Blood Updated: 04/23/24 0426    Osmolality, Urine - Urine, Clean Catch [[id number]] Collected: 04/22/24 2053    Specimen: Urine, Clean Catch Updated: 04/23/24 0413    Creatinine Urine Random (kidney function) GFR component - Urine, Clean Catch [[id number]] Collected: 04/22/24 2053    Specimen: Urine, Clean Catch Updated: 04/23/24 0412    Basic Metabolic Panel [[id number]]  (Abnormal) Collected: 04/23/24 0308    Specimen: Blood Updated: 04/23/24 0403     Glucose 146 mg/dL      BUN 11 mg/dL       Creatinine 0.65 mg/dL      Sodium 121 mmol/L      Potassium 4.1 mmol/L      Chloride 88 mmol/L      CO2 24.1 mmol/L      Calcium 9.1 mg/dL      BUN/Creatinine Ratio 16.9     Anion Gap 8.9 mmol/L      eGFR 91.4 mL/min/1.73     Narrative:      GFR Normal >60  Chronic Kidney Disease <60  Kidney Failure <15    The GFR formula is only valid for adults with stable renal function between ages 18 and 70.    Respiratory Panel PCR w/COVID-19(SARS-CoV-2) PLACIDO/SELENE/GRACIA/PAD/COR/YULI In-House, NP Swab in [facility], 2 HR TAT - Swab, Nasopharynx [[id number]] Collected: 04/23/24 0016    Specimen: Swab from Nasopharynx Updated: 04/23/24 0020    Basic Metabolic Panel [[id number]]  (Abnormal) Collected: 04/22/24 2329    Specimen: Blood Updated: 04/22/24 2354     Glucose 126 mg/dL      BUN 11 mg/dL      Creatinine 0.62 mg/dL      Sodium 123 mmol/L      Potassium 4.4 mmol/L      Chloride 85 mmol/L      CO2 25.1 mmol/L      Calcium 9.2 mg/dL      BUN/Creatinine Ratio 17.7     Anion Gap 12.9 mmol/L      eGFR 92.4 mL/min/1.73     Narrative:      GFR Normal >60  Chronic Kidney Disease <60  Kidney Failure <15    The GFR formula is only valid for adults with stable renal function between ages 18 and 70.    D-dimer, Quantitative [[id number]]  (Normal) Collected: 04/22/24 2329    Specimen: Blood Updated: 04/22/24 2348     D-Dimer, Quantitative 0.73 MCGFEU/mL     Narrative:      According to the assay 's published package insert, a normal (<0.50 MCGFEU/mL) D-dimer result in conjunction with a non-high clinical probability assessment, excludes deep vein thrombosis (DVT) and pulmonary embolism (PE) with high sensitivity.    D-dimer values increase with age and this can make VTE exclusion of an older population difficult. To address this, the [employer], based on best available evidence and recent guidelines, recommends that clinicians use age-adjusted D-dimer thresholds in patients greater than 50 years of age with: a) a  "low probability of PE who do not meet all Pulmonary Embolism Rule Out Criteria, or b) in those with intermediate probability of PE.   The formula for an age-adjusted D-dimer cut-off is \"age/100\".  For example, a 60 year old patient would have an age-adjusted cut-off of 0.60 MCGFEU/mL and an 80 year old 0.80 MCGFEU/mL.    Protime-INR [[id number]]  (Normal) Collected: 04/22/24 2329    Specimen: Blood Updated: 04/22/24 2348     Protime 13.3 Seconds      INR 1.01    Narrative:      Therapeutic Ranges for INR: 2.0-3.0 (PT 20-30)                              2.5-3.5 (PT 25-34)    Procalcitonin [[id number]]  (Normal) Collected: 04/22/24 2249    Specimen: Blood Updated: 04/22/24 2343     Procalcitonin 0.06 ng/mL     Narrative:      As a Marker for Sepsis (Non-Neonates):    1. <0.5 ng/mL represents a low risk of severe sepsis and/or septic shock.  2. >2 ng/mL represents a high risk of severe sepsis and/or septic shock.    As a Marker for Lower Respiratory Tract Infections that require antibiotic therapy:    PCT on Admission    Antibiotic Therapy       6-12 Hrs later    >0.5                Strongly Recommended  >0.25 - <0.5        Recommended   0.1 - 0.25          Discouraged              Remeasure/reassess PCT  <0.1                Strongly Discouraged     Remeasure/reassess PCT    As 28 day mortality risk marker: \"Change in Procalcitonin Result\" (>80% or <=80%) if Day 0 (or Day 1) and Day 4 values are available. Refer to http://www.Wuzz[employer]-pct-calculator.com    Change in PCT <=80%  A decrease of PCT levels below or equal to 80% defines a positive change in PCT test result representing a higher risk for 28-day all-cause mortality of patients diagnosed with severe sepsis for septic shock.    Change in PCT >80%  A decrease of PCT levels of more than 80% defines a negative change in PCT result representing a lower risk for 28-day all-cause mortality of patients diagnosed with severe sepsis or septic shock.       TSH [[id number]]  " (Normal) Collected: 04/22/24 2249    Specimen: Blood Updated: 04/22/24 2343     TSH 1.560 uIU/mL     Hemoglobin A1c [[id number]]  (Abnormal) Collected: 04/22/24 2022    Specimen: Blood Updated: 04/22/24 2332     Hemoglobin A1C 5.70 %     Narrative:      Hemoglobin A1C Ranges:    Increased Risk for Diabetes  5.7% to 6.4%  Diabetes                     >= 6.5%  Diabetic Goal                < 7.0%    High Sensitivity Troponin T 2Hr [[id number]]  (Abnormal) Collected: 04/22/24 2249    Specimen: Blood Updated: 04/22/24 2318     HS Troponin T 20 ng/L      Troponin T Delta 1 ng/L     Narrative:      High Sensitive Troponin T Reference Range:  <14.0 ng/L- Negative Female for AMI  <22.0 ng/L- Negative Male for AMI  >=14 - Abnormal Female indicating possible myocardial injury.  >=22 - Abnormal Male indicating possible myocardial injury.   Clinicians would have to utilize clinical acumen, EKG, Troponin, and serial changes to determine if it is an Acute Myocardial Infarction or myocardial injury due to an underlying chronic condition.         Urinalysis, Microscopic Only - Urine, Clean Catch [[id number]] Collected: 04/22/24 2053    Specimen: Urine, Clean Catch Updated: 04/22/24 2117     RBC, UA 0-2 /HPF      WBC, UA 0-2 /HPF      Bacteria, UA None Seen /HPF      Squamous Epithelial Cells, UA 0-2 /HPF      Hyaline Casts, UA None Seen /LPF      Methodology Manual Light Microscopy    Salicylate Level [[id number]]  (Normal) Collected: 04/22/24 2022    Specimen: Blood Updated: 04/22/24 2115     Salicylate <0.3 mg/dL     Ethanol [[id number]] Collected: 04/22/24 2022    Specimen: Blood Updated: 04/22/24 2115     Ethanol <10 mg/dL      Ethanol % <0.010 %     Acetaminophen Level [[id number]]  (Normal) Collected: 04/22/24 2022    Specimen: Blood Updated: 04/22/24 2115     Acetaminophen <5.0 mcg/mL     Urine Drug Screen - Urine, Clean Catch [[id number]]  (Normal) Collected: 04/22/24 2053    Specimen: Urine, Clean Catch Updated: 04/22/24 2112      THC, Screen, Urine Negative     Phencyclidine (PCP), Urine Negative     Cocaine Screen, Urine Negative     Methamphetamine, Ur Negative     Opiate Screen Negative     Amphetamine Screen, Urine Negative     Benzodiazepine Screen, Urine Negative     Tricyclic Antidepressants Screen Negative     Methadone Screen, Urine Negative     Barbiturates Screen, Urine Negative     Oxycodone Screen, Urine Negative     Buprenorphine, Screen, Urine Negative    Narrative:      Cutoff For Drugs Screened:    Amphetamines               500 ng/ml  Barbiturates               200 ng/ml  Benzodiazepines            150 ng/ml  Cocaine                    150 ng/ml  Methadone                  200 ng/ml  Opiates                    100 ng/ml  Phencyclidine               25 ng/ml  THC                         50 ng/ml  Methamphetamine            500 ng/ml  Tricyclic Antidepressants  300 ng/ml  Oxycodone                  100 ng/ml  Buprenorphine               10 ng/ml    The normal value for all drugs tested is negative. This report includes unconfirmed screening results, with the cutoff values listed, to be used for medical treatment purposes only.  Unconfirmed results must not be used for non-medical purposes such as employment or legal testing.  Clinical consideration should be applied to any drug of abuse test, particularly when unconfirmed results are used.      Urinalysis With Microscopic If Indicated (No Culture) - Urine, Clean Catch [[id number]]  (Abnormal) Collected: 04/22/24 2053    Specimen: Urine, Clean Catch Updated: 04/22/24 2107     Color, UA Yellow     Appearance, UA Clear     pH, UA 8.5     Specific Gravity, UA 1.020     Glucose, UA Negative     Ketones, UA Negative     Bilirubin, UA Negative     Blood, UA Trace     Protein,  mg/dL (2+)     Leuk Esterase, UA Negative     Nitrite, UA Negative     Urobilinogen, UA 0.2 E.U./dL    Blood Culture - Blood, Arm, Right [[id number]] Collected: 04/22/24 2101    Specimen: Blood from Arm,  Right Updated: 04/22/24 2104    High Sensitivity Troponin T [[id number]]  (Abnormal) Collected: 04/22/24 2022    Specimen: Blood Updated: 04/22/24 2102     HS Troponin T 19 ng/L     Narrative:      High Sensitive Troponin T Reference Range:  <14.0 ng/L- Negative Female for AMI  <22.0 ng/L- Negative Male for AMI  >=14 - Abnormal Female indicating possible myocardial injury.  >=22 - Abnormal Male indicating possible myocardial injury.   Clinicians would have to utilize clinical acumen, EKG, Troponin, and serial changes to determine if it is an Acute Myocardial Infarction or myocardial injury due to an underlying chronic condition.         Comprehensive Metabolic Panel [[id number]]  (Abnormal) Collected: 04/22/24 2022    Specimen: Blood Updated: 04/22/24 2100     Glucose 108 mg/dL      BUN 13 mg/dL      Creatinine 0.72 mg/dL      Sodium 122 mmol/L      Potassium 4.4 mmol/L      Chloride 85 mmol/L      CO2 24.8 mmol/L      Calcium 10.0 mg/dL      Total Protein 7.4 g/dL      Albumin 4.4 g/dL      ALT (SGPT) 14 U/L      AST (SGOT) 18 U/L      Alkaline Phosphatase 102 U/L      Total Bilirubin 0.5 mg/dL      Globulin 3.0 gm/dL      A/G Ratio 1.5 g/dL      BUN/Creatinine Ratio 18.1     Anion Gap 12.2 mmol/L      eGFR 86.8 mL/min/1.73     Narrative:      GFR Normal >60  Chronic Kidney Disease <60  Kidney Failure <15    The GFR formula is only valid for adults with stable renal function between ages 18 and 70.    Lactic Acid, Plasma [[id number]]  (Normal) Collected: 04/22/24 2042    Specimen: Blood Updated: 04/22/24 2100     Lactate 1.0 mmol/L     Respiratory Panel PCR w/COVID-19(SARS-CoV-2) PLACIDO/SELENE/GRACIA/PAD/COR/YULI In-House, NP Swab in [facility], 2 HR TAT - Swab, Nasopharynx [[id number]] Collected: 04/22/24 2053    Specimen: Swab from Nasopharynx Updated: 04/22/24 2059    Blood Culture - Blood, Arm, Left [[id number]] Collected: 04/22/24 2042    Specimen: Blood from Arm, Left Updated: 04/22/24 2048    CBC & Differential [[id number]]   (Abnormal) Collected: 04/22/24 2022    Specimen: Blood Updated: 04/22/24 2029    Narrative:      The following orders were created for panel order CBC & Differential.  Procedure                               Abnormality         Status                     ---------                               -----------         ------                     CBC Auto Differential[[id number]]        Abnormal            Final result                 Please view results for these tests on the individual orders.    CBC Auto Differential [[id number]]  (Abnormal) Collected: 04/22/24 2022    Specimen: Blood Updated: 04/22/24 2029     WBC 7.35 10*3/mm3      RBC 3.80 10*6/mm3      Hemoglobin 12.0 g/dL      Hematocrit 35.0 %      MCV 92.1 fL      MCH 31.6 pg      MCHC 34.3 g/dL      RDW 14.3 %      RDW-SD 48.3 fl      MPV 9.8 fL      Platelets 242 10*3/mm3      Neutrophil % 59.0 %      Lymphocyte % 28.8 %      Monocyte % 10.7 %      Eosinophil % 0.3 %      Basophil % 0.4 %      Immature Grans % 0.8 %      Neutrophils, Absolute 4.33 10*3/mm3      Lymphocytes, Absolute 2.12 10*3/mm3      Monocytes, Absolute 0.79 10*3/mm3      Eosinophils, Absolute 0.02 10*3/mm3      Basophils, Absolute 0.03 10*3/mm3      Immature Grans, Absolute 0.06 10*3/mm3      nRBC 0.0 /100 WBC     Blood Gas, Venous - [[id number]]  (Abnormal) Collected: 04/22/24 2022    Specimen: Venous Blood Updated: 04/22/24 2024     Site Nurse/Dr Draw     pH, Venous 7.432 pH Units      Comment: 83 Value above reference range        pCO2, Venous 44.4 mm Hg      pO2, Venous 39.3 mm Hg      HCO3, Venous 29.6 mmol/L      Comment: 83 Value above reference range        Base Excess, Venous 4.6 mmol/L      Comment: 83 Value above reference range        O2 Saturation, Venous 78.8 %      Comment: 83 Value above reference range        Hemoglobin, Blood Gas 12.1 g/dL      Temperature 37.0     Barometric Pressure for Blood Gas 741 mmHg      Modality Nasal Cannula     Flow Rate 4.0 lpm      Collected by  [id number]     Comment: Meter: [id number]     :  [id number]       POC Glucose Once [[id number]]  (Normal) Collected: 04/22/24 2013    Specimen: Blood Updated: 04/22/24 2020     Glucose 109 mg/dL           ECG 12 Lead Dyspnea   Preliminary Result   HEART RATE= 81  bpm   RR Interval= 740  ms   MT Interval= 151  ms   P Horizontal Axis= -35  deg   P Front Axis= 44  deg   QRSD Interval= 98  ms   QT Interval= 378  ms   QTcB= 439  ms   QRS Axis= 74  deg   T Wave Axis= 89  deg   - ABNORMAL ECG -   Sinus rhythm   Abnrm T, consider ischemia, anterolateral lds   Electronically Signed By:    Date and Time of Study: 2024-04-22 20:12:46        Results for orders placed in visit on 01/26/23    Adult Transthoracic Echo Complete W/ Cont if Necessary Per Protocol    Interpretation Summary    Left ventricular ejection fraction appears to be 61 - 65%.    Left ventricular diastolic function was normal.    Estimated right ventricular systolic pressure from tricuspid regurgitation is normal (<35 mmHg).    CT Head Without Contrast    Result Date: 4/22/2024  Impression: 1. No acute intracranial pathology. Electronically Signed: Oral Barker MD  4/22/2024 9:11 PM EDT  Workstation ID: [id number]    XR Chest 1 View    Result Date: 4/22/2024  Impression: 1. No acute cardiopulmonary disease. Electronically Signed: Oral Barker MD  4/22/2024 8:38 PM EDT  Workstation ID: [id number]       ASSESSMENT/PLAN:  Please note portions of this assessment/plan may have been copied and pasted, but I have personally seen this patient and reviewed each line of this assessment and plan for accuracy and made updates to reflect my necessary changes on 4/23/2024    Acute exacerbation of end-stage steroid and oxygen dependent COPD  -History of pulmonary hypertension as well, per chart review previous discussions by pulmonary with patient regarding likely need of hospice given end-stage COPD  -My colleague has again offered hospice at time of admission but  patient mentation does not allow for this discussion appropriately, she also discussed with patient's daughter who continues to pursue aggressive treatment at this time, but does not want patient intubated, patient also declines to be intubated if needed  -As noted on H&P patient refusing multiple therapies  -Continued DuoNeb treatments, hypertonic nebs and CPT-as patient will allow  -Continue IV Solu-Medrol and azithromycin  -Target saturation of 88 to 92%  -Patient refusing BiPAP mask.   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   -Will plan to bring up 6 to 8 mEq then discontinue hypertonic saline  -Patient may need salt tabs  -Patient's presentation very concerning for possible small cell lung cancer, however chest x-ray was clear, will defer to pulmonary if CT scanning is necessary  -Nephrology consulted for assistance  -CT head was negative, neurostroke consulted from ED recommended no further workup    Ischemic heart disease/MI/CAD/hyperlipidemia status post stent placement x 2 and hypertension  -Patient was given Versed  -Continue as needed hydralazine  -Blood pressure has improved but not yet at goal, continue careful monitoring    PAD-chronic lower extremity edema, monitor    GERD/Zepeda's esophagus-continue Pepcid IV    History of AAA-no acute issues currently    Tobacco abuse-patient was offered nicotine patch, continue as requested    Chronic severe neck pain-continue morphine as needed    Agitation/anxiety-continue BuSpar 5 mg 3 times daily    PLAN FOR DISPOSITION: FAITH Mcginnis DO  04/23/24  07:17 EDT    At [facility], we believe that sharing information builds trust and better relationships. You  "are receiving this note because you recently visited [facility]. It is possible you will see health information before a provider has talked with you about it. This kind of information can be easy to misunderstand. To help you fully understand what it means for your health, we urge you to discuss this note with your provider.    \"Dictated utilizing Dragon dictation\"    "

## 2024-04-24 PROBLEM — G93.41 ACUTE METABOLIC ENCEPHALOPATHY: Status: RESOLVED | Noted: 2024-04-23 | Resolved: 2024-04-24

## 2024-04-24 LAB
ALBUMIN SERPL-MCNC: 4 G/DL (ref 3.5–5.2)
ANION GAP SERPL CALCULATED.3IONS-SCNC: 11.8 MMOL/L (ref 5–15)
BUN SERPL-MCNC: 21 MG/DL (ref 8–23)
BUN/CREAT SERPL: 24.1 (ref 7–25)
CALCIUM SPEC-SCNC: 9.4 MG/DL (ref 8.6–10.5)
CHLORIDE SERPL-SCNC: 94 MMOL/L (ref 98–107)
CO2 SERPL-SCNC: 25.2 MMOL/L (ref 22–29)
CREAT SERPL-MCNC: 0.87 MG/DL (ref 0.57–1)
EGFRCR SERPLBLD CKD-EPI 2021: 69.1 ML/MIN/1.73
GEN 5 2HR TROPONIN T REFLEX: 31 NG/L
GLUCOSE SERPL-MCNC: 130 MG/DL (ref 65–99)
MAGNESIUM SERPL-MCNC: 2.1 MG/DL (ref 1.6–2.4)
PHOSPHATE SERPL-MCNC: 4.4 MG/DL (ref 2.5–4.5)
POTASSIUM SERPL-SCNC: 4.3 MMOL/L (ref 3.5–5.2)
SODIUM SERPL-SCNC: 131 MMOL/L (ref 136–145)
TROPONIN T DELTA: 1 NG/L
TROPONIN T SERPL HS-MCNC: 30 NG/L

## 2024-04-24 PROCEDURE — 63710000001 PREDNISONE PER 1 MG: Performed by: INTERNAL MEDICINE

## 2024-04-24 PROCEDURE — 94799 UNLISTED PULMONARY SVC/PX: CPT

## 2024-04-24 PROCEDURE — 25010000002 HYDRALAZINE PER 20 MG: Performed by: NURSE PRACTITIONER

## 2024-04-24 PROCEDURE — 25010000002 FUROSEMIDE PER 20 MG: Performed by: INTERNAL MEDICINE

## 2024-04-24 PROCEDURE — 94761 N-INVAS EAR/PLS OXIMETRY MLT: CPT

## 2024-04-24 PROCEDURE — 25010000002 ENOXAPARIN PER 10 MG: Performed by: NURSE PRACTITIONER

## 2024-04-24 PROCEDURE — 93005 ELECTROCARDIOGRAM TRACING: CPT | Performed by: INTERNAL MEDICINE

## 2024-04-24 PROCEDURE — 80069 RENAL FUNCTION PANEL: CPT | Performed by: INTERNAL MEDICINE

## 2024-04-24 PROCEDURE — 99232 SBSQ HOSP IP/OBS MODERATE 35: CPT | Performed by: INTERNAL MEDICINE

## 2024-04-24 PROCEDURE — 63710000001 PREDNISONE PER 5 MG: Performed by: INTERNAL MEDICINE

## 2024-04-24 PROCEDURE — 83735 ASSAY OF MAGNESIUM: CPT | Performed by: INTERNAL MEDICINE

## 2024-04-24 PROCEDURE — 94669 MECHANICAL CHEST WALL OSCILL: CPT

## 2024-04-24 PROCEDURE — 93010 ELECTROCARDIOGRAM REPORT: CPT | Performed by: INTERNAL MEDICINE

## 2024-04-24 PROCEDURE — 94668 MNPJ CHEST WALL SBSQ: CPT

## 2024-04-24 PROCEDURE — 84484 ASSAY OF TROPONIN QUANT: CPT | Performed by: INTERNAL MEDICINE

## 2024-04-24 PROCEDURE — 94664 DEMO&/EVAL PT USE INHALER: CPT

## 2024-04-24 RX ORDER — FUROSEMIDE 20 MG/1
20 TABLET ORAL
Status: DISCONTINUED | OUTPATIENT
Start: 2024-04-25 | End: 2024-04-25 | Stop reason: HOSPADM

## 2024-04-24 RX ORDER — FUROSEMIDE 10 MG/ML
40 INJECTION INTRAMUSCULAR; INTRAVENOUS ONCE
Status: COMPLETED | OUTPATIENT
Start: 2024-04-24 | End: 2024-04-24

## 2024-04-24 RX ORDER — ECHINACEA PURPUREA EXTRACT 125 MG
2 TABLET ORAL AS NEEDED
Status: DISCONTINUED | OUTPATIENT
Start: 2024-04-24 | End: 2024-04-25 | Stop reason: HOSPADM

## 2024-04-24 RX ADMIN — SALINE NASAL SPRAY 2 SPRAY: 1.5 SOLUTION NASAL at 18:03

## 2024-04-24 RX ADMIN — Medication 10 ML: at 17:53

## 2024-04-24 RX ADMIN — IPRATROPIUM BROMIDE AND ALBUTEROL SULFATE 3 ML: .5; 3 SOLUTION RESPIRATORY (INHALATION) at 11:02

## 2024-04-24 RX ADMIN — Medication 10 ML: at 21:33

## 2024-04-24 RX ADMIN — NICOTINE 1 PATCH: 21 PATCH, EXTENDED RELEASE TRANSDERMAL at 00:01

## 2024-04-24 RX ADMIN — NITROGLYCERIN 0.4 MG: 0.4 TABLET SUBLINGUAL at 18:46

## 2024-04-24 RX ADMIN — ATORVASTATIN CALCIUM 10 MG: 10 TABLET, FILM COATED ORAL at 08:47

## 2024-04-24 RX ADMIN — IPRATROPIUM BROMIDE AND ALBUTEROL SULFATE 3 ML: .5; 3 SOLUTION RESPIRATORY (INHALATION) at 15:59

## 2024-04-24 RX ADMIN — ENOXAPARIN SODIUM 40 MG: 40 INJECTION SUBCUTANEOUS at 05:00

## 2024-04-24 RX ADMIN — HYDRALAZINE HYDROCHLORIDE 20 MG: 20 INJECTION INTRAMUSCULAR; INTRAVENOUS at 17:52

## 2024-04-24 RX ADMIN — IPRATROPIUM BROMIDE AND ALBUTEROL SULFATE 3 ML: .5; 3 SOLUTION RESPIRATORY (INHALATION) at 05:18

## 2024-04-24 RX ADMIN — BUSPIRONE HYDROCHLORIDE 5 MG: 5 TABLET ORAL at 21:30

## 2024-04-24 RX ADMIN — FUROSEMIDE 40 MG: 10 INJECTION, SOLUTION INTRAMUSCULAR; INTRAVENOUS at 09:11

## 2024-04-24 RX ADMIN — Medication 10 ML: at 00:03

## 2024-04-24 RX ADMIN — Medication 10 ML: at 09:11

## 2024-04-24 RX ADMIN — METOPROLOL TARTRATE 12.5 MG: 25 TABLET, FILM COATED ORAL at 21:29

## 2024-04-24 RX ADMIN — Medication 10 ML: at 08:48

## 2024-04-24 RX ADMIN — NITROGLYCERIN 0.4 MG: 0.4 TABLET SUBLINGUAL at 05:10

## 2024-04-24 RX ADMIN — ENOXAPARIN SODIUM 40 MG: 40 INJECTION SUBCUTANEOUS at 23:06

## 2024-04-24 RX ADMIN — BUSPIRONE HYDROCHLORIDE 5 MG: 5 TABLET ORAL at 05:02

## 2024-04-24 RX ADMIN — IPRATROPIUM BROMIDE AND ALBUTEROL SULFATE 3 ML: .5; 3 SOLUTION RESPIRATORY (INHALATION) at 07:55

## 2024-04-24 RX ADMIN — FAMOTIDINE 20 MG: 20 TABLET, FILM COATED ORAL at 21:30

## 2024-04-24 RX ADMIN — FLUTICASONE PROPIONATE 2 SPRAY: 50 SPRAY, METERED NASAL at 08:49

## 2024-04-24 RX ADMIN — BUSPIRONE HYDROCHLORIDE 5 MG: 5 TABLET ORAL at 16:31

## 2024-04-24 RX ADMIN — TRAMADOL HYDROCHLORIDE 50 MG: 50 TABLET ORAL at 21:30

## 2024-04-24 RX ADMIN — FAMOTIDINE 20 MG: 20 TABLET, FILM COATED ORAL at 08:47

## 2024-04-24 RX ADMIN — PREDNISONE 20 MG: 10 TABLET ORAL at 12:33

## 2024-04-24 RX ADMIN — METOPROLOL TARTRATE 12.5 MG: 25 TABLET, FILM COATED ORAL at 08:47

## 2024-04-24 RX ADMIN — PREDNISONE 20 MG: 10 TABLET ORAL at 08:47

## 2024-04-24 NOTE — PLAN OF CARE
Goal Outcome Evaluation:  Plan of Care Reviewed With: patient        Progress: no change  Outcome Evaluation: pt had overall better day though she does have anxiety restlessness and repeated requests frequently. she was moved fr ICU to med surg this shift. has been up in chair and uses BSC. Does still have keys cath for frequent diurecing and strict I & O's. had an episode of hypertention this evening with increased anxiousness. 's noted & she has c/o mid sternal to epigastric CP. EKG obtained and tachycardia noted. remains on 3 L NC sats 93%. HR 90's. NItro x 1 given and symptoms resolved.

## 2024-04-24 NOTE — PROGRESS NOTES
"    [facility] Progress Note      Patient Name: Mary Jack  : 1945  MRN: [id number]  Primary Care Physician:  Delfino Matt MD  Date of admission: 2024    Subjective     Interval History:   Complains about the food and its consistency  Also complains about the OPEP:  \" It is on too long\"  States breathing is the same, though looks more comfortable than yesterday  Does feel less puffy    Review of Systems:   As noted above    Objective     Vitals:   Temp:  [96.8 °F (36 °C)-97.2 °F (36.2 °C)] 97 °F (36.1 °C)  Heart Rate:  [] 104  Resp:  [16-26] 20  BP: (135-169)/(68-85) 156/85  Flow (L/min):  [3-4] 3    Intake/Output Summary (Last 24 hours) at 2024 0823  Last data filed at 2024 0548  Gross per 24 hour   Intake 701.67 ml   Output 2050 ml   Net -1348.33 ml       Physical Exam:    Constitutional: More awake than yesterday; speech fluent, NAD  HEENT: Sclera anicteric, no conjunctival injection  Neck: Supple, carotid bruits, trachea at midline, no JVD  Respiratory: Poor air movement, fewer crackles than yesterday, nonlabored on 3 L/min  Cardiovascular: RR, tachycardic, no rub  Gastrointestinal: BS +, soft, nontender and nondistended  : No palpable bladder  Musculoskeletal: Trace doughy edema, no clubbing or cyanosis  Psychiatric: Irritable, cooperative, oriented  Neurologic: moving all extremities, normal speech and mental status  Skin: Warm and dry    Scheduled Meds:     atorvastatin, 10 mg, Oral, Daily  azithromycin, 500 mg, Intravenous, Q24H  azithromycin, , ,   busPIRone, 5 mg, Oral, Q8H  enoxaparin, 40 mg, Subcutaneous, Q24H  famotidine, 20 mg, Intravenous, Q12H   Or  famotidine, 20 mg, Oral, Q12H  fluticasone, 2 spray, Each Nare, Daily  ipratropium-albuterol, 3 mL, Nebulization, 4x Daily - RT  predniSONE, 20 mg, Oral, TID With Meals  sodium chloride, 10 mL, Intravenous, Q12H  sodium chloride, , ,   sodium chloride, 4 mL, Nebulization, BID - RT      IV " Meds:   dextrose, 6 mL/kg (Ideal)  Pharmacy to Dose enoxaparin (LOVENOX),         Results Reviewed:   I have personally reviewed the results from the time of this admission to 4/24/2024 08:23 EDT     Results from last 7 days   Lab Units 04/24/24 0447 04/23/24  1929 04/23/24  1013 04/23/24  0443 04/23/24  0308 04/22/24 2329 04/22/24 2022   SODIUM mmol/L 131* 123* 121*   < > 121* 123* 122*   POTASSIUM mmol/L 4.3 3.4* 4.0   < > 4.1 4.4 4.4   CHLORIDE mmol/L 94*  --   --   --  88* 85* 85*   CO2 mmol/L 25.2  --   --   --  24.1 25.1 24.8   BUN mg/dL 21  --   --   --  11 11 13   CREATININE mg/dL 0.87  --   --   --  0.65 0.62 0.72   CALCIUM mg/dL 9.4  --   --   --  9.1 9.2 10.0   BILIRUBIN mg/dL  --   --   --   --   --   --  0.5   ALK PHOS U/L  --   --   --   --   --   --  102   ALT (SGPT) U/L  --   --   --   --   --   --  14   AST (SGOT) U/L  --   --   --   --   --   --  18   GLUCOSE mg/dL 130*  --   --   --  146* 126* 108*    < > = values in this interval not displayed.       Estimated Creatinine Clearance: 52.6 mL/min (by C-G formula based on SCr of 0.87 mg/dL).    Results from last 7 days   Lab Units 04/24/24 0447 04/23/24  1013   MAGNESIUM mg/dL 2.1  --    PHOSPHORUS mg/dL 4.4 2.6             Results from last 7 days   Lab Units 04/22/24 2022   WBC 10*3/mm3 7.35   HEMOGLOBIN g/dL 12.0   PLATELETS 10*3/mm3 242       Results from last 7 days   Lab Units 04/22/24 2329   INR  1.01       Assessment / Plan     ASSESSMENT:  1.  Hyponatremia, acute on chronic, improving, with acute component due to volume overload (significant edema; crackles), with good urine output following IV diuretic.  Chronic component likely from severe lung disease, which will increase ADH secretion, as does her chronic severe pain.  Added insults include poor solute intake and excessive water intake.    2.  End-stage COPD with active tobacco abuse   3.  Ischemic heart disease  4.  Hypertension, exacerbated by volume excess    PLAN:  1.   Furosemide 40 mg IV today  2.  Tomorrow, begin oral furosemide 20 mg twice daily  3.  D/w Dr. Mcginnis  4.   If serum sodium level same or better tomorrow, no objection to discharge from renal view                                                                                        Thank you for involving us in the care of Mary Jack.  Please feel free to call with any questions.    Farhan Rebollar MD  04/24/24  08:23 EDT    [facility]  [phone]    Please note that portions of this note were completed with a voice recognition program.

## 2024-04-24 NOTE — PROGRESS NOTES
"SERVICE: [facility] HOSPITALIST    CONSULTANTS: Pulmonary nephrology    CHIEF COMPLAINT: Shortness of breath, confusion    SUBJECTIVE: Patient seen and examined at bedside. Patient reports she has noticed her blood pressure is high and her heart rate is elevated.  We discussed previous blood pressure medications and she reports she has been on many but often takes herself off medications without direction from her doctors.  She therefore took herself off metoprolol.  She does report she tolerated this medicine fairly well in the past.  She is agreeable to resuming metoprolol at low-dose.  She reports she is overall feeling slightly better.  She also reports an episode overnight where she had some difficulty breathing, that now seems better.  She also reports to me that she will not take DuoNeb breathing treatments as they are the stinky version of nebulizers, however plain albuterol nebulizers are the \"good ones\" these are the only ones she will allow.  I informed her that thankfully we can turn her back to her 3 L baseline requirement and therefore will permit use of the good nebulizers only for now.  No other acute complaints or issues to report.     OBJECTIVE:    Physical exam is largely unchanged from previous exam, except where documented below, examination is accurate as of 4/24/2024    Physical Exam:  General: Patient is awake and alert, much more conversant with less difficulty and word finding noted today. Well developed and well nourished. No acute distress noted.   HENT: Head is atraumatic, normocephalic. Hearing is grossly intact. Nose is without obvious congestion and appears patent. Neck is supple and trachea is midline.   Eyes: Vision is grossly intact. Pupils appear equal and round.   Cardiovascular: Heart has regular rate and rhythm with no murmurs, rubs or gallops noted.   Respiratory: Diffusely diminished with poor air movement otherwise no wheezing, rhonchi or rales. " "  Abdominal/GI: Soft, nontender, bowel sounds present. No rebound or guarding present.   Extremities: No peripheral edema noted.   Musculoskeletal: Spontaneous movement of bilateral upper and lower extremities against gravity noted. No signs of injury or deformity noted.   Skin: Warm and dry.   Psych: Mood and affect are appropriate. Cooperative with exam.   Neuro: No facial asymmetry noted. No focal deficits noted, hearing and vision are grossly intact.     /85   Pulse 100   Temp 97 °F (36.1 °C) (Oral)   Resp 24   Ht 157.5 cm (62\")   Wt 76.3 kg (168 lb 3.4 oz)   SpO2 91%   BMI 30.77 kg/m²     MEDS/LABS REVIEWED AND ORDERED    atorvastatin, 10 mg, Oral, Daily  azithromycin, 500 mg, Intravenous, Q24H  azithromycin, , ,   busPIRone, 5 mg, Oral, Q8H  enoxaparin, 40 mg, Subcutaneous, Q24H  famotidine, 20 mg, Intravenous, Q12H   Or  famotidine, 20 mg, Oral, Q12H  fluticasone, 2 spray, Each Nare, Daily  ipratropium-albuterol, 3 mL, Nebulization, 4x Daily - RT  predniSONE, 20 mg, Oral, TID With Meals  sodium chloride, 10 mL, Intravenous, Q12H  sodium chloride, , ,   sodium chloride, 4 mL, Nebulization, BID - RT          LAB/DIAGNOSTICS:    Lab Results (last 24 hours)       Procedure Component Value Units Date/Time    Renal Function Panel [[id number]]  (Abnormal) Collected: 04/24/24 0447    Specimen: Blood Updated: 04/24/24 0540     Glucose 130 mg/dL      BUN 21 mg/dL      Creatinine 0.87 mg/dL      Sodium 131 mmol/L      Potassium 4.3 mmol/L      Chloride 94 mmol/L      CO2 25.2 mmol/L      Calcium 9.4 mg/dL      Albumin 4.0 g/dL      Phosphorus 4.4 mg/dL      Anion Gap 11.8 mmol/L      BUN/Creatinine Ratio 24.1     eGFR 69.1 mL/min/1.73     Narrative:      GFR Normal >60  Chronic Kidney Disease <60  Kidney Failure <15    The GFR formula is only valid for adults with stable renal function between ages 18 and 70.    Magnesium [[id number]]  (Normal) Collected: 04/24/24 0447    Specimen: Blood Updated: 04/24/24 " 0522     Magnesium 2.1 mg/dL     Blood Culture - Blood, Arm, Right [[id number]]  (Normal) Collected: 04/22/24 2101    Specimen: Blood from Arm, Right Updated: 04/23/24 2116     Blood Culture No growth at 24 hours    Blood Culture - Blood, Arm, Left [[id number]]  (Normal) Collected: 04/22/24 2042    Specimen: Blood from Arm, Left Updated: 04/23/24 2101     Blood Culture No growth at 24 hours    Sodium [[id number]]  (Abnormal) Collected: 04/23/24 1929    Specimen: Blood Updated: 04/23/24 1943     Sodium 123 mmol/L     Potassium [[id number]]  (Abnormal) Collected: 04/23/24 1929    Specimen: Blood Updated: 04/23/24 1943     Potassium 3.4 mmol/L     Creatinine Urine Random (kidney function) GFR component - Urine, Clean Catch [[id number]] Collected: 04/22/24 2053    Specimen: Urine, Clean Catch Updated: 04/23/24 1615     Creatinine, Urine 37.5 mg/dL     Narrative:      Reference intervals for random urine have not been established.  Clinical usage is dependent upon physician's interpretation in combination with other laboratory tests.       Respiratory Panel PCR w/COVID-19(SARS-CoV-2) PLACIDO/SELENE/GRACIA/PAD/COR/YULI In-House, NP Swab in UTM/VTM, 2 HR TAT - Swab, Nasopharynx [[id number]]  (Normal) Collected: 04/22/24 2053    Specimen: Swab from Nasopharynx Updated: 04/23/24 1150     ADENOVIRUS, PCR Not Detected     Coronavirus 229E Not Detected     Coronavirus HKU1 Not Detected     Coronavirus NL63 Not Detected     Coronavirus OC43 Not Detected     COVID19 Not Detected     Human Metapneumovirus Not Detected     Human Rhinovirus/Enterovirus Not Detected     Influenza A PCR Not Detected     Influenza B PCR Not Detected     Parainfluenza Virus 1 Not Detected     Parainfluenza Virus 2 Not Detected     Parainfluenza Virus 3 Not Detected     Parainfluenza Virus 4 Not Detected     RSV, PCR Not Detected     Bordetella pertussis pcr Not Detected     Bordetella parapertussis PCR Not Detected     Chlamydophila pneumoniae PCR Not Detected      Mycoplasma pneumo by PCR Not Detected    Narrative:      In the setting of a positive respiratory panel with a viral infection PLUS a negative procalcitonin without other underlying concern for bacterial infection, consider observing off antibiotics or discontinuation of antibiotics and continue supportive care. If the respiratory panel is positive for atypical bacterial infection (Bordetella pertussis, Chlamydophila pneumoniae, or Mycoplasma pneumoniae), consider antibiotic de-escalation to target atypical bacterial infection.    Sodium [[id number]]  (Abnormal) Collected: 04/23/24 1013    Specimen: Blood Updated: 04/23/24 1048     Sodium 121 mmol/L     Potassium [[id number]]  (Normal) Collected: 04/23/24 1013    Specimen: Blood Updated: 04/23/24 1048     Potassium 4.0 mmol/L     Phosphorus [[id number]]  (Normal) Collected: 04/23/24 1013    Specimen: Blood Updated: 04/23/24 1048     Phosphorus 2.6 mg/dL     Osmolality, Urine - Urine, Clean Catch [[id number]] Collected: 04/22/24 2053    Specimen: Urine, Clean Catch Updated: 04/23/24 1042     Osmolality, Urine 399 mOsm/kg     Narrative:      Osmo Normal Reference Ranges:    Random:  mOsm/kg H2O, depending on fluid intake.  Random: >850 mOsm/kg H20, after 12 hour fluid restriction.    24 Hour: 300-900 mOsm/kg H2O.    Osmolality, Serum [[id number]]  (Abnormal) Collected: 04/22/24 2022    Specimen: Blood Updated: 04/23/24 1038     Osmolality 264 mOsm/kg           ECG 12 Lead Dyspnea   Final Result   HEART RATE= 81  bpm   RR Interval= 740  ms   AK Interval= 151  ms   P Horizontal Axis= -35  deg   P Front Axis= 44  deg   QRSD Interval= 98  ms   QT Interval= 378  ms   QTcB= 439  ms   QRS Axis= 74  deg   T Wave Axis= 89  deg   - ABNORMAL ECG -   Sinus rhythm   Non-specific STT wave change   No change from prior tracing   Electronically Signed By: Lul Hampton ([facility]) 23-Apr-2024 17:30:59   Date and Time of Study: 2024-04-22 20:12:46        Results for orders placed during  the hospital encounter of 04/22/24    Adult Transthoracic Echo Complete W/ Cont if Necessary Per Protocol    Interpretation Summary    Left ventricular systolic function is normal. Calculated left ventricular EF = 65.9%    The left ventricular cavity is borderline dilated.    Left ventricular wall thickness is consistent with mild concentric hypertrophy.    Left ventricular diastolic function was indeterminate due to E and A wave fusion in the setting of borderline sinus tachycardia.    RV borderline dilated with grossly normal function.  Mild RVH.    No significant valvular abnormalities.    Normal biatrial and IVC size.    CT Head Without Contrast    Result Date: 4/22/2024  Impression: 1. No acute intracranial pathology. Electronically Signed: Oral Barker MD  4/22/2024 9:11 PM EDT  Workstation ID: [id number]    XR Chest 1 View    Result Date: 4/22/2024  Impression: 1. No acute cardiopulmonary disease. Electronically Signed: Oral Barker MD  4/22/2024 8:38 PM EDT  Workstation ID: [id number]       ASSESSMENT/PLAN:  Please note portions of this assessment/plan may have been copied and pasted, but I have personally seen this patient and reviewed each line of this assessment and plan for accuracy and made updates to reflect my necessary changes on 4/24/2024    Acute exacerbation of end-stage steroid and oxygen dependent COPD  -History of pulmonary hypertension as well, per chart review previous discussions by pulmonary with patient regarding likely need of hospice given end-stage COPD  -My colleague has again offered hospice at time of admission but patient mentation does not allow for this discussion appropriately, she also discussed with patient's daughter who continues to pursue aggressive treatment at this time, but does not want patient intubated, patient also declines to be intubated if needed  -As noted on H&P patient refusing multiple therapies  -Continued albuterol treatments as patient will not allow DuoNeb  "treatments, hypertonic nebs and CPT-as patient will allow  -Continue IV Solu-Medrol and azithromycin  -Target saturation of 88 to 92%  -Patient refusing BiPAP mask.   -Pulmonary following  -Baseline oxygen use is 3L     Acute hyponatremia, improving, encephalopathy resolved   -Mentation improved  -Sodium has improved with diuresis, nephrology continues to follow    Ischemic heart disease/MI/CAD/hyperlipidemia status post stent placement x 2 and hypertension  -Provide metoprolol 12.5 mg twice daily and monitor for effect, patient previously tolerated this medicine but took herself off of it  -Continue as needed hydralazine  -Blood pressure has improved but not yet at goal, continue careful monitoring  -As needed nitro dose given overnight as patient became short of breath with exertion    PAD-chronic lower extremity edema, monitor    GERD/Zepeda's esophagus-continue Pepcid IV    History of AAA-no acute issues currently    Tobacco abuse-patient was offered nicotine patch, continue as requested    Chronic severe neck pain-continue morphine as needed    Agitation/anxiety-continue BuSpar 5 mg 3 times daily    PLAN FOR DISPOSITION: FAITH Mcginnis DO  04/24/24  07:17 EDT    At [facility], we believe that sharing information builds trust and better relationships. You are receiving this note because you recently visited [facility]. It is possible you will see health information before a provider has talked with you about it. This kind of information can be easy to misunderstand. To help you fully understand what it means for your health, we urge you to discuss this note with your provider.    \"Dictated utilizing Dragon dictation\"    "

## 2024-04-24 NOTE — PROGRESS NOTES
PROGRESS NOTE  Patient Name: Mary Jack  Age/Sex: 78 y.o. female  : 1945  MRN: [id number]    Date of Admission: 2024  Date of Encounter Visit: 24   LOS: 2 days   Patient Care Team:  Delfino Matt MD as PCP - General  Oral Llanes MD as Consulting Physician (Pulmonary Disease)  Herber Hayes MD as Consulting Physician (Cardiology)  Virgie Castelan MD as Consulting Physician (Gastroenterology)  Maxi Lundy MD as Surgeon (Orthopedic Surgery)  Mahesh Salmon MD as Surgeon (Vascular Surgery)  Donato Vaughn MD as Consulting Physician (Nephrology)    Chief Complaint: COPD, hyponatremia    Hospital course: Patient is doing much better, sodium level is significantly better at 131.  She is still complaining about the nebulizer medication, she wants to use her HFA metered-dose inhaler albuterol instead of the nebulized albuterol and I explained to her the advantage of the albuterol by nebulizer in her case given her limited airflow.  She does not like the mucolytic and will discontinue  She is much more awake and alert and no longer encephalopathic         REVIEW OF SYSTEMS:   CONSTITUTIONAL: no fever or chills  CARDIOVASCULAR: No chest pain, chest pressure or chest discomfort. No palpitations or edema.   RESPIRATORY: Shortness of breath improved, still having some cough and complaining of difficulty expectoration   GASTROINTESTINAL: No anorexia, nausea, vomiting or diarrhea. No abdominal pain or blood.   HEMATOLOGIC: No bleeding or bruising.     Ventilator/Non-Invasive Ventilation Settings (From admission, onward)       Start     Ordered    24  NIPPV (CPAP or BIPAP)  Until Discontinued,   Status:  Canceled        Question:  Type  Answer:  BIPAP    24                      Vital Signs  Temp:  [96.8 °F (36 °C)-97.2 °F (36.2 °C)] 97 °F (36.1 °C)  Heart Rate:  [] 100  Resp:  [16-26] 24  BP: (135-169)/(68-85) 156/85  SpO2:  [90 %-96 %] 91 %  on  Flow  "(L/min):  [3-4] 4 Device (Oxygen Therapy): humidified;nasal cannula    Intake/Output Summary (Last 24 hours) at 4/24/2024 0709  Last data filed at 4/24/2024 0548  Gross per 24 hour   Intake 821.67 ml   Output 2050 ml   Net -1228.33 ml     Flowsheet Rows      Flowsheet Row First Filed Value   Admission Height 160 cm (62.99\") Documented at 04/23/2024 0020   Admission Weight 79.3 kg (174 lb 13.2 oz) Documented at 04/22/2024 2303          Body mass index is 30.77 kg/m².      04/22/24  2303 04/23/24  0735 04/24/24  0446   Weight: 79.3 kg (174 lb 13.2 oz) 78.9 kg (174 lb) 76.3 kg (168 lb 3.4 oz)       Physical Exam:  GEN:  No acute distress, alert, cooperative, well developed   EYES:   Sclerae clear. No icterus. PERRL. Normal EOM  ENT:   External ears/nose normal, no oral lesions, no thrush, mucous membranes moist  NECK:  Supple, midline trachea, no JVD  LUNGS: Normal chest on inspection,diminished, wheezing is only heard upon coughing, not much rhonchi or retained secretions suspected at this point.  . Respirations regular, even and unlabored.   CV:  Regular rhythm and rate. Normal S1/S2. No murmurs, gallops, or rubs noted.  ABD:  Soft, nontender and nondistended. Normal bowel sounds. No guarding  EXT:  Moves all extremities well. No cyanosis. No redness.  Mild edema  Skin: Dry, intact, no bleeding    Results Review:    Results From Last 14 Days   Lab Units 04/22/24 2329 04/22/24 2042   D DIMER QUANT MCGFEU/mL 0.73  --    LACTATE mmol/L  --  1.0     Results from last 7 days   Lab Units 04/24/24  0447 04/23/24  1929 04/23/24  1013 04/23/24  0615 04/23/24  0443 04/23/24  0308 04/22/24 2329 04/22/24 2022   SODIUM mmol/L 131* 123* 121* 122* 122* 121* 123* 122*   POTASSIUM mmol/L 4.3 3.4* 4.0 3.9 4.1 4.1 4.4 4.4   CHLORIDE mmol/L 94*  --   --   --   --  88* 85* 85*   CO2 mmol/L 25.2  --   --   --   --  24.1 25.1 24.8   BUN mg/dL 21  --   --   --   --  11 11 13   CREATININE mg/dL 0.87  --   --   --   --  0.65 0.62 0.72 " "  CALCIUM mg/dL 9.4  --   --   --   --  9.1 9.2 10.0   AST (SGOT) U/L  --   --   --   --   --   --   --  18   ALT (SGPT) U/L  --   --   --   --   --   --   --  14   ANION GAP mmol/L 11.8  --   --   --   --  8.9 12.9 12.2   ALBUMIN g/dL 4.0  --   --   --   --   --   --  4.4     Results from last 7 days   Lab Units 04/22/24 2329 04/22/24 2249 04/22/24 2022   HSTROP T ng/L  --  20* 19*   D DIMER QUANT MCGFEU/mL 0.73  --   --      Results from last 7 days   Lab Units 04/22/24 2249   TSH uIU/mL 1.560         Results from last 7 days   Lab Units 04/22/24 2022   WBC 10*3/mm3 7.35   HEMOGLOBIN g/dL 12.0   HEMATOCRIT % 35.0   PLATELETS 10*3/mm3 242   MCV fL 92.1   NEUTROPHIL % % 59.0   LYMPHOCYTE % % 28.8   MONOCYTES % % 10.7   EOSINOPHIL % % 0.3   BASOPHIL % % 0.4   IMM GRAN % % 0.8*     Results from last 7 days   Lab Units 04/22/24 2329   INR  1.01     Results from last 7 days   Lab Units 04/24/24  0447   MAGNESIUM mg/dL 2.1           Invalid input(s): \"LDLCALC\"      Results from last 7 days   Lab Units 04/22/24 2022   HEMOGLOBIN A1C % 5.70*     Glucose   Date/Time Value Ref Range Status   04/22/2024 2013 109 70 - 130 mg/dL Final     Results from last 7 days   Lab Units 04/22/24 2249 04/22/24 2042   PROCALCITONIN ng/mL 0.06  --    LACTATE mmol/L  --  1.0     Results from last 7 days   Lab Units 04/22/24 2101 04/22/24 2042   BLOODCX  No growth at 24 hours No growth at 24 hours     Results from last 7 days   Lab Units 04/22/24 2053   NITRITE UA  Negative   WBC UA /HPF 0-2   BACTERIA UA /HPF None Seen   SQUAM EPITHEL UA /HPF 0-2     Results from last 7 days   Lab Units 04/22/24 2053   COVID19  Not Detected   ADENOVIRUS DETECTION BY PCR  Not Detected   CORONAVIRUS 229E  Not Detected   CORONAVIRUS HKU1  Not Detected   CORONAVIRUS NL63  Not Detected   CORONAVIRUS OC43  Not Detected   HUMAN METAPNEUMOVIRUS  Not Detected   HUMAN RHINOVIRUS/ENTEROVIRUS  Not Detected   INFLUENZA B PCR  Not Detected   PARAINFLUENZA 1  " Not Detected   PARAINFLUENZA VIRUS 2  Not Detected   PARAINFLUENZA VIRUS 3  Not Detected   PARAINFLUENZA VIRUS 4  Not Detected   BORDETELLA PERTUSSIS PCR  Not Detected   BORDETELLA PARAPERTUSSIS PCR  Not Detected   CHLAMYDOPHILA PNEUMONIAE PCR  Not Detected   MYCOPLAMA PNEUMO PCR  Not Detected   RSV, PCR  Not Detected     Results from last 7 days   Lab Units 04/22/24 2053   SODIUM UR mmol/L 101   CREATININE UR mg/dL 37.5   CHLORIDE UR mmol/L 91   OSMOLALITY UR mOsm/kg 399           Imaging:   Imaging Results (All)               I reviewed the patient's new clinical results.  I personally viewed and interpreted the patient's imaging results:        Medication Review:   atorvastatin, 10 mg, Oral, Daily  azithromycin, 500 mg, Intravenous, Q24H  azithromycin, , ,   busPIRone, 5 mg, Oral, Q8H  enoxaparin, 40 mg, Subcutaneous, Q24H  famotidine, 20 mg, Intravenous, Q12H   Or  famotidine, 20 mg, Oral, Q12H  fluticasone, 2 spray, Each Nare, Daily  ipratropium-albuterol, 3 mL, Nebulization, 4x Daily - RT  predniSONE, 20 mg, Oral, TID With Meals  sodium chloride, 10 mL, Intravenous, Q12H  sodium chloride, , ,   sodium chloride, 4 mL, Nebulization, BID - RT        dextrose, 6 mL/kg (Ideal)  Pharmacy to Dose enoxaparin (LOVENOX),         ASSESSMENT:   COPD with acute exacerbation  Altered mental status, metabolic encephalopathy  Hyponatremia, of clinical significance with sodium level of 121  Acute on chronic hypoxemic respiratory failure with minor increase in her oxygen requirements compared to baseline  Coronary artery disease status post angioplasty  Abdominal aortic aneurysm  Pulmonary hypertension  Essential hypertension    PLAN:  Discontinue the mucolytic therapy patient did not like them and she does not have much secretion on exam today  Continue with the prednisone taper as tolerated, she is currently on the 20 mg 3 times daily will consider taper to twice daily by tomorrow  Continue follow-up on the sodium level per  the primary team and nephrology team  Wean oxygen as tolerated but she seems to be doing better and she is cleared for discharge home from the pulmonary standpoint is okay from the renal standpoint  Discussed with patient  Labs/Notes/films were independently reviewed and pertinent results are summarized above  The copied texts in this note were reviewed and they are accurate as of 04/24/24    Disposition: Home    Jeff Perez MD  04/24/24  07:09 EDT             Dictated utilizing Dragon dictation

## 2024-04-24 NOTE — PLAN OF CARE
Goal Outcome Evaluation:  Plan of Care Reviewed With: patient           Outcome Evaluation: Pt A&O x 4, slept 6 hours straight, awoke with anxiety that she couldn't breath, RT called for duo neb, pt reported chest tight and takes nitro at home to help, prn nitro given, pt soa with any excertion but does turn self, stood on the side of the bed and took steps to reposition self better in bed.  Pt completed 300 ml water before 5 am this morning and has her first 120 ml water at bedside, cup has notation.  pt diuresed total 700 ml last nite, tolerating urinary cath for strict critical I/O documentation.  BP remains 150's/ 80's, HR 90's and sat 92-95 %, pt increased to 4 lpm/nc early this am

## 2024-04-24 NOTE — CASE MANAGEMENT/SOCIAL WORK
Continued Stay Note  MAL Major     Patient Name: Mary Jack  MRN: [id number]  Today's Date: 4/24/2024    Admit Date: 4/22/2024    Plan: plan home/[facility] to follow   Discharge Plan       Row Name 04/24/24 1429       Plan    Plan plan home/[facility] to follow    Patient/Family in Agreement with Plan yes    Plan Comments CM consult for nebulizer and HH at dc. Discussed with Dr Mcginnis. Spoke with [facility]  - referral for Nsg/PT/OT - COPD mgmt, anticipate dc tomorrow. [facility] is able to accept patient. Dr Mcginnis will enter order for nebulizer prior to dc, CM will deliver once ordered. CM will continue to follow.                   Discharge Codes    No documentation.                       Herson Cuevas RN

## 2024-04-24 NOTE — SIGNIFICANT NOTE
Patient complains of sodium chloride 7% and refuse to take any treatments in the future with the sodium chloride 7%

## 2024-04-25 ENCOUNTER — READMISSION MANAGEMENT (OUTPATIENT)
Dept: CALL CENTER | Facility: HOSPITAL | Age: 79
End: 2024-04-25
Payer: MEDICARE

## 2024-04-25 VITALS
TEMPERATURE: 97.7 F | WEIGHT: 167.55 LBS | SYSTOLIC BLOOD PRESSURE: 139 MMHG | OXYGEN SATURATION: 94 % | BODY MASS INDEX: 30.83 KG/M2 | HEIGHT: 62 IN | RESPIRATION RATE: 24 BRPM | DIASTOLIC BLOOD PRESSURE: 73 MMHG | HEART RATE: 78 BPM

## 2024-04-25 LAB
ALBUMIN SERPL-MCNC: 3.6 G/DL (ref 3.5–5.2)
ANION GAP SERPL CALCULATED.3IONS-SCNC: 9.6 MMOL/L (ref 5–15)
BUN SERPL-MCNC: 23 MG/DL (ref 8–23)
BUN/CREAT SERPL: 27.7 (ref 7–25)
CALCIUM SPEC-SCNC: 9.2 MG/DL (ref 8.6–10.5)
CHLORIDE SERPL-SCNC: 95 MMOL/L (ref 98–107)
CO2 SERPL-SCNC: 29.4 MMOL/L (ref 22–29)
CREAT SERPL-MCNC: 0.83 MG/DL (ref 0.57–1)
EGFRCR SERPLBLD CKD-EPI 2021: 73.2 ML/MIN/1.73
GLUCOSE SERPL-MCNC: 86 MG/DL (ref 65–99)
PHOSPHATE SERPL-MCNC: 3.5 MG/DL (ref 2.5–4.5)
POTASSIUM SERPL-SCNC: 3.6 MMOL/L (ref 3.5–5.2)
QT INTERVAL: 324 MS
QTC INTERVAL: 451 MS
SODIUM SERPL-SCNC: 134 MMOL/L (ref 136–145)

## 2024-04-25 PROCEDURE — 94799 UNLISTED PULMONARY SVC/PX: CPT

## 2024-04-25 PROCEDURE — 25010000002 AZITHROMYCIN PER 500 MG: Performed by: NURSE PRACTITIONER

## 2024-04-25 PROCEDURE — 63710000001 PREDNISONE PER 1 MG: Performed by: INTERNAL MEDICINE

## 2024-04-25 PROCEDURE — 94761 N-INVAS EAR/PLS OXIMETRY MLT: CPT

## 2024-04-25 PROCEDURE — 25810000003 SODIUM CHLORIDE 0.9 % SOLUTION 250 ML FLEX CONT: Performed by: NURSE PRACTITIONER

## 2024-04-25 PROCEDURE — 99239 HOSP IP/OBS DSCHRG MGMT >30: CPT | Performed by: INTERNAL MEDICINE

## 2024-04-25 PROCEDURE — 80069 RENAL FUNCTION PANEL: CPT | Performed by: INTERNAL MEDICINE

## 2024-04-25 RX ORDER — ALBUTEROL SULFATE 0.63 MG/3ML
1 SOLUTION RESPIRATORY (INHALATION) EVERY 6 HOURS PRN
Qty: 360 ML | Refills: 0 | Status: SHIPPED | OUTPATIENT
Start: 2024-04-25

## 2024-04-25 RX ORDER — ECHINACEA PURPUREA EXTRACT 125 MG
2 TABLET ORAL AS NEEDED
Qty: 30 ML | Refills: 0 | Status: SHIPPED | OUTPATIENT
Start: 2024-04-25

## 2024-04-25 RX ORDER — FUROSEMIDE 20 MG/1
20 TABLET ORAL 2 TIMES DAILY
Qty: 60 TABLET | Refills: 0 | Status: SHIPPED | OUTPATIENT
Start: 2024-04-25

## 2024-04-25 RX ORDER — PREDNISONE 20 MG/1
20 TABLET ORAL 2 TIMES DAILY WITH MEALS
Status: DISCONTINUED | OUTPATIENT
Start: 2024-04-25 | End: 2024-04-25 | Stop reason: HOSPADM

## 2024-04-25 RX ORDER — POTASSIUM CHLORIDE 20 MEQ/1
40 TABLET, EXTENDED RELEASE ORAL EVERY 4 HOURS
Status: DISCONTINUED | OUTPATIENT
Start: 2024-04-25 | End: 2024-04-25 | Stop reason: HOSPADM

## 2024-04-25 RX ORDER — ALBUTEROL SULFATE 90 UG/1
2 AEROSOL, METERED RESPIRATORY (INHALATION)
Status: DISCONTINUED | OUTPATIENT
Start: 2024-04-25 | End: 2024-04-25 | Stop reason: HOSPADM

## 2024-04-25 RX ORDER — NICOTINE 21 MG/24HR
1 PATCH, TRANSDERMAL 24 HOURS TRANSDERMAL DAILY PRN
Qty: 30 EACH | Refills: 0 | Status: SHIPPED | OUTPATIENT
Start: 2024-04-25

## 2024-04-25 RX ORDER — FUROSEMIDE 20 MG/1
40 TABLET ORAL DAILY
Qty: 30 TABLET | Refills: 0 | Status: SHIPPED | OUTPATIENT
Start: 2024-04-25 | End: 2024-04-25

## 2024-04-25 RX ADMIN — BUSPIRONE HYDROCHLORIDE 5 MG: 5 TABLET ORAL at 09:10

## 2024-04-25 RX ADMIN — FAMOTIDINE 20 MG: 20 TABLET, FILM COATED ORAL at 09:01

## 2024-04-25 RX ADMIN — Medication 10 ML: at 09:06

## 2024-04-25 RX ADMIN — AZITHROMYCIN MONOHYDRATE 500 MG: 500 INJECTION, POWDER, LYOPHILIZED, FOR SOLUTION INTRAVENOUS at 00:20

## 2024-04-25 RX ADMIN — ALBUTEROL SULFATE 2 PUFF: 90 AEROSOL, METERED RESPIRATORY (INHALATION) at 08:06

## 2024-04-25 RX ADMIN — FLUTICASONE PROPIONATE 2 SPRAY: 50 SPRAY, METERED NASAL at 09:06

## 2024-04-25 RX ADMIN — PREDNISONE 20 MG: 20 TABLET ORAL at 09:00

## 2024-04-25 RX ADMIN — ATORVASTATIN CALCIUM 10 MG: 10 TABLET, FILM COATED ORAL at 09:00

## 2024-04-25 RX ADMIN — ALBUTEROL SULFATE 2 PUFF: 90 AEROSOL, METERED RESPIRATORY (INHALATION) at 12:25

## 2024-04-25 RX ADMIN — POTASSIUM CHLORIDE 40 MEQ: 1500 TABLET, EXTENDED RELEASE ORAL at 09:01

## 2024-04-25 RX ADMIN — FUROSEMIDE 20 MG: 20 TABLET ORAL at 09:00

## 2024-04-25 RX ADMIN — METOPROLOL TARTRATE 12.5 MG: 25 TABLET, FILM COATED ORAL at 09:00

## 2024-04-25 RX ADMIN — NICOTINE 1 PATCH: 21 PATCH, EXTENDED RELEASE TRANSDERMAL at 09:01

## 2024-04-25 NOTE — DISCHARGE INSTR - APPOINTMENTS
Patient already has an appointment with Dr. Lim on  May 6 @ 9:30 AM. [phone]    Patient has follow up with Dr. Matt on April 30 @ 1 pm [phone]

## 2024-04-25 NOTE — CASE MANAGEMENT/SOCIAL WORK
Continued Stay Note  MAL Major     Patient Name: Mary Jack  MRN: [id number]  Today's Date: 4/25/2024    Admit Date: 4/22/2024    Plan: plan home/[facility] to follow   Discharge Plan       Row Name 04/25/24 5317       Plan    Plan Comments Call received from charge nurse Renee and she states that patient's portable tanks are out of oxygen and she will need one at discharge. CM called and left  for Fat[facility] with [facility] letting her know patient will need a portable tank delivered to the hospital today for discharge. CM updated CM Eduardo Silverio of such. CM will follow.                   Discharge Codes    No documentation.                 Expected Discharge Date and Time       Expected Discharge Date Expected Discharge Time    Apr 25, 2024               Angela Winters RN

## 2024-04-25 NOTE — OUTREACH NOTE
Prep Survey      Flowsheet Row Responses   Restoration facility patient discharged from? [place]   Is LACE score < 7 ? No   Eligibility [facility] [place]   Date of Admission 04/22/24   Date of Discharge 04/25/24   Discharge Disposition Home-Health Care Sv   Discharge diagnosis Acute metabolic encephalopathysecondary to acute hyponatremia   Does the patient have one of the following disease processes/diagnoses(primary or secondary)? Other   Does the patient have Home health ordered? Yes   What is the Home health agency?  [facility]    Is there a DME ordered? No   Prep survey completed? Yes            Shannon CHICAS - Registered Nurse

## 2024-04-25 NOTE — PROGRESS NOTES
PROGRESS NOTE  Patient Name: Mary Jack  Age/Sex: 78 y.o. female  : 1945  MRN: [id number]    Date of Admission: 2024  Date of Encounter Visit: 24   LOS: 3 days   Patient Care Team:  Delfino Matt MD as PCP - General  Oral Llanes MD as Consulting Physician (Pulmonary Disease)  Herber Hayes MD as Consulting Physician (Cardiology)  Virgie Castelan MD as Consulting Physician (Gastroenterology)  Maxi Lundy MD as Surgeon (Orthopedic Surgery)  Mahesh Salmon MD as Surgeon (Vascular Surgery)  Donato Vaughn MD as Consulting Physician (Nephrology)    Chief Complaint: COPD, hyponatremia    Hospital course: Patient is complaining of not feeling well, she did have an episode of hypotension last night she also had some chest tightness that radiated into the left arm.  Her troponin was 30 repeat with 31.  EKG showed no ST elevation or any significant ischemic changes  She is on 3 L which is comparable to her home regimen and she does not seem tight on exam with no more wheezing       REVIEW OF SYSTEMS:   CONSTITUTIONAL: no fever or chills  CARDIOVASCULAR: Chest tightness radiating to the left arm. No palpitations or edema.   RESPIRATORY: No more wheezing, still complaining of some shortness of breath  GASTROINTESTINAL: No anorexia, nausea, vomiting or diarrhea. No abdominal pain or blood.   HEMATOLOGIC: No bleeding or bruising.     Ventilator/Non-Invasive Ventilation Settings (From admission, onward)       Start     Ordered    24  NIPPV (CPAP or BIPAP)  Until Discontinued,   Status:  Canceled        Question:  Type  Answer:  BIPAP    24                      Vital Signs  Temp:  [97.6 °F (36.4 °C)-98.2 °F (36.8 °C)] 97.8 °F (36.6 °C)  Heart Rate:  [] 84  Resp:  [16-20] 18  BP: (132-172)/(60-77) 138/64  SpO2:  [92 %-96 %] 96 %  on  Flow (L/min):  [3] 3 Device (Oxygen Therapy): nasal cannula    Intake/Output Summary (Last 24 hours) at 2024  "0707  Last data filed at 4/25/2024 0422  Gross per 24 hour   Intake 1160 ml   Output 3300 ml   Net -2140 ml     Flowsheet Rows      Flowsheet Row First Filed Value   Admission Height 160 cm (62.99\") Documented at 04/23/2024 0020   Admission Weight 79.3 kg (174 lb 13.2 oz) Documented at 04/22/2024 2303          Body mass index is 30.65 kg/m².      04/23/24  0735 04/24/24  0446 04/25/24  0422   Weight: 78.9 kg (174 lb) 76.3 kg (168 lb 3.4 oz) 76 kg (167 lb 8.8 oz)       Physical Exam:  GEN:  No acute distress, alert, cooperative, well developed   EYES:   Sclerae clear. No icterus. PERRL. Normal EOM  ENT:   External ears/nose normal, no oral lesions, no thrush, mucous membranes moist  NECK:  Supple, midline trachea, no JVD  LUNGS: Normal chest on inspection, clear to auscultation with diminished breath sounds but overall that is the best she sounded since she came in.  . Respirations regular, even and unlabored.   CV:  Regular rhythm and rate. Normal S1/S2. No murmurs, gallops, or rubs noted.  ABD:  Soft, nontender and nondistended. Normal bowel sounds. No guarding  EXT:  Moves all extremities well. No cyanosis. No redness.  Mild edema  Skin: Dry, intact, no bleeding    Results Review:    Results From Last 14 Days   Lab Units 04/22/24 2329 04/22/24 2042   D DIMER QUANT MCGFEU/mL 0.73  --    LACTATE mmol/L  --  1.0     Results from last 7 days   Lab Units 04/25/24  0644 04/24/24  0447 04/23/24  1929 04/23/24  1013 04/23/24  0615 04/23/24  0443 04/23/24  0308 04/22/24 2329 04/22/24 2022   SODIUM mmol/L 134* 131* 123* 121* 122* 122* 121* 123* 122*   POTASSIUM mmol/L 3.6 4.3 3.4* 4.0 3.9 4.1 4.1 4.4 4.4   CHLORIDE mmol/L 95* 94*  --   --   --   --  88* 85* 85*   CO2 mmol/L 29.4* 25.2  --   --   --   --  24.1 25.1 24.8   BUN mg/dL 23 21  --   --   --   --  11 11 13   CREATININE mg/dL 0.83 0.87  --   --   --   --  0.65 0.62 0.72   CALCIUM mg/dL 9.2 9.4  --   --   --   --  9.1 9.2 10.0   AST (SGOT) U/L  --   --   --   --  " " --   --   --   --  18   ALT (SGPT) U/L  --   --   --   --   --   --   --   --  14   ANION GAP mmol/L 9.6 11.8  --   --   --   --  8.9 12.9 12.2   ALBUMIN g/dL 3.6 4.0  --   --   --   --   --   --  4.4     Results from last 7 days   Lab Units 04/24/24  2116 04/24/24 1909 04/22/24 2329 04/22/24 2249   HSTROP T ng/L 31* 30*  --  20*   D DIMER QUANT MCGFEU/mL  --   --  0.73  --      Results from last 7 days   Lab Units 04/22/24 2249   TSH uIU/mL 1.560         Results from last 7 days   Lab Units 04/22/24 2022   WBC 10*3/mm3 7.35   HEMOGLOBIN g/dL 12.0   HEMATOCRIT % 35.0   PLATELETS 10*3/mm3 242   MCV fL 92.1   NEUTROPHIL % % 59.0   LYMPHOCYTE % % 28.8   MONOCYTES % % 10.7   EOSINOPHIL % % 0.3   BASOPHIL % % 0.4   IMM GRAN % % 0.8*     Results from last 7 days   Lab Units 04/22/24 2329   INR  1.01     Results from last 7 days   Lab Units 04/24/24  0447   MAGNESIUM mg/dL 2.1           Invalid input(s): \"LDLCALC\"      Results from last 7 days   Lab Units 04/22/24 2022   HEMOGLOBIN A1C % 5.70*     Glucose   Date/Time Value Ref Range Status   04/22/2024 2013 109 70 - 130 mg/dL Final     Results from last 7 days   Lab Units 04/22/24 2249 04/22/24 2042   PROCALCITONIN ng/mL 0.06  --    LACTATE mmol/L  --  1.0     Results from last 7 days   Lab Units 04/22/24 2101 04/22/24 2042   BLOODCX  No growth at 2 days No growth at 2 days     Results from last 7 days   Lab Units 04/22/24 2053   NITRITE UA  Negative   WBC UA /HPF 0-2   BACTERIA UA /HPF None Seen   SQUAM EPITHEL UA /HPF 0-2     Results from last 7 days   Lab Units 04/22/24 2053   COVID19  Not Detected   ADENOVIRUS DETECTION BY PCR  Not Detected   CORONAVIRUS 229E  Not Detected   CORONAVIRUS HKU1  Not Detected   CORONAVIRUS NL63  Not Detected   CORONAVIRUS OC43  Not Detected   HUMAN METAPNEUMOVIRUS  Not Detected   HUMAN RHINOVIRUS/ENTEROVIRUS  Not Detected   INFLUENZA B PCR  Not Detected   PARAINFLUENZA 1  Not Detected   PARAINFLUENZA VIRUS 2  Not Detected "   PARAINFLUENZA VIRUS 3  Not Detected   PARAINFLUENZA VIRUS 4  Not Detected   BORDETELLA PERTUSSIS PCR  Not Detected   BORDETELLA PARAPERTUSSIS PCR  Not Detected   CHLAMYDOPHILA PNEUMONIAE PCR  Not Detected   MYCOPLAMA PNEUMO PCR  Not Detected   RSV, PCR  Not Detected     Results from last 7 days   Lab Units 04/22/24 2053   SODIUM UR mmol/L 101   CREATININE UR mg/dL 37.5   CHLORIDE UR mmol/L 91   OSMOLALITY UR mOsm/kg 399           Imaging:   Imaging Results (All)               I reviewed the patient's new clinical results.  I personally viewed and interpreted the patient's imaging results:        Medication Review:   atorvastatin, 10 mg, Oral, Daily  busPIRone, 5 mg, Oral, Q8H  enoxaparin, 40 mg, Subcutaneous, Q24H  famotidine, 20 mg, Intravenous, Q12H   Or  famotidine, 20 mg, Oral, Q12H  fluticasone, 2 spray, Each Nare, Daily  furosemide, 20 mg, Oral, BID  ipratropium-albuterol, 3 mL, Nebulization, 4x Daily - RT  metoprolol tartrate, 12.5 mg, Oral, Q12H  predniSONE, 20 mg, Oral, TID With Meals  sodium chloride, 10 mL, Intravenous, Q12H        dextrose, 6 mL/kg (Ideal)  Pharmacy to Dose enoxaparin (LOVENOX),         ASSESSMENT:   COPD with acute exacerbation  Altered mental status, metabolic encephalopathy  Hyponatremia, of clinical significance with sodium level of 121  Acute on chronic hypoxemic respiratory failure with minor increase in her oxygen requirements compared to baseline  Coronary artery disease status post angioplasty  Abdominal aortic aneurysm  Pulmonary hypertension  Essential hypertension    PLAN:  Will discontinue the chest PT and she is already off the mucolytic therapy  Patient would like to switch from the nebulized albuterol to the HFA albuterol we will discontinue the DuoNeb  Continue with the prednisone taper will reduce the dose to 20 mg twice daily  Consider cardiology evaluation for the chest pain    Discussed with patient, primary team and respiratory therapist  Labs/Notes/films were  independently reviewed and pertinent results are summarized above  The copied texts in this note were reviewed and they are accurate as of 04/25/24    Disposition: Home    Jeff Perez MD  04/25/24  07:07 EDT             Dictated utilizing Dragon dictation

## 2024-04-25 NOTE — NURSING NOTE
Educated patient on smoking & benefits of quitting smoking. Educated patient she can NOT smoke while having her oxygen on. Patient states she understands the medications & diet she needs to go by. Go to discharge patient & she states she does not have a portable oxygen tank that has any oxygen in it. Called case management & they called her oxygen provider to bring a new tank.

## 2024-04-25 NOTE — DISCHARGE SUMMARY
Mary Jack  1945  [id number]    Hospitalists Discharge Summary    Date of Admission: 2024  Date of Discharge:  2024    History of Present Illness from [facility] on admit: The patient is a 78-year-old female who presented to the emergency department via EMS secondary to acute onset shortness of air.  The patient is unable to speak in full sentences and hesitant to answer questions and therefore all information is taken from the chart, the ER provider and patient's daughter who was contacted by myself with patient's permission.  Daughter notes that she called EMS for her mother who has been having intermittent shortness of air, mucous plugging episodes over the past 1 week.  Daughter and patient note that pulmonary has told her not to allow invasive ventilation and she agrees to be DNI.  Daughter notes she has frequent mucous plugging episodes causing excessive coughing and eventually if she does cough mucus up she gets better.  At her baseline she is on 3 L of oxygen at all times.  Review of pulmonary notes and daughters history shows patient is severe end-stage COPD and has been offered hospice in the past.  The patient admits that she does still smoke but is unable to tell how much.  Patient notes that she has been so short of air she has not been able to eat or drink or smoke and ha had single episode of diarrhea this morning.      Initial contact with ER provider was regarding shortness of air and altered mental status.  Reportedly patient is A&O x 3 at her baseline and was noted as confused today.  ER provider contacted neurostroke who did not recommend any further evaluation after CT head was negative.     Respiratory staff notes that patient would not allow breathing treatments in ER despite the fact that she entered on nonrebreather mask.     Significant diagnostics in ER include VB.432/44.4/39.3/29.6  Sodium 122, HS troponin 19> 20     She has a history of severe end-stage COPD, ischemic  heart disease/MI/CAD/HLD s/p stents x 2, hypertension, pulmonary arterial hypertension, AAA, GERD/Zepeda's esophagus, PAD, tobacco abuse     She and daughter deny recent f/c/headache/rhinorrhea/nasal congestion/lightheadedness/syncopal sensation/n/v/chest pain/abdominal pain/change in bladder habits/no weight change/bloody emesis or bloody stools/change in medications or any other new concerns.    Primary Discharge diagnoses: Metabolic encephalopathy secondary to acute hyponatremia-encephalopathy resolved, sodium level improved, will again resume Lasix on discharge     secondary Discharge Diagnoses: Acute exacerbation of end-stage steroid and oxygen dependent COPD-patient returned to baseline oxygen use no wheezing on exam.  Medication noncompliance-patient often takes herself off medications without informing her physicians of doing so, believes she can take medicines as suited for her and then suddenly stopped, patient counseled on need to take medications as prescribed.  Ischemic heart disease/previous MI/CAD/hyperlipidemia-stable, now back on metoprolol.  PAD-no acute issues.  GERD/stable.  History of AAA-no acute issues.  Tobacco abuse-nicotine patch as prescribed.  Chronic severe neck pain-stable.  Anxiety/agitation-stable on BuSpar.     Hospital Course Summary: Patient was admitted for acute metabolic encephalopathy secondary to acute hyponatremia, patient previously prescribed Lasix had discontinued this because she felt she was urinating too much, overall patient has vast history of discontinuing medications without direction from physicians to do so, once Lasix were resumed by nephrology hyponatremia quickly improved, therefore will discharge with oral Lasix.  Patient counseled on need to take medications as prescribed.  Patient was also treated with prednisone and nebulizer treatments and responded well for her COPD exacerbation, she will continue on oral prednisone at home and was prescribed nebulizer  treatments. On 4/25/2024 patient's condition had improved. They were deemed stable for discharge. They were advised to take all medications as prescribed, follow up with PCP within 1 week. If there are any issues patient should contact PCP and/or return to the ED for follow up. Patient was agreeable to the plan and subsequently discharged at this time.     PCP  Patient Care Team:  Delfino Matt MD as PCP - General  Oral Llanes MD as Consulting Physician (Pulmonary Disease)  Herber Hayes MD as Consulting Physician (Cardiology)  Virgie Castelan MD as Consulting Physician (Gastroenterology)  Maxi Lundy MD as Surgeon (Orthopedic Surgery)  Mahesh Salmon MD as Surgeon (Vascular Surgery)  Donato Vaughn MD as Consulting Physician (Nephrology)    Consults:   Consults       Date and Time Order Name Status Description    4/23/2024  4:20 AM Inpatient Nephrology Consult      4/22/2024 11:38 PM Inpatient Pulmonology Consult Completed     4/22/2024 10:29 PM Inpatient Neurology Consult Stroke Completed     4/22/2024 10:05 PM Inpatient Neurology Consult Stroke Completed             Operations and Procedures Performed:       Adult Transthoracic Echo Complete W/ Cont if Necessary Per Protocol    Result Date: 4/23/2024  Narrative:   Left ventricular systolic function is normal. Calculated left ventricular EF = 65.9%   The left ventricular cavity is borderline dilated.   Left ventricular wall thickness is consistent with mild concentric hypertrophy.   Left ventricular diastolic function was indeterminate due to E and A wave fusion in the setting of borderline sinus tachycardia.   RV borderline dilated with grossly normal function.  Mild RVH.   No significant valvular abnormalities.   Normal biatrial and IVC size.     CT Head Without Contrast    Result Date: 4/22/2024  Narrative: CT HEAD WO CONTRAST Date of Exam: 4/22/2024 8:39 PM EDT Indication: Altered mental status, nontraumatic (Ped 0-17y).  Comparison: None available. Technique: Axial CT images were obtained of the head without contrast administration.  Coronal reconstructions were performed.  Automated exposure control and iterative reconstruction methods were used. Findings: No intra or extra-axial fluid collections, masses, or areas of hemorrhage. No midline shift or mass effect is identified. Ventricles and sulci are age-appropriate. Orbits paranasal sinuses and mastoid air cells are unremarkable.     Impression: Impression: 1. No acute intracranial pathology. Electronically Signed: Oral Barker MD  4/22/2024 9:11 PM EDT  Workstation ID: [id number]    XR Chest 1 View    Result Date: 4/22/2024  Narrative: XR CHEST 1 VW Date of Exam: 4/22/2024 8:18 PM EDT Indication: sob Comparison: None available. Findings: Moderate changes of emphysema. Lungs are clear. Cardiac and mediastinal contours are within normal limits. Regional skeleton is unremarkable.     Impression: Impression: 1. No acute cardiopulmonary disease. Electronically Signed: Oral Barker MD  4/22/2024 8:38 PM EDT  Workstation ID: [id number]     Allergies:  is allergic to metoprolol, iodinated contrast media, codeine, and morphine and related.    Perez  Reviewed     Discharge Medications:     Discharge Medications        New Medications        Instructions Start Date   nicotine 21 MG/24HR patch  Commonly known as: NICODERM CQ   1 patch, Transdermal, Daily PRN      sodium chloride 0.65 % nasal spray   2 sprays, Nasal, As Needed             Changes to Medications        Instructions Start Date   albuterol sulfate  (90 Base) MCG/ACT inhaler  Commonly known as: PROVENTIL HFA;VENTOLIN HFA;PROAIR HFA  What changed: Another medication with the same name was added. Make sure you understand how and when to take each.   2 puffs, Inhalation, Every 4 Hours PRN      albuterol 0.63 MG/3ML nebulizer solution  Commonly known as: ACCUNEB  What changed: You were already taking a medication with the same  name, and this prescription was added. Make sure you understand how and when to take each.   0.63 mg, Nebulization, Every 6 Hours PRN      aspirin 81 MG tablet  What changed: when to take this   81 mg, Oral, Daily      furosemide 20 MG tablet  Commonly known as: LASIX  What changed:   how much to take  when to take this   40 mg, Oral, Daily      polyethylene glycol packet  Commonly known as: MIRALAX  What changed: when to take this   17 g, Oral, Daily             Continue These Medications        Instructions Start Date   atorvastatin 10 MG tablet  Commonly known as: LIPITOR   TAKE 1 TABLET EVERY DAY      ferrous sulfate 325 (65 FE) MG tablet   325 mg, Oral, Daily With Breakfast      nitroglycerin 0.4 MG SL tablet  Commonly known as: NITROSTAT   0.4 mg, Sublingual, Every 5 Minutes PRN, Take no more than 3 doses in 15 minutes.       O2  Commonly known as: OXYGEN   3 L/min, Inhalation, Continuous, Travel oxygen is 4-5 units      omeprazole 40 MG capsule  Commonly known as: priLOSEC   TAKE 1 CAPSULE EVERY DAY FOR HEARTBURN      potassium chloride 10 MEQ CR capsule  Commonly known as: MICRO-K   20 mEq, Oral, Daily      predniSONE 10 MG tablet  Commonly known as: DELTASONE   Take 1 tablet by mouth Daily. Indications: COPD      Spacer/Aero-Holding Chambers device   1 Units, Does not apply, Daily PRN      traMADol 50 MG tablet  Commonly known as: ULTRAM   50 mg, Oral, Every 6 Hours PRN      Trelegy Ellipta 100-62.5-25 MCG/ACT inhaler  Generic drug: Fluticasone-Umeclidin-Vilant   1 puff, Inhalation, Daily - RT      vitamin B-12 1000 MCG tablet  Commonly known as: CYANOCOBALAMIN   1,000 mcg, Oral, Every Morning               Last Lab Results:   Lab Results (most recent)       Procedure Component Value Units Date/Time    Renal Function Panel [[id number]]  (Abnormal) Collected: 04/25/24 0644    Specimen: Blood Updated: 04/25/24 0703     Glucose 86 mg/dL      BUN 23 mg/dL      Creatinine 0.83 mg/dL      Sodium 134 mmol/L       Potassium 3.6 mmol/L      Chloride 95 mmol/L      CO2 29.4 mmol/L      Calcium 9.2 mg/dL      Albumin 3.6 g/dL      Phosphorus 3.5 mg/dL      Anion Gap 9.6 mmol/L      BUN/Creatinine Ratio 27.7     eGFR 73.2 mL/min/1.73     Narrative:      GFR Normal >60  Chronic Kidney Disease <60  Kidney Failure <15    The GFR formula is only valid for adults with stable renal function between ages 18 and 70.    High Sensitivity Troponin T 2Hr [[id number]]  (Abnormal) Collected: 04/24/24 2116    Specimen: Blood Updated: 04/24/24 2137     HS Troponin T 31 ng/L      Troponin T Delta 1 ng/L     Narrative:      High Sensitive Troponin T Reference Range:  <14.0 ng/L- Negative Female for AMI  <22.0 ng/L- Negative Male for AMI  >=14 - Abnormal Female indicating possible myocardial injury.  >=22 - Abnormal Male indicating possible myocardial injury.   Clinicians would have to utilize clinical acumen, EKG, Troponin, and serial changes to determine if it is an Acute Myocardial Infarction or myocardial injury due to an underlying chronic condition.         Blood Culture - Blood, Arm, Right [[id number]]  (Normal) Collected: 04/22/24 2101    Specimen: Blood from Arm, Right Updated: 04/24/24 2116     Blood Culture No growth at 2 days    Blood Culture - Blood, Arm, Left [[id number]]  (Normal) Collected: 04/22/24 2042    Specimen: Blood from Arm, Left Updated: 04/24/24 2100     Blood Culture No growth at 2 days    High Sensitivity Troponin T [[id number]]  (Abnormal) Collected: 04/24/24 1909    Specimen: Blood Updated: 04/24/24 1934     HS Troponin T 30 ng/L     Narrative:      High Sensitive Troponin T Reference Range:  <14.0 ng/L- Negative Female for AMI  <22.0 ng/L- Negative Male for AMI  >=14 - Abnormal Female indicating possible myocardial injury.  >=22 - Abnormal Male indicating possible myocardial injury.   Clinicians would have to utilize clinical acumen, EKG, Troponin, and serial changes to determine if it is an Acute Myocardial Infarction or  myocardial injury due to an underlying chronic condition.         Renal Function Panel [[id number]]  (Abnormal) Collected: 04/24/24 0447    Specimen: Blood Updated: 04/24/24 0540     Glucose 130 mg/dL      BUN 21 mg/dL      Creatinine 0.87 mg/dL      Sodium 131 mmol/L      Potassium 4.3 mmol/L      Chloride 94 mmol/L      CO2 25.2 mmol/L      Calcium 9.4 mg/dL      Albumin 4.0 g/dL      Phosphorus 4.4 mg/dL      Anion Gap 11.8 mmol/L      BUN/Creatinine Ratio 24.1     eGFR 69.1 mL/min/1.73     Narrative:      GFR Normal >60  Chronic Kidney Disease <60  Kidney Failure <15    The GFR formula is only valid for adults with stable renal function between ages 18 and 70.    Magnesium [[id number]]  (Normal) Collected: 04/24/24 0447    Specimen: Blood Updated: 04/24/24 0522     Magnesium 2.1 mg/dL     Sodium [[id number]]  (Abnormal) Collected: 04/23/24 1929    Specimen: Blood Updated: 04/23/24 1943     Sodium 123 mmol/L     Potassium [[id number]]  (Abnormal) Collected: 04/23/24 1929    Specimen: Blood Updated: 04/23/24 1943     Potassium 3.4 mmol/L     Creatinine Urine Random (kidney function) GFR component - Urine, Clean Catch [[id number]] Collected: 04/22/24 2053    Specimen: Urine, Clean Catch Updated: 04/23/24 1615     Creatinine, Urine 37.5 mg/dL     Narrative:      Reference intervals for random urine have not been established.  Clinical usage is dependent upon physician's interpretation in combination with other laboratory tests.       Respiratory Panel PCR w/COVID-19(SARS-CoV-2) PLACIDO/SELENE/GRACIA/PAD/COR/YULI In-House, NP Swab in UTM/VTM, 2 HR TAT - Swab, Nasopharynx [[id number]]  (Normal) Collected: 04/22/24 2053    Specimen: Swab from Nasopharynx Updated: 04/23/24 1150     ADENOVIRUS, PCR Not Detected     Coronavirus 229E Not Detected     Coronavirus HKU1 Not Detected     Coronavirus NL63 Not Detected     Coronavirus OC43 Not Detected     COVID19 Not Detected     Human Metapneumovirus Not Detected     Human  Rhinovirus/Enterovirus Not Detected     Influenza A PCR Not Detected     Influenza B PCR Not Detected     Parainfluenza Virus 1 Not Detected     Parainfluenza Virus 2 Not Detected     Parainfluenza Virus 3 Not Detected     Parainfluenza Virus 4 Not Detected     RSV, PCR Not Detected     Bordetella pertussis pcr Not Detected     Bordetella parapertussis PCR Not Detected     Chlamydophila pneumoniae PCR Not Detected     Mycoplasma pneumo by PCR Not Detected    Narrative:      In the setting of a positive respiratory panel with a viral infection PLUS a negative procalcitonin without other underlying concern for bacterial infection, consider observing off antibiotics or discontinuation of antibiotics and continue supportive care. If the respiratory panel is positive for atypical bacterial infection (Bordetella pertussis, Chlamydophila pneumoniae, or Mycoplasma pneumoniae), consider antibiotic de-escalation to target atypical bacterial infection.    Sodium [[id number]]  (Abnormal) Collected: 04/23/24 1013    Specimen: Blood Updated: 04/23/24 1048     Sodium 121 mmol/L     Potassium [[id number]]  (Normal) Collected: 04/23/24 1013    Specimen: Blood Updated: 04/23/24 1048     Potassium 4.0 mmol/L     Phosphorus [[id number]]  (Normal) Collected: 04/23/24 1013    Specimen: Blood Updated: 04/23/24 1048     Phosphorus 2.6 mg/dL     Osmolality, Urine - Urine, Clean Catch [[id number]] Collected: 04/22/24 2053    Specimen: Urine, Clean Catch Updated: 04/23/24 1042     Osmolality, Urine 399 mOsm/kg     Narrative:      Osmo Normal Reference Ranges:    Random:  mOsm/kg H2O, depending on fluid intake.  Random: >850 mOsm/kg H20, after 12 hour fluid restriction.    24 Hour: 300-900 mOsm/kg H2O.    Osmolality, Serum [[id number]]  (Abnormal) Collected: 04/22/24 2022    Specimen: Blood Updated: 04/23/24 1038     Osmolality 264 mOsm/kg     Urine Electrolytes - Urine, Clean Catch [[id number]] Collected: 04/22/24 2053    Specimen: Urine,  Clean Catch Updated: 04/23/24 0517     Potassium, Urine 47.3 mmol/L      Sodium, Urine 101 mmol/L      Chloride, Urine 91 mmol/L     Narrative:      Reference intervals for random urine have not been established.  Clinical usage is dependent upon physician's interpretation in combination with other laboratory tests.       Basic Metabolic Panel [[id number]]  (Abnormal) Collected: 04/23/24 0308    Specimen: Blood Updated: 04/23/24 0403     Glucose 146 mg/dL      BUN 11 mg/dL      Creatinine 0.65 mg/dL      Sodium 121 mmol/L      Potassium 4.1 mmol/L      Chloride 88 mmol/L      CO2 24.1 mmol/L      Calcium 9.1 mg/dL      BUN/Creatinine Ratio 16.9     Anion Gap 8.9 mmol/L      eGFR 91.4 mL/min/1.73     Narrative:      GFR Normal >60  Chronic Kidney Disease <60  Kidney Failure <15    The GFR formula is only valid for adults with stable renal function between ages 18 and 70.    Basic Metabolic Panel [[id number]]  (Abnormal) Collected: 04/22/24 2329    Specimen: Blood Updated: 04/22/24 2354     Glucose 126 mg/dL      BUN 11 mg/dL      Creatinine 0.62 mg/dL      Sodium 123 mmol/L      Potassium 4.4 mmol/L      Chloride 85 mmol/L      CO2 25.1 mmol/L      Calcium 9.2 mg/dL      BUN/Creatinine Ratio 17.7     Anion Gap 12.9 mmol/L      eGFR 92.4 mL/min/1.73     Narrative:      GFR Normal >60  Chronic Kidney Disease <60  Kidney Failure <15    The GFR formula is only valid for adults with stable renal function between ages 18 and 70.    D-dimer, Quantitative [[id number]]  (Normal) Collected: 04/22/24 2329    Specimen: Blood Updated: 04/22/24 2348     D-Dimer, Quantitative 0.73 MCGFEU/mL     Narrative:      According to the assay 's published package insert, a normal (<0.50 MCGFEU/mL) D-dimer result in conjunction with a non-high clinical probability assessment, excludes deep vein thrombosis (DVT) and pulmonary embolism (PE) with high sensitivity.    D-dimer values increase with age and this can make VTE exclusion of an  "older population difficult. To address this, the [employer], based on best available evidence and recent guidelines, recommends that clinicians use age-adjusted D-dimer thresholds in patients greater than 50 years of age with: a) a low probability of PE who do not meet all Pulmonary Embolism Rule Out Criteria, or b) in those with intermediate probability of PE.   The formula for an age-adjusted D-dimer cut-off is \"age/100\".  For example, a 60 year old patient would have an age-adjusted cut-off of 0.60 MCGFEU/mL and an 80 year old 0.80 MCGFEU/mL.    Protime-INR [[id number]]  (Normal) Collected: 04/22/24 2329    Specimen: Blood Updated: 04/22/24 2348     Protime 13.3 Seconds      INR 1.01    Narrative:      Therapeutic Ranges for INR: 2.0-3.0 (PT 20-30)                              2.5-3.5 (PT 25-34)    Procalcitonin [[id number]]  (Normal) Collected: 04/22/24 2249    Specimen: Blood Updated: 04/22/24 2343     Procalcitonin 0.06 ng/mL     Narrative:      As a Marker for Sepsis (Non-Neonates):    1. <0.5 ng/mL represents a low risk of severe sepsis and/or septic shock.  2. >2 ng/mL represents a high risk of severe sepsis and/or septic shock.    As a Marker for Lower Respiratory Tract Infections that require antibiotic therapy:    PCT on Admission    Antibiotic Therapy       6-12 Hrs later    >0.5                Strongly Recommended  >0.25 - <0.5        Recommended   0.1 - 0.25          Discouraged              Remeasure/reassess PCT  <0.1                Strongly Discouraged     Remeasure/reassess PCT    As 28 day mortality risk marker: \"Change in Procalcitonin Result\" (>80% or <=80%) if Day 0 (or Day 1) and Day 4 values are available. Refer to http://www.[employer]-pct-calculator.com    Change in PCT <=80%  A decrease of PCT levels below or equal to 80% defines a positive change in PCT test result representing a higher risk for 28-day all-cause mortality of patients diagnosed with severe sepsis for septic " shock.    Change in PCT >80%  A decrease of PCT levels of more than 80% defines a negative change in PCT result representing a lower risk for 28-day all-cause mortality of patients diagnosed with severe sepsis or septic shock.       TSH [[id number]]  (Normal) Collected: 04/22/24 2249    Specimen: Blood Updated: 04/22/24 2343     TSH 1.560 uIU/mL     Hemoglobin A1c [[id number]]  (Abnormal) Collected: 04/22/24 2022    Specimen: Blood Updated: 04/22/24 2332     Hemoglobin A1C 5.70 %     Narrative:      Hemoglobin A1C Ranges:    Increased Risk for Diabetes  5.7% to 6.4%  Diabetes                     >= 6.5%  Diabetic Goal                < 7.0%    High Sensitivity Troponin T 2Hr [[id number]]  (Abnormal) Collected: 04/22/24 2249    Specimen: Blood Updated: 04/22/24 2318     HS Troponin T 20 ng/L      Troponin T Delta 1 ng/L     Narrative:      High Sensitive Troponin T Reference Range:  <14.0 ng/L- Negative Female for AMI  <22.0 ng/L- Negative Male for AMI  >=14 - Abnormal Female indicating possible myocardial injury.  >=22 - Abnormal Male indicating possible myocardial injury.   Clinicians would have to utilize clinical acumen, EKG, Troponin, and serial changes to determine if it is an Acute Myocardial Infarction or myocardial injury due to an underlying chronic condition.         Urinalysis, Microscopic Only - Urine, Clean Catch [[id number]] Collected: 04/22/24 2053    Specimen: Urine, Clean Catch Updated: 04/22/24 2117     RBC, UA 0-2 /HPF      WBC, UA 0-2 /HPF      Bacteria, UA None Seen /HPF      Squamous Epithelial Cells, UA 0-2 /HPF      Hyaline Casts, UA None Seen /LPF      Methodology Manual Light Microscopy    Salicylate Level [[id number]]  (Normal) Collected: 04/22/24 2022    Specimen: Blood Updated: 04/22/24 2115     Salicylate <0.3 mg/dL     Ethanol [[id number]] Collected: 04/22/24 2022    Specimen: Blood Updated: 04/22/24 2115     Ethanol <10 mg/dL      Ethanol % <0.010 %     Acetaminophen Level [[id number]]   (Normal) Collected: 04/22/24 2022    Specimen: Blood Updated: 04/22/24 2115     Acetaminophen <5.0 mcg/mL     Urine Drug Screen - Urine, Clean Catch [[id number]]  (Normal) Collected: 04/22/24 2053    Specimen: Urine, Clean Catch Updated: 04/22/24 2112     THC, Screen, Urine Negative     Phencyclidine (PCP), Urine Negative     Cocaine Screen, Urine Negative     Methamphetamine, Ur Negative     Opiate Screen Negative     Amphetamine Screen, Urine Negative     Benzodiazepine Screen, Urine Negative     Tricyclic Antidepressants Screen Negative     Methadone Screen, Urine Negative     Barbiturates Screen, Urine Negative     Oxycodone Screen, Urine Negative     Buprenorphine, Screen, Urine Negative    Narrative:      Cutoff For Drugs Screened:    Amphetamines               500 ng/ml  Barbiturates               200 ng/ml  Benzodiazepines            150 ng/ml  Cocaine                    150 ng/ml  Methadone                  200 ng/ml  Opiates                    100 ng/ml  Phencyclidine               25 ng/ml  THC                         50 ng/ml  Methamphetamine            500 ng/ml  Tricyclic Antidepressants  300 ng/ml  Oxycodone                  100 ng/ml  Buprenorphine               10 ng/ml    The normal value for all drugs tested is negative. This report includes unconfirmed screening results, with the cutoff values listed, to be used for medical treatment purposes only.  Unconfirmed results must not be used for non-medical purposes such as employment or legal testing.  Clinical consideration should be applied to any drug of abuse test, particularly when unconfirmed results are used.      Urinalysis With Microscopic If Indicated (No Culture) - Urine, Clean Catch [[id number]]  (Abnormal) Collected: 04/22/24 2053    Specimen: Urine, Clean Catch Updated: 04/22/24 2107     Color, UA Yellow     Appearance, UA Clear     pH, UA 8.5     Specific Gravity, UA 1.020     Glucose, UA Negative     Ketones, UA Negative     Bilirubin,  UA Negative     Blood, UA Trace     Protein,  mg/dL (2+)     Leuk Esterase, UA Negative     Nitrite, UA Negative     Urobilinogen, UA 0.2 E.U./dL    High Sensitivity Troponin T [[id number]]  (Abnormal) Collected: 04/22/24 2022    Specimen: Blood Updated: 04/22/24 2102     HS Troponin T 19 ng/L     Narrative:      High Sensitive Troponin T Reference Range:  <14.0 ng/L- Negative Female for AMI  <22.0 ng/L- Negative Male for AMI  >=14 - Abnormal Female indicating possible myocardial injury.  >=22 - Abnormal Male indicating possible myocardial injury.   Clinicians would have to utilize clinical acumen, EKG, Troponin, and serial changes to determine if it is an Acute Myocardial Infarction or myocardial injury due to an underlying chronic condition.         Comprehensive Metabolic Panel [[id number]]  (Abnormal) Collected: 04/22/24 2022    Specimen: Blood Updated: 04/22/24 2100     Glucose 108 mg/dL      BUN 13 mg/dL      Creatinine 0.72 mg/dL      Sodium 122 mmol/L      Potassium 4.4 mmol/L      Chloride 85 mmol/L      CO2 24.8 mmol/L      Calcium 10.0 mg/dL      Total Protein 7.4 g/dL      Albumin 4.4 g/dL      ALT (SGPT) 14 U/L      AST (SGOT) 18 U/L      Alkaline Phosphatase 102 U/L      Total Bilirubin 0.5 mg/dL      Globulin 3.0 gm/dL      A/G Ratio 1.5 g/dL      BUN/Creatinine Ratio 18.1     Anion Gap 12.2 mmol/L      eGFR 86.8 mL/min/1.73     Narrative:      GFR Normal >60  Chronic Kidney Disease <60  Kidney Failure <15    The GFR formula is only valid for adults with stable renal function between ages 18 and 70.    Lactic Acid, Plasma [[id number]]  (Normal) Collected: 04/22/24 2042    Specimen: Blood Updated: 04/22/24 2100     Lactate 1.0 mmol/L     CBC & Differential [[id number]]  (Abnormal) Collected: 04/22/24 2022    Specimen: Blood Updated: 04/22/24 2029    Narrative:      The following orders were created for panel order CBC & Differential.  Procedure                               Abnormality         Status                      ---------                               -----------         ------                     CBC Auto Differential[[id number]]        Abnormal            Final result                 Please view results for these tests on the individual orders.    CBC Auto Differential [[id number]]  (Abnormal) Collected: 04/22/24 2022    Specimen: Blood Updated: 04/22/24 2029     WBC 7.35 10*3/mm3      RBC 3.80 10*6/mm3      Hemoglobin 12.0 g/dL      Hematocrit 35.0 %      MCV 92.1 fL      MCH 31.6 pg      MCHC 34.3 g/dL      RDW 14.3 %      RDW-SD 48.3 fl      MPV 9.8 fL      Platelets 242 10*3/mm3      Neutrophil % 59.0 %      Lymphocyte % 28.8 %      Monocyte % 10.7 %      Eosinophil % 0.3 %      Basophil % 0.4 %      Immature Grans % 0.8 %      Neutrophils, Absolute 4.33 10*3/mm3      Lymphocytes, Absolute 2.12 10*3/mm3      Monocytes, Absolute 0.79 10*3/mm3      Eosinophils, Absolute 0.02 10*3/mm3      Basophils, Absolute 0.03 10*3/mm3      Immature Grans, Absolute 0.06 10*3/mm3      nRBC 0.0 /100 WBC     Blood Gas, Venous - [[id number]]  (Abnormal) Collected: 04/22/24 2022    Specimen: Venous Blood Updated: 04/22/24 2024     Site Nurse/Dr Draw     pH, Venous 7.432 pH Units      Comment: 83 Value above reference range        pCO2, Venous 44.4 mm Hg      pO2, Venous 39.3 mm Hg      HCO3, Venous 29.6 mmol/L      Comment: 83 Value above reference range        Base Excess, Venous 4.6 mmol/L      Comment: 83 Value above reference range        O2 Saturation, Venous 78.8 %      Comment: 83 Value above reference range        Hemoglobin, Blood Gas 12.1 g/dL      Temperature 37.0     Barometric Pressure for Blood Gas 741 mmHg      Modality Nasal Cannula     Flow Rate 4.0 lpm      Collected by [id number]     Comment: Meter: [id number]     :  [id number]       POC Glucose Once [[id number]]  (Normal) Collected: 04/22/24 2013    Specimen: Blood Updated: 04/22/24 2020     Glucose 109 mg/dL           Imaging Results (Most  Recent)       Procedure Component Value Units Date/Time    CT Head Without Contrast [[id number]] Collected: 04/22/24 2109     Updated: 04/22/24 2114    Narrative:      CT HEAD WO CONTRAST    Date of Exam: 4/22/2024 8:39 PM EDT    Indication: Altered mental status, nontraumatic (Ped 0-17y).    Comparison: None available.    Technique: Axial CT images were obtained of the head without contrast administration.  Coronal reconstructions were performed.  Automated exposure control and iterative reconstruction methods were used.      Findings:  No intra or extra-axial fluid collections, masses, or areas of hemorrhage. No midline shift or mass effect is identified. Ventricles and sulci are age-appropriate. Orbits paranasal sinuses and mastoid air cells are unremarkable.      Impression:      Impression:    1. No acute intracranial pathology.      Electronically Signed: Oral Barker MD    4/22/2024 9:11 PM EDT    Workstation ID: [id number]    XR Chest 1 View [[id number]] Collected: 04/22/24 2037     Updated: 04/22/24 2042    Narrative:      XR CHEST 1 VW    Date of Exam: 4/22/2024 8:18 PM EDT    Indication: sob    Comparison: None available.    Findings:  Moderate changes of emphysema. Lungs are clear. Cardiac and mediastinal contours are within normal limits. Regional skeleton is unremarkable.      Impression:      Impression:    1. No acute cardiopulmonary disease.      Electronically Signed: Oral Barker MD    4/22/2024 8:38 PM EDT    Workstation ID: [id number]            PROCEDURES      Condition on Discharge:  Stable, Improved.     Physical Exam at Discharge  Vital Signs  Temp:  [97.6 °F (36.4 °C)-98.2 °F (36.8 °C)] 97.6 °F (36.4 °C)  Heart Rate:  [] 96  Resp:  [16-20] 18  BP: (132-181)/(60-77) 163/72    Physical Exam  General: Patient is awake and alert, much more conversant with less difficulty and word finding noted today. Well developed and well nourished. No acute distress noted.   HENT: Head is atraumatic,  normocephalic. Hearing is grossly intact. Nose is without obvious congestion and appears patent. Neck is supple and trachea is midline.   Eyes: Vision is grossly intact. Pupils appear equal and round.   Cardiovascular: Heart has regular rate and rhythm with no murmurs, rubs or gallops noted.   Respiratory: Diffusely diminished with poor air movement otherwise no wheezing, rhonchi or rales.   Abdominal/GI: Soft, nontender, bowel sounds present. No rebound or guarding present.   Extremities: No peripheral edema noted.   Musculoskeletal: Spontaneous movement of bilateral upper and lower extremities against gravity noted. No signs of injury or deformity noted.   Skin: Warm and dry.   Psych: Mood and affect are appropriate. Cooperative with exam.   Neuro: No facial asymmetry noted. No focal deficits noted, hearing and vision are grossly intact.     Discharge Disposition  To home in stable condition     Visiting Nurse:    Yes     Home PT/OT:  Yes    Home Safety Evaluation:  No     DME  Home oxygen 3L, nebulizer     Discharge Diet:      Dietary Orders (From admission, onward)       Start     Ordered    04/24/24 0846  Diet: Regular/House, Fluid Restriction (240 mL/tray); Other (Specify mL/day) (800 ml/day); Fluid Consistency: Thin (IDDSI 0)  Diet Effective Now        References:    Diet Order [facility]   Question Answer Comment   Diets: Regular/House    Diets: Fluid Restriction (240 mL/tray)    Fluid Restriction Diet (240 mL/tray): Other (Specify mL/day) 800 ml/day   Fluid Consistency: Thin (IDDSI 0)        04/24/24 0846                    Activity at Discharge:  As tolerated    Pre-discharge education  Smoking cessation       Follow-up Appointments  No future appointments.  Additional Instructions for the Follow-ups that You Need to Schedule       Discharge Follow-up with Specialty: pulmonary, Dr. hawkins   As directed      Specialty: pulmonaryDr. hawkins                Test Results Pending at Discharge  Pending Labs        Order Current Status    Blood Culture - Blood, Arm, Left Preliminary result    Blood Culture - Blood, Arm, Right Preliminary result            Discharge over 30 min (if over 30 minutes give explanation as to why it took greater than 30 minutes)  Secondary to:   Coordination of care/follow up  Medication reconciliation  D/W patient      At [facility], we believe that sharing information builds trust and better relationships. You are receiving this note because you recently visited [facility]. It is possible you will see health information before a provider has talked with you about it. This kind of information can be easy to misunderstand. To help you fully understand what it means for your health, we urge you to discuss this note with your provider.    Delfino Mcginnis DO  04/25/24  08:29 EDT

## 2024-04-25 NOTE — CASE MANAGEMENT/SOCIAL WORK
Continued Stay Note  MAL Major     Patient Name: aMry Jack  MRN: [id number]  Today's Date: 4/25/2024    Admit Date: 4/22/2024    Plan: Plan home, dtr to asist   Discharge Plan       Row Name 04/25/24 1537       Plan    Plan Plan home, dtr to asist    Patient/Family in Agreement with Plan yes    Plan Comments Per Raul RN patiets family will bring a portable 02 tank from home for dc. Ora updated and request to deliver additional portable tanks to patient home. Fatou states those poratble 02 tanks will be delivered to patients home tomorrow 4/26. Negin/monitor tech updated to inform Raul DEL CID.      Row Name 04/25/24 7141       Plan    Plan plan home with dtr to assist    Plan Comments Follow up call to Ora for ETA on 02 portable tank delivery. She states she will check with delivey and return call.      Row Name 04/25/24 2803       Plan    Plan Comments Call received from charge nurse Renee and she states that patient's portable tanks are out of oxygen and she will need one at discharge. JAYY called and left  for Fatou with [employer] letting her know patient will need a portable tank delivered to the hospital today for discharge. JAYY updated JAYY Silverio of such. CM will follow.                   Discharge Codes    No documentation.                 Expected Discharge Date and Time       Expected Discharge Date Expected Discharge Time    Apr 25, 2024               Herson Cuevas, RN

## 2024-04-25 NOTE — CASE MANAGEMENT/SOCIAL WORK
Continued Stay Note  MAL [facility]     Patient Name: Mary Jack  MRN: [id number]  Today's Date: 4/25/2024    Admit Date: 4/22/2024    Plan: plan home with dtr to assist   Discharge Plan       Row Name 04/25/24 4188       Plan    Plan plan home with dtr to assist    Plan Comments Follow up call to Fatou/Layla for ETA on 02 portable tank delivery. She states she will check with delivey and return call.      Row Name 04/25/24 6384       Plan    Plan Comments Call received from charge nurse Renee and she states that patient's portable tanks are out of oxygen and she will need one at discharge. CM called and left VM for Fatou with [employer] letting her know patient will need a portable tank delivered to the hospital today for discharge. CM updated CM Eduardo Silverio of such. CM will follow.                   Discharge Codes    No documentation.                 Expected Discharge Date and Time       Expected Discharge Date Expected Discharge Time    Apr 25, 2024               Herson Cuevas RN

## 2024-04-25 NOTE — CASE MANAGEMENT/SOCIAL WORK
Continued Stay Note  MAL Major     Patient Name: Mary Jack  MRN: [id number]  Today's Date: 4/25/2024    Admit Date: 4/22/2024    Plan: plan home/[facility] to follow   Discharge Plan       Row Name 04/25/24 1016       Plan    Plan plan home/[facility] to follow    Patient/Family in Agreement with Plan yes    Plan Comments Rec'd order for nebulizer,but patient d/c meds have been changed to inhalers. Spoke with patient at bedside, she is sitting up in recliner. She is not interested in having the nebulizer at home. Informed [facility] [place]. She is agreeable to HH [place]. LVM for Caron/Irwinstuart to inform patient will [place] today. No additional needs noted. Edgar RN updated.                   Discharge Codes    No documentation.                 Expected Discharge Date and Time       Expected Discharge Date Expected Discharge Time    Apr 25, 2024               Herson Cuevas RN

## 2024-04-26 ENCOUNTER — TRANSITIONAL CARE MANAGEMENT TELEPHONE ENCOUNTER (OUTPATIENT)
Dept: CALL CENTER | Facility: HOSPITAL | Age: 79
End: 2024-04-26
Payer: MEDICARE

## 2024-04-26 ENCOUNTER — TELEPHONE (OUTPATIENT)
Dept: FAMILY MEDICINE CLINIC | Facility: CLINIC | Age: 79
End: 2024-04-26
Payer: MEDICARE

## 2024-04-26 NOTE — OUTREACH NOTE
Call Center TCM Note      Flowsheet Row Responses   [facility] patient discharged from? [place]   Does the patient have one of the following disease processes/diagnoses(primary or secondary)? Other   TCM attempt successful? Yes   Call start time 1248   Call end time 1251   Discharge diagnosis Acute metabolic encephalopathysecondary to acute hyponatremia   Meds reviewed with patient/caregiver? Yes   Is the patient having any side effects they believe may be caused by any medication additions or changes? No   Does the patient have all medications ordered at discharge? Yes   Is the patient taking all medications as directed (includes completed medication regime)? Yes   Does the patient have an appointment with their PCP within 7-14 days of discharge? Yes   What is the Home health agency?  [facility]    Has home health visited the patient within 72 hours of discharge? Yes   Psychosocial issues? No   Did the patient receive a copy of their discharge instructions? Yes   Nursing interventions Reviewed instructions with patient   What is the patient's perception of their health status since discharge? New symptoms unrelated to diagnosis  [Patient states she is having some mild dizziness, but the home health nurse is on the way to her house now and she will have the nurse evaluate this.]   Is the patient/caregiver able to teach back signs and symptoms related to disease process for when to call PCP? Yes   Is the patient/caregiver able to teach back signs and symptoms related to disease process for when to call 911? Yes   Is the patient/caregiver able to teach back the hierarchy of who to call/visit for symptoms/problems? PCP, Specialist, Home health nurse, Urgent Care, ED, 911 Yes   If the patient is a current smoker, are they able to teach back resources for cessation? [id number]   TCM call completed? Yes   Call end time 1251   Would this patient benefit from a Referral to [facility] Social Work? No   Is the patient  interested in additional calls from an ambulatory ? No            Acacia Schilling LPN    4/26/2024, 12:52 EDT

## 2024-04-27 LAB
BACTERIA SPEC AEROBE CULT: NORMAL
BACTERIA SPEC AEROBE CULT: NORMAL

## 2024-04-29 NOTE — CASE MANAGEMENT/SOCIAL WORK
Case Management Discharge Note      Final Note: Discharged home with [facility]    Provided Post Acute Provider List?: N/A  Provided Post Acute Provider Quality & Resource List?: N/A    Selected Continued Care - Discharged on 4/25/2024 Admission date: 4/22/2024 - Discharge disposition: Home or Self Care      Destination    No services have been selected for the patient.                Durable Medical Equipment    No services have been selected for the patient.                Dialysis/Infusion    No services have been selected for the patient.                Home Medical Care       Service Provider Selected Services Address Phone Fax Patient Preferred    [facility] [address] [place] [id number] [phone] --              Therapy    No services have been selected for the patient.                Community Resources    No services have been selected for the patient.                Community & DME    No services have been selected for the patient.                         Final Discharge Disposition Code: 06 - home with home health care

## 2024-04-30 ENCOUNTER — TELEMEDICINE (OUTPATIENT)
Dept: FAMILY MEDICINE CLINIC | Facility: CLINIC | Age: 79
End: 2024-04-30
Payer: MEDICARE

## 2024-04-30 DIAGNOSIS — E87.1 HYPONATREMIA: ICD-10-CM

## 2024-04-30 DIAGNOSIS — J43.1 PANLOBULAR EMPHYSEMA: ICD-10-CM

## 2024-04-30 DIAGNOSIS — Z99.81 SUPPLEMENTAL OXYGEN DEPENDENT: Primary | ICD-10-CM

## 2024-04-30 DIAGNOSIS — Z09 HOSPITAL DISCHARGE FOLLOW-UP: ICD-10-CM

## 2024-04-30 PROCEDURE — 1160F RVW MEDS BY RX/DR IN RCRD: CPT | Performed by: FAMILY MEDICINE

## 2024-04-30 PROCEDURE — 1111F DSCHRG MED/CURRENT MED MERGE: CPT | Performed by: FAMILY MEDICINE

## 2024-04-30 PROCEDURE — 1159F MED LIST DOCD IN RCRD: CPT | Performed by: FAMILY MEDICINE

## 2024-04-30 PROCEDURE — 99496 TRANSJ CARE MGMT HIGH F2F 7D: CPT | Performed by: FAMILY MEDICINE

## 2024-04-30 RX ORDER — AZITHROMYCIN 250 MG/1
250 TABLET, FILM COATED ORAL
COMMUNITY
Start: 2024-04-27

## 2024-04-30 NOTE — PROGRESS NOTES
Transitional Care Follow Up Visit  Subjective     Mary Jack is a 78 y.o. female who presents for a transitional care management visit.    Within 48 business hours after discharge our office contacted her via telephone to coordinate her care and needs.      I reviewed and discussed the details of that call along with the discharge summary, hospital problems, inpatient lab results, inpatient diagnostic studies, and consultation reports with Mary.     Current outpatient and discharge medications have been reconciled for the patient.  Reviewed by: Delfino Matt MD          4/25/2024     5:26 PM   Date of TCM Phone Call   [facility]   Date of Admission 4/22/2024   Date of Discharge 4/25/2024   Discharge Disposition Home-Health Care [facility]     Risk for Readmission (LACE) Score: 10 (4/25/2024  6:00 AM)      History of Present Illness   Course During Hospital Stay:    Was hospitalized with acute encephalopathy due to severe hyponatremia.  She has stopped her Lasix.  Sodium when she left the hospital was improved to 134  Back on her Lasix and potassium now  She was drinking 6 bottles of water a day.  Now drinking 3    Also acute exacerbation COPD  Finished up her antibiotics  Using her inhalers  On 3 L of oxygen 24 hours a day  Refused a nebulizer      ,     The following portions of the patient's history were reviewed and updated as appropriate: allergies, current medications, past family history, past medical history, past social history, past surgical history, and problem list.      Current outpatient and discharge medications have been reconciled for the patient.  Reviewed by: Delfino Matt MD    Review of Systems    Objective   Physical Exam        Assessment & Plan   Diagnoses and all orders for this visit:    1. Supplemental oxygen dependent (Primary)    2. Panlobular emphysema    3. Hyponatremia    4. Hospital discharge follow-up    Other orders  -     metoprolol tartrate  (LOPRESSOR) 25 MG tablet; Take 0.5 tablets by mouth 2 (Two) Times a Day.      Blood pressure is low at home, okay to Lopressor to 1/2 pill twice daily    Okay to stop the iron

## 2024-05-02 ENCOUNTER — PATIENT MESSAGE (OUTPATIENT)
Dept: FAMILY MEDICINE CLINIC | Facility: CLINIC | Age: 79
End: 2024-05-02
Payer: MEDICARE

## 2024-05-02 NOTE — PROGRESS NOTES
"Enter Query Response Below      Query Response: SIADH ruled in              If applicable, please update the problem list.   Patient: Mary Jack        : 1945  Account: [id number]           Admit Date: 2024        How to Respond to this query:       a. Click New Note     b. Answer query within the yellow box.                c. Update the Problem List, if applicable.      If you have any questions about this query contact me at:  [email or web address]       Dr. Mcginnis,    Acute hyponatremia was noted on the 24 H&P.  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  SIADH is not included on the Discharge Summary.  Treatment included a Lactated Ringers 1 Liter bolus , Normal Saline infusion @ 100 cc/hr -, Hypertonic Normal Saline 3% bolus and infusion , a Normal Saline 500ml bolus 4/23 x2.    After study, please clarify the following:    SIADH ruled in  SIADH ruled out  Other- specify______  Unable to determine    By submitting this query, we are merely seeking further clarification of documentation to accurately reflect all conditions that you are monitoring, evaluating, treating or that extend the hospitalization or utilize additional resources of care. Please utilize your independent clinical judgment when addressing the question(s) above.     This query and your response, once completed, will be entered into the legal medical record.    Sincerely,  Ro CAMACHO, RN, CCDS  Clinical Documentation Improvement Program  [email or web address]  "

## 2024-05-03 ENCOUNTER — TELEPHONE (OUTPATIENT)
Dept: FAMILY MEDICINE CLINIC | Facility: CLINIC | Age: 79
End: 2024-05-03

## 2024-05-03 NOTE — TELEPHONE ENCOUNTER
Please call home health and give verbal approval for physical therapy 2 TIMES A WEEK FOR 1 WEEK  1 TIME A WEEK FOR 4 WEEKS

## 2024-05-03 NOTE — TELEPHONE ENCOUNTER
Caller: JAKE    Relationship: Home Health    Best call back number: [phone]     What orders are you requesting (i.e. lab or imaging): PHYSICAL THERAPY    In what timeframe would the patient need to come in: ASAP    Where will you receive your lab/imaging services: PATIENT HOME    Additional notes: REQUESTING VERBAL ORDERS FOR PHYSICAL THERAPY AT     2 TIMES A WEEK FOR 1 WEEK  1 TIME A WEEK FOR 4 WEEKS    PLEASE CALL TO ADVISE.

## 2024-05-07 ENCOUNTER — READMISSION MANAGEMENT (OUTPATIENT)
Dept: CALL CENTER | Facility: HOSPITAL | Age: 79
End: 2024-05-07
Payer: MEDICARE

## 2024-05-10 ENCOUNTER — TELEPHONE (OUTPATIENT)
Dept: FAMILY MEDICINE CLINIC | Facility: CLINIC | Age: 79
End: 2024-05-10
Payer: MEDICARE

## 2024-05-16 ENCOUNTER — LAB REQUISITION (OUTPATIENT)
Dept: LAB | Facility: HOSPITAL | Age: 79
End: 2024-05-16
Payer: MEDICARE

## 2024-05-16 DIAGNOSIS — E87.1 HYPO-OSMOLALITY AND HYPONATREMIA: ICD-10-CM

## 2024-05-16 DIAGNOSIS — E78.5 HYPERLIPIDEMIA, UNSPECIFIED: ICD-10-CM

## 2024-05-16 DIAGNOSIS — J44.1 CHRONIC OBSTRUCTIVE PULMONARY DISEASE WITH (ACUTE) EXACERBATION: ICD-10-CM

## 2024-05-16 LAB
ANION GAP SERPL CALCULATED.3IONS-SCNC: 9.6 MMOL/L (ref 5–15)
BUN SERPL-MCNC: 16 MG/DL (ref 8–23)
BUN/CREAT SERPL: 19.8 (ref 7–25)
CALCIUM SPEC-SCNC: 9.4 MG/DL (ref 8.6–10.5)
CHLORIDE SERPL-SCNC: 97 MMOL/L (ref 98–107)
CHOLEST SERPL-MCNC: 181 MG/DL (ref 0–200)
CO2 SERPL-SCNC: 28.4 MMOL/L (ref 22–29)
CREAT SERPL-MCNC: 0.81 MG/DL (ref 0.57–1)
EGFRCR SERPLBLD CKD-EPI 2021: 75.3 ML/MIN/1.73
GLUCOSE SERPL-MCNC: 79 MG/DL (ref 65–99)
HDLC SERPL-MCNC: 86 MG/DL (ref 40–60)
LDLC SERPL CALC-MCNC: 73 MG/DL (ref 0–100)
LDLC/HDLC SERPL: 0.81 {RATIO}
POTASSIUM SERPL-SCNC: 3.7 MMOL/L (ref 3.5–5.2)
SODIUM SERPL-SCNC: 135 MMOL/L (ref 136–145)
TRIGL SERPL-MCNC: 127 MG/DL (ref 0–150)
VLDLC SERPL-MCNC: 22 MG/DL (ref 5–40)

## 2024-05-16 PROCEDURE — 80061 LIPID PANEL: CPT | Performed by: FAMILY MEDICINE

## 2024-05-16 PROCEDURE — 80048 BASIC METABOLIC PNL TOTAL CA: CPT | Performed by: FAMILY MEDICINE

## 2024-05-25 DIAGNOSIS — J43.1 PANLOBULAR EMPHYSEMA: ICD-10-CM

## 2024-05-28 RX ORDER — ALBUTEROL SULFATE 90 UG/1
2 AEROSOL, METERED RESPIRATORY (INHALATION) EVERY 4 HOURS PRN
Qty: 54 G | Refills: 3 | Status: SHIPPED | OUTPATIENT
Start: 2024-05-28

## 2024-05-30 ENCOUNTER — TELEPHONE (OUTPATIENT)
Dept: FAMILY MEDICINE CLINIC | Facility: CLINIC | Age: 79
End: 2024-05-30

## 2024-05-30 NOTE — TELEPHONE ENCOUNTER
Caller: NASEEM    Relationship: Home Health    Best call back number: [phone]     What orders are you requesting (i.e. lab or imaging): VERBAL ORDERS     In what timeframe would the patient need to come in: ASAP    Where will you receive your lab/imaging services: PATIENT HOME    Additional notes: SKILLED NURSING AT 1 TIME A WEEK FOR 3 MORE WEEKS

## 2024-05-31 ENCOUNTER — TELEPHONE (OUTPATIENT)
Dept: FAMILY MEDICINE CLINIC | Facility: CLINIC | Age: 79
End: 2024-05-31
Payer: MEDICARE

## 2024-05-31 NOTE — TELEPHONE ENCOUNTER
Caller: [facility] JAKE    Relationship:     Best call back number: [phone]     What orders are you requesting (i.e. lab or imaging): PT    Where will you receive your lab/imaging services: AT HER HOME     Additional notes: 1 X WEEK FOR 3 WEEKS STARTING 6/3/24 AND PLANNING TO RECERT

## 2024-06-09 ENCOUNTER — NURSE TRIAGE (OUTPATIENT)
Dept: CALL CENTER | Facility: HOSPITAL | Age: 79
End: 2024-06-09
Payer: MEDICARE

## 2024-06-09 NOTE — TELEPHONE ENCOUNTER
Caller reports she missed a.m. dose of Metoprolol and just took it at 1730. BP this a.m. 113/59 with HR 67. BP currently 113/63 with HR 81. Questioning whether to take second dose this evening at 2000.     Instructed caller to check BP and HR at bedtime if notes elevations or any symptoms to call back. Otherwise instructed not to take additional dose at 2000 and restart in a.m.    Verbalized understanding.    Reason for Disposition   Caller has medicine question only, adult not sick, AND triager answers question    Additional Information   Negative: [1] Intentional drug overdose AND [2] suicidal thoughts or ideas   Negative: Drug overdose and triager unable to answer question   Negative: Caller requesting a renewal or refill of a medicine patient is currently taking   Negative: Caller requesting information unrelated to medicine   Negative: Caller requesting information about COVID-19 Vaccine   Negative: Caller requesting information about Emergency Contraception   Negative: Caller requesting information about Combined Birth Control Pills   Negative: Caller requesting information about Progestin Birth Control Pills   Negative: Caller requesting information about Post-Op pain or medicines   Negative: Caller requesting a prescription antibiotic (such as Penicillin) for Strep throat and has a positive culture result   Negative: Caller requesting a prescription anti-viral med (such as Tamiflu) and has influenza (flu) symptoms   Negative: Immunization reaction suspected   Negative: Rash while taking a medicine or within 3 days of stopping it   Negative: [1] Asthma and [2] having symptoms of asthma (cough, wheezing, etc.)   Negative: [1] Symptom of illness (e.g., headache, abdominal pain, earache, vomiting) AND [2] more than mild   Negative: Breastfeeding questions about mother's medicines and diet   Negative: MORE THAN A DOUBLE DOSE of a prescription or over-the-counter (OTC) drug   Negative: [1] DOUBLE DOSE (an extra  dose or lesser amount) of prescription drug AND [2] any symptoms (e.g., dizziness, nausea, pain, sleepiness)   Negative: [1] DOUBLE DOSE (an extra dose or lesser amount) of over-the-counter (OTC) drug AND [2] any symptoms (e.g., dizziness, nausea, pain, sleepiness)   Negative: Took another person's prescription drug   Negative: [1] DOUBLE DOSE (an extra dose or lesser amount) of prescription drug AND [2] NO symptoms  (Exception: A double dose of antibiotics.)   Negative: Diabetes drug error or overdose (e.g., took wrong type of insulin or took extra dose)   Negative: [1] Prescription not at pharmacy AND [2] was prescribed by PCP recently (Exception: Triager has access to EMR and prescription is recorded there. Go to Home Care and confirm for pharmacy.)   Negative: [1] Pharmacy calling with prescription question AND [2] triager unable to answer question   Negative: [1] Caller has URGENT medicine question about med that PCP or specialist prescribed AND [2] triager unable to answer question   Negative: Medicine patch causing local rash or itching   Negative: [1] Caller has medicine question about med NOT prescribed by PCP AND [2] triager unable to answer question (e.g., compatibility with other med, storage)   Negative: Prescription request for new medicine (not a refill)   Negative: [1] Caller has NON-URGENT medicine question about med that PCP prescribed AND [2] triager unable to answer question   Negative: Caller wants to use a complementary or alternative medicine   Negative: [1] Prescription prescribed recently is not at pharmacy AND [2] triager has access to patient's EMR AND [3] prescription is recorded in the EMR   Negative: [1] DOUBLE DOSE (an extra dose or lesser amount) of over-the-counter (OTC) drug AND [2] NO symptoms   Negative: [1] DOUBLE DOSE (an extra dose or lesser amount) of antibiotic drug AND [2] NO symptoms    Protocols used: [facility]

## 2024-06-10 ENCOUNTER — PATIENT MESSAGE (OUTPATIENT)
Dept: FAMILY MEDICINE CLINIC | Facility: CLINIC | Age: 79
End: 2024-06-10
Payer: MEDICARE

## 2024-06-10 DIAGNOSIS — K22.70 BARRETT'S ESOPHAGUS WITHOUT DYSPLASIA: ICD-10-CM

## 2024-06-10 RX ORDER — OMEPRAZOLE 40 MG/1
40 CAPSULE, DELAYED RELEASE ORAL DAILY
Qty: 90 CAPSULE | Refills: 0 | Status: SHIPPED | OUTPATIENT
Start: 2024-06-10

## 2024-06-10 NOTE — TELEPHONE ENCOUNTER
From: Mary Jack  To: Delfino Matt  Sent: 6/10/2024 11:01 AM EDT  Subject: Blood pressure    I take Metoprolol Tartrate for my BP 2 times a day, morning and night , when I took my bp this morn it was 113/58,heart rate 66, my question is ,should I still take my BP med or not ,since BP and heart rate are at a good level thanksCassandra

## 2024-06-11 ENCOUNTER — OFFICE VISIT (OUTPATIENT)
Dept: FAMILY MEDICINE CLINIC | Facility: CLINIC | Age: 79
End: 2024-06-11
Payer: MEDICARE

## 2024-06-11 VITALS
DIASTOLIC BLOOD PRESSURE: 64 MMHG | BODY MASS INDEX: 32.2 KG/M2 | HEART RATE: 60 BPM | OXYGEN SATURATION: 90 % | WEIGHT: 175 LBS | TEMPERATURE: 97.4 F | HEIGHT: 62 IN | SYSTOLIC BLOOD PRESSURE: 112 MMHG

## 2024-06-11 DIAGNOSIS — Z99.81 SUPPLEMENTAL OXYGEN DEPENDENT: ICD-10-CM

## 2024-06-11 DIAGNOSIS — D64.9 CHRONIC ANEMIA: ICD-10-CM

## 2024-06-11 DIAGNOSIS — J43.1 PANLOBULAR EMPHYSEMA: ICD-10-CM

## 2024-06-11 DIAGNOSIS — I25.9 ISCHEMIC HEART DISEASE DUE TO CORONARY ARTERY OBSTRUCTION: Primary | ICD-10-CM

## 2024-06-11 DIAGNOSIS — I24.0 ISCHEMIC HEART DISEASE DUE TO CORONARY ARTERY OBSTRUCTION: Primary | ICD-10-CM

## 2024-06-11 DIAGNOSIS — E87.1 HYPONATREMIA: ICD-10-CM

## 2024-06-11 PROBLEM — Z86.16 HISTORY OF 2019 NOVEL CORONAVIRUS DISEASE (COVID-19): Status: RESOLVED | Noted: 2021-08-31 | Resolved: 2024-06-11

## 2024-06-11 PROCEDURE — 3078F DIAST BP <80 MM HG: CPT | Performed by: FAMILY MEDICINE

## 2024-06-11 PROCEDURE — G2211 COMPLEX E/M VISIT ADD ON: HCPCS | Performed by: FAMILY MEDICINE

## 2024-06-11 PROCEDURE — 99214 OFFICE O/P EST MOD 30 MIN: CPT | Performed by: FAMILY MEDICINE

## 2024-06-11 PROCEDURE — 3074F SYST BP LT 130 MM HG: CPT | Performed by: FAMILY MEDICINE

## 2024-06-11 PROCEDURE — 1159F MED LIST DOCD IN RCRD: CPT | Performed by: FAMILY MEDICINE

## 2024-06-11 PROCEDURE — 1160F RVW MEDS BY RX/DR IN RCRD: CPT | Performed by: FAMILY MEDICINE

## 2024-06-11 PROCEDURE — 1126F AMNT PAIN NOTED NONE PRSNT: CPT | Performed by: FAMILY MEDICINE

## 2024-06-11 RX ORDER — FUROSEMIDE 20 MG/1
20 TABLET ORAL 2 TIMES DAILY
Qty: 180 TABLET | Refills: 1 | Status: SHIPPED | OUTPATIENT
Start: 2024-06-11

## 2024-06-11 NOTE — PROGRESS NOTES
"  Subjective   Mary Jack is a 78 y.o. female who is here for   Chief Complaint   Patient presents with    Emphysema   .   Answers submitted by the patient for this visit:  Other (Submitted on 6/10/2024)  Please describe your symptoms.: no symptoms  Have you had these symptoms before?: Yes  How long have you been having these symptoms?: 1-4 days  Please list any medications you are currently taking for this condition.: follow up visit  Please describe any probable cause for these symptoms. : follow up visit  Primary Reason for Visit (Submitted on 6/10/2024)  What is the primary reason for your visit?: Other    History of Present Illness     Patient comes in today to follow-up on her COPD.  She reports she is finally quit smoking.  No cigarettes in over a month.  She remains on oxygen 24 hours a day.  Using her Trelegy once some day.  Using her albuterol inhaler occasionally  Does not like to use her albuterol and nebulizer as it causes tachycardia.  He is up-to-date with pulmonology    We discussed her hypertension today.    Has chronic hyponatremia.  Does take Lasix daily for chronic dependent edema  Still having muscle cramps at night.  Suggest over-the-counter magnesium        The following portions of the patient's history were reviewed and updated as appropriate: allergies, current medications, past family history, past medical history, past social history, past surgical history, and problem list.    Review of Systems    Objective   Vitals:    06/11/24 1317   BP: 112/64   BP Location: Left arm   Patient Position: Sitting   Cuff Size: Adult   Pulse: 60   Temp: 97.4 °F (36.3 °C)   SpO2: 90%   Weight: 79.4 kg (175 lb)   Height: 157.5 cm (62\")      Physical Exam  Vitals reviewed.   Cardiovascular:      Rate and Rhythm: Normal rate.   Pulmonary:      Effort: Pulmonary effort is normal.      Breath sounds: Examination of the right-upper field reveals decreased breath sounds. Examination of the left-upper field " reveals decreased breath sounds. Examination of the right-middle field reveals decreased breath sounds. Examination of the left-middle field reveals decreased breath sounds. Examination of the right-lower field reveals decreased breath sounds. Examination of the left-lower field reveals decreased breath sounds. Decreased breath sounds present.   Neurological:      Mental Status: She is alert.     Is sitting in her wheelchair.  On 4 L of oxygen.  Accompanied by her daughter    Assessment & Plan   Diagnoses and all orders for this visit:    1. 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  Dispense: 180 tablet; Refill: 1  -     metoprolol tartrate (LOPRESSOR) 25 MG tablet; Take 1 tablet by mouth 2 (Two) Times a Day. Indications: High Blood Pressure Disorder  Dispense: 180 tablet; Refill: 1    2. Hyponatremia  -     Renal Function Panel  -     TSH Rfx On Abnormal To Free T4    3. Chronic anemia  -     CBC (No Diff)  -     Iron and TIBC  -     Ferritin    4. Supplemental oxygen dependent    5. Panlobular emphysema    Refill her cardiac medications  Labs today  She has completed with her home health care    There are no Patient Instructions on file for this visit.    Medications Discontinued During This Encounter   Medication Reason    azithromycin (ZITHROMAX) 250 MG tablet *Therapy completed    traMADol (ULTRAM) 50 MG tablet *Therapy completed    ferrous sulfate 325 (65 FE) MG tablet *Therapy completed    furosemide (LASIX) 20 MG tablet Reorder    metoprolol tartrate (LOPRESSOR) 25 MG tablet Reorder        No follow-ups on file.    Dr. Delfino Matt  [facility] [place], [place].

## 2024-06-12 LAB
ALBUMIN SERPL-MCNC: 4.2 G/DL (ref 3.8–4.8)
BUN SERPL-MCNC: 25 MG/DL (ref 8–27)
BUN/CREAT SERPL: 23 (ref 12–28)
CALCIUM SERPL-MCNC: 9.7 MG/DL (ref 8.7–10.3)
CHLORIDE SERPL-SCNC: 92 MMOL/L (ref 96–106)
CO2 SERPL-SCNC: 25 MMOL/L (ref 20–29)
CREAT SERPL-MCNC: 1.09 MG/DL (ref 0.57–1)
EGFRCR SERPLBLD CKD-EPI 2021: 53 ML/MIN/1.73
ERYTHROCYTE [DISTWIDTH] IN BLOOD BY AUTOMATED COUNT: 12.8 % (ref 11.7–15.4)
FERRITIN SERPL-MCNC: 128 NG/ML (ref 15–150)
GLUCOSE SERPL-MCNC: 96 MG/DL (ref 70–99)
HCT VFR BLD AUTO: 33.9 % (ref 34–46.6)
HGB BLD-MCNC: 11.3 G/DL (ref 11.1–15.9)
IRON SATN MFR SERPL: 26 % (ref 15–55)
IRON SERPL-MCNC: 92 UG/DL (ref 27–139)
MCH RBC QN AUTO: 31.7 PG (ref 26.6–33)
MCHC RBC AUTO-ENTMCNC: 33.3 G/DL (ref 31.5–35.7)
MCV RBC AUTO: 95 FL (ref 79–97)
PHOSPHATE SERPL-MCNC: 3.4 MG/DL (ref 3–4.3)
PLATELET # BLD AUTO: 222 X10E3/UL (ref 150–450)
POTASSIUM SERPL-SCNC: 4.6 MMOL/L (ref 3.5–5.2)
RBC # BLD AUTO: 3.56 X10E6/UL (ref 3.77–5.28)
SODIUM SERPL-SCNC: 134 MMOL/L (ref 134–144)
TIBC SERPL-MCNC: 349 UG/DL (ref 250–450)
TSH SERPL DL<=0.005 MIU/L-ACNC: 2.52 UIU/ML (ref 0.45–4.5)
UIBC SERPL-MCNC: 257 UG/DL (ref 118–369)
WBC # BLD AUTO: 9.4 X10E3/UL (ref 3.4–10.8)

## 2024-06-20 RX ORDER — ATORVASTATIN CALCIUM 10 MG/1
TABLET, FILM COATED ORAL
Qty: 90 TABLET | Refills: 0 | Status: SHIPPED | OUTPATIENT
Start: 2024-06-20

## 2024-06-21 ENCOUNTER — TELEPHONE (OUTPATIENT)
Dept: FAMILY MEDICINE CLINIC | Facility: CLINIC | Age: 79
End: 2024-06-21

## 2024-06-21 NOTE — TELEPHONE ENCOUNTER
Caller: SKY DENIS    Relationship: [facility]    Best call back number: [phone]     What orders are you requesting (i.e. lab or imaging): VERBAL ORDERS FOR PHYSICAL THERAPY 2 TIMES A WEEK FOR ONE WEEK, THEN ONE TIME A WEEK FOR THREE WEEKS

## 2024-09-02 DIAGNOSIS — R60.9 DEPENDENT EDEMA: ICD-10-CM

## 2024-09-02 DIAGNOSIS — K22.70 BARRETT'S ESOPHAGUS WITHOUT DYSPLASIA: ICD-10-CM

## 2024-09-02 RX ORDER — OMEPRAZOLE 40 MG/1
40 CAPSULE, DELAYED RELEASE ORAL DAILY
Qty: 90 CAPSULE | Refills: 3 | Status: SHIPPED | OUTPATIENT
Start: 2024-09-02

## 2024-09-02 RX ORDER — POTASSIUM CHLORIDE 750 MG/1
20 CAPSULE, EXTENDED RELEASE ORAL DAILY
Qty: 180 CAPSULE | Refills: 3 | Status: SHIPPED | OUTPATIENT
Start: 2024-09-02

## 2024-09-05 ENCOUNTER — TELEPHONE (OUTPATIENT)
Dept: FAMILY MEDICINE CLINIC | Facility: CLINIC | Age: 79
End: 2024-09-05
Payer: MEDICARE

## 2024-09-05 NOTE — TELEPHONE ENCOUNTER
When speaking with patient about awv, she mentioned that her emphysema was flaring up and she was not feeling well. She requested medication be sent to [facility] in [place]. I asked if she has done a COVID test and she stated she has not been around anyone. Please advise if patient needs an appointment.

## 2024-09-06 ENCOUNTER — TELEMEDICINE (OUTPATIENT)
Dept: FAMILY MEDICINE CLINIC | Facility: CLINIC | Age: 79
End: 2024-09-06
Payer: MEDICARE

## 2024-09-06 DIAGNOSIS — J44.1 COPD WITH EXACERBATION: Primary | ICD-10-CM

## 2024-09-06 PROCEDURE — 1160F RVW MEDS BY RX/DR IN RCRD: CPT | Performed by: FAMILY MEDICINE

## 2024-09-06 PROCEDURE — 1126F AMNT PAIN NOTED NONE PRSNT: CPT | Performed by: FAMILY MEDICINE

## 2024-09-06 PROCEDURE — 1159F MED LIST DOCD IN RCRD: CPT | Performed by: FAMILY MEDICINE

## 2024-09-06 PROCEDURE — 99213 OFFICE O/P EST LOW 20 MIN: CPT | Performed by: FAMILY MEDICINE

## 2024-09-06 RX ORDER — CEFDINIR 300 MG/1
300 CAPSULE ORAL 2 TIMES DAILY
Qty: 20 CAPSULE | Refills: 0 | Status: SHIPPED | OUTPATIENT
Start: 2024-09-06

## 2024-09-06 RX ORDER — PREDNISONE 10 MG/1
10 TABLET ORAL 3 TIMES DAILY
Qty: 30 TABLET | Refills: 0 | Status: SHIPPED | OUTPATIENT
Start: 2024-09-06

## 2024-09-06 NOTE — PROGRESS NOTES
Subjective   Mary Jack is a 79 y.o. female who is here for No chief complaint on file.  .   When speaking with patient about awv, she mentioned that her emphysema was flaring up and she was not feeling well. She requested medication be sent to [facility] in [place]. I asked if she has done a COVID test and she stated she has not been around anyone. Please advise if patient needs an appointment.   History of Present Illness   This is a Mychart Video medical encounter.  Video visit is being chosen because either patient has an acute contagious infection or is unable to physically come to the office due to medical issues or transportation issues.  Medical history from chart is reviewed. Audio visual encounter provided through a secure link via Epic Twillo. Patient location is patient's home. Provider location is in my routine medical office in [facility]. Patient has given prior consent for this encounter, via check in process. Regarding EM coding: history will not be as robust and exam will be limited. Most EM coding decisions will be based on MDM and time.  Total face video time, 15 minutes . Total chart time pre and post chartin-20 minutes.    Florencia checks in today via MyChart video visit  Having a exacerbation of COPD  Increasing shortness of air  No fever  Brown mucus  The following portions of the patient's history were reviewed and updated as appropriate: allergies, current medications, past medical history, past social history, past surgical history, and problem list.    Review of Systems    Objective   There were no vitals filed for this visit.   Physical Exam  Vitals reviewed.   Pulmonary:      Effort: No respiratory distress.   Neurological:      Mental Status: She is alert.     Elderly female.  Sitting at home.  Wearing her oxygen.  [facility] voice    Assessment & Plan   Diagnoses and all orders for this visit:    1. COPD with exacerbation (Primary)  -     predniSONE (DELTASONE) 10 MG tablet; Take  1 tablet by mouth 3 times a day. Indications: COPD  Dispense: 30 tablet; Refill: 0  -     cefdinir (OMNICEF) 300 MG capsule; Take 1 capsule by mouth 2 (Two) Times a Day. Indications: Pneumonia  Dispense: 20 capsule; Refill: 0    He has her Trelegy inhaler  She has her albuterol inhaler  Again refuses a nebulizer?  Mucinex    Will see her in a month for her wellness visit    There are no Patient Instructions on file for this visit.    Medications Discontinued During This Encounter   Medication Reason    predniSONE (DELTASONE) 10 MG tablet Reorder        No follow-ups on file.    Dr. Delfino Matt  [place], [place]

## 2024-09-12 ENCOUNTER — TELEPHONE (OUTPATIENT)
Dept: CARDIOLOGY | Facility: CLINIC | Age: 79
End: 2024-09-12
Payer: MEDICARE

## 2024-09-12 RX ORDER — ATORVASTATIN CALCIUM 10 MG/1
TABLET, FILM COATED ORAL
Qty: 90 TABLET | Refills: 3 | OUTPATIENT
Start: 2024-09-12

## 2024-09-12 NOTE — TELEPHONE ENCOUNTER
Spoke to patient -told her yes she needs to stay on this medication -and if she can not get transportation to [place] and sees her family md regularly see if he would be willing to fill the script -I also advised that she really needs to try and get a ride here -patient states her copd is just too bad to travel here

## 2024-09-12 NOTE — TELEPHONE ENCOUNTER
Caller: Mary Jack    Relationship to patient: Self    Best call back number: [phone]    Requested date: IF NEEDED OR VIRTUAL VISIT    Additional notes: PT IS CALLING BECAUSE SHE SAID SHE IS UNABLE TO SCHEDULE AN APPT DUE TO TRANSPORTATION AND SHE SAID HER HEALTH IS DETERIOIRATING. SHE ASKED IF DR. BEVERLY WANTED HER TO STILL TAKE THE ATORVASTATIN. PLEASE ADVISE

## 2024-09-17 ENCOUNTER — TELEPHONE (OUTPATIENT)
Dept: FAMILY MEDICINE CLINIC | Facility: CLINIC | Age: 79
End: 2024-09-17
Payer: MEDICARE

## 2024-09-18 ENCOUNTER — TELEPHONE (OUTPATIENT)
Dept: FAMILY MEDICINE CLINIC | Facility: CLINIC | Age: 79
End: 2024-09-18

## 2024-09-18 ENCOUNTER — TELEMEDICINE (OUTPATIENT)
Dept: FAMILY MEDICINE CLINIC | Facility: CLINIC | Age: 79
End: 2024-09-18
Payer: MEDICARE

## 2024-09-18 DIAGNOSIS — S49.91XA ARM INJURY, RIGHT, INITIAL ENCOUNTER: Primary | ICD-10-CM

## 2024-09-18 PROCEDURE — 99213 OFFICE O/P EST LOW 20 MIN: CPT | Performed by: FAMILY MEDICINE

## 2024-09-18 PROCEDURE — 1126F AMNT PAIN NOTED NONE PRSNT: CPT | Performed by: FAMILY MEDICINE

## 2024-09-18 NOTE — TELEPHONE ENCOUNTER
Caller: Mary Jack    Relationship: Self    Best call back number: [phone]    What orders are you requesting (i.e. lab or imaging): MOBILE X-RAY    In what timeframe would the patient need to come in: AS SOON AS AVAILABLE    Where will you receive your lab/imaging services: AT HOME    Additional notes: STATED THAT THEY ARE NEEDING TO MAKE SURE THAT THE ORDERS FOR THE MOBILE X-RAY WAS SENT TO [facility] SO THAT THEY CAN GET THIS DONE THROUGH THEIR INSURANCE. PLEASE CALL AND ADVISE

## 2024-09-20 ENCOUNTER — TELEPHONE (OUTPATIENT)
Dept: FAMILY MEDICINE CLINIC | Facility: CLINIC | Age: 79
End: 2024-09-20
Payer: MEDICARE

## 2024-09-20 ENCOUNTER — HOSPITAL ENCOUNTER (EMERGENCY)
Facility: HOSPITAL | Age: 79
Discharge: HOME | End: 2024-09-20
Attending: STUDENT IN AN ORGANIZED HEALTH CARE EDUCATION/TRAINING PROGRAM
Payer: MEDICARE

## 2024-09-20 ENCOUNTER — APPOINTMENT (OUTPATIENT)
Dept: GENERAL RADIOLOGY | Facility: HOSPITAL | Age: 79
End: 2024-09-20
Payer: MEDICARE

## 2024-09-20 ENCOUNTER — TELEPHONE (OUTPATIENT)
Dept: ORTHOPEDIC SURGERY | Facility: CLINIC | Age: 79
End: 2024-09-20
Payer: MEDICARE

## 2024-09-20 ENCOUNTER — TELEPHONE (OUTPATIENT)
Dept: FAMILY MEDICINE CLINIC | Facility: CLINIC | Age: 79
End: 2024-09-20

## 2024-09-20 VITALS
HEIGHT: 62 IN | OXYGEN SATURATION: 95 % | DIASTOLIC BLOOD PRESSURE: 92 MMHG | HEART RATE: 63 BPM | BODY MASS INDEX: 35.92 KG/M2 | TEMPERATURE: 98.5 F | WEIGHT: 195.2 LBS | SYSTOLIC BLOOD PRESSURE: 131 MMHG | RESPIRATION RATE: 18 BRPM

## 2024-09-20 DIAGNOSIS — S42.231S: ICD-10-CM

## 2024-09-20 DIAGNOSIS — J43.1 PANLOBULAR EMPHYSEMA: Primary | ICD-10-CM

## 2024-09-20 DIAGNOSIS — S42.201A CLOSED FRACTURE OF PROXIMAL END OF RIGHT HUMERUS, UNSPECIFIED FRACTURE MORPHOLOGY, INITIAL ENCOUNTER: Primary | ICD-10-CM

## 2024-09-20 PROCEDURE — 99283 EMERGENCY DEPT VISIT LOW MDM: CPT

## 2024-09-20 PROCEDURE — 73030 X-RAY EXAM OF SHOULDER: CPT

## 2024-09-20 PROCEDURE — 99283 EMERGENCY DEPT VISIT LOW MDM: CPT | Performed by: STUDENT IN AN ORGANIZED HEALTH CARE EDUCATION/TRAINING PROGRAM

## 2024-09-20 NOTE — TELEPHONE ENCOUNTER
Caller: Mary Jack    Relationship: Self    Best call back number: [phone]     What is the medical concern/diagnosis: FRACTURED HUMERUS    What specialty or service is being requested: HOME HEALTH    Any additional details: PATIENT STATES THAT SHE WENT TO THE ER TODAY FOR A FRACTURED HUMERUS. SHE WAS ADVISED TO REQUEST REFERRAL FOR HOME HEALTH FROM PCP TO GET ASSISTANCE AROUND THE HOME WHILE SHE RECOVERS. PLEASE CALL TO FOLLOW UP

## 2024-09-23 ENCOUNTER — TELEPHONE (OUTPATIENT)
Dept: ORTHOPEDIC SURGERY | Facility: CLINIC | Age: 79
End: 2024-09-23

## 2024-09-23 NOTE — TELEPHONE ENCOUNTER
Caller: Mary Jack     Relationship: SELF    Best call back number: [phone]     What is your medical concern? PATIENT IS UNABLE TO WALK FOR LONG PERIODS OF TIME DUE TO OXYGEN LEVELS.  FRACTURED HUMERUS FROM A FALL AND UNABLE TO PICK THINGS UP OR CARRY, PATIENT IS NEEDING A REFERRAL FOR  HOME HEALTH TO HELP WITH CLEANING, DRESSING, FIXING MEALS, SHOWERING.     Is your provider already aware of this issue? YES    Have you been treated for this issue? YES    PLEASE CALL WHEN REFERRAL HAS BEEN SENT, PLEASE LET HER KNOW WHO THE REFERRAL WENT TO.       Okay will reorder another [facility] home health referral along with in-home physical therapy for her right proximal humeral fracture and oxygen requiring COPD

## 2024-09-24 ENCOUNTER — HOME HEALTH ADMISSION (OUTPATIENT)
Dept: HOME HEALTH SERVICES | Facility: HOME HEALTHCARE | Age: 79
End: 2024-09-24
Payer: MEDICARE

## 2024-09-24 NOTE — TELEPHONE ENCOUNTER
Spoke with [facility].  They will be seeing Mrs Jack on Thursday, 9/26/24.

## 2024-09-25 ENCOUNTER — LAB (OUTPATIENT)
Dept: LAB | Facility: HOSPITAL | Age: 79
End: 2024-09-25
Payer: MEDICARE

## 2024-09-25 ENCOUNTER — OFFICE VISIT (OUTPATIENT)
Dept: ORTHOPEDIC SURGERY | Facility: CLINIC | Age: 79
End: 2024-09-25
Payer: MEDICARE

## 2024-09-25 VITALS
DIASTOLIC BLOOD PRESSURE: 76 MMHG | WEIGHT: 195 LBS | HEART RATE: 79 BPM | HEIGHT: 62 IN | SYSTOLIC BLOOD PRESSURE: 134 MMHG | BODY MASS INDEX: 35.88 KG/M2

## 2024-09-25 DIAGNOSIS — S42.291A OTHER CLOSED DISPLACED FRACTURE OF PROXIMAL END OF RIGHT HUMERUS, INITIAL ENCOUNTER: ICD-10-CM

## 2024-09-25 DIAGNOSIS — M79.601 RIGHT ARM PAIN: Primary | ICD-10-CM

## 2024-09-25 LAB
ANION GAP SERPL CALCULATED.3IONS-SCNC: 11 MMOL/L (ref 5–15)
BUN SERPL-MCNC: 17 MG/DL (ref 8–23)
BUN/CREAT SERPL: 18.9 (ref 7–25)
CALCIUM SPEC-SCNC: 9.5 MG/DL (ref 8.6–10.5)
CHLORIDE SERPL-SCNC: 93 MMOL/L (ref 98–107)
CO2 SERPL-SCNC: 26 MMOL/L (ref 22–29)
CREAT SERPL-MCNC: 0.9 MG/DL (ref 0.57–1)
EGFRCR SERPLBLD CKD-EPI 2021: 66 ML/MIN/1.73
GLUCOSE SERPL-MCNC: 94 MG/DL (ref 65–99)
POTASSIUM SERPL-SCNC: 3.8 MMOL/L (ref 3.5–5.2)
SODIUM SERPL-SCNC: 130 MMOL/L (ref 136–145)

## 2024-09-25 PROCEDURE — 1160F RVW MEDS BY RX/DR IN RCRD: CPT | Performed by: ORTHOPAEDIC SURGERY

## 2024-09-25 PROCEDURE — 99204 OFFICE O/P NEW MOD 45 MIN: CPT | Performed by: ORTHOPAEDIC SURGERY

## 2024-09-25 PROCEDURE — 36415 COLL VENOUS BLD VENIPUNCTURE: CPT

## 2024-09-25 PROCEDURE — 3075F SYST BP GE 130 - 139MM HG: CPT | Performed by: ORTHOPAEDIC SURGERY

## 2024-09-25 PROCEDURE — 1159F MED LIST DOCD IN RCRD: CPT | Performed by: ORTHOPAEDIC SURGERY

## 2024-09-25 PROCEDURE — 73030 X-RAY EXAM OF SHOULDER: CPT | Performed by: ORTHOPAEDIC SURGERY

## 2024-09-25 PROCEDURE — 3078F DIAST BP <80 MM HG: CPT | Performed by: ORTHOPAEDIC SURGERY

## 2024-09-25 PROCEDURE — 23600 CLTX PROX HUMRL FX W/O MNPJ: CPT | Performed by: ORTHOPAEDIC SURGERY

## 2024-09-25 PROCEDURE — 80048 BASIC METABOLIC PNL TOTAL CA: CPT

## 2024-09-25 NOTE — TELEPHONE ENCOUNTER
Caller: Mary Jack    Relationship: Self    Best call back number: [phone]      What was the call regarding: MARY CALLING FROM HOME HEALTH STATES PATIENT REFUSED HOME HEALTH SERVICES ONLY WANTS SOMEONE TO COOK AND CLEAN FOR HER

## 2024-09-26 ENCOUNTER — TELEPHONE (OUTPATIENT)
Dept: ORTHOPEDIC SURGERY | Facility: CLINIC | Age: 79
End: 2024-09-26
Payer: MEDICARE

## 2024-09-26 ENCOUNTER — TELEPHONE (OUTPATIENT)
Dept: FAMILY MEDICINE CLINIC | Facility: CLINIC | Age: 79
End: 2024-09-26
Payer: MEDICARE

## 2024-10-02 ENCOUNTER — PATIENT ROUNDING (BHMG ONLY) (OUTPATIENT)
Dept: ORTHOPEDIC SURGERY | Facility: CLINIC | Age: 79
End: 2024-10-02
Payer: MEDICARE

## 2024-10-02 NOTE — PROGRESS NOTES
A My-Chart message has been sent to the patient for PATIENT ROUNDING with [facility].

## 2024-10-09 ENCOUNTER — TELEPHONE (OUTPATIENT)
Dept: FAMILY MEDICINE CLINIC | Facility: CLINIC | Age: 79
End: 2024-10-09

## 2024-10-09 NOTE — TELEPHONE ENCOUNTER
Caller: KASSANDRA DENIS    Relationship: HOME HEALTH NURSE    Best call back number: [phone]     Who are you requesting to speak with (clinical staff, provider,  specific staff member): CLINICAL STAFF    What was the call regarding: STATED THAT THEY ARE NEEDING TO GET VERBAL ORDERS FOR NURSING ONCE A WEEK FOR ONE WEEK, TWICE A WEEK FOR A WEEK AND THEN ONCE A WEEK FOR 3 WEEKS. PLEASE CALL AND ADVISE

## 2024-10-23 ENCOUNTER — OFFICE VISIT (OUTPATIENT)
Dept: ORTHOPEDIC SURGERY | Facility: CLINIC | Age: 79
End: 2024-10-23
Payer: MEDICARE

## 2024-10-23 VITALS — WEIGHT: 195 LBS | HEIGHT: 62 IN | BODY MASS INDEX: 35.88 KG/M2

## 2024-10-23 DIAGNOSIS — S42.291A OTHER CLOSED DISPLACED FRACTURE OF PROXIMAL END OF RIGHT HUMERUS, INITIAL ENCOUNTER: Primary | ICD-10-CM

## 2024-10-23 PROCEDURE — 99024 POSTOP FOLLOW-UP VISIT: CPT | Performed by: ORTHOPAEDIC SURGERY

## 2024-10-23 NOTE — PROGRESS NOTES
CC: Follow-up status post closed treatment right proximal humerus fracture, date of injury 9/19/2024    Interval history: Patient returns clinic today stating over pain is fairly stable at this point in time.  She is continuing to have issues in regards to aching type pain but it is mildly improved since last visit.  She is continue to have issues in regards to oxygen desaturation when she stands.  She is on supplemental oxygen with underlying diagnosis of emphysema.  Rates pain over her shoulder as moderate in intensity, aching in nature, does wax and wane.  Moderate improvement with use of sling.      Exam:  Right shoulder-tolerates passive forward flexion to 40 degrees, tolerates passive external rotation of 20 degrees, positive deltoid firing all 3 components.  She has good range of motion of the right elbow wrist and finger symmetric to the left wrist cap refill all digits 1+ radial pulse.      Imaging:  3 view x-rays right shoulder from today's visit AP, Scap Y, axial lateral views, ordered reviewed by me, indication status post closed treatment right proximal humerus fracture, compared to prior office x-rays indicates 100% displaced and translated proximal humerus fracture with shaft medialized in comparison of the humeral head, on axillary views humeral head does appear to be centered on the glenoid.  No significant humeral head elevation appreciated.      Impression: Status post closed treatment right proximal humerus fracture    Plan:  Reviewed with patient that she has significant displacement of her fracture.  Unfortunately from a medical standpoint she is not a candidate for any type of surgical intervention at this time.  Will continue use of sling but she can come out of the sling is much as tolerated to work on gentle motion.  She may wind up with a nonunion or fibrous nonunion-our hope at this point in time is to try to mitigate her pain is much as possible.  She was in agreement had all questions  answered.  I will see her back in 6 weeks repeat x-rays of right shoulder or sooner if needed

## 2024-10-31 ENCOUNTER — LAB REQUISITION (OUTPATIENT)
Dept: LAB | Facility: HOSPITAL | Age: 79
End: 2024-10-31
Payer: MEDICARE

## 2024-10-31 DIAGNOSIS — I48.91 UNSPECIFIED ATRIAL FIBRILLATION: ICD-10-CM

## 2024-10-31 DIAGNOSIS — E78.5 HYPERLIPIDEMIA, UNSPECIFIED: ICD-10-CM

## 2024-10-31 DIAGNOSIS — J44.9 CHRONIC OBSTRUCTIVE PULMONARY DISEASE, UNSPECIFIED: ICD-10-CM

## 2024-10-31 DIAGNOSIS — D64.9 ANEMIA, UNSPECIFIED: ICD-10-CM

## 2024-10-31 DIAGNOSIS — I71.40 ABDOMINAL AORTIC ANEURYSM, WITHOUT RUPTURE, UNSPECIFIED: ICD-10-CM

## 2024-10-31 LAB
ALBUMIN SERPL-MCNC: 4 G/DL (ref 3.5–5.2)
ANION GAP SERPL CALCULATED.3IONS-SCNC: 11.8 MMOL/L (ref 5–15)
BASOPHILS # BLD AUTO: 0.05 10*3/MM3 (ref 0–0.2)
BASOPHILS NFR BLD AUTO: 1 % (ref 0–1.5)
BILIRUB UR QL STRIP: NEGATIVE
BUN SERPL-MCNC: 13 MG/DL (ref 8–23)
BUN/CREAT SERPL: 16.7 (ref 7–25)
CALCIUM SPEC-SCNC: 9.8 MG/DL (ref 8.6–10.5)
CHLORIDE SERPL-SCNC: 94 MMOL/L (ref 98–107)
CHOLEST SERPL-MCNC: 135 MG/DL (ref 0–200)
CLARITY UR: CLEAR
CO2 SERPL-SCNC: 25.2 MMOL/L (ref 22–29)
COLOR UR: YELLOW
CREAT SERPL-MCNC: 0.78 MG/DL (ref 0.57–1)
DEPRECATED RDW RBC AUTO: 48.6 FL (ref 37–54)
EGFRCR SERPLBLD CKD-EPI 2021: 78.3 ML/MIN/1.73
EOSINOPHIL # BLD AUTO: 0.12 10*3/MM3 (ref 0–0.4)
EOSINOPHIL NFR BLD AUTO: 2.5 % (ref 0.3–6.2)
ERYTHROCYTE [DISTWIDTH] IN BLOOD BY AUTOMATED COUNT: 13.9 % (ref 12.3–15.4)
GLUCOSE SERPL-MCNC: 93 MG/DL (ref 65–99)
GLUCOSE UR STRIP-MCNC: NEGATIVE MG/DL
HCT VFR BLD AUTO: 28.3 % (ref 34–46.6)
HDLC SERPL-MCNC: 74 MG/DL (ref 40–60)
HGB BLD-MCNC: 9.2 G/DL (ref 12–15.9)
HGB UR QL STRIP.AUTO: NEGATIVE
IMM GRANULOCYTES # BLD AUTO: 0.03 10*3/MM3 (ref 0–0.05)
IMM GRANULOCYTES NFR BLD AUTO: 0.6 % (ref 0–0.5)
KETONES UR QL STRIP: NEGATIVE
LDLC SERPL CALC-MCNC: 45 MG/DL (ref 0–100)
LDLC/HDLC SERPL: 0.61 {RATIO}
LEUKOCYTE ESTERASE UR QL STRIP.AUTO: NEGATIVE
LYMPHOCYTES # BLD AUTO: 1.63 10*3/MM3 (ref 0.7–3.1)
LYMPHOCYTES NFR BLD AUTO: 33.3 % (ref 19.6–45.3)
MCH RBC QN AUTO: 31.1 PG (ref 26.6–33)
MCHC RBC AUTO-ENTMCNC: 32.5 G/DL (ref 31.5–35.7)
MCV RBC AUTO: 95.6 FL (ref 79–97)
MONOCYTES # BLD AUTO: 0.83 10*3/MM3 (ref 0.1–0.9)
MONOCYTES NFR BLD AUTO: 17 % (ref 5–12)
NEUTROPHILS NFR BLD AUTO: 2.23 10*3/MM3 (ref 1.7–7)
NEUTROPHILS NFR BLD AUTO: 45.6 % (ref 42.7–76)
NITRITE UR QL STRIP: NEGATIVE
NRBC BLD AUTO-RTO: 0 /100 WBC (ref 0–0.2)
PH UR STRIP.AUTO: 6 [PH] (ref 4.5–8)
PHOSPHATE SERPL-MCNC: 3.9 MG/DL (ref 2.5–4.5)
PLATELET # BLD AUTO: 250 10*3/MM3 (ref 140–450)
PMV BLD AUTO: 10.9 FL (ref 6–12)
POTASSIUM SERPL-SCNC: 3.8 MMOL/L (ref 3.5–5.2)
PROT UR QL STRIP: NEGATIVE
RBC # BLD AUTO: 2.96 10*6/MM3 (ref 3.77–5.28)
SODIUM SERPL-SCNC: 131 MMOL/L (ref 136–145)
SP GR UR STRIP: 1.01 (ref 1–1.03)
TRIGL SERPL-MCNC: 81 MG/DL (ref 0–150)
UROBILINOGEN UR QL STRIP: NORMAL
VLDLC SERPL-MCNC: 16 MG/DL (ref 5–40)
WBC NRBC COR # BLD AUTO: 4.89 10*3/MM3 (ref 3.4–10.8)

## 2024-10-31 PROCEDURE — 80061 LIPID PANEL: CPT | Performed by: FAMILY MEDICINE

## 2024-10-31 PROCEDURE — 80069 RENAL FUNCTION PANEL: CPT | Performed by: FAMILY MEDICINE

## 2024-10-31 PROCEDURE — 81003 URINALYSIS AUTO W/O SCOPE: CPT | Performed by: FAMILY MEDICINE

## 2024-10-31 PROCEDURE — 85025 COMPLETE CBC W/AUTO DIFF WBC: CPT | Performed by: FAMILY MEDICINE

## 2024-11-01 ENCOUNTER — TELEPHONE (OUTPATIENT)
Dept: FAMILY MEDICINE CLINIC | Facility: CLINIC | Age: 79
End: 2024-11-01
Payer: MEDICARE

## 2024-11-05 ENCOUNTER — TELEMEDICINE (OUTPATIENT)
Dept: FAMILY MEDICINE CLINIC | Facility: CLINIC | Age: 79
End: 2024-11-05
Payer: MEDICARE

## 2024-11-05 DIAGNOSIS — J43.1 PANLOBULAR EMPHYSEMA: Primary | ICD-10-CM

## 2024-11-05 DIAGNOSIS — Z66 DNR (DO NOT RESUSCITATE): ICD-10-CM

## 2024-11-05 PROCEDURE — 1160F RVW MEDS BY RX/DR IN RCRD: CPT | Performed by: FAMILY MEDICINE

## 2024-11-05 PROCEDURE — 1159F MED LIST DOCD IN RCRD: CPT | Performed by: FAMILY MEDICINE

## 2024-11-05 PROCEDURE — 99213 OFFICE O/P EST LOW 20 MIN: CPT | Performed by: FAMILY MEDICINE

## 2024-11-05 PROCEDURE — 1126F AMNT PAIN NOTED NONE PRSNT: CPT | Performed by: FAMILY MEDICINE

## 2024-11-05 NOTE — PROGRESS NOTES
Subjective   Mary Jack is a 79 y.o. female who is here for   Chief Complaint   Patient presents with    COPD   .     COPD       This is a Mychart Video medical encounter.  Video visit is being chosen because either patient has an acute contagious infection or is unable to physically come to the office due to medical issues or transportation issues.  Medical history from chart is reviewed. Audio visual encounter provided through a secure link via Epic Twillo. Patient location is patient's home. Provider location is in my routine medical office in [facility]. Patient has given prior consent for this encounter, via check in process. Regarding EM coding: history will not be as robust and exam will be limited. Most EM coding decisions will be based on MDM and time.  Total face video time, 15 minutes . Total chart time pre and post chartin-20 minutes.  Patient is not able to drive a car.  Oxygen dependent  Nonhealing right humeral fracture  Current with [facility]  She was a nonoperative candidate for her right humeral fracture  Due to advanced COPD    Checked her labs she is quite anemic probably acute blood loss anemia from the long bone fracture  She is now on iron once a day      The following portions of the patient's history were reviewed and updated as appropriate: allergies, current medications, past family history, past medical history, past social history, past surgical history, and problem list.    Review of Systems    Objective   There were no vitals filed for this visit.   Physical Exam  Neurological:      Mental Status: She is alert.         Assessment & Plan   Diagnoses and all orders for this visit:    1. Panlobular emphysema (Primary)  -     Ambulatory Referral to Palliative Care    2. DNR (do not resuscitate)    We discussed her recent labs.  She is taking iron every other day  Do agree she qualifies for palliative care due to end-stage COPD  There are no Patient Instructions on  file for this visit.    Medications Discontinued During This Encounter   Medication Reason    cefdinir (OMNICEF) 300 MG capsule *Therapy completed    nicotine (NICODERM CQ) 21 MG/24HR patch *Therapy completed        No follow-ups on file.    Dr. Delfino Matt  [place], [place]

## 2024-11-06 ENCOUNTER — TELEPHONE (OUTPATIENT)
Dept: FAMILY MEDICINE CLINIC | Facility: CLINIC | Age: 79
End: 2024-11-06
Payer: MEDICARE

## 2024-11-06 NOTE — TELEPHONE ENCOUNTER
Radha with [facility] ([phone]) called and stated that they are admitting Mary to [facility].   Dr. Shaka lee.

## 2024-11-06 NOTE — TELEPHONE ENCOUNTER
Home health called with concern about Ms Jack.  She said for 2 weeks she has had increased shortness of breath, O2 reads in high 80's at rest.  She is on 3.5 L O2.  Pt does not look good but refuses to go to the ED.

## 2024-11-20 DIAGNOSIS — I24.0 ISCHEMIC HEART DISEASE DUE TO CORONARY ARTERY OBSTRUCTION: ICD-10-CM

## 2024-11-20 DIAGNOSIS — I25.9 ISCHEMIC HEART DISEASE DUE TO CORONARY ARTERY OBSTRUCTION: ICD-10-CM

## 2024-11-21 RX ORDER — METOPROLOL TARTRATE 25 MG/1
TABLET, FILM COATED ORAL
Qty: 180 TABLET | Refills: 1 | Status: SHIPPED | OUTPATIENT
Start: 2024-11-21

## 2024-12-04 ENCOUNTER — OFFICE VISIT (OUTPATIENT)
Dept: ORTHOPEDIC SURGERY | Facility: CLINIC | Age: 79
End: 2024-12-04
Payer: MEDICARE

## 2024-12-04 VITALS — HEIGHT: 62 IN | BODY MASS INDEX: 35.88 KG/M2 | WEIGHT: 195 LBS

## 2024-12-04 DIAGNOSIS — S42.291A OTHER CLOSED DISPLACED FRACTURE OF PROXIMAL END OF RIGHT HUMERUS, INITIAL ENCOUNTER: Primary | ICD-10-CM

## 2024-12-04 PROCEDURE — 99024 POSTOP FOLLOW-UP VISIT: CPT | Performed by: ORTHOPAEDIC SURGERY

## 2024-12-04 NOTE — PROGRESS NOTES
CC: Follow-up status post closed treatment right proximal humerus fracture, date of injury 9/19/2024     Interval history: Patient returns clinic today stating overall doing reasonably well with her shoulder, she has no residual pain at this point in time.  She still has some limitations in regards to motion and function but is happy with the progress she has made since last visit.        Exam:   Right shoulder-no residual tenderness over right proximal humerus, active forward flexion 70 degrees with 4- out of 5 strength, external Tatian 20 degrees before minus out of 5 strength, 4 out of 5 strength on belly press test.  Positive deltoid firing all 3 components.  Brisk cap refill digits 1+ radial pulse.      Imaging:  3 view x-rays right shoulder from today's visit AP, Scap Y, axial lateral views, ordered reviewed by me, indication status post closed treatment right proximal humerus fracture, compared to prior office x-rays indicates 100% displaced and translated proximal humerus fracture with shaft medialized in comparison of the humeral head with posterior displacement of head segment noted as well on scapular Y views.  There is evidence of moderate periosteal callus formation.        Impression: Status post closed treatment right proximal humerus fracture     Plan:  Patient is doing reasonably well with her proximal humerus malunion that does appear to be healing reasonably well at this point in time despite significant displacement of fracture segments.  She does have good evidence of periosteal callus formation.  She is not a candidate for surgical treatment at this point anyway given her significant COPD and pulmonary disease.  I did recommend follow-up in 2 months with repeat x-rays of right shoulder or sooner if needed.  She was in agreement with that had all questions answered today and is happy with her progress

## 2025-02-26 ENCOUNTER — OFFICE VISIT (OUTPATIENT)
Dept: ORTHOPEDIC SURGERY | Facility: CLINIC | Age: 80
End: 2025-02-26
Payer: MEDICARE

## 2025-02-26 VITALS — WEIGHT: 195 LBS | HEIGHT: 62 IN | BODY MASS INDEX: 35.88 KG/M2

## 2025-02-26 DIAGNOSIS — S42.291A OTHER CLOSED DISPLACED FRACTURE OF PROXIMAL END OF RIGHT HUMERUS, INITIAL ENCOUNTER: Primary | ICD-10-CM

## 2025-02-26 NOTE — PROGRESS NOTES
Subjective:     Patient ID: Mary Jack is a 79 y.o. female.    Chief Complaint:  Follow-up status post closed treatment right proximal humerus fracture, date of injury 9/19/2024   History of Present Illness  History of Present Illness  The patient returns to the clinic today for a follow-up evaluation regarding her right shoulder.    She reports an improvement in her pain; however, she continues to experience significant functional limitations, particularly with internal rotation. She describes moderate shoulder pain, rating it as 3 to 4 out of 10, which intensifies to 6 or 7 out of 10 during maximal range of motion attempts. She is not experiencing any new numbness or tingling. She also reports no systemic symptoms such as fever, chills, or sweats.     Social History     Occupational History    Occupation: retired     Employer: [email or web address]   Tobacco Use    Smoking status: Former     Current packs/day: 1.00     Average packs/day: 1 pack/day for 60.8 years (60.8 ttl pk-yrs)     Types: Cigarettes     Start date: 8/3/2016    Smokeless tobacco: Never    Tobacco comments:     Quit for about 6 weeks then started back   Vaping Use    Vaping status: Never Used   Substance and Sexual Activity    Alcohol use: No    Drug use: Never    Sexual activity: Not Currently     Partners: Male     Birth control/protection: Post-menopausal      Past Medical History:   Diagnosis Date    AAA (abdominal aortic aneurysm)     Allergic not sure    CODINE,MORFINE AND DYE SOLUTION    Anemia     Aneurysm of abdominal aorta     Arthritis     Zepeda esophagus     CAD (coronary artery disease)     Colon polyp     Constipation     COPD (chronic obstructive pulmonary disease)     Emphysema lung     Empyema     Frozen shoulder 2018    GERD (gastroesophageal reflux disease)     Headache     Hiatal hernia     High risk medication use     History of 2019 novel coronavirus disease (COVID-19) 08/31/2021    History of cardiac  catheterization     CATH STENT PLACEMENT: CIRCUMFLEX BRANCH    Hyperlipidemia     Hypertension     Infectious viral hepatitis Jaundice in 1963    Lumbosacral disc disease     Myocardial infarction     Myocardial infarction     Occult blood in stools     On home O2     Peripheral artery disease     Pneumonia 2005/2020    Reflux gastritis     Scoliosis     Visual impairment     BLURRY VISION IN RT. EYE     Past Surgical History:   Procedure Laterality Date    CARDIAC CATHETERIZATION      CARDIAC CATHETERIZATION N/A 05/12/2017    Procedure: Left Heart Cath;  Surgeon: Herber Hayes MD;  [facility];  Service:     CARDIAC CATHETERIZATION N/A 07/01/2020    Procedure: Left Heart Cath;  Surgeon: Herber Hayes MD;  Location:  [facility] CATH INVASIVE LOCATION;  Service: Cardiology;  Laterality: N/A;    CARDIAC CATHETERIZATION N/A 07/01/2020    Procedure: Coronary angiography;  Surgeon: Herber Hayes MD;  [facility];  Service: Cardiology;  Laterality: N/A;    CARDIAC CATHETERIZATION N/A 07/01/2020    Procedure: Left ventriculography;  Surgeon: Herber Hayes MD;  Location: [facility] CATH INVASIVE LOCATION;  Service: Cardiology;  Laterality: N/A;    CAROTID ENDARTERECTOMY Right 03/16/2020    Procedure: RIGHT CAROTID ENDARTERECTOMY;  Surgeon: Mahesh Salmon MD;  Location: [facility] MAIN OR;  Service: Vascular;  Laterality: Right;    CHOLECYSTECTOMY      COLONOSCOPY      COLONOSCOPY N/A 06/10/2016    Procedure: COLONOSCOPY with polypectomy;  Surgeon: Virgie Castelan MD;  Location: [facility] OR;  Service:     CORONARY STENT PLACEMENT      X2    ENDOSCOPY N/A 06/10/2016    Procedure: ESOPHAGOGASTRODUODENOSCOPY WITH BIOPSY;  Surgeon: Virgie Castelan MD;  Location: [facility] OR;  Service:     ENDOSCOPY      ENDOSCOPY N/A 08/25/2017    Procedure: ESOPHAGOGASTRODUODENOSCOPY w/ biopsies;  Surgeon: Virgie Castelan MD;  Location: [facility] OR;  Service:        Family  "History   Problem Relation Age of Onset    Colon cancer Mother     Breast cancer Mother     Other Father         cardiac disorder    Hypertension Father     Heart disease Father     Heart disease Sister     Hypertension Sister     Pulmonary fibrosis Sister     Other Sister     Thyroid disease Sister     Other Sister     Other Brother         cardiac disorder    Heart disease Brother     Other Brother     Thyroid disease Daughter     Malig Hyperthermia Neg Hx          Review of Systems        Objective:  Vitals:    02/26/25 1451   Weight: 88.5 kg (195 lb)   Height: 157.5 cm (62\")         02/26/25  1451   Weight: 88.5 kg (195 lb)     Body mass index is 35.67 kg/m².  General: No acute distress.  Resp: normal respiratory effort  Skin: no rashes or wounds; normal turgor  Psych: mood and affect appropriate; recent and remote memory intact      Physical Exam  Right shoulder shows active forward flexion of 80 degrees, passive forward flexion of 95 degrees, with 4- out of 5 strength. Active external rotation is 30 degrees, passive external rotation is 40 degrees, with 3 out of 5 strength. Strength is 4 out of 5 on belly press test with internal rotation to her buttock. Positive deltoid five in 3 components.         Imaging:  3 view x-rays right shoulder from today's visit AP Scap Y axillary lateral views, ordered and reviewed by me, indication closed treatment right proximal humerus fracture, compared to prior office x-rays indicates stable but complete displacement of proximal humerus fracture with comminution and intra-articular head split noted with significant posterior displacement of the proximal segment on axillary lateral and scapular Y views    Assessment:        1. Other closed displaced fracture of proximal end of right humerus, initial encounter           Plan:          Assessment & Plan  1. Right shoulder pain.  Repeat x-rays show significant displacement of her fracture, indicating a stable but significantly " malunited fracture. Due to her severe COPD and pulmonary disease, she is not a suitable candidate for reverse shoulder arthroplasty. Home exercises have been provided to maximize function and mobility within her painful limits. An injection was considered but deferred at this time.    Follow-up  The patient will follow up as needed.    Mary Jack was in agreement with plan and had all questions answered.     Orders:  Orders Placed This Encounter   Procedures    XR Shoulder 2+ View Right       Medications:  No orders of the defined types were placed in this encounter.      Followup:  No follow-ups on file.    Diagnoses and all orders for this visit:    1. Other closed displaced fracture of proximal end of right humerus, initial encounter (Primary)  -     XR Shoulder 2+ View Right                  Dictated utilizing Dragon dictation     Patient or patient representative verbalized consent for the use of Ambient Listening during the visit with  Eben Jack MD for chart documentation. 2/26/2025  15:16 EST

## 2025-03-26 ENCOUNTER — LAB REQUISITION (OUTPATIENT)
Dept: LAB | Facility: HOSPITAL | Age: 80
End: 2025-03-26
Payer: MEDICARE

## 2025-03-26 DIAGNOSIS — R60.1 GENERALIZED EDEMA: ICD-10-CM

## 2025-03-26 DIAGNOSIS — R06.00 DYSPNEA, UNSPECIFIED: ICD-10-CM

## 2025-03-26 DIAGNOSIS — Z79.899 OTHER LONG TERM (CURRENT) DRUG THERAPY: ICD-10-CM

## 2025-03-26 DIAGNOSIS — R63.5 ABNORMAL WEIGHT GAIN: ICD-10-CM

## 2025-03-26 LAB
ALBUMIN SERPL-MCNC: 4.3 G/DL (ref 3.5–5.2)
ALBUMIN/GLOB SERPL: 1.9 G/DL
ALP SERPL-CCNC: 116 U/L (ref 39–117)
ALT SERPL W P-5'-P-CCNC: 10 U/L (ref 1–33)
ANION GAP SERPL CALCULATED.3IONS-SCNC: 11.4 MMOL/L (ref 5–15)
AST SERPL-CCNC: 20 U/L (ref 1–32)
BILIRUB SERPL-MCNC: 0.4 MG/DL (ref 0–1.2)
BUN SERPL-MCNC: 11 MG/DL (ref 8–23)
BUN/CREAT SERPL: 12.1 (ref 7–25)
CALCIUM SPEC-SCNC: 9.6 MG/DL (ref 8.6–10.5)
CHLORIDE SERPL-SCNC: 93 MMOL/L (ref 98–107)
CO2 SERPL-SCNC: 25.6 MMOL/L (ref 22–29)
CREAT SERPL-MCNC: 0.91 MG/DL (ref 0.57–1)
EGFRCR SERPLBLD CKD-EPI 2021: 65.1 ML/MIN/1.73
GLOBULIN UR ELPH-MCNC: 2.3 GM/DL
GLUCOSE SERPL-MCNC: 121 MG/DL (ref 65–99)
POTASSIUM SERPL-SCNC: 4.5 MMOL/L (ref 3.5–5.2)
PROT SERPL-MCNC: 6.6 G/DL (ref 6–8.5)
SODIUM SERPL-SCNC: 130 MMOL/L (ref 136–145)
TSH SERPL DL<=0.05 MIU/L-ACNC: 4.68 UIU/ML (ref 0.27–4.2)

## 2025-03-26 PROCEDURE — 84443 ASSAY THYROID STIM HORMONE: CPT | Performed by: INTERNAL MEDICINE

## 2025-03-26 PROCEDURE — 80053 COMPREHEN METABOLIC PANEL: CPT | Performed by: INTERNAL MEDICINE

## (undated) DEVICE — DRSNG WND GZ PAD BORDERED 4X8IN STRL

## (undated) DEVICE — PK CATH CARD 40

## (undated) DEVICE — STERILE COTTON TIP 6IN 10PK: Brand: MEDLINE

## (undated) DEVICE — SUT SILK 3/0 SH CR5 18IN C0135

## (undated) DEVICE — SMOKE EVACUATION TUBING WITH 7/8 IN TO 1/4 IN REDUCER: Brand: BUFFALO FILTER

## (undated) DEVICE — SUT PROLN 6/0 BV1 D/A 30IN 8709H

## (undated) DEVICE — KT MANIFLD CARDIAC

## (undated) DEVICE — CATH DIAG IMPULSE FR4 5F 100CM

## (undated) DEVICE — GLV SURG BIOGEL LTX PF 8 1/2

## (undated) DEVICE — CATH VENT MIV RADL PIG ST TIP 4F 110CM

## (undated) DEVICE — CATH DIAG IMPULSE MPA2 5F 125CM

## (undated) DEVICE — GLV SURG SENSICARE PI PF LF 8.5 GRN STRL

## (undated) DEVICE — ADHS LIQ MASTISOL 2/3ML

## (undated) DEVICE — CATH DIAG IMPULSE FL3.5 5F 100CM

## (undated) DEVICE — SUT PROLN 6/0 C1 D/A 30IN 8706H

## (undated) DEVICE — SPNG GZ WOVN 4X4IN 12PLY 10/BX STRL

## (undated) DEVICE — STRIP CLS WND SUTURESTRIP/PLS 0.5X5IN TP1105

## (undated) DEVICE — SUT SILK 2/0 TIES 18IN A185H

## (undated) DEVICE — SYR LUER SLPTP 50ML

## (undated) DEVICE — TP UMB COTN 1/8X36 U12T

## (undated) DEVICE — 3M™ IOBAN™ 2 ANTIMICROBIAL INCISE DRAPE 6650EZ: Brand: IOBAN™ 2

## (undated) DEVICE — ANTIBACTERIAL UNDYED BRAIDED (POLYGLACTIN 910), SYNTHETIC ABSORBABLE SUTURE: Brand: COATED VICRYL

## (undated) DEVICE — VIAL FORMALIN CAP 10P 40ML

## (undated) DEVICE — SUT SILK 4/0 TIES 18IN A183H

## (undated) DEVICE — GW EMR FIX EXCHG J STD .035 3MM 260CM

## (undated) DEVICE — JACKT LAB KNIT COLR LG BLU

## (undated) DEVICE — SUCTION CANISTER, 3000CC,SAFELINER: Brand: DEROYAL

## (undated) DEVICE — SOL NS 500ML

## (undated) DEVICE — CATHETER,URETHRAL,REDRUBBER,STERILE,20FR: Brand: MEDLINE

## (undated) DEVICE — PENCL E/S HNDSWCH ROCKR CB

## (undated) DEVICE — GOWN ISOL W/THUMB UNIV BLU BX/15

## (undated) DEVICE — GLIDESHEATH BASIC HYDROPHILIC COATED INTRODUCER SHEATH: Brand: GLIDESHEATH

## (undated) DEVICE — SUT SILK 3/0 TIES 18IN A184H

## (undated) DEVICE — Device

## (undated) DEVICE — GLIDESHEATH SLENDER STAINLESS STEEL KIT: Brand: GLIDESHEATH SLENDER

## (undated) DEVICE — BW-412T DISP COMBO CLEANING BRUSH: Brand: SINGLE USE COMBINATION CLEANING BRUSH

## (undated) DEVICE — MASK,FACE,SHIELD,BLUE,ANTI FOG,TIES: Brand: MEDLINE

## (undated) DEVICE — GLV SURG SENSICARE MICRO PF LF 6 STRL

## (undated) DEVICE — THE BITE BLOCK MAXI, LATEX FREE STRAP IS USED TO PROTECT THE ENDOSCOPE INSERTION TUBE FROM BEING BITTEN BY THE PATIENT.

## (undated) DEVICE — FRCP BX RADJAW4 NDL 2.8 240CM LG OG BX40

## (undated) DEVICE — CONN TBG Y 5 IN 1 LF STRL

## (undated) DEVICE — SYR LL 3CC

## (undated) DEVICE — SUT VIC 4/0 SH 27IN J415H

## (undated) DEVICE — Device: Brand: DEFENDO AIR/WATER/SUCTION AND BIOPSY VALVE

## (undated) DEVICE — PK ENDART CARTOID 40